# Patient Record
Sex: MALE | Race: WHITE | Employment: UNEMPLOYED | URBAN - NONMETROPOLITAN AREA
[De-identification: names, ages, dates, MRNs, and addresses within clinical notes are randomized per-mention and may not be internally consistent; named-entity substitution may affect disease eponyms.]

---

## 2017-05-03 VITALS
HEIGHT: 69 IN | WEIGHT: 265 LBS | HEART RATE: 64 BPM | DIASTOLIC BLOOD PRESSURE: 60 MMHG | BODY MASS INDEX: 39.25 KG/M2 | SYSTOLIC BLOOD PRESSURE: 114 MMHG

## 2017-05-03 RX ORDER — DIAZEPAM 2 MG/1
2 TABLET ORAL EVERY 6 HOURS PRN
COMMUNITY
End: 2019-04-18 | Stop reason: ALTCHOICE

## 2017-05-03 RX ORDER — GABAPENTIN 300 MG/1
300 CAPSULE ORAL 3 TIMES DAILY
COMMUNITY
End: 2017-06-29

## 2017-05-03 RX ORDER — OXYCODONE HYDROCHLORIDE AND ACETAMINOPHEN 5; 325 MG/1; MG/1
1 TABLET ORAL EVERY 4 HOURS PRN
Status: ON HOLD | COMMUNITY
End: 2017-07-19 | Stop reason: HOSPADM

## 2017-05-05 ENCOUNTER — INITIAL CONSULT (OUTPATIENT)
Dept: PULMONOLOGY | Age: 32
End: 2017-05-05

## 2017-05-05 VITALS
OXYGEN SATURATION: 97 % | HEART RATE: 80 BPM | BODY MASS INDEX: 41.02 KG/M2 | HEIGHT: 71 IN | SYSTOLIC BLOOD PRESSURE: 138 MMHG | WEIGHT: 293 LBS | DIASTOLIC BLOOD PRESSURE: 86 MMHG

## 2017-05-05 DIAGNOSIS — G47.33 OSA (OBSTRUCTIVE SLEEP APNEA): Primary | ICD-10-CM

## 2017-05-05 DIAGNOSIS — I10 ESSENTIAL HYPERTENSION: ICD-10-CM

## 2017-05-05 DIAGNOSIS — G47.10 HYPERSOMNIA: ICD-10-CM

## 2017-05-05 PROCEDURE — 99203 OFFICE O/P NEW LOW 30 MIN: CPT | Performed by: INTERNAL MEDICINE

## 2017-05-05 RX ORDER — FLUTICASONE PROPIONATE 50 MCG
2 SPRAY, SUSPENSION (ML) NASAL DAILY
Qty: 1 BOTTLE | Refills: 6 | Status: ON HOLD | OUTPATIENT
Start: 2017-05-05 | End: 2017-07-19 | Stop reason: HOSPADM

## 2017-05-05 RX ORDER — CALCIUM CARBONATE 500(1250)
5000 TABLET ORAL DAILY
COMMUNITY
End: 2017-08-29 | Stop reason: ALTCHOICE

## 2017-06-29 ENCOUNTER — OFFICE VISIT (OUTPATIENT)
Dept: OTOLARYNGOLOGY | Age: 32
End: 2017-06-29

## 2017-06-29 VITALS
HEART RATE: 80 BPM | HEIGHT: 70 IN | DIASTOLIC BLOOD PRESSURE: 64 MMHG | TEMPERATURE: 98.1 F | WEIGHT: 303.5 LBS | SYSTOLIC BLOOD PRESSURE: 124 MMHG | BODY MASS INDEX: 43.45 KG/M2 | RESPIRATION RATE: 20 BRPM

## 2017-06-29 DIAGNOSIS — Z99.89 OSA ON CPAP: ICD-10-CM

## 2017-06-29 DIAGNOSIS — K13.79 HYPERTROPHY OF UVULA: ICD-10-CM

## 2017-06-29 DIAGNOSIS — K13.79 HYPERTROPHIC SOFT PALATE: ICD-10-CM

## 2017-06-29 DIAGNOSIS — J34.2 NASAL SEPTAL DEVIATION: Primary | ICD-10-CM

## 2017-06-29 DIAGNOSIS — Z78.9 INTOLERANCE OF CONTINUOUS POSITIVE AIRWAY PRESSURE (CPAP) VENTILATION: ICD-10-CM

## 2017-06-29 DIAGNOSIS — G47.33 OSA ON CPAP: ICD-10-CM

## 2017-06-29 DIAGNOSIS — J34.3 HYPERTROPHY OF INFERIOR NASAL TURBINATE: ICD-10-CM

## 2017-06-29 PROCEDURE — 99243 OFF/OP CNSLTJ NEW/EST LOW 30: CPT | Performed by: OTOLARYNGOLOGY

## 2017-06-29 ASSESSMENT — ENCOUNTER SYMPTOMS
SHORTNESS OF BREATH: 0
FACIAL SWELLING: 0
VOICE CHANGE: 0
CHEST TIGHTNESS: 0
STRIDOR: 0
TROUBLE SWALLOWING: 0
VOMITING: 0
COUGH: 0
RHINORRHEA: 0
CHOKING: 0
SORE THROAT: 0
ABDOMINAL PAIN: 0
WHEEZING: 0
SINUS PRESSURE: 0
COLOR CHANGE: 0
DIARRHEA: 0
APNEA: 0
NAUSEA: 0

## 2017-07-18 ENCOUNTER — ANESTHESIA EVENT (OUTPATIENT)
Dept: OPERATING ROOM | Age: 32
End: 2017-07-18
Payer: MEDICAID

## 2017-07-18 PROBLEM — J34.2 NASAL SEPTAL DEVIATION: Status: ACTIVE | Noted: 2017-07-18

## 2017-07-18 PROBLEM — J34.3 HYPERTROPHY OF INFERIOR NASAL TURBINATE: Status: ACTIVE | Noted: 2017-07-18

## 2017-07-19 ENCOUNTER — HOSPITAL ENCOUNTER (OUTPATIENT)
Age: 32
Setting detail: OUTPATIENT SURGERY
Discharge: HOME | End: 2017-07-19
Attending: OTOLARYNGOLOGY | Admitting: OTOLARYNGOLOGY
Payer: MEDICAID

## 2017-07-19 ENCOUNTER — ANESTHESIA (OUTPATIENT)
Dept: OPERATING ROOM | Age: 32
End: 2017-07-19
Payer: MEDICAID

## 2017-07-19 VITALS
TEMPERATURE: 74.7 F | RESPIRATION RATE: 4 BRPM | DIASTOLIC BLOOD PRESSURE: 86 MMHG | SYSTOLIC BLOOD PRESSURE: 164 MMHG | OXYGEN SATURATION: 95 %

## 2017-07-19 VITALS
HEIGHT: 71 IN | HEART RATE: 84 BPM | TEMPERATURE: 97 F | RESPIRATION RATE: 16 BRPM | WEIGHT: 303.57 LBS | DIASTOLIC BLOOD PRESSURE: 81 MMHG | BODY MASS INDEX: 42.5 KG/M2 | SYSTOLIC BLOOD PRESSURE: 145 MMHG | OXYGEN SATURATION: 95 %

## 2017-07-19 PROCEDURE — 6360000002 HC RX W HCPCS: Performed by: OTOLARYNGOLOGY

## 2017-07-19 PROCEDURE — 6370000000 HC RX 637 (ALT 250 FOR IP)

## 2017-07-19 PROCEDURE — 7100000011 HC PHASE II RECOVERY - ADDTL 15 MIN: Performed by: OTOLARYNGOLOGY

## 2017-07-19 PROCEDURE — 7100000000 HC PACU RECOVERY - FIRST 15 MIN: Performed by: OTOLARYNGOLOGY

## 2017-07-19 PROCEDURE — 6370000000 HC RX 637 (ALT 250 FOR IP): Performed by: OTOLARYNGOLOGY

## 2017-07-19 PROCEDURE — 3700000001 HC ADD 15 MINUTES (ANESTHESIA): Performed by: OTOLARYNGOLOGY

## 2017-07-19 PROCEDURE — 7100000010 HC PHASE II RECOVERY - FIRST 15 MIN: Performed by: OTOLARYNGOLOGY

## 2017-07-19 PROCEDURE — 2500000003 HC RX 250 WO HCPCS: Performed by: OTOLARYNGOLOGY

## 2017-07-19 PROCEDURE — 2580000003 HC RX 258: Performed by: OTOLARYNGOLOGY

## 2017-07-19 PROCEDURE — 2500000003 HC RX 250 WO HCPCS: Performed by: REGISTERED NURSE

## 2017-07-19 PROCEDURE — 6370000000 HC RX 637 (ALT 250 FOR IP): Performed by: REGISTERED NURSE

## 2017-07-19 PROCEDURE — 3600000004 HC SURGERY LEVEL 4 BASE: Performed by: OTOLARYNGOLOGY

## 2017-07-19 PROCEDURE — 7100000001 HC PACU RECOVERY - ADDTL 15 MIN: Performed by: OTOLARYNGOLOGY

## 2017-07-19 PROCEDURE — 6360000002 HC RX W HCPCS: Performed by: REGISTERED NURSE

## 2017-07-19 PROCEDURE — 3700000000 HC ANESTHESIA ATTENDED CARE: Performed by: OTOLARYNGOLOGY

## 2017-07-19 PROCEDURE — 3600000014 HC SURGERY LEVEL 4 ADDTL 15MIN: Performed by: OTOLARYNGOLOGY

## 2017-07-19 RX ORDER — HYDROCODONE BITARTRATE AND ACETAMINOPHEN 7.5; 325 MG/1; MG/1
TABLET ORAL
Status: COMPLETED
Start: 2017-07-19 | End: 2017-07-19

## 2017-07-19 RX ORDER — PROPOFOL 10 MG/ML
INJECTION, EMULSION INTRAVENOUS PRN
Status: DISCONTINUED | OUTPATIENT
Start: 2017-07-19 | End: 2017-07-19 | Stop reason: SDUPTHER

## 2017-07-19 RX ORDER — GLYCOPYRROLATE 0.2 MG/ML
INJECTION INTRAMUSCULAR; INTRAVENOUS PRN
Status: DISCONTINUED | OUTPATIENT
Start: 2017-07-19 | End: 2017-07-19 | Stop reason: SDUPTHER

## 2017-07-19 RX ORDER — ROCURONIUM BROMIDE 10 MG/ML
INJECTION, SOLUTION INTRAVENOUS PRN
Status: DISCONTINUED | OUTPATIENT
Start: 2017-07-19 | End: 2017-07-19 | Stop reason: SDUPTHER

## 2017-07-19 RX ORDER — MIDAZOLAM HYDROCHLORIDE 1 MG/ML
INJECTION INTRAMUSCULAR; INTRAVENOUS PRN
Status: DISCONTINUED | OUTPATIENT
Start: 2017-07-19 | End: 2017-07-19 | Stop reason: SDUPTHER

## 2017-07-19 RX ORDER — ONDANSETRON 2 MG/ML
INJECTION INTRAMUSCULAR; INTRAVENOUS PRN
Status: DISCONTINUED | OUTPATIENT
Start: 2017-07-19 | End: 2017-07-19 | Stop reason: SDUPTHER

## 2017-07-19 RX ORDER — ROPIVACAINE HYDROCHLORIDE 5 MG/ML
INJECTION, SOLUTION EPIDURAL; INFILTRATION; PERINEURAL PRN
Status: DISCONTINUED | OUTPATIENT
Start: 2017-07-19 | End: 2017-07-19 | Stop reason: HOSPADM

## 2017-07-19 RX ORDER — LIDOCAINE HYDROCHLORIDE 40 MG/ML
SOLUTION TOPICAL PRN
Status: DISCONTINUED | OUTPATIENT
Start: 2017-07-19 | End: 2017-07-19 | Stop reason: SDUPTHER

## 2017-07-19 RX ORDER — HYDROMORPHONE HCL 110MG/55ML
0.25 PATIENT CONTROLLED ANALGESIA SYRINGE INTRAVENOUS EVERY 5 MIN PRN
Status: DISCONTINUED | OUTPATIENT
Start: 2017-07-19 | End: 2017-07-19 | Stop reason: HOSPADM

## 2017-07-19 RX ORDER — SUCCINYLCHOLINE CHLORIDE 20 MG/ML
INJECTION INTRAMUSCULAR; INTRAVENOUS PRN
Status: DISCONTINUED | OUTPATIENT
Start: 2017-07-19 | End: 2017-07-19 | Stop reason: SDUPTHER

## 2017-07-19 RX ORDER — LABETALOL HYDROCHLORIDE 5 MG/ML
5 INJECTION, SOLUTION INTRAVENOUS EVERY 10 MIN PRN
Status: DISCONTINUED | OUTPATIENT
Start: 2017-07-19 | End: 2017-07-19 | Stop reason: HOSPADM

## 2017-07-19 RX ORDER — FENTANYL CITRATE 50 UG/ML
INJECTION, SOLUTION INTRAMUSCULAR; INTRAVENOUS PRN
Status: DISCONTINUED | OUTPATIENT
Start: 2017-07-19 | End: 2017-07-19 | Stop reason: SDUPTHER

## 2017-07-19 RX ORDER — NEOSTIGMINE METHYLSULFATE 1 MG/ML
INJECTION, SOLUTION INTRAVENOUS PRN
Status: DISCONTINUED | OUTPATIENT
Start: 2017-07-19 | End: 2017-07-19 | Stop reason: SDUPTHER

## 2017-07-19 RX ORDER — CEPHALEXIN 500 MG/1
500 CAPSULE ORAL 4 TIMES DAILY
Qty: 20 CAPSULE | Refills: 0 | Status: SHIPPED | OUTPATIENT
Start: 2017-07-19 | End: 2017-07-24

## 2017-07-19 RX ORDER — SODIUM CHLORIDE 9 MG/ML
INJECTION, SOLUTION INTRAVENOUS CONTINUOUS
Status: DISCONTINUED | OUTPATIENT
Start: 2017-07-19 | End: 2017-07-19 | Stop reason: HOSPADM

## 2017-07-19 RX ORDER — DIPHENHYDRAMINE HYDROCHLORIDE 50 MG/ML
12.5 INJECTION INTRAMUSCULAR; INTRAVENOUS
Status: DISCONTINUED | OUTPATIENT
Start: 2017-07-19 | End: 2017-07-19 | Stop reason: HOSPADM

## 2017-07-19 RX ORDER — PREDNISONE 20 MG/1
20 TABLET ORAL DAILY
Qty: 4 TABLET | Refills: 0 | Status: SHIPPED | OUTPATIENT
Start: 2017-07-19 | End: 2017-07-22

## 2017-07-19 RX ORDER — DEXAMETHASONE SODIUM PHOSPHATE 4 MG/ML
12 INJECTION, SOLUTION INTRA-ARTICULAR; INTRALESIONAL; INTRAMUSCULAR; INTRAVENOUS; SOFT TISSUE
Status: DISCONTINUED | OUTPATIENT
Start: 2017-07-19 | End: 2017-07-19 | Stop reason: HOSPADM

## 2017-07-19 RX ORDER — FENTANYL CITRATE 50 UG/ML
25 INJECTION, SOLUTION INTRAMUSCULAR; INTRAVENOUS EVERY 5 MIN PRN
Status: DISCONTINUED | OUTPATIENT
Start: 2017-07-19 | End: 2017-07-19 | Stop reason: HOSPADM

## 2017-07-19 RX ORDER — HYDROMORPHONE HCL 110MG/55ML
0.5 PATIENT CONTROLLED ANALGESIA SYRINGE INTRAVENOUS EVERY 5 MIN PRN
Status: DISCONTINUED | OUTPATIENT
Start: 2017-07-19 | End: 2017-07-19 | Stop reason: HOSPADM

## 2017-07-19 RX ORDER — HYDROCODONE BITARTRATE AND ACETAMINOPHEN 7.5; 325 MG/1; MG/1
1 TABLET ORAL EVERY 4 HOURS PRN
Qty: 20 TABLET | Refills: 0 | Status: SHIPPED | OUTPATIENT
Start: 2017-07-19 | End: 2017-08-29 | Stop reason: ALTCHOICE

## 2017-07-19 RX ORDER — MINERAL OIL AND WHITE PETROLATUM 150; 830 MG/G; MG/G
OINTMENT OPHTHALMIC PRN
Status: DISCONTINUED | OUTPATIENT
Start: 2017-07-19 | End: 2017-07-19 | Stop reason: SDUPTHER

## 2017-07-19 RX ORDER — PSEUDOEPHEDRINE HYDROCHLORIDE 30 MG/1
30 TABLET ORAL EVERY 6 HOURS
Qty: 56 TABLET | Refills: 1 | Status: SHIPPED | OUTPATIENT
Start: 2017-07-19 | End: 2017-08-02

## 2017-07-19 RX ORDER — LIDOCAINE HYDROCHLORIDE AND EPINEPHRINE 10; 10 MG/ML; UG/ML
INJECTION, SOLUTION INFILTRATION; PERINEURAL PRN
Status: DISCONTINUED | OUTPATIENT
Start: 2017-07-19 | End: 2017-07-19 | Stop reason: HOSPADM

## 2017-07-19 RX ORDER — PROMETHAZINE HYDROCHLORIDE 25 MG/ML
12.5 INJECTION, SOLUTION INTRAMUSCULAR; INTRAVENOUS
Status: DISCONTINUED | OUTPATIENT
Start: 2017-07-19 | End: 2017-07-19 | Stop reason: HOSPADM

## 2017-07-19 RX ORDER — FENTANYL CITRATE 50 UG/ML
50 INJECTION, SOLUTION INTRAMUSCULAR; INTRAVENOUS EVERY 5 MIN PRN
Status: DISCONTINUED | OUTPATIENT
Start: 2017-07-19 | End: 2017-07-19 | Stop reason: HOSPADM

## 2017-07-19 RX ORDER — DEXAMETHASONE SODIUM PHOSPHATE 4 MG/ML
INJECTION, SOLUTION INTRA-ARTICULAR; INTRALESIONAL; INTRAMUSCULAR; INTRAVENOUS; SOFT TISSUE PRN
Status: DISCONTINUED | OUTPATIENT
Start: 2017-07-19 | End: 2017-07-19 | Stop reason: HOSPADM

## 2017-07-19 RX ORDER — MEPERIDINE HYDROCHLORIDE 25 MG/ML
25 INJECTION INTRAMUSCULAR; INTRAVENOUS; SUBCUTANEOUS
Status: DISCONTINUED | OUTPATIENT
Start: 2017-07-19 | End: 2017-07-19 | Stop reason: HOSPADM

## 2017-07-19 RX ORDER — OXYMETAZOLINE HYDROCHLORIDE 0.05 G/100ML
SPRAY NASAL PRN
Status: DISCONTINUED | OUTPATIENT
Start: 2017-07-19 | End: 2017-07-19 | Stop reason: HOSPADM

## 2017-07-19 RX ORDER — GINSENG 100 MG
CAPSULE ORAL PRN
Status: DISCONTINUED | OUTPATIENT
Start: 2017-07-19 | End: 2017-07-19 | Stop reason: HOSPADM

## 2017-07-19 RX ORDER — LIDOCAINE HYDROCHLORIDE 20 MG/ML
INJECTION, SOLUTION EPIDURAL; INFILTRATION; INTRACAUDAL; PERINEURAL PRN
Status: DISCONTINUED | OUTPATIENT
Start: 2017-07-19 | End: 2017-07-19 | Stop reason: SDUPTHER

## 2017-07-19 RX ADMIN — DEXAMETHASONE SODIUM PHOSPHATE 12 MG: 4 INJECTION, SOLUTION INTRAMUSCULAR; INTRAVENOUS at 07:18

## 2017-07-19 RX ADMIN — HYDROCODONE BITARTRATE AND ACETAMINOPHEN 1 TABLET: 7.5; 325 TABLET ORAL at 12:22

## 2017-07-19 RX ADMIN — NEOSTIGMINE METHYLSULFATE 5 MG: 1 INJECTION, SOLUTION INTRAVENOUS at 10:32

## 2017-07-19 RX ADMIN — FENTANYL CITRATE 50 MCG: 50 INJECTION, SOLUTION INTRAMUSCULAR; INTRAVENOUS at 10:22

## 2017-07-19 RX ADMIN — GLYCOPYRROLATE 0.2 MG: 0.2 INJECTION, SOLUTION INTRAMUSCULAR; INTRAVENOUS at 07:32

## 2017-07-19 RX ADMIN — SODIUM CHLORIDE: 9 INJECTION, SOLUTION INTRAVENOUS at 07:10

## 2017-07-19 RX ADMIN — FENTANYL CITRATE 50 MCG: 50 INJECTION, SOLUTION INTRAMUSCULAR; INTRAVENOUS at 10:31

## 2017-07-19 RX ADMIN — FENTANYL CITRATE 100 MCG: 50 INJECTION, SOLUTION INTRAMUSCULAR; INTRAVENOUS at 07:41

## 2017-07-19 RX ADMIN — GLYCOPYRROLATE 0.4 MG: 0.2 INJECTION, SOLUTION INTRAMUSCULAR; INTRAVENOUS at 10:32

## 2017-07-19 RX ADMIN — SUCCINYLCHOLINE CHLORIDE 200 MG: 20 INJECTION, SOLUTION INTRAMUSCULAR; INTRAVENOUS at 07:41

## 2017-07-19 RX ADMIN — MIDAZOLAM 2 MG: 1 INJECTION INTRAMUSCULAR; INTRAVENOUS at 07:32

## 2017-07-19 RX ADMIN — LIDOCAINE HYDROCHLORIDE 4 ML: 40 SOLUTION TOPICAL at 10:19

## 2017-07-19 RX ADMIN — LIDOCAINE HYDROCHLORIDE 100 MG: 20 INJECTION, SOLUTION EPIDURAL; INFILTRATION; INTRACAUDAL; PERINEURAL at 07:41

## 2017-07-19 RX ADMIN — ONDANSETRON 4 MG: 2 INJECTION INTRAMUSCULAR; INTRAVENOUS at 10:19

## 2017-07-19 RX ADMIN — Medication 50 MG: at 07:57

## 2017-07-19 RX ADMIN — PROPOFOL 200 MG: 10 INJECTION, EMULSION INTRAVENOUS at 07:41

## 2017-07-19 RX ADMIN — FENTANYL CITRATE 150 MCG: 50 INJECTION, SOLUTION INTRAMUSCULAR; INTRAVENOUS at 08:00

## 2017-07-19 RX ADMIN — MINERAL OIL AND WHITE PETROLATUM 2 APPLICATOR: 150; 830 OINTMENT OPHTHALMIC at 07:43

## 2017-07-19 RX ADMIN — SODIUM CHLORIDE: 9 INJECTION, SOLUTION INTRAVENOUS at 08:15

## 2017-07-19 ASSESSMENT — PULMONARY FUNCTION TESTS
PIF_VALUE: 27
PIF_VALUE: 24
PIF_VALUE: 30
PIF_VALUE: 30
PIF_VALUE: 26
PIF_VALUE: 31
PIF_VALUE: 27
PIF_VALUE: 4
PIF_VALUE: 27
PIF_VALUE: 30
PIF_VALUE: 27
PIF_VALUE: 26
PIF_VALUE: 27
PIF_VALUE: 28
PIF_VALUE: 27
PIF_VALUE: 1
PIF_VALUE: 29
PIF_VALUE: 31
PIF_VALUE: 23
PIF_VALUE: 31
PIF_VALUE: 27
PIF_VALUE: 28
PIF_VALUE: 2
PIF_VALUE: 26
PIF_VALUE: 27
PIF_VALUE: 27
PIF_VALUE: 4
PIF_VALUE: 31
PIF_VALUE: 29
PIF_VALUE: 27
PIF_VALUE: 25
PIF_VALUE: 23
PIF_VALUE: 27
PIF_VALUE: 27
PIF_VALUE: 2
PIF_VALUE: 27
PIF_VALUE: 19
PIF_VALUE: 31
PIF_VALUE: 29
PIF_VALUE: 32
PIF_VALUE: 27
PIF_VALUE: 27
PIF_VALUE: 26
PIF_VALUE: 27
PIF_VALUE: 27
PIF_VALUE: 29
PIF_VALUE: 26
PIF_VALUE: 27
PIF_VALUE: 31
PIF_VALUE: 27
PIF_VALUE: 31
PIF_VALUE: 31
PIF_VALUE: 27
PIF_VALUE: 16
PIF_VALUE: 27
PIF_VALUE: 29
PIF_VALUE: 27
PIF_VALUE: 27
PIF_VALUE: 29
PIF_VALUE: 27
PIF_VALUE: 31
PIF_VALUE: 27
PIF_VALUE: 27
PIF_VALUE: 26
PIF_VALUE: 29
PIF_VALUE: 27
PIF_VALUE: 29
PIF_VALUE: 29
PIF_VALUE: 27
PIF_VALUE: 27
PIF_VALUE: 30
PIF_VALUE: 20
PIF_VALUE: 27
PIF_VALUE: 27
PIF_VALUE: 31
PIF_VALUE: 31
PIF_VALUE: 27
PIF_VALUE: 31
PIF_VALUE: 27
PIF_VALUE: 31
PIF_VALUE: 31
PIF_VALUE: 20
PIF_VALUE: 25
PIF_VALUE: 32
PIF_VALUE: 21
PIF_VALUE: 27
PIF_VALUE: 30
PIF_VALUE: 3
PIF_VALUE: 0
PIF_VALUE: 27
PIF_VALUE: 27
PIF_VALUE: 2
PIF_VALUE: 27
PIF_VALUE: 27
PIF_VALUE: 29
PIF_VALUE: 27
PIF_VALUE: 27
PIF_VALUE: 26
PIF_VALUE: 27
PIF_VALUE: 27
PIF_VALUE: 24
PIF_VALUE: 29
PIF_VALUE: 27
PIF_VALUE: 4
PIF_VALUE: 27
PIF_VALUE: 33
PIF_VALUE: 27
PIF_VALUE: 24
PIF_VALUE: 0
PIF_VALUE: 27
PIF_VALUE: 31
PIF_VALUE: 31
PIF_VALUE: 26
PIF_VALUE: 27
PIF_VALUE: 27
PIF_VALUE: 0
PIF_VALUE: 6
PIF_VALUE: 27
PIF_VALUE: 23
PIF_VALUE: 27
PIF_VALUE: 27
PIF_VALUE: 29
PIF_VALUE: 27
PIF_VALUE: 29
PIF_VALUE: 31
PIF_VALUE: 27
PIF_VALUE: 4
PIF_VALUE: 27
PIF_VALUE: 0
PIF_VALUE: 31
PIF_VALUE: 27
PIF_VALUE: 30
PIF_VALUE: 27
PIF_VALUE: 31
PIF_VALUE: 27
PIF_VALUE: 29
PIF_VALUE: 27
PIF_VALUE: 24
PIF_VALUE: 32
PIF_VALUE: 29
PIF_VALUE: 27
PIF_VALUE: 29
PIF_VALUE: 27
PIF_VALUE: 31
PIF_VALUE: 9
PIF_VALUE: 0
PIF_VALUE: 27
PIF_VALUE: 4
PIF_VALUE: 22
PIF_VALUE: 31
PIF_VALUE: 31
PIF_VALUE: 27
PIF_VALUE: 27

## 2017-07-19 ASSESSMENT — PAIN SCALES - GENERAL
PAINLEVEL_OUTOF10: 6
PAINLEVEL_OUTOF10: 5
PAINLEVEL_OUTOF10: 9
PAINLEVEL_OUTOF10: 9
PAINLEVEL_OUTOF10: 7

## 2017-07-19 ASSESSMENT — PAIN - FUNCTIONAL ASSESSMENT: PAIN_FUNCTIONAL_ASSESSMENT: 0-10

## 2017-07-20 ENCOUNTER — OFFICE VISIT (OUTPATIENT)
Dept: ENT CLINIC | Age: 32
End: 2017-07-20

## 2017-07-20 VITALS
TEMPERATURE: 98.1 F | RESPIRATION RATE: 24 BRPM | SYSTOLIC BLOOD PRESSURE: 160 MMHG | DIASTOLIC BLOOD PRESSURE: 80 MMHG | HEIGHT: 71 IN | HEART RATE: 88 BPM | WEIGHT: 307.3 LBS | BODY MASS INDEX: 43.02 KG/M2

## 2017-07-20 DIAGNOSIS — Z99.89 OSA ON CPAP: ICD-10-CM

## 2017-07-20 DIAGNOSIS — Z78.9 INTOLERANCE OF CONTINUOUS POSITIVE AIRWAY PRESSURE (CPAP) VENTILATION: ICD-10-CM

## 2017-07-20 DIAGNOSIS — J34.2 NASAL SEPTAL DEVIATION: Primary | ICD-10-CM

## 2017-07-20 DIAGNOSIS — J34.3 HYPERTROPHY OF INFERIOR NASAL TURBINATE: ICD-10-CM

## 2017-07-20 DIAGNOSIS — G47.33 OSA ON CPAP: ICD-10-CM

## 2017-07-20 DIAGNOSIS — K13.79 HYPERTROPHY OF UVULA: ICD-10-CM

## 2017-07-20 DIAGNOSIS — Z98.890 STATUS POST NASAL SEPTOPLASTY: ICD-10-CM

## 2017-07-20 DIAGNOSIS — K13.79 HYPERTROPHIC SOFT PALATE: ICD-10-CM

## 2017-07-20 PROCEDURE — 99024 POSTOP FOLLOW-UP VISIT: CPT | Performed by: OTOLARYNGOLOGY

## 2017-07-20 ASSESSMENT — ENCOUNTER SYMPTOMS
COUGH: 0
RHINORRHEA: 0
NAUSEA: 0
COLOR CHANGE: 0
WHEEZING: 0
DIARRHEA: 0
VOMITING: 0
ABDOMINAL PAIN: 0
STRIDOR: 0
TROUBLE SWALLOWING: 0
CHEST TIGHTNESS: 0
SINUS PRESSURE: 0
APNEA: 0
FACIAL SWELLING: 0
SHORTNESS OF BREATH: 0
SORE THROAT: 0
VOICE CHANGE: 0
CHOKING: 0

## 2017-07-27 ENCOUNTER — OFFICE VISIT (OUTPATIENT)
Dept: ENT CLINIC | Age: 32
End: 2017-07-27

## 2017-07-27 VITALS
WEIGHT: 301.8 LBS | DIASTOLIC BLOOD PRESSURE: 88 MMHG | TEMPERATURE: 98.2 F | SYSTOLIC BLOOD PRESSURE: 126 MMHG | HEART RATE: 88 BPM | RESPIRATION RATE: 20 BRPM | BODY MASS INDEX: 42.09 KG/M2

## 2017-07-27 DIAGNOSIS — Z98.890 STATUS POST NASAL SEPTOPLASTY: ICD-10-CM

## 2017-07-27 DIAGNOSIS — Z99.89 OSA ON CPAP: ICD-10-CM

## 2017-07-27 DIAGNOSIS — K13.79 HYPERTROPHY OF UVULA: ICD-10-CM

## 2017-07-27 DIAGNOSIS — G47.33 OSA ON CPAP: ICD-10-CM

## 2017-07-27 DIAGNOSIS — K13.79 HYPERTROPHIC SOFT PALATE: ICD-10-CM

## 2017-07-27 DIAGNOSIS — Z78.9 INTOLERANCE OF CONTINUOUS POSITIVE AIRWAY PRESSURE (CPAP) VENTILATION: ICD-10-CM

## 2017-07-27 DIAGNOSIS — J34.3 HYPERTROPHY OF INFERIOR NASAL TURBINATE: ICD-10-CM

## 2017-07-27 DIAGNOSIS — J34.2 NASAL SEPTAL DEVIATION: Primary | ICD-10-CM

## 2017-07-27 PROCEDURE — 99024 POSTOP FOLLOW-UP VISIT: CPT | Performed by: OTOLARYNGOLOGY

## 2017-07-27 RX ORDER — DOXYCYCLINE HYCLATE 100 MG/1
100 CAPSULE ORAL 4 TIMES DAILY
COMMUNITY
End: 2017-08-29 | Stop reason: ALTCHOICE

## 2017-07-27 RX ORDER — SULFAMETHOXAZOLE AND TRIMETHOPRIM 800; 160 MG/1; MG/1
1 TABLET ORAL 2 TIMES DAILY
Qty: 28 TABLET | Refills: 2 | Status: SHIPPED | OUTPATIENT
Start: 2017-07-27 | End: 2017-08-10

## 2017-07-27 ASSESSMENT — ENCOUNTER SYMPTOMS
VOICE CHANGE: 0
SINUS PRESSURE: 0
NAUSEA: 0
VOMITING: 0
DIARRHEA: 0
TROUBLE SWALLOWING: 0
RHINORRHEA: 0
STRIDOR: 0
SHORTNESS OF BREATH: 0
COLOR CHANGE: 0
COUGH: 0
CHEST TIGHTNESS: 0
WHEEZING: 0
SORE THROAT: 0
FACIAL SWELLING: 0
CHOKING: 0
APNEA: 0
ABDOMINAL PAIN: 0

## 2017-07-28 LAB
ORGANISM: ABNORMAL
THROAT/NOSE CULTURE: ABNORMAL

## 2017-08-07 ENCOUNTER — NURSE TRIAGE (OUTPATIENT)
Dept: ADMINISTRATIVE | Age: 32
End: 2017-08-07

## 2017-08-16 ENCOUNTER — TELEPHONE (OUTPATIENT)
Dept: ENT CLINIC | Age: 32
End: 2017-08-16

## 2017-08-29 ENCOUNTER — OFFICE VISIT (OUTPATIENT)
Dept: ENT CLINIC | Age: 32
End: 2017-08-29

## 2017-08-29 VITALS
HEART RATE: 88 BPM | SYSTOLIC BLOOD PRESSURE: 128 MMHG | TEMPERATURE: 98.4 F | HEIGHT: 71 IN | WEIGHT: 297.7 LBS | RESPIRATION RATE: 14 BRPM | DIASTOLIC BLOOD PRESSURE: 80 MMHG | BODY MASS INDEX: 41.68 KG/M2

## 2017-08-29 DIAGNOSIS — J34.3 HYPERTROPHY OF INFERIOR NASAL TURBINATE: ICD-10-CM

## 2017-08-29 DIAGNOSIS — Z98.890 STATUS POST NASAL SEPTOPLASTY: ICD-10-CM

## 2017-08-29 DIAGNOSIS — J34.2 NASAL SEPTAL DEVIATION: Primary | ICD-10-CM

## 2017-08-29 DIAGNOSIS — G47.33 OSA ON CPAP: ICD-10-CM

## 2017-08-29 DIAGNOSIS — Z99.89 OSA ON CPAP: ICD-10-CM

## 2017-08-29 DIAGNOSIS — K13.79 HYPERTROPHIC SOFT PALATE: ICD-10-CM

## 2017-08-29 DIAGNOSIS — Z78.9 INTOLERANCE OF CONTINUOUS POSITIVE AIRWAY PRESSURE (CPAP) VENTILATION: ICD-10-CM

## 2017-08-29 DIAGNOSIS — K13.79 HYPERTROPHY OF UVULA: ICD-10-CM

## 2017-08-29 PROCEDURE — 99024 POSTOP FOLLOW-UP VISIT: CPT | Performed by: OTOLARYNGOLOGY

## 2017-08-29 ASSESSMENT — ENCOUNTER SYMPTOMS
CHOKING: 0
SHORTNESS OF BREATH: 0
VOICE CHANGE: 0
STRIDOR: 0
SORE THROAT: 0
COLOR CHANGE: 0
FACIAL SWELLING: 0
VOMITING: 0
CHEST TIGHTNESS: 0
NAUSEA: 0
COUGH: 0
ABDOMINAL PAIN: 0
RHINORRHEA: 0
WHEEZING: 0
DIARRHEA: 0
TROUBLE SWALLOWING: 0
SINUS PRESSURE: 0
APNEA: 0

## 2019-04-18 ENCOUNTER — OFFICE VISIT (OUTPATIENT)
Dept: ENT CLINIC | Age: 34
End: 2019-04-18
Payer: MEDICAID

## 2019-04-18 VITALS
RESPIRATION RATE: 12 BRPM | SYSTOLIC BLOOD PRESSURE: 144 MMHG | WEIGHT: 315 LBS | BODY MASS INDEX: 42.66 KG/M2 | HEART RATE: 74 BPM | TEMPERATURE: 98.6 F | DIASTOLIC BLOOD PRESSURE: 100 MMHG | HEIGHT: 72 IN

## 2019-04-18 DIAGNOSIS — K13.79 HYPERTROPHY OF UVULA: ICD-10-CM

## 2019-04-18 DIAGNOSIS — E66.2 CLASS 3 OBESITY WITH ALVEOLAR HYPOVENTILATION, SERIOUS COMORBIDITY, AND BODY MASS INDEX (BMI) OF 40.0 TO 44.9 IN ADULT (HCC): ICD-10-CM

## 2019-04-18 DIAGNOSIS — J35.1 PALATINE TONSIL HYPERTROPHY: ICD-10-CM

## 2019-04-18 DIAGNOSIS — K13.79 HYPERTROPHIC SOFT PALATE: ICD-10-CM

## 2019-04-18 DIAGNOSIS — E55.9 VITAMIN D DEFICIENCY: ICD-10-CM

## 2019-04-18 DIAGNOSIS — J35.1 LINGUAL TONSIL HYPERTROPHY: ICD-10-CM

## 2019-04-18 DIAGNOSIS — K14.8 LARGE TONGUE: ICD-10-CM

## 2019-04-18 DIAGNOSIS — Z98.890 STATUS POST NASAL SEPTOPLASTY: ICD-10-CM

## 2019-04-18 DIAGNOSIS — Z78.9 INTOLERANCE OF CONTINUOUS POSITIVE AIRWAY PRESSURE (CPAP) VENTILATION: Primary | ICD-10-CM

## 2019-04-18 DIAGNOSIS — K21.9 GERD WITHOUT ESOPHAGITIS: ICD-10-CM

## 2019-04-18 PROBLEM — G47.33 OSA ON CPAP: Status: ACTIVE | Noted: 2019-04-18

## 2019-04-18 PROBLEM — Z99.89 OSA ON CPAP: Status: ACTIVE | Noted: 2019-04-18

## 2019-04-18 PROBLEM — E66.813 CLASS 3 OBESITY WITH ALVEOLAR HYPOVENTILATION, SERIOUS COMORBIDITY, AND BODY MASS INDEX (BMI) OF 40.0 TO 44.9 IN ADULT: Status: ACTIVE | Noted: 2019-04-18

## 2019-04-18 PROCEDURE — 31575 DIAGNOSTIC LARYNGOSCOPY: CPT | Performed by: OTOLARYNGOLOGY

## 2019-04-18 PROCEDURE — 99214 OFFICE O/P EST MOD 30 MIN: CPT | Performed by: OTOLARYNGOLOGY

## 2019-04-18 RX ORDER — CHOLECALCIFEROL (VITAMIN D3) 1250 MCG
1 CAPSULE ORAL WEEKLY
Qty: 10 CAPSULE | Refills: 5 | Status: SHIPPED | OUTPATIENT
Start: 2019-04-18 | End: 2021-02-09 | Stop reason: ALTCHOICE

## 2019-04-18 ASSESSMENT — ENCOUNTER SYMPTOMS
FACIAL SWELLING: 0
WHEEZING: 0
ABDOMINAL PAIN: 0
CHEST TIGHTNESS: 0
TROUBLE SWALLOWING: 0
COUGH: 0
NAUSEA: 0
VOICE CHANGE: 0
RHINORRHEA: 0
COLOR CHANGE: 0
VOMITING: 0
SHORTNESS OF BREATH: 0
DIARRHEA: 0
CHOKING: 0
STRIDOR: 0
APNEA: 0
SORE THROAT: 0
SINUS PRESSURE: 0

## 2019-04-18 NOTE — PATIENT INSTRUCTIONS
Do not fill up stomach for 3 hours before bedtime. Elevate the head of the bed, perhaps with a foam wedge. May take Prilosec 20 mg with supper.      See dentist regarding dental splint for GERHARD

## 2019-04-18 NOTE — LETTER
[address] and [address] [address] [place] [address] Alberto Hortal[address]  [address]  Phone: [phone]  Fax: [address], [place]        April 29, 2019    Jina Albarado, [address]    Patient: Ronnie Judge   MR Number: [id number]   YOB: 1985   Date of Visit: 4/18/2019     Dear Jina Albarado,    I recently saw your patient, Ronnie Judge, regarding proceeding with the UPPP and tonsillectomy. [facility] has  Below are the relevant portions of my assessment and plan of care. Assessment & Plan   Diagnoses and all orders for this visit:     Diagnosis Orders   1. Intolerance of continuous positive airway pressure (CPAP) ventilation  [place] REMOVAL OF TONSILS,12+ Y/O    UPPP    NM LARYNGOSCOPY FLEXIBLE DIAGNOSTIC   2. Hypertrophy of uvula  UPPP   3. Hypertrophic soft palate  UPPP   4. Large tongue  UPPP    NM LARYNGOSCOPY FLEXIBLE DIAGNOSTIC   5. Class 3 obesity with alveolar hypoventilation, serious comorbidity, and body mass index (BMI) of 40.0 to 44.9 in adult (HCC)  [place] REMOVAL OF TONSILS,12+ Y/O    UPPP   6. Status post nasal septoplasty  [place] REMOVAL OF TONSILS,12+ Y/O    UPPP   7. GERD without esophagitis  NM LARYNGOSCOPY FLEXIBLE DIAGNOSTIC   8. Lingual tonsil hypertrophy     9. [place] tonsil hypertrophy  NM REMOVAL OF TONSILS,12+ Y/O   10. Vitamin D deficiency  Vitamin D 25 Hydroxy       The findings were explained and his questions were answered. As noted previously has severe obstruction the level of the oropharynx with a long thick palate and a huge thick wide uvula. Uvulopalatoplasty and tonsillectomy was discussed previously as the next logical step. He also has a prominent base of tongue. The potential third phase would be partial posterior midline glossectomy to reduce the volume in that base of tongue, should he still not be effectively treated with CPAP.   Based on my extensive experience with virtually 40 years of treating sleep apnea, his oropharyngeal obstruction is more significant than the obstruction in the hypopharynx at the base of tongue level. After these procedures, if he still has difficulty, a dental device might pull his base of tongue forward enough that he might avoid CPAP. He is still far too obstructed at both levels for a dental device to work now. Maxillo- mandibular osteotomies have very high expense and morbidity, and as noted in the authoritative [place] experience, are the last resort. Furthermore there is no one in this area that does the surgery. Recommended procedures:  Uvulopalatopharyngoplasty  Tonsillectomy  Estimated surgical time, 2 hours. Informed consent. UPPP plus tonsillectomy  Discussed the risks including but not limited to bleeding intraop as well as postop with possible resultant airway obstruction, MI, stroke, and death. Also discussed the risks of velopharyngeal insufficiency, voice change, failure to improve or resolve the snoring and/or apnea, weight loss and dehydration. Rarely a revision might be necessary. The goal is to improve his airway when asleep by eliminating the redundant/obstructing tissues. No guarantees are made about outcomes or compllications. Pt understands and accepts these risks and wishes to proceed    He will probably need to be kept overnight for pain control and observation of airway. If you have questions, please do not hesitate to call me. I look forward to following Hermelindo David along with you.     Sincerely,          Zahra Gutierrez MD

## 2019-04-18 NOTE — PROGRESS NOTES
[address], NOSE AND THROAT  [address] [address]  Dept: [phone]  Dept Fax: [phone]  Loc: [phone]    David Cisneros is a 29 y.o. male who was referred byNo ref. provider found for:  Chief Complaint   Patient presents with    Follow-up     Pt. would like to discuss surgery   . HPI:     David Cisneros is a 29 y.o. male who presents today for discussion about proceeding with a next step in the plan to get his airway opened up sufficiently. His nose was almost completely obstructed and and his airway is better but he is still dramatically snoring. His daytme somnolence is still interfering with work and home life. Original AHI 73.8, With desaturation to 71%  TITRATED CPAP PRESSURE:  16  Patient cannot tolerate. Cannot keep the mask on. BMI 40.87 kg/m2. Mallampati Score: 4   Neck Circumference:20 Inches  [place] sleepiness score 5/5/17: 17    History:     No Known Allergies  Current Outpatient Medications   Medication Sig Dispense Refill    UNABLE TO FIND Take 50 mg by mouth daily Indications: Medical Canibus      Cholecalciferol (VITAMIN D3) 24870 units CAPS Take 1 capsule by mouth once a week 10 capsule 5     No current facility-administered medications for this visit.       Past Medical History:   Diagnosis Date    Chronic back pain     Diverticulitis     Sleep apnea       Past Surgical History:   Procedure Laterality Date    ANKLE SURGERY  2001    BACK SURGERY      CHOLECYSTECTOMY      COLON SURGERY      NOSE SURGERY  1993    Laser Surgery    RECTAL SURGERY      Ruptured Sigmoid Colon 2013    SEPTOPLASTY Left 7/19/2017    SEPTOPLASTY SUBMUCOUS RESECT TURBINATES, PARTIAL LEFT performed by Lui Briones MD at [address] [place] [address] History   Problem Relation Age of Onset    Cancer Mother     Diabetes Father     Heart Disease Father     Heart Disease Maternal Grandmother     Heart Disease Maternal Grandfather     Diabetes Paternal Grandmother     COPD Paternal Grandmother     Diabetes Paternal Grandfather     COPD Paternal Grandfather      Social History     Tobacco Use    Smoking status: Current Every Day Smoker     Packs/day: 0.50     Types: Cigarettes     Start date: 5/5/2007    Smokeless tobacco: Never Used    Tobacco comment: 0.5-1 ppd 5/5/17   Substance Use Topics    Alcohol use: Yes     Comment: rarely       Subjective:      Review of Systems   Constitutional: Negative for activity change, appetite change, chills, diaphoresis, fatigue, fever and unexpected weight change. HENT: Negative for congestion, dental problem, ear discharge, ear pain, facial swelling, hearing loss, mouth sores, nosebleeds, postnasal drip, rhinorrhea, sinus pressure, sneezing, sore throat, tinnitus, trouble swallowing and voice change. Eyes: Negative for visual disturbance. Respiratory: Negative for apnea, cough, choking, chest tightness, shortness of breath, wheezing and stridor. Cardiovascular: Negative for chest pain, palpitations and leg swelling. Gastrointestinal: Negative for abdominal pain, diarrhea, nausea and vomiting. Endocrine: Negative for cold intolerance, heat intolerance, polydipsia and polyuria. Genitourinary: Negative for dysuria, enuresis and hematuria. Musculoskeletal: Negative for arthralgias, gait problem, neck pain and neck stiffness. Skin: Negative for color change and rash. Allergic/Immunologic: Negative for environmental allergies, food allergies and immunocompromised state. Neurological: Negative for dizziness, syncope, facial asymmetry, speech difficulty, light-headedness and headaches. Hematological: Negative for adenopathy. Does not bruise/bleed easily. Psychiatric/Behavioral: Negative for confusion and sleep disturbance. The patient is not nervous/anxious.         Objective:   BP (!) 144/100 (Site: Right Upper Arm, Position: Sitting)   Pulse 74   Temp 98.6 °F (37 process tenderness present. No tracheal deviation present. No thyroid mass and no thyromegaly present. No adenopathy. Salivary glands not enlarged and normal to palpation     Cardiovascular: Normal rate and regular rhythm. No murmur heard. Pulmonary/Chest: Effort normal and breath sounds normal. No stridor. Chest wall is not dull to percussion. Neurological: He is alert and oriented to person, place, and time. No cranial nerve deficit (VIIth N function intact bilat). Psychiatric: He has a normal mood and affect. Nursing note and vitals reviewed. PROCEDURE: FIBEROPTIC LARYNGOSCOPY    A fiberoptic laryngoscopy was performed under topical anesthesia, after using Afrin and Lidocaine spray in the nasal fossa. The nasal fossa, nasopharynx, hypopharynx and larynx were carefully examined. Base of tongue was symmetrical and large. Epiglottis appeared normal and was retrodisplaced. True vocal cords had normal mobility. There was no erythema. No mucosal lesions or masses were noted. No pooling in the pyriform sinuses. Data:  All of the past medical history, past surgical history, family history,social history, allergies and current medications were reviewed with the patient. Assessment & Plan   Diagnoses and all orders for this visit:     Diagnosis Orders   1. Intolerance of continuous positive airway pressure (CPAP) ventilation  [place] REMOVAL OF TONSILS,12+ Y/O    UPPP    [place] LARYNGOSCOPY FLEXIBLE DIAGNOSTIC   2. Hypertrophy of uvula  UPPP   3. Hypertrophic soft palate  UPPP   4. Large tongue  UPPP    VA LARYNGOSCOPY FLEXIBLE DIAGNOSTIC   5. Class 3 obesity with alveolar hypoventilation, serious comorbidity, and body mass index (BMI) of 40.0 to 44.9 in adult (HCC)  [place] REMOVAL OF TONSILS,12+ Y/O    UPPP   6. Status post nasal septoplasty  [place] REMOVAL OF TONSILS,12+ Y/O    UPPP   7. GERD without esophagitis  [place] LARYNGOSCOPY FLEXIBLE DIAGNOSTIC   8. Lingual tonsil hypertrophy     9.  [place] tonsil hypertrophy PA REMOVAL OF TONSILS,12+ Y/O   10. Vitamin D deficiency  Vitamin D 25 Hydroxy       The findings were explained and his questions were answered. As noted previously has severe obstruction the level of the oropharynx with a long thick palate and a huge thick wide uvula. Uvulopalatoplasty and tonsillectomy was discussed previously as the next logical step. He also has a prominent base of tongue. The potential third phase would be partial posterior midline glossectomy to reduce the volume in that base of tongue, should he still not be effectively treated with CPAP. Based on my extensive experience with virtually 40 years of treating sleep apnea, his oropharyngeal obstruction is more significant than the obstruction in the hypopharynx at the base of tongue level. After these procedures, if he still has difficulty, a dental device might pull his base of tongue forward enough that he might avoid CPAP. He is still far too obstructed at both levels for a dental device to work now. Maxillo- mandibular osteotomies have very high expense and morbidity, and as noted in the authoritative [place] experience, are the last resort. Furthermore there is no one in this area that does the surgery. Recommended procedures:  Uvulopalatopharyngoplasty  Tonsillectomy  Estimated surgical time, 2 hours. Informed consent. UPPP plus tonsillectomy  Discussed the risks including but not limited to bleeding intraop as well as postop with possible resultant airway obstruction, MI, stroke, and death. Also discussed the risks of velopharyngeal insufficiency, voice change, failure to improve or resolve the snoring and/or apnea, weight loss and dehydration. Rarely a revision might be necessary. The goal is to improve his airway when asleep by eliminating the redundant/obstructing tissues. No guarantees are made about outcomes or compllications.  Pt understands and accepts these risks and wishes to proceed    He will probably need to be kept overnight for pain control and observation of airway. Jayy Carroll. Cindy Reynolds MD    **This report has been created using voice recognition software. It may contain minor errors which are inherent in voicerecognition technology. **

## 2019-04-24 ENCOUNTER — TELEPHONE (OUTPATIENT)
Dept: ENT CLINIC | Age: 34
End: 2019-04-24

## 2019-04-30 DIAGNOSIS — K13.79 HYPERTROPHY OF UVULA: ICD-10-CM

## 2019-04-30 DIAGNOSIS — Z01.818 PRE-OP TESTING: Primary | ICD-10-CM

## 2019-04-30 DIAGNOSIS — F17.200 SMOKER: ICD-10-CM

## 2019-05-03 ENCOUNTER — TELEPHONE (OUTPATIENT)
Dept: ENT CLINIC | Age: 34
End: 2019-05-03

## 2019-05-03 DIAGNOSIS — J34.2 NASAL SEPTAL DEVIATION: ICD-10-CM

## 2019-05-03 DIAGNOSIS — Z01.818 PRE-OP TESTING: Primary | ICD-10-CM

## 2019-05-08 ENCOUNTER — ANESTHESIA EVENT (OUTPATIENT)
Dept: OPERATING ROOM | Age: 34
End: 2019-05-08
Payer: MEDICAID

## 2019-05-08 ENCOUNTER — HOSPITAL ENCOUNTER (OUTPATIENT)
Age: 34
Setting detail: OUTPATIENT SURGERY
Discharge: HOME | End: 2019-05-08
Attending: OTOLARYNGOLOGY | Admitting: OTOLARYNGOLOGY
Payer: MEDICAID

## 2019-05-08 ENCOUNTER — ANESTHESIA (OUTPATIENT)
Dept: OPERATING ROOM | Age: 34
End: 2019-05-08
Payer: MEDICAID

## 2019-05-08 VITALS
DIASTOLIC BLOOD PRESSURE: 91 MMHG | TEMPERATURE: 98.9 F | RESPIRATION RATE: 16 BRPM | HEIGHT: 70 IN | BODY MASS INDEX: 44.15 KG/M2 | WEIGHT: 308.4 LBS | OXYGEN SATURATION: 94 % | SYSTOLIC BLOOD PRESSURE: 151 MMHG | HEART RATE: 88 BPM

## 2019-05-08 VITALS
OXYGEN SATURATION: 100 % | DIASTOLIC BLOOD PRESSURE: 59 MMHG | RESPIRATION RATE: 3 BRPM | TEMPERATURE: 98.6 F | SYSTOLIC BLOOD PRESSURE: 129 MMHG

## 2019-05-08 DIAGNOSIS — E66.2 CLASS 3 OBESITY WITH ALVEOLAR HYPOVENTILATION, SERIOUS COMORBIDITY, AND BODY MASS INDEX (BMI) OF 40.0 TO 44.9 IN ADULT (HCC): ICD-10-CM

## 2019-05-08 DIAGNOSIS — Z99.89 OSA ON CPAP: Primary | ICD-10-CM

## 2019-05-08 DIAGNOSIS — G47.33 OSA ON CPAP: Primary | ICD-10-CM

## 2019-05-08 DIAGNOSIS — K14.8 LARGE TONGUE: ICD-10-CM

## 2019-05-08 DIAGNOSIS — K13.79 HYPERTROPHY OF UVULA: ICD-10-CM

## 2019-05-08 DIAGNOSIS — Z78.9 INTOLERANCE OF CONTINUOUS POSITIVE AIRWAY PRESSURE (CPAP) VENTILATION: ICD-10-CM

## 2019-05-08 DIAGNOSIS — K13.79 HYPERTROPHIC SOFT PALATE: ICD-10-CM

## 2019-05-08 DIAGNOSIS — Z98.890 STATUS POST NASAL SEPTOPLASTY: ICD-10-CM

## 2019-05-08 LAB — PLATELET # BLD: 122 THOU/MM3 (ref 130–400)

## 2019-05-08 PROCEDURE — 85049 AUTOMATED PLATELET COUNT: CPT

## 2019-05-08 PROCEDURE — 2580000003 HC RX 258: Performed by: OTOLARYNGOLOGY

## 2019-05-08 PROCEDURE — 6360000002 HC RX W HCPCS: Performed by: ANESTHESIOLOGY

## 2019-05-08 PROCEDURE — 2500000003 HC RX 250 WO HCPCS: Performed by: NURSE ANESTHETIST, CERTIFIED REGISTERED

## 2019-05-08 PROCEDURE — 6360000002 HC RX W HCPCS: Performed by: NURSE ANESTHETIST, CERTIFIED REGISTERED

## 2019-05-08 PROCEDURE — 88304 TISSUE EXAM BY PATHOLOGIST: CPT

## 2019-05-08 PROCEDURE — 7100000011 HC PHASE II RECOVERY - ADDTL 15 MIN: Performed by: OTOLARYNGOLOGY

## 2019-05-08 PROCEDURE — 42145 REPAIR PALATE PHARYNX/UVULA: CPT | Performed by: OTOLARYNGOLOGY

## 2019-05-08 PROCEDURE — 3700000000 HC ANESTHESIA ATTENDED CARE: Performed by: OTOLARYNGOLOGY

## 2019-05-08 PROCEDURE — 88302 TISSUE EXAM BY PATHOLOGIST: CPT

## 2019-05-08 PROCEDURE — 6370000000 HC RX 637 (ALT 250 FOR IP)

## 2019-05-08 PROCEDURE — 6370000000 HC RX 637 (ALT 250 FOR IP): Performed by: OTOLARYNGOLOGY

## 2019-05-08 PROCEDURE — 3600000014 HC SURGERY LEVEL 4 ADDTL 15MIN: Performed by: OTOLARYNGOLOGY

## 2019-05-08 PROCEDURE — 6360000002 HC RX W HCPCS: Performed by: OTOLARYNGOLOGY

## 2019-05-08 PROCEDURE — 3700000001 HC ADD 15 MINUTES (ANESTHESIA): Performed by: OTOLARYNGOLOGY

## 2019-05-08 PROCEDURE — 2709999900 HC NON-CHARGEABLE SUPPLY: Performed by: OTOLARYNGOLOGY

## 2019-05-08 PROCEDURE — 2580000003 HC RX 258: Performed by: NURSE ANESTHETIST, CERTIFIED REGISTERED

## 2019-05-08 PROCEDURE — 7100000001 HC PACU RECOVERY - ADDTL 15 MIN: Performed by: OTOLARYNGOLOGY

## 2019-05-08 PROCEDURE — 36415 COLL VENOUS BLD VENIPUNCTURE: CPT

## 2019-05-08 PROCEDURE — 7100000010 HC PHASE II RECOVERY - FIRST 15 MIN: Performed by: OTOLARYNGOLOGY

## 2019-05-08 PROCEDURE — 3600000004 HC SURGERY LEVEL 4 BASE: Performed by: OTOLARYNGOLOGY

## 2019-05-08 PROCEDURE — 2720000010 HC SURG SUPPLY STERILE: Performed by: OTOLARYNGOLOGY

## 2019-05-08 PROCEDURE — 7100000000 HC PACU RECOVERY - FIRST 15 MIN: Performed by: OTOLARYNGOLOGY

## 2019-05-08 RX ORDER — LABETALOL HYDROCHLORIDE 5 MG/ML
5 INJECTION, SOLUTION INTRAVENOUS EVERY 10 MIN PRN
Status: DISCONTINUED | OUTPATIENT
Start: 2019-05-08 | End: 2019-05-08 | Stop reason: HOSPADM

## 2019-05-08 RX ORDER — ROCURONIUM BROMIDE 10 MG/ML
INJECTION, SOLUTION INTRAVENOUS PRN
Status: DISCONTINUED | OUTPATIENT
Start: 2019-05-08 | End: 2019-05-08 | Stop reason: SDUPTHER

## 2019-05-08 RX ORDER — FENTANYL CITRATE 50 UG/ML
50 INJECTION, SOLUTION INTRAMUSCULAR; INTRAVENOUS EVERY 5 MIN PRN
Status: DISCONTINUED | OUTPATIENT
Start: 2019-05-08 | End: 2019-05-08 | Stop reason: HOSPADM

## 2019-05-08 RX ORDER — HYDROXYZINE HCL 10 MG/5 ML
15 SOLUTION, ORAL ORAL EVERY 4 HOURS PRN
Qty: 240 ML | Refills: 1 | Status: SHIPPED | OUTPATIENT
Start: 2019-05-08 | End: 2019-05-15

## 2019-05-08 RX ORDER — FENTANYL CITRATE 50 UG/ML
INJECTION, SOLUTION INTRAMUSCULAR; INTRAVENOUS PRN
Status: DISCONTINUED | OUTPATIENT
Start: 2019-05-08 | End: 2019-05-08 | Stop reason: SDUPTHER

## 2019-05-08 RX ORDER — LIDOCAINE HYDROCHLORIDE 20 MG/ML
INJECTION, SOLUTION INTRAVENOUS PRN
Status: DISCONTINUED | OUTPATIENT
Start: 2019-05-08 | End: 2019-05-08 | Stop reason: SDUPTHER

## 2019-05-08 RX ORDER — DEXAMETHASONE SODIUM PHOSPHATE 4 MG/ML
INJECTION, SOLUTION INTRA-ARTICULAR; INTRALESIONAL; INTRAMUSCULAR; INTRAVENOUS; SOFT TISSUE PRN
Status: DISCONTINUED | OUTPATIENT
Start: 2019-05-08 | End: 2019-05-08 | Stop reason: SDUPTHER

## 2019-05-08 RX ORDER — MEPERIDINE HYDROCHLORIDE 25 MG/ML
12.5 INJECTION INTRAMUSCULAR; INTRAVENOUS; SUBCUTANEOUS EVERY 5 MIN PRN
Status: DISCONTINUED | OUTPATIENT
Start: 2019-05-08 | End: 2019-05-08 | Stop reason: HOSPADM

## 2019-05-08 RX ORDER — NEOSTIGMINE METHYLSULFATE 5 MG/5 ML
SYRINGE (ML) INTRAVENOUS PRN
Status: DISCONTINUED | OUTPATIENT
Start: 2019-05-08 | End: 2019-05-08 | Stop reason: SDUPTHER

## 2019-05-08 RX ORDER — SODIUM CHLORIDE 9 MG/ML
INJECTION, SOLUTION INTRAVENOUS CONTINUOUS PRN
Status: DISCONTINUED | OUTPATIENT
Start: 2019-05-08 | End: 2019-05-08 | Stop reason: SDUPTHER

## 2019-05-08 RX ORDER — SODIUM CHLORIDE FOR INHALATION 0.9 %
3 VIAL, NEBULIZER (ML) INHALATION EVERY 4 HOURS PRN
Status: DISCONTINUED | OUTPATIENT
Start: 2019-05-08 | End: 2019-05-08 | Stop reason: HOSPADM

## 2019-05-08 RX ORDER — OXYMETAZOLINE HYDROCHLORIDE 0.05 G/100ML
SPRAY NASAL PRN
Status: DISCONTINUED | OUTPATIENT
Start: 2019-05-08 | End: 2019-05-08 | Stop reason: ALTCHOICE

## 2019-05-08 RX ORDER — ONDANSETRON 2 MG/ML
INJECTION INTRAMUSCULAR; INTRAVENOUS PRN
Status: DISCONTINUED | OUTPATIENT
Start: 2019-05-08 | End: 2019-05-08 | Stop reason: SDUPTHER

## 2019-05-08 RX ORDER — SODIUM CHLORIDE 9 MG/ML
INJECTION, SOLUTION INTRAVENOUS ONCE
Status: COMPLETED | OUTPATIENT
Start: 2019-05-08 | End: 2019-05-08

## 2019-05-08 RX ORDER — SUCCINYLCHOLINE/SOD CL,ISO/PF 200MG/10ML
SYRINGE (ML) INTRAVENOUS PRN
Status: DISCONTINUED | OUTPATIENT
Start: 2019-05-08 | End: 2019-05-08 | Stop reason: SDUPTHER

## 2019-05-08 RX ORDER — PROPOFOL 10 MG/ML
INJECTION, EMULSION INTRAVENOUS PRN
Status: DISCONTINUED | OUTPATIENT
Start: 2019-05-08 | End: 2019-05-08 | Stop reason: SDUPTHER

## 2019-05-08 RX ORDER — ONDANSETRON 2 MG/ML
4 INJECTION INTRAMUSCULAR; INTRAVENOUS
Status: DISCONTINUED | OUTPATIENT
Start: 2019-05-08 | End: 2019-05-08 | Stop reason: HOSPADM

## 2019-05-08 RX ORDER — GLYCOPYRROLATE 1 MG/5 ML
SYRINGE (ML) INTRAVENOUS PRN
Status: DISCONTINUED | OUTPATIENT
Start: 2019-05-08 | End: 2019-05-08 | Stop reason: SDUPTHER

## 2019-05-08 RX ADMIN — FENTANYL CITRATE 50 MCG: 50 INJECTION, SOLUTION INTRAMUSCULAR; INTRAVENOUS at 16:24

## 2019-05-08 RX ADMIN — DEXAMETHASONE SODIUM PHOSPHATE 20 MG: 4 INJECTION, SOLUTION INTRAMUSCULAR; INTRAVENOUS at 14:40

## 2019-05-08 RX ADMIN — FENTANYL CITRATE 150 MCG: 50 INJECTION INTRAMUSCULAR; INTRAVENOUS at 12:02

## 2019-05-08 RX ADMIN — SODIUM CHLORIDE: 9 INJECTION, SOLUTION INTRAVENOUS at 12:53

## 2019-05-08 RX ADMIN — LIDOCAINE HYDROCHLORIDE 150 MG: 20 INJECTION, SOLUTION INTRAVENOUS at 12:02

## 2019-05-08 RX ADMIN — SODIUM CHLORIDE: 9 INJECTION, SOLUTION INTRAVENOUS at 10:47

## 2019-05-08 RX ADMIN — FENTANYL CITRATE 50 MCG: 50 INJECTION INTRAMUSCULAR; INTRAVENOUS at 13:48

## 2019-05-08 RX ADMIN — Medication 200 MG: at 12:02

## 2019-05-08 RX ADMIN — ROCURONIUM BROMIDE 10 MG: 10 INJECTION INTRAVENOUS at 12:02

## 2019-05-08 RX ADMIN — ROCURONIUM BROMIDE 30 MG: 10 INJECTION INTRAVENOUS at 12:24

## 2019-05-08 RX ADMIN — FENTANYL CITRATE 50 MCG: 50 INJECTION, SOLUTION INTRAMUSCULAR; INTRAVENOUS at 16:15

## 2019-05-08 RX ADMIN — RACEPINEPHRINE HYDROCHLORIDE 11.25 MG: 11.25 SOLUTION RESPIRATORY (INHALATION) at 15:20

## 2019-05-08 RX ADMIN — AMPICILLIN SODIUM AND SULBACTAM SODIUM 3 G: 2; 1 INJECTION, POWDER, FOR SOLUTION INTRAMUSCULAR; INTRAVENOUS at 12:04

## 2019-05-08 RX ADMIN — SODIUM CHLORIDE: 9 INJECTION, SOLUTION INTRAVENOUS at 12:00

## 2019-05-08 RX ADMIN — HYDROCODONE BITARTRATE AND ACETAMINOPHEN 15 ML: 5; 217 SOLUTION ORAL at 17:40

## 2019-05-08 RX ADMIN — Medication 0.8 MG: at 14:56

## 2019-05-08 RX ADMIN — FENTANYL CITRATE 50 MCG: 50 INJECTION INTRAMUSCULAR; INTRAVENOUS at 15:16

## 2019-05-08 RX ADMIN — PROPOFOL 200 MG: 10 INJECTION, EMULSION INTRAVENOUS at 12:02

## 2019-05-08 RX ADMIN — Medication 5 MG: at 14:56

## 2019-05-08 RX ADMIN — PROPOFOL 100 MG: 10 INJECTION, EMULSION INTRAVENOUS at 13:04

## 2019-05-08 RX ADMIN — ROCURONIUM BROMIDE 10 MG: 10 INJECTION INTRAVENOUS at 13:04

## 2019-05-08 RX ADMIN — ONDANSETRON HYDROCHLORIDE 4 MG: 4 INJECTION, SOLUTION INTRAMUSCULAR; INTRAVENOUS at 14:40

## 2019-05-08 RX ADMIN — AMPICILLIN SODIUM AND SULBACTAM SODIUM 3 G: 2; 1 INJECTION, POWDER, FOR SOLUTION INTRAMUSCULAR; INTRAVENOUS at 11:38

## 2019-05-08 ASSESSMENT — PULMONARY FUNCTION TESTS
PIF_VALUE: 33
PIF_VALUE: 30
PIF_VALUE: 30
PIF_VALUE: 18
PIF_VALUE: 30
PIF_VALUE: 21
PIF_VALUE: 28
PIF_VALUE: 29
PIF_VALUE: 30
PIF_VALUE: 26
PIF_VALUE: 31
PIF_VALUE: 28
PIF_VALUE: 31
PIF_VALUE: 28
PIF_VALUE: 24
PIF_VALUE: 2
PIF_VALUE: 29
PIF_VALUE: 28
PIF_VALUE: 29
PIF_VALUE: 29
PIF_VALUE: 18
PIF_VALUE: 30
PIF_VALUE: 26
PIF_VALUE: 30
PIF_VALUE: 29
PIF_VALUE: 27
PIF_VALUE: 29
PIF_VALUE: 19
PIF_VALUE: 29
PIF_VALUE: 28
PIF_VALUE: 30
PIF_VALUE: 17
PIF_VALUE: 26
PIF_VALUE: 32
PIF_VALUE: 30
PIF_VALUE: 17
PIF_VALUE: 29
PIF_VALUE: 29
PIF_VALUE: 31
PIF_VALUE: 28
PIF_VALUE: 28
PIF_VALUE: 29
PIF_VALUE: 28
PIF_VALUE: 30
PIF_VALUE: 28
PIF_VALUE: 30
PIF_VALUE: 30
PIF_VALUE: 1
PIF_VALUE: 31
PIF_VALUE: 26
PIF_VALUE: 27
PIF_VALUE: 30
PIF_VALUE: 29
PIF_VALUE: 29
PIF_VALUE: 27
PIF_VALUE: 30
PIF_VALUE: 27
PIF_VALUE: 29
PIF_VALUE: 29
PIF_VALUE: 32
PIF_VALUE: 27
PIF_VALUE: 29
PIF_VALUE: 30
PIF_VALUE: 30
PIF_VALUE: 28
PIF_VALUE: 29
PIF_VALUE: 30
PIF_VALUE: 28
PIF_VALUE: 29
PIF_VALUE: 26
PIF_VALUE: 28
PIF_VALUE: 19
PIF_VALUE: 30
PIF_VALUE: 29
PIF_VALUE: 30
PIF_VALUE: 30
PIF_VALUE: 29
PIF_VALUE: 30
PIF_VALUE: 17
PIF_VALUE: 31
PIF_VALUE: 30
PIF_VALUE: 30
PIF_VALUE: 35
PIF_VALUE: 28
PIF_VALUE: 17
PIF_VALUE: 25
PIF_VALUE: 30
PIF_VALUE: 27
PIF_VALUE: 1
PIF_VALUE: 29
PIF_VALUE: 29
PIF_VALUE: 30
PIF_VALUE: 19
PIF_VALUE: 28
PIF_VALUE: 28
PIF_VALUE: 30
PIF_VALUE: 29
PIF_VALUE: 28
PIF_VALUE: 19
PIF_VALUE: 30
PIF_VALUE: 29
PIF_VALUE: 31
PIF_VALUE: 23
PIF_VALUE: 30
PIF_VALUE: 27
PIF_VALUE: 30
PIF_VALUE: 28
PIF_VALUE: 29
PIF_VALUE: 28
PIF_VALUE: 29
PIF_VALUE: 28
PIF_VALUE: 26
PIF_VALUE: 25
PIF_VALUE: 28
PIF_VALUE: 28
PIF_VALUE: 30
PIF_VALUE: 27
PIF_VALUE: 30
PIF_VALUE: 29
PIF_VALUE: 30
PIF_VALUE: 26
PIF_VALUE: 27
PIF_VALUE: 34
PIF_VALUE: 29
PIF_VALUE: 30
PIF_VALUE: 28
PIF_VALUE: 26
PIF_VALUE: 28
PIF_VALUE: 32
PIF_VALUE: 29
PIF_VALUE: 29
PIF_VALUE: 27
PIF_VALUE: 26
PIF_VALUE: 18
PIF_VALUE: 28
PIF_VALUE: 26
PIF_VALUE: 19
PIF_VALUE: 29
PIF_VALUE: 38
PIF_VALUE: 26
PIF_VALUE: 30
PIF_VALUE: 27
PIF_VALUE: 29
PIF_VALUE: 26
PIF_VALUE: 23
PIF_VALUE: 28
PIF_VALUE: 2
PIF_VALUE: 30
PIF_VALUE: 30
PIF_VALUE: 28
PIF_VALUE: 27
PIF_VALUE: 30
PIF_VALUE: 1
PIF_VALUE: 30
PIF_VALUE: 28
PIF_VALUE: 29
PIF_VALUE: 17
PIF_VALUE: 33
PIF_VALUE: 28
PIF_VALUE: 17
PIF_VALUE: 30
PIF_VALUE: 29
PIF_VALUE: 16
PIF_VALUE: 27
PIF_VALUE: 26
PIF_VALUE: 30
PIF_VALUE: 2
PIF_VALUE: 26
PIF_VALUE: 28
PIF_VALUE: 29
PIF_VALUE: 29
PIF_VALUE: 33
PIF_VALUE: 29
PIF_VALUE: 28
PIF_VALUE: 15
PIF_VALUE: 28
PIF_VALUE: 30
PIF_VALUE: 18
PIF_VALUE: 28
PIF_VALUE: 30
PIF_VALUE: 13
PIF_VALUE: 28
PIF_VALUE: 26
PIF_VALUE: 19

## 2019-05-08 ASSESSMENT — PAIN DESCRIPTION - PAIN TYPE
TYPE: SURGICAL PAIN

## 2019-05-08 ASSESSMENT — PAIN DESCRIPTION - DESCRIPTORS
DESCRIPTORS: STABBING
DESCRIPTORS: SORE

## 2019-05-08 ASSESSMENT — PAIN SCALES - GENERAL
PAINLEVEL_OUTOF10: 10
PAINLEVEL_OUTOF10: 5
PAINLEVEL_OUTOF10: 10
PAINLEVEL_OUTOF10: 9

## 2019-05-08 ASSESSMENT — PAIN - FUNCTIONAL ASSESSMENT: PAIN_FUNCTIONAL_ASSESSMENT: 0-10

## 2019-05-08 ASSESSMENT — PAIN DESCRIPTION - PROGRESSION
CLINICAL_PROGRESSION: GRADUALLY IMPROVING
CLINICAL_PROGRESSION: GRADUALLY WORSENING

## 2019-05-08 ASSESSMENT — PAIN DESCRIPTION - FREQUENCY
FREQUENCY: INTERMITTENT
FREQUENCY: CONTINUOUS

## 2019-05-08 ASSESSMENT — PAIN DESCRIPTION - LOCATION
LOCATION: NOSE
LOCATION: THROAT
LOCATION: THROAT

## 2019-05-08 NOTE — PROGRESS NOTES
1510: Patient arrived to PACU. Report received from Claudia Dalton CRNA and circulating RN. Patient placed on cardiac monitor. To give pt racepinephrine breathing treatment per Dr. Lorri Valdez. 1520: breathing treatment started at this time. 1530: 50mcg fentanyl left from CRNA given to pt at this time. 1539: pt keeps stating that he feels like he cannot breath. Pt sleepy, and is showing signs of sleep apnea when asleep. Notified Dr. Caryn johns  [id number]: pt resting in cart with eyes closed. When awaken, states that he has pain, but then falls right back to sleep. 1615: notified Dr. Caryn Sigala that pt having pain 10/10, but when he falls asleep he obstructs at times due to sleep apnea. To work in the fentanyl slowly as ordered. Not to give any long acting medications at this time. 1623: pt states that pain medication did not help and it is still a 10/10. Given fentanyl at this time. 1631: pt's oxygen level down to 88%. Encouraged to take deep breaths. States pain is a 9/10, but he is resting with his eyes closed. 1638: pt resting in cart with eyes closed. Rates pain 8/10, and then falls right back to sleep. 1644: pt meets pacu discharge criteria. Pt transported to [facility] in stable condition on 8 liters face tent with 35% Fio2. Report given to Vanessa/ORALIA Espinosa's. Pt's family notified in waiting room.

## 2019-05-08 NOTE — H&P
[address], NOSE AND THROAT  [address]  [address] [address]  Dept: [phone]  Dept Fax: [phone]  Loc: [phone]     Lupe Hills is a 29 y.o. male who was referred byNo ref. provider found for:       Chief Complaint   Patient presents with    Follow-up       Pt. would like to discuss surgery   . HPI:      Lupe Hills is a 29 y.o. male who presents today for discussion about proceeding with a next step in the plan to get his airway opened up sufficiently. His nose was almost completely obstructed and and his airway is better but he is still dramatically snoring. His daytme somnolence is still interfering with work and home life.     Original AHI 73.8, With desaturation to 71%  TITRATED CPAP PRESSURE:  16  Patient cannot tolerate. Cannot keep the mask on.   BMI 40.87 kg/m2.   Mallampati Score: 4   Neck Circumference:20 Inches  [place] sleepiness score 5/5/17: 17     History:      No Known Allergies  Current Facility-Administered Medications          Current Outpatient Medications   Medication Sig Dispense Refill    UNABLE TO FIND Take 50 mg by mouth daily Indications: Medical Canibus        Cholecalciferol (VITAMIN D3) 84112 units CAPS Take 1 capsule by mouth once a week 10 capsule 5      No current facility-administered medications for this visit.          Past Medical History        Past Medical History:   Diagnosis Date    Chronic back pain      Diverticulitis      Sleep apnea           Past Surgical History   Past Surgical History:   Procedure Laterality Date    ANKLE SURGERY   2001    BACK SURGERY        CHOLECYSTECTOMY        COLON SURGERY        NOSE SURGERY   1993     Laser Surgery    RECTAL SURGERY         Ruptured Sigmoid Colon 2013    SEPTOPLASTY Left 7/19/2017     SEPTOPLASTY SUBMUCOUS RESECT TURBINATES, PARTIAL LEFT performed by Faye Bowers MD at [facility] [address] History   Problem Relation Age of Onset    Cancer Mother      Diabetes Father      Heart Disease Father      Heart Disease Maternal Grandmother      Heart Disease Maternal Grandfather      Diabetes Paternal Grandmother      COPD Paternal Grandmother      Diabetes Paternal Grandfather      COPD Paternal Grandfather           Social History            Tobacco Use    Smoking status: Current Every Day Smoker       Packs/day: 0.50       Types: Cigarettes       Start date: 5/5/2007    Smokeless tobacco: Never Used    Tobacco comment: 0.5-1 ppd 5/5/17   Substance Use Topics    Alcohol use: Yes       Comment: rarely         Subjective:      Review of Systems   Constitutional: Negative for activity change, appetite change, chills, diaphoresis, fatigue, fever and unexpected weight change. HENT: Negative for congestion, dental problem, ear discharge, ear pain, facial swelling, hearing loss, mouth sores, nosebleeds, postnasal drip, rhinorrhea, sinus pressure, sneezing, sore throat, tinnitus, trouble swallowing and voice change. Eyes: Negative for visual disturbance. Respiratory: Negative for apnea, cough, choking, chest tightness, shortness of breath, wheezing and stridor. Cardiovascular: Negative for chest pain, palpitations and leg swelling. Gastrointestinal: Negative for abdominal pain, diarrhea, nausea and vomiting. Endocrine: Negative for cold intolerance, heat intolerance, polydipsia and polyuria. Genitourinary: Negative for dysuria, enuresis and hematuria. Musculoskeletal: Negative for arthralgias, gait problem, neck pain and neck stiffness. Skin: Negative for color change and rash. Allergic/Immunologic: Negative for environmental allergies, food allergies and immunocompromised state. Neurological: Negative for dizziness, syncope, facial asymmetry, speech difficulty, light-headedness and headaches. Hematological: Negative for adenopathy. Does not bruise/bleed easily. Eyes: Right eye exhibits normal extraocular motion and no nystagmus. Left eye exhibits normal extraocular motion and no nystagmus. Conjugate gaze   Neck: Trachea normal and phonation normal. Neck supple. No spinous process tenderness present. No tracheal deviation present. No thyroid mass and no thyromegaly present. No adenopathy. Salivary glands not enlarged and normal to palpation     Cardiovascular: Normal rate and regular rhythm. No murmur heard. Pulmonary/Chest: Effort normal and breath sounds normal. No stridor. Chest wall is not dull to percussion. Neurological: He is alert and oriented to person, place, and time. No cranial nerve deficit (VIIth N function intact bilat). Psychiatric: He has a normal mood and affect. Nursing note and vitals reviewed.     PROCEDURE: FIBEROPTIC LARYNGOSCOPY     A fiberoptic laryngoscopy was performed under topical anesthesia, after using Afrin and Lidocaine spray in the nasal fossa. The nasal fossa, nasopharynx, hypopharynx and larynx were carefully examined. Base of tongue was symmetrical and large. Epiglottis appeared normal and was retrodisplaced. True vocal cords had normal mobility. There was no erythema. No mucosal lesions or masses were noted. No pooling in the pyriform sinuses.     Data:  All of the past medical history, past surgical history, family history,social history, allergies and current medications were reviewed with the patient. Assessment & Plan   Diagnoses and all orders for this visit:       Diagnosis Orders   1. Intolerance of continuous positive airway pressure (CPAP) ventilation  [place] REMOVAL OF TONSILS,12+ Y/O     UPPP     [place] LARYNGOSCOPY FLEXIBLE DIAGNOSTIC   2. Hypertrophy of uvula  UPPP   3. Hypertrophic soft palate  UPPP   4. Large tongue  UPPP     [place] LARYNGOSCOPY FLEXIBLE DIAGNOSTIC   5.  Class 3 obesity with alveolar hypoventilation, serious comorbidity, and body mass index (BMI) of 40.0 to 44.9 in adult (HCC)  [place] REMOVAL OF TONSILS,12+ Y/O     UPPP   6. Status post nasal septoplasty  [place] REMOVAL OF TONSILS,12+ Y/O     UPPP   7. GERD without esophagitis  VA LARYNGOSCOPY FLEXIBLE DIAGNOSTIC   8. Lingual tonsil hypertrophy      9. [place] tonsil hypertrophy  VA REMOVAL OF TONSILS,12+ Y/O   10. Vitamin D deficiency  Vitamin D 25 Hydroxy         The findings were explained and his questions were answered. As noted previously has severe obstruction the level of the oropharynx with a long thick palate and a huge thick wide uvula. Uvulopalatoplasty and tonsillectomy was discussed previously as the next logical step. He also has a prominent base of tongue. The potential third phase would be partial posterior midline glossectomy to reduce the volume in that base of tongue, should he still not be effectively treated with CPAP. Based on my extensive experience with virtually 40 years of treating sleep apnea, his oropharyngeal obstruction is more significant than the obstruction in the hypopharynx at the base of tongue level. After these procedures, if he still has difficulty, a dental device might pull his base of tongue forward enough that he might avoid CPAP. He is still far too obstructed at both levels for a dental device to work now. Maxillo- mandibular osteotomies have very high expense and morbidity, and as noted in the authoritative [place] experience, are the last resort. Furthermore there is no one in this area that does the surgery.      Recommended procedures:  Uvulopalatopharyngoplasty  Tonsillectomy  Estimated surgical time, 2 hours.      Informed consent. UPPP plus tonsillectomy  Discussed the risks including but not limited to bleeding intraop as well as postop with possible resultant airway obstruction, MI, stroke, and death. Also discussed the risks of velopharyngeal insufficiency, voice change, failure to improve or resolve the snoring and/or apnea, weight loss and dehydration. Rarely a revision might be necessary.  The goal is to improve his airway when asleep by eliminating the redundant/obstructing tissues. No guarantees are made about outcomes or compllications. Pt understands and accepts these risks and wishes to proceed     He will probably need to be kept overnight for pain control and observation of airway.       Osbaldo Presley MD     **This report has been created using voice recognition software. It may contain minor errors which are inherent in voicerecognition technology. **

## 2019-05-08 NOTE — ANESTHESIA POSTPROCEDURE EVALUATION
Department of Anesthesiology  Postprocedure Note    Patient: Dallas Ruiz  MRN: [id number]  YOB: 1985  Date of evaluation: 5/8/2019  Time:  3:59 PM     Procedure Summary     Date:  05/08/19 Room / Location:  [address] [place] [address] HENOK Wynn    Anesthesia Start:  1200 Anesthesia Stop:  8734    Procedure:  TONSILLECTOMY, UPPP (N/A Nose) Diagnosis:  (CPAP VENTILATION, POST SEPTOPLASTY, TONSIL HYPERTROPHY, HYPERTROPY OF UVULA, HYPERTROPHIC SOFT PALATE, LARGE TONGUE)    Surgeon:  Nba Chacon MD Responsible Provider:  Desire Acosta DO    Anesthesia Type:  general ASA Status:  3          Anesthesia Type: general    Daisy Phase I: Daisy Score: 10    Daisy Phase II:      Last vitals: Reviewed and per EMR flowsheets.        Anesthesia Post Evaluation    Patient location during evaluation: PACU  Patient participation: complete - patient participated  Level of consciousness: sleepy but conscious, responsive to verbal stimuli and responsive to light touch  Pain score: 4  Airway patency: patent  Nausea & Vomiting: no nausea and no vomiting  Complications: no  Cardiovascular status: hemodynamically stable and blood pressure returned to baseline  Respiratory status: spontaneous ventilation, nasal cannula and acceptable  Hydration status: stable

## 2019-05-08 NOTE — INTERVAL H&P NOTE
Pt Name: Behzad Coleman  MRN: [id number]  YOB: 1985  Date of evaluation: 5/8/2019    I have examined the patient and reviewed the H&P/Consult and there are no changes to the patient or plans.          Electronically signed by Angelika Hardy MD on 5/8/2019 at 11:43 AM

## 2019-05-08 NOTE — ANESTHESIA PRE PROCEDURE
TURBINATES, PARTIAL LEFT performed by Paco Coon MD at [address] History:    Social History     Tobacco Use    Smoking status: Current Every Day Smoker     Packs/day: 0.50     Types: Cigarettes     Start date: 5/5/2007    Smokeless tobacco: Never Used    Tobacco comment: 0.5-1 ppd 5/5/17   Substance Use Topics    Alcohol use: Yes     Comment: rarely                                Ready to quit: Not Answered  Counseling given: Not Answered  Comment: 0.5-1 ppd 5/5/17      Vital Signs (Current):   Vitals:    05/08/19 1028   BP: (!) 141/87   Pulse: 67   Resp: 16   Temp: 98.2 °F (36.8 °C)   TempSrc: Temporal   SpO2: 99%   Weight: (!) 308 lb 6.4 oz (139.9 kg)   Height: 5' 10\" (1.778 m)                                              BP Readings from Last 3 Encounters:   05/08/19 (!) 141/87   04/18/19 (!) 144/100   08/29/17 128/80       NPO Status: Time of last liquid consumption: 2330                        Time of last solid consumption: 2330                        Date of last liquid consumption: 05/07/19                        Date of last solid food consumption: 05/07/19    BMI:   Wt Readings from Last 3 Encounters:   05/08/19 (!) 308 lb 6.4 oz (139.9 kg)   05/02/19 300 lb (136.1 kg)   04/18/19 (!) 315 lb 14.4 oz (143.3 kg)     Body mass index is 44.25 kg/m². CBC:   Lab Results   Component Value Date     05/08/2019       CMP: No results found for: NA, K, CL, CO2, BUN, CREATININE, GFRAA, AGRATIO, LABGLOM, GLUCOSE, PROT, CALCIUM, BILITOT, ALKPHOS, AST, ALT    POC Tests: No results for input(s): POCGLU, POCNA, POCK, POCCL, POCBUN, POCHEMO, POCHCT in the last 72 hours.     Coags: No results found for: PROTIME, INR, APTT    HCG (If Applicable): No results found for: PREGTESTUR, PREGSERUM, HCG, HCGQUANT     ABGs: No results found for: PHART, PO2ART, ZKL6KGD, KMW0OQC, BEART, J1WXJEWS     Type & Screen (If Applicable):  No results found for: [facility]    Anesthesia Evaluation  Patient summary reviewed and Nursing notes reviewed   history of anesthetic complications: PONV. Airway: Mallampati: II  TM distance: >3 FB   Neck ROM: full  Mouth opening: > = 3 FB Dental:          Pulmonary:normal exam  breath sounds clear to auscultation  (+) sleep apnea: on CPAP,                             Cardiovascular:Negative CV ROS  Exercise tolerance: good (>4 METS),                     Neuro/Psych:   Negative Neuro/Psych ROS              GI/Hepatic/Renal:   (+) GERD:, morbid obesity          Endo/Other: Negative Endo/Other ROS             Pt had no PAT visit       Abdominal:           Vascular: negative vascular ROS. Anesthesia Plan      general     ASA 3       Induction: intravenous. MIPS: Postoperative opioids intended and Prophylactic antiemetics administered. Anesthetic plan and risks discussed with patient, father and mother. Plan discussed with CRNA.                   [address] Radha Rob, DO   5/8/2019

## 2019-05-08 NOTE — BRIEF OP NOTE
Brief Postoperative Note  ______________________________________________________________    Patient: Moriah Anderson  YOB: 1985  MRN: [id number]  Date of Procedure: 5/8/2019    Pre-Op Diagnosis: CPAP VENTILATION, POST SEPTOPLASTY, TONSIL HYPERTROPHY, HYPERTROPY OF UVULA, HYPERTROPHIC SOFT PALATE, LARGE TONGUE    Post-Op Diagnosis: Same       Procedure(s):  TONSILLECTOMY, UPPP    Anesthesia: General    Surgeon(s):  Paco Coon MD    Assistant:     Estimated Blood Loss (mL): less than 50     Complications: None    Specimens:   ID Type Source Tests Collected by Time Destination   A : left tonsil Tissue Tonsil SURGICAL PATHOLOGY Paco Coon MD 5/8/2019 1248    B : right tonsil Tissue Tonsil SURGICAL PATHOLOGY Paco Coon MD 5/8/2019 1249    C : uvula Tissue Uvula SURGICAL PATHOLOGY Paco Coon MD 5/8/2019 1257        Implants:  * No implants in log *      Drains: * No LDAs found *    Findings: oropharynx very compromised by huge uvula, thick palate, prominent fat pads, and large tongue. Mouth opening limited. Difficult access. Will observe in Same Day and he might need to be admitted. Opioid prescription exceeds day and/or maximum morphine equivalent daily dose (MEDD) limits for specific reason(s): condition routinely requires MEDD > 30 and non-opioid medication is not appropriate to treat the pain.  Reason: Patient wieghs over 300 lbs and has had an extremely paiful operation      Lora Garcia MD  Date: 5/8/2019  Time: 4:08 PM

## 2019-05-08 NOTE — PROGRESS NOTES
1037:  Patient admitted to [facility] room 7.  parents at bedside. Arm bands applied. Bilat. Socks, SCD's applied. Call light in reach. No drainage from cysts.

## 2019-05-08 NOTE — PROGRESS NOTES
Patient returned to [facility]. Report received from [facility] PACU. Family at bedside.

## 2019-05-08 NOTE — PROGRESS NOTES
Discharge criteria met. Discharge instructions given to the patient and his mom. Both verbalized understanding of the discharge instructions.

## 2019-05-09 ENCOUNTER — OFFICE VISIT (OUTPATIENT)
Dept: ENT CLINIC | Age: 34
End: 2019-05-09

## 2019-05-09 VITALS
TEMPERATURE: 97.6 F | HEART RATE: 72 BPM | WEIGHT: 306 LBS | HEIGHT: 70 IN | SYSTOLIC BLOOD PRESSURE: 130 MMHG | BODY MASS INDEX: 43.81 KG/M2 | DIASTOLIC BLOOD PRESSURE: 76 MMHG | RESPIRATION RATE: 14 BRPM

## 2019-05-09 DIAGNOSIS — J35.1 LINGUAL TONSIL HYPERTROPHY: ICD-10-CM

## 2019-05-09 DIAGNOSIS — G47.33 OSA ON CPAP: ICD-10-CM

## 2019-05-09 DIAGNOSIS — Z98.890 STATUS POST NASAL SEPTOPLASTY: ICD-10-CM

## 2019-05-09 DIAGNOSIS — K13.79 HYPERTROPHY OF UVULA: Primary | ICD-10-CM

## 2019-05-09 DIAGNOSIS — J34.3 HYPERTROPHY OF INFERIOR NASAL TURBINATE: ICD-10-CM

## 2019-05-09 DIAGNOSIS — Z99.89 OSA ON CPAP: ICD-10-CM

## 2019-05-09 DIAGNOSIS — K13.79 HYPERTROPHIC SOFT PALATE: ICD-10-CM

## 2019-05-09 DIAGNOSIS — J35.1 PALATINE TONSIL HYPERTROPHY: ICD-10-CM

## 2019-05-09 PROCEDURE — 99024 POSTOP FOLLOW-UP VISIT: CPT | Performed by: OTOLARYNGOLOGY

## 2019-05-09 ASSESSMENT — ENCOUNTER SYMPTOMS
CHOKING: 0
FACIAL SWELLING: 0
VOICE CHANGE: 0
WHEEZING: 0
APNEA: 0
STRIDOR: 0
DIARRHEA: 0
TROUBLE SWALLOWING: 0
COLOR CHANGE: 0
VOMITING: 0
SHORTNESS OF BREATH: 0
NAUSEA: 0
SORE THROAT: 1
RHINORRHEA: 0
ABDOMINAL PAIN: 0
SINUS PRESSURE: 0
CHEST TIGHTNESS: 0
COUGH: 0

## 2019-05-09 NOTE — PROGRESS NOTES
[address], NOSE AND THROAT  [facility] [address]  [address] [address]  Dept: [phone]  Dept Fax: [phone]  Loc: [phone]    Behzad Coleman is a 29 y.o. male who was referred byNo ref. provider found for:  Chief Complaint   Patient presents with    Post-Op Check     Patient is here post-op tonsillectomy/ UPPP 5/8/19   . HPI:     Behzad Coleman is a 29 y.o. male who presents today for follow-up of his surgery yesterday. He is very pleased because he is breathing so much better already. He had nasal surgery previously, and yesterday he had a tonsillectomy and a palatoplasty. .  This pain seems to be under good control. History:     No Known Allergies  Current Outpatient Medications   Medication Sig Dispense Refill    lidocaine viscous (XYLOCAINE) 2 % solution Take 15 mLs by mouth as needed for Irritation 100 mL 3    Cholecalciferol (VITAMIN D3) 92248 units CAPS Take 1 capsule by mouth once a week 10 capsule 5     No current facility-administered medications for this visit.       Past Medical History:   Diagnosis Date    Chronic back pain     Diverticulitis     PONV (postoperative nausea and vomiting)     Sleep apnea       Past Surgical History:   Procedure Laterality Date    ANKLE SURGERY  2001    BACK SURGERY      CHOLECYSTECTOMY      COLON SURGERY      NOSE SURGERY  1993    Laser Surgery    RECTAL SURGERY      Ruptured Sigmoid Colon 2013    SEPTOPLASTY Left 7/19/2017    SEPTOPLASTY SUBMUCOUS RESECT TURBINATES, PARTIAL LEFT performed by Ambika Ko MD at [facility] SEPTOPLASTY N/A 5/8/2019    TONSILLECTOMY, UPPP performed by Ambika Ko MD at [address] [place] [address] History   Problem Relation Age of Onset    Cancer Mother         Breast Cancer    Diabetes Father     Heart Disease Father     Heart Disease Maternal Grandmother     Heart Disease Maternal Grandfather     Diabetes Paternal Grandmother     COPD Paternal Grandmother     Diabetes Paternal Grandfather     COPD Paternal Grandfather      Social History     Tobacco Use    Smoking status: Current Every Day Smoker     Packs/day: 0.50     Types: Cigarettes     Start date: 5/5/2007    Smokeless tobacco: Never Used    Tobacco comment: 0.5-1 ppd 5/5/17 last cig 5/7/19   Substance Use Topics    Alcohol use: Yes     Comment: rarely       Subjective:      Review of Systems   Constitutional: Negative for activity change, appetite change, chills, diaphoresis, fatigue, fever and unexpected weight change. HENT: Positive for sore throat. Negative for congestion, dental problem, ear discharge, ear pain, facial swelling, hearing loss, mouth sores, nosebleeds, postnasal drip, rhinorrhea, sinus pressure, sneezing, tinnitus, trouble swallowing and voice change. Eyes: Negative for visual disturbance. Respiratory: Negative for apnea, cough, choking, chest tightness, shortness of breath, wheezing and stridor. Cardiovascular: Negative for chest pain, palpitations and leg swelling. Gastrointestinal: Negative for abdominal pain, diarrhea, nausea and vomiting. Endocrine: Negative for cold intolerance, heat intolerance, polydipsia and polyuria. Genitourinary: Negative for dysuria, enuresis and hematuria. Musculoskeletal: Negative for arthralgias, gait problem, neck pain and neck stiffness. Skin: Negative for color change and rash. Allergic/Immunologic: Negative for environmental allergies, food allergies and immunocompromised state. Neurological: Negative for dizziness, syncope, facial asymmetry, speech difficulty, light-headedness and headaches. Hematological: Negative for adenopathy. Does not bruise/bleed easily. Psychiatric/Behavioral: Negative for confusion and sleep disturbance. The patient is not nervous/anxious.         Objective:   /76 (Site: Left Upper Arm, Position: Sitting)   Pulse 72   Temp 97.6 °F (36.4 °C) (Oral)   Resp 14   Ht 5' 10\" (1.778 m)   Wt (!) 306 lb (138.8 kg)   BMI 43.91 kg/m²     Physical Exam   Oropharynx: Usual postop appearance. Suture line is intact. Data:  All of the past medical history, past surgical history, family history,social history, allergies and current medications were reviewed with the patient. Assessment & Plan   Diagnoses and all orders for this visit:     Diagnosis Orders   1. Hypertrophy of uvula     2. Hypertrophic soft palate     3. [place] tonsil hypertrophy     4. Status post nasal septoplasty     5. Hypertrophy of inferior nasal turbinate     6. Lingual tonsil hypertrophy     7. GERHARD on CPAP         The findings were explained and his questions were answered. So far the patient has an excellent result. He has adequate pain medication, I will see him in about 3 weeks       Zahra Hagen. Leonor Ho MD    **This report has been created using voice recognition software. It may contain minor errors which are inherent in voicerecognition technology. **

## 2019-05-09 NOTE — OP NOTE
[address] [phone]                                OPERATIVE REPORT    PATIENT NAME: Pratima Morgan                  :        1985  MED REC NO:   [id number]                           ROOM:  ACCOUNT NO:   [de-identified]                           ADMIT DATE: 2019  PROVIDER:     Nimesh Morrison. Orlando Rodgers M.D.    Fritz Vigil:  2019    OPERATION PERFORMED:  Uvulopalatopharyngoplasty, tonsillectomy. SURGEON:  Nimesh Morrison. Orlando Rodgers MD    ANESTHESIA:  General endotracheal.    PREOPERATIVE DIAGNOSES:  Tonsillar hypertrophy, hypertrophy of uvula,  hypertrophy of soft palate, large tongue, GERHARD with intolerance of CPAP. POSTOPERATIVE DIAGNOSES:  Tonsillar hypertrophy, hypertrophy of uvula,  hypertrophy of soft palate, large tongue, GERHARD with intolerance of CPAP. HISTORY AND OPERATIVE FINDINGS:  The patient previously had septoplasty  and turbinate surgery and he had his nose opened up. He still has  heroic snoring, frequent obstructions. I had proposed that we perform  tonsillectomy and palatoplasty for his obstructive oropharynx at the  time we fix his nose and he has recently presented to complete those  procedures. Findings at surgery revealed a huge uvula and palate lying  on the posterior pharyngeal wall. Tonsils were 2+. Palate was  extremely thickened and the oropharyngeal airway was narrowed from the  rather extreme obesity and subcutaneous fat pads. At the end of the procedure, I had gotten as much gain as possible in  terms of his airway. His palate was somewhat swollen. I put in a nasal  trumpet and trimmed it to the appropriate length along with an oral  airway, then he was awakened and extubated and taken to recovery. Shortly after emergence from anesthesia, he removed the oral airway and  the nasal trumpet himself.     The patient will be observed closely on same day surgery and unless  things improve dramatically, he will have to be admitted on monitoring  of his O2 sats and for pain control. OPERATIVE PROCEDURE:  After adequate level of general endotracheal  anesthesia had been obtained, the patient was draped in the usual  fashion for tonsillectomy. Mouth gag was placed. Tonsils removed with  dissection and low power electrocautery. Hemostasis was achieved with  packs, sutures, and suction cautery. The planned modification of the posterior pillar and closure of the  tonsil bed was carried out by having back cuts up along the side of the  uvula on each side of approximately 1.5 cm. Uvula was then trimmed  basically completely away except for having some preservation of the  posterior mucosa _____ sewn in place. There were no mucosal defects  there because of that. 3-0 Vicryl figure-of-eight sutures were used to close and approximate  the anterior and posterior pillars. There was much improved  velopharyngeal opening at the end of this procedure. Stomach was  suctioned. The nasal and oral airways were placed as mentioned. Bupivacaine 0.5% without epinephrine was injected into the tonsillar  fossa beds for postoperative pain relief. Then the patient was then  awakened, extubated, and taken to recovery room in satisfactory  condition. Estimated blood loss was less than 50 mL. There were no  complications. José Manuel Riojas M.D.    D: 05/08/2019 17:18:10       T: 05/08/2019 23:07:03     [email or web address]  Job#: [id number]     Doc#: [id number]    CC:  Cleveland Hamman. DEVYN Montana D.O.

## 2019-05-14 ENCOUNTER — TELEPHONE (OUTPATIENT)
Dept: ENT CLINIC | Age: 34
End: 2019-05-14

## 2019-05-14 DIAGNOSIS — K13.79 HYPERTROPHIC SOFT PALATE: ICD-10-CM

## 2019-05-14 DIAGNOSIS — K14.8 LARGE TONGUE: ICD-10-CM

## 2019-05-14 DIAGNOSIS — Z78.9 INTOLERANCE OF CONTINUOUS POSITIVE AIRWAY PRESSURE (CPAP) VENTILATION: ICD-10-CM

## 2019-05-14 DIAGNOSIS — Z98.890 STATUS POST NASAL SEPTOPLASTY: ICD-10-CM

## 2019-05-14 DIAGNOSIS — G47.33 OSA ON CPAP: ICD-10-CM

## 2019-05-14 DIAGNOSIS — Z99.89 OSA ON CPAP: ICD-10-CM

## 2019-05-14 DIAGNOSIS — K13.79 HYPERTROPHY OF UVULA: ICD-10-CM

## 2019-05-14 DIAGNOSIS — E66.2 CLASS 3 OBESITY WITH ALVEOLAR HYPOVENTILATION, SERIOUS COMORBIDITY, AND BODY MASS INDEX (BMI) OF 40.0 TO 44.9 IN ADULT (HCC): ICD-10-CM

## 2019-05-14 NOTE — TELEPHONE ENCOUNTER
Called patient, informed him prescription for 3 more days of Hycet was sent to his pharmacy. Told him he can also use Cepacol lozenges or viscous lidocaine. Patient verbalized understanding and thanked me.

## 2019-05-14 NOTE — TELEPHONE ENCOUNTER
Patient called in stating he accidentally spilled his Hycet over the weekend. He still has a little bit left but would like to know about getting a refill. Please advise. He also is complaining of a cough with a lot of Phlegm. He wants to know if this is normal. Please advise on this also.

## 2019-05-17 ENCOUNTER — NURSE TRIAGE (OUTPATIENT)
Dept: ADMINISTRATIVE | Age: 34
End: 2019-05-17

## 2019-05-30 ENCOUNTER — TELEPHONE (OUTPATIENT)
Dept: ENT CLINIC | Age: 34
End: 2019-05-30

## 2019-05-30 NOTE — TELEPHONE ENCOUNTER
Patient called in stating he is feeling stiches in the back of his throat. It is making him gag constantly. He is asking to move up his appointment, which is scheduled for 06/04/19 with Erasmo Reeder. He is post op UPPP and tonsillectomy on 05/08/19. Please advise.

## 2019-05-31 ENCOUNTER — OFFICE VISIT (OUTPATIENT)
Dept: ENT CLINIC | Age: 34
End: 2019-05-31

## 2019-05-31 VITALS
WEIGHT: 298.5 LBS | DIASTOLIC BLOOD PRESSURE: 88 MMHG | BODY MASS INDEX: 42.73 KG/M2 | HEIGHT: 70 IN | RESPIRATION RATE: 16 BRPM | TEMPERATURE: 98 F | HEART RATE: 80 BPM | SYSTOLIC BLOOD PRESSURE: 140 MMHG

## 2019-05-31 DIAGNOSIS — Z98.890 S/P UPPP (UVULOPALATOPHARYNGOPLASTY): Primary | ICD-10-CM

## 2019-05-31 DIAGNOSIS — G47.33 OSA ON CPAP: ICD-10-CM

## 2019-05-31 DIAGNOSIS — Z99.89 OSA ON CPAP: ICD-10-CM

## 2019-05-31 PROCEDURE — 99024 POSTOP FOLLOW-UP VISIT: CPT | Performed by: PHYSICIAN ASSISTANT

## 2019-05-31 ASSESSMENT — ENCOUNTER SYMPTOMS
STRIDOR: 0
VOMITING: 0
ABDOMINAL PAIN: 0
VOICE CHANGE: 0
COUGH: 1
CHEST TIGHTNESS: 0
NAUSEA: 0
RHINORRHEA: 0
SINUS PRESSURE: 0
SHORTNESS OF BREATH: 0
CHOKING: 0
FACIAL SWELLING: 0
DIARRHEA: 0
COLOR CHANGE: 0
TROUBLE SWALLOWING: 0
APNEA: 0
WHEEZING: 0
SORE THROAT: 1

## 2019-05-31 NOTE — PROGRESS NOTES
[facility], NOSE AND THROAT  [facility]  [address] [address]  Dept: [phone]  Dept Fax: [phone]  Loc: [phone]    Lupe Hills is a 29 y.o. male who was referred by No ref. provider found for:  Chief Complaint   Patient presents with    Follow Up After Procedure     Patient here to have sutures/cut at back of throat due to making him gag   . HPI:     Patient presents today for post-op follow up from a UPPP on 5/8/19 with Dr. Mae He. The patient states that he has been doing well since surgery. He has had a cough since surgery with occasional green sputum production. He was feeling some rattling in his chest last week as well. However the last few days he hasn't been coughing as much and he doesn't notice any rattling. He called the office yesterday because he was reportedly gagging on the sutures and wanted his appointment moved up. He states after calling to move the appointment he had a few hard coughs after gagging and he felt like he dislodged the biggest one. He did not gag after the incident. The patient states that he is doing so much better after the surgery, has nearly no pain, and he is not falling asleep while driving anymore. He also wakes up feeling refreshed and won't fall asleep right away if he sits down in a comfortable chair. The patient denies fevers, chills, headaches, nasal drainage, nausea, and vomiting. Subjective:        Review of Systems   Constitutional: Negative for activity change, appetite change, chills, diaphoresis, fatigue, fever and unexpected weight change. HENT: Positive for sore throat. Negative for congestion, dental problem, ear discharge, ear pain, facial swelling, hearing loss, mouth sores, nosebleeds, postnasal drip, rhinorrhea, sinus pressure, sneezing, tinnitus, trouble swallowing and voice change. Eyes: Negative for visual disturbance.    Respiratory: Positive for cough (occasionally productive). Negative for apnea, choking, chest tightness, shortness of breath, wheezing and stridor. Cardiovascular: Negative for chest pain, palpitations and leg swelling. Gastrointestinal: Negative for abdominal pain, diarrhea, nausea and vomiting. Endocrine: Negative for cold intolerance, heat intolerance, polydipsia and polyuria. Genitourinary: Negative for difficulty urinating, discharge, dysuria, enuresis, hematuria, penile pain, penile swelling, scrotal swelling, testicular pain and urgency. Musculoskeletal: Negative for arthralgias, gait problem, neck pain and neck stiffness. Skin: Negative for color change, rash and wound. Allergic/Immunologic: Negative for environmental allergies, food allergies and immunocompromised state. Neurological: Negative for dizziness, seizures, syncope, facial asymmetry, speech difficulty, light-headedness and headaches. Hematological: Negative for adenopathy. Does not bruise/bleed easily. Psychiatric/Behavioral: Negative for confusion, sleep disturbance and suicidal ideas. The patient is not nervous/anxious. Objective: This is a 29 y.o. male. Patient is alert and oriented to person, place and time. Mood is happy. BP (!) 140/88 (Site: Right Upper Arm, Position: Sitting, Cuff Size: Medium Adult)   Pulse 80   Temp 98 °F (36.7 °C) (Oral)   Resp 16   Ht 5' 10\" (1.778 m)   Wt 298 lb 8 oz (135.4 kg)   BMI 42.83 kg/m²       Lips, tongue and oral cavity show tongue is midline, mobile, no lesions. Dentition: good, no malocclusion  Oral mucosa: moist  Tonsils: No exudate or erythema   Oropharynx: wound site healing well without signs of infection. Some of wound eschar still in place and is partially hanging off of the palate. Hard and soft palates symmetrical and intact. Uvula midline. Gag reflex present    Lymphatic: No cervical lymphadenopathy noted. Lungs: Normal effort of breathing, not obviously distressed.  Lung sounds clear throughout. Assessment/Plan:     Diagnosis Orders   1. S/P UPPP (uvulopalatopharyngoplasty)       The patient is a 29year old male that presents for post-UPPP evaluation. The wound site appears to be healing well. I explained to the patient that I do not see any of the sutures, but they could be contained in the eschar. I told the patient I would not recommend trying to removed the eschar. He was agreeable, as he had not gagged since he reportedly dislodged a large portion yesterday. The patient had some rattling in his chest last week with a intermittently productive cough. However, he states that seems to have greatly improved over the last few days. The patient will call our office on Monday if he does not improve over the weekend. I also recommended he consider coming to the ED over the weekend if he worsens symptomatically or develops a fever. The patient expressed understanding and thanked me. He will follow up PRN.     Electronically signed by RAUL Mock on 5/31/2019 at 9:22 AM

## 2019-06-18 ENCOUNTER — OFFICE VISIT (OUTPATIENT)
Dept: ENT CLINIC | Age: 34
End: 2019-06-18

## 2019-06-18 VITALS
RESPIRATION RATE: 14 BRPM | DIASTOLIC BLOOD PRESSURE: 78 MMHG | WEIGHT: 294 LBS | SYSTOLIC BLOOD PRESSURE: 138 MMHG | TEMPERATURE: 98.9 F | BODY MASS INDEX: 42.09 KG/M2 | HEART RATE: 76 BPM | HEIGHT: 70 IN

## 2019-06-18 DIAGNOSIS — Z99.89 OSA ON CPAP: ICD-10-CM

## 2019-06-18 DIAGNOSIS — Z98.890 S/P UPPP (UVULOPALATOPHARYNGOPLASTY): Primary | ICD-10-CM

## 2019-06-18 DIAGNOSIS — G47.33 OSA ON CPAP: ICD-10-CM

## 2019-06-18 DIAGNOSIS — Z90.89 S/P TONSILLECTOMY: ICD-10-CM

## 2019-06-18 PROCEDURE — 99024 POSTOP FOLLOW-UP VISIT: CPT | Performed by: PHYSICIAN ASSISTANT

## 2019-06-18 ASSESSMENT — ENCOUNTER SYMPTOMS
FACIAL SWELLING: 0
DIARRHEA: 0
SINUS PRESSURE: 0
VOMITING: 0
COUGH: 0
CHEST TIGHTNESS: 0
WHEEZING: 0
APNEA: 0
COLOR CHANGE: 0
SHORTNESS OF BREATH: 0
NAUSEA: 0
ABDOMINAL PAIN: 0
SORE THROAT: 0
VOICE CHANGE: 0
CHOKING: 0
STRIDOR: 0
TROUBLE SWALLOWING: 1
RHINORRHEA: 0

## 2019-06-18 NOTE — PROGRESS NOTES
apnea, cough, choking, chest tightness, shortness of breath, wheezing and stridor. Cardiovascular: Negative for chest pain, palpitations and leg swelling. Gastrointestinal: Negative for abdominal pain, diarrhea, nausea and vomiting. Endocrine: Negative for cold intolerance, heat intolerance, polydipsia and polyuria. Genitourinary: Negative for difficulty urinating, discharge, dysuria, enuresis, hematuria, penile pain, penile swelling, scrotal swelling, testicular pain and urgency. Musculoskeletal: Negative for arthralgias, gait problem, neck pain and neck stiffness. Skin: Negative for color change, rash and wound. Allergic/Immunologic: Negative for environmental allergies, food allergies and immunocompromised state. Neurological: Negative for dizziness, seizures, syncope, facial asymmetry, speech difficulty, light-headedness and headaches. Hematological: Negative for adenopathy. Does not bruise/bleed easily. Psychiatric/Behavioral: Negative for confusion, sleep disturbance and suicidal ideas. The patient is not nervous/anxious. Social History:    TOBACCO:   reports that he has been smoking cigarettes. He started smoking about 12 years ago. He has been smoking about 0.50 packs per day. He has never used smokeless tobacco.  ETOH:   reports that he drinks alcohol. DRUGS:   reports that he does not use drugs. Family History:       Problem Relation Age of Onset    Cancer Mother         Breast Cancer    Diabetes Father     Heart Disease Father     Heart Disease Maternal Grandmother     Heart Disease Maternal Grandfather     Diabetes Paternal Grandmother     COPD Paternal Grandmother     Diabetes Paternal Grandfather     COPD Paternal Grandfather        Objective: This is a 29 y.o. male. Patient is alert and oriented to person, place and time. Patient appears well developed, well nourished. Mood is happy. Not obviously hearing impaired.     /78 (Site: Left Upper Arm, Position: Sitting)   Pulse 76   Temp 98.9 °F (37.2 °C) (Oral)   Resp 14   Ht 5' 10\" (1.778 m)   Wt 294 lb (133.4 kg)   BMI 42.18 kg/m²     Mouth/Throat:  Lips, tongue and oral cavity: Normal. No masses or lesions noted   Dentition: fair, no malocclusion  Oral mucosa: moist  Tonsils: surgically absent  Oropharynx: normal-appearing mucosa. Long eschar hanging in previous spot of uvula and a shorter one laterally to the left. The medial eschar was trimmed down to about half the size. Patient immediately reports improvement. Dr. Vick Skinner examined the patient as well, and he removed the sutures completely. Hard and soft palates: symmetrical and intact. Salivary glands: not enlarged and no tenderness to palpation. Uvula: midline, no obvious lesions    Neck: Trachea midline. Thyroid not enlarged, no palpable masses or tenderness  Lymphatic: No cervical lymphadenopathy noted. Eyes: FLAKITO, EOM intact. Conjunctiva moist without discharge. Lungs: Normal effort of breathing, not obviously distressed. Assessment/Plan:     Diagnosis Orders   1. S/P UPPP (uvulopalatopharyngoplasty)     2. GERHARD on CPAP  [address] [place] [address]   3. S/P tonsillectomy         The patient is a 29 y.o. male that presents for post-op evaluation. Patient has been doing well since surgery. He denies symptoms of infection. The only complaint is long eschar in the throat causing him to gag. The eschar was trimmed down without complication and patient reports immediate improvement. The sutures were then completely removed by Dr. Vick Skinner without complication. Since the patient is over 6 weeks post-op I recommended another sleep study to evaluate if the patient's CPAP pressure can be reduced. We also discussed getting a mouth splint from a dentist or sporting goods store to help prevent his tongue falling back in the oropharynx causing obstruction. Patient will return PRN. He will call with any concerns in the meantime. Electronically signed by RAUL White on 6/18/2019 at 11:19 AM

## 2020-01-02 ENCOUNTER — HOSPITAL ENCOUNTER (EMERGENCY)
Age: 35
Discharge: HOME | End: 2020-01-02
Payer: MEDICAID

## 2020-01-02 VITALS
TEMPERATURE: 97.8 F | HEIGHT: 72 IN | BODY MASS INDEX: 35.21 KG/M2 | OXYGEN SATURATION: 97 % | WEIGHT: 260 LBS | HEART RATE: 62 BPM | SYSTOLIC BLOOD PRESSURE: 120 MMHG | RESPIRATION RATE: 18 BRPM | DIASTOLIC BLOOD PRESSURE: 76 MMHG

## 2020-01-02 PROCEDURE — 99283 EMERGENCY DEPT VISIT LOW MDM: CPT

## 2020-01-02 PROCEDURE — 6370000000 HC RX 637 (ALT 250 FOR IP): Performed by: EMERGENCY MEDICINE

## 2020-01-02 RX ORDER — IBUPROFEN 800 MG/1
800 TABLET ORAL ONCE
Status: COMPLETED | OUTPATIENT
Start: 2020-01-02 | End: 2020-01-02

## 2020-01-02 RX ORDER — IBUPROFEN 600 MG/1
600 TABLET ORAL 3 TIMES DAILY PRN
Qty: 30 TABLET | Refills: 0 | Status: SHIPPED | OUTPATIENT
Start: 2020-01-02 | End: 2021-07-11

## 2020-01-02 RX ORDER — CEPHALEXIN 500 MG/1
500 CAPSULE ORAL 3 TIMES DAILY
Qty: 30 CAPSULE | Refills: 0 | Status: SHIPPED | OUTPATIENT
Start: 2020-01-02 | End: 2020-01-12

## 2020-01-02 RX ORDER — SULFAMETHOXAZOLE AND TRIMETHOPRIM 800; 160 MG/1; MG/1
1 TABLET ORAL 2 TIMES DAILY
Qty: 20 TABLET | Refills: 0 | Status: SHIPPED | OUTPATIENT
Start: 2020-01-02 | End: 2020-01-12

## 2020-01-02 RX ORDER — HYDROCODONE BITARTRATE AND ACETAMINOPHEN 5; 325 MG/1; MG/1
1 TABLET ORAL ONCE
Status: COMPLETED | OUTPATIENT
Start: 2020-01-02 | End: 2020-01-02

## 2020-01-02 RX ADMIN — HYDROCODONE BITARTRATE AND ACETAMINOPHEN 1 TABLET: 5; 325 TABLET ORAL at 09:20

## 2020-01-02 RX ADMIN — IBUPROFEN 800 MG: 800 TABLET, FILM COATED ORAL at 09:20

## 2020-01-02 ASSESSMENT — PAIN DESCRIPTION - LOCATION
LOCATION: HEAD;GENERALIZED
LOCATION: HEAD

## 2020-01-02 ASSESSMENT — PAIN SCALES - GENERAL
PAINLEVEL_OUTOF10: 9

## 2020-01-02 ASSESSMENT — PAIN DESCRIPTION - PAIN TYPE
TYPE: ACUTE PAIN
TYPE: ACUTE PAIN

## 2020-01-02 ASSESSMENT — ENCOUNTER SYMPTOMS
ABDOMINAL PAIN: 0
SHORTNESS OF BREATH: 0
COUGH: 0

## 2020-01-02 NOTE — ED PROVIDER NOTES
[facility]  eMERGENCY dEPARTMENT eNCOUnter          [facility]       Chief Complaint   Patient presents with    Cyst     under arms and right leg; History of HS    Headache       Nurses Notes reviewed and I agree except as noted in the HPI. HISTORY OF PRESENT ILLNESS    Nicola Boone is a 29 y.o. male who presents to the Emergency Department for the evaluation of what he calls a flareup of his hidradenitis. Patient states for the last several days he has had a flareup of multiple nodules to bilateral axilla, and in his groin. He states that there is no drainable abscesses in either armpit but he does have an area on his right inner thigh that has become red, swollen and very tender. Patient states that he gets flares of his hidradenitis frequently. He is typically placed on Bactrim that helps with his symptoms. The patient states that his family physician has moved and he has not seen him since changing locations. He needs to get back with his primary care provider and is here in the emergency department as he has yet to do that. Denies any fevers, body aches or chills. Patient does state he has a headache for 2 days. He thinks that headache is attributed to just feeling generalized uncomfortableness due to the multiple lesions. The HPI was provided by the patient. REVIEW OF SYSTEMS     Review of Systems   Constitutional: Positive for fatigue. Negative for fever. Respiratory: Negative for cough and shortness of breath. Cardiovascular: Negative for chest pain and leg swelling. Gastrointestinal: Negative for abdominal pain. Skin: Positive for rash and wound (abscess right inner thigh, multiple lesion to axillae). Neurological: Positive for headaches. Psychiatric/Behavioral: The patient is not nervous/anxious.         PAST MEDICAL HISTORY    has a past medical history of Chronic back pain, Diverticulitis, Hidradenitis suppurativa, PONV (postoperative Bill Jiménez, XOCHITL - CNP  01/02/20 1017

## 2020-01-02 NOTE — ED NOTES
Pt resting in bed with family at bedside. Resp easy and unlabored. VSS. Pt informed on updated plan of care. Lights remain dimmed for pt comfort. Call light in reach. Will continue to monitor.       Sia Peck RN  01/02/20 3424

## 2021-02-02 ENCOUNTER — TELEPHONE (OUTPATIENT)
Dept: ENT CLINIC | Age: 36
End: 2021-02-02

## 2021-02-02 DIAGNOSIS — Z78.9 INTOLERANCE OF CONTINUOUS POSITIVE AIRWAY PRESSURE (CPAP) VENTILATION: Primary | ICD-10-CM
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
## 2021-02-04 NOTE — TELEPHONE ENCOUNTER
Referral placed. Depending when his sleep medicine appointment and sleep study is scheduled, his appointment with Dr. Josefina Montoya may need to be pushed back. Hopefully they will just be able to see him before that appointment.

## 2021-02-05 NOTE — TELEPHONE ENCOUNTER
The office received urgent care notes and after Camille Khan reviewed them he stated that he can see patient at first available. Attempted to call patient. Got voicemail, left message to call the office.

## 2021-02-09 ENCOUNTER — OFFICE VISIT (OUTPATIENT)
Dept: ENT CLINIC | Age: 36
End: 2021-02-09
Payer: MEDICAID

## 2021-02-09 VITALS
HEART RATE: 126 BPM | DIASTOLIC BLOOD PRESSURE: 84 MMHG | HEIGHT: 72 IN | RESPIRATION RATE: 16 BRPM | TEMPERATURE: 98.9 F | SYSTOLIC BLOOD PRESSURE: 124 MMHG | BODY MASS INDEX: 42.66 KG/M2 | WEIGHT: 315 LBS

## 2021-02-09 DIAGNOSIS — K21.9 GERD WITHOUT ESOPHAGITIS: ICD-10-CM

## 2021-02-09 DIAGNOSIS — Z86.39 H/O VITAMIN D DEFICIENCY: ICD-10-CM

## 2021-02-09 DIAGNOSIS — Z57.0 OCCUPATIONAL EXPOSURE TO NOISE: ICD-10-CM

## 2021-02-09 DIAGNOSIS — H69.81 ETD (EUSTACHIAN TUBE DYSFUNCTION), RIGHT: ICD-10-CM

## 2021-02-09 DIAGNOSIS — R09.89 CHOKING EPISODE OCCURRING AT NIGHT: ICD-10-CM

## 2021-02-09 DIAGNOSIS — H65.92 LEFT SEROUS OTITIS MEDIA, UNSPECIFIED CHRONICITY: Primary | ICD-10-CM

## 2021-02-09 DIAGNOSIS — Z78.9 INTOLERANCE OF CONTINUOUS POSITIVE AIRWAY PRESSURE (CPAP) VENTILATION: ICD-10-CM

## 2021-02-09 DIAGNOSIS — G47.19 DAYTIME HYPERSOMNOLENCE: ICD-10-CM

## 2021-02-09 DIAGNOSIS — Z90.89 HISTORY OF TONSILLECTOMY: ICD-10-CM

## 2021-02-09 DIAGNOSIS — H91.93 DECREASED HEARING OF BOTH EARS: ICD-10-CM

## 2021-02-09 DIAGNOSIS — Z98.890 HISTORY OF UVULOPALATOPHARYNGOPLASTY: ICD-10-CM

## 2021-02-09 DIAGNOSIS — G47.33 OSA (OBSTRUCTIVE SLEEP APNEA): ICD-10-CM

## 2021-02-09 DIAGNOSIS — H93.13 BILATERAL TINNITUS: ICD-10-CM

## 2021-02-09 DIAGNOSIS — Z98.890 HISTORY OF NASAL SEPTOPLASTY: ICD-10-CM

## 2021-02-09 PROCEDURE — 4004F PT TOBACCO SCREEN RCVD TLK: CPT | Performed by: PHYSICIAN ASSISTANT

## 2021-02-09 PROCEDURE — G8417 CALC BMI ABV UP PARAM F/U: HCPCS | Performed by: PHYSICIAN ASSISTANT

## 2021-02-09 PROCEDURE — G8484 FLU IMMUNIZE NO ADMIN: HCPCS | Performed by: PHYSICIAN ASSISTANT

## 2021-02-09 PROCEDURE — G8427 DOCREV CUR MEDS BY ELIG CLIN: HCPCS | Performed by: PHYSICIAN ASSISTANT

## 2021-02-09 PROCEDURE — 99215 OFFICE O/P EST HI 40 MIN: CPT | Performed by: PHYSICIAN ASSISTANT

## 2021-02-09 RX ORDER — CEFDINIR 300 MG/1
300 CAPSULE ORAL 2 TIMES DAILY
Qty: 28 CAPSULE | Refills: 0 | Status: SHIPPED | OUTPATIENT
Start: 2021-02-09 | End: 2021-02-23

## 2021-02-09 RX ORDER — POLYMYXIN B SULFATE AND TRIMETHOPRIM 1; 10000 MG/ML; [USP'U]/ML
SOLUTION OPHTHALMIC
COMMUNITY
Start: 2021-02-02 | End: 2021-07-14 | Stop reason: ALTCHOICE

## 2021-02-09 RX ORDER — FLUTICASONE PROPIONATE 50 MCG
1 SPRAY, SUSPENSION (ML) NASAL DAILY
Qty: 1 BOTTLE | Refills: 2 | Status: SHIPPED
Start: 2021-02-09 | End: 2021-06-30 | Stop reason: SDUPTHER

## 2021-02-09 RX ORDER — OMEPRAZOLE 40 MG/1
40 CAPSULE, DELAYED RELEASE ORAL
Qty: 30 CAPSULE | Refills: 2 | Status: SHIPPED | OUTPATIENT
Start: 2021-02-09 | End: 2021-11-23

## 2021-02-09 SDOH — HEALTH STABILITY - PHYSICAL HEALTH: OCCUPATIONAL EXPOSURE TO NOISE: Z57.0

## 2021-02-09 ASSESSMENT — ENCOUNTER SYMPTOMS
SORE THROAT: 0
CHOKING: 0
RHINORRHEA: 0
ABDOMINAL PAIN: 0
TROUBLE SWALLOWING: 0
APNEA: 1
VOMITING: 0
COUGH: 0
COLOR CHANGE: 0
WHEEZING: 0
SINUS PRESSURE: 0
FACIAL SWELLING: 0
STRIDOR: 0
VOICE CHANGE: 0
SHORTNESS OF BREATH: 0
NAUSEA: 0
DIARRHEA: 0
CHEST TIGHTNESS: 0

## 2021-02-09 NOTE — PROGRESS NOTES
[facility] [place] [facility]  [facility] EAR, NOSE AND THROAT  [facility]  Dept: [phone]  Dept Fax: [phone]  Loc: [phone]    Luz Rosa is a 39 y.o. male who was referred by No ref. provider found for:  Chief Complaint   Patient presents with    Follow-up     Patient is here for ear check. Vira Libman HPI:     Patient presents for ear check and discuss sleeping issues. Patient reports that he went to the ER on 1/1/2021 after he fell. He is evaluated in the ER and found to have a torn rotator cuff. Also during the exam patient was noted to have \"an ear infection. \"  He denies any ear symptoms at the time including otalgia, and states he was otherwise feeling completely healthy at the time. Since then the patient reports that he has began to [facility] an ear infection in both ears. \"  He states that both ears feel as though they are full with muffled hearing, but states the left is worse than right. He states that the hearing was a gradual decrease in denies any acute/sudden changes. He also reports a new \"deep buzzing sound\" in each ear. He states that his ears are not necessarily painful at this time, but just feel plugged. He says occasionally he will have a few seconds of an earache which will spontaneously resolve. He feels as though he is underwater or on an airplane and his ears will not pop/crack. Patient has been treated with amoxicillin and Augmentin which has not helped his symptoms whatsoever. He denies any environmental allergies, rhinorrhea, congestion, postnasal drainage, sneezing. He has a denies a history of recurrent ear infections and states he has never had tubes. Patient does report a occupational exposure to loud noise as well as exposure to noise from firearms. Patient reports that he typically tries to wear hearing protection in loud situations.     The patient reports that he is also very concerned about his sleeping habits. He had called the office prior to the visit reporting waking up frequently throughout the night feeling as though he is choking. He states that he can wake up about 30 times in the night due to the sensation of choking reportedly. He states that he constantly has a headache and is always tired. He states that he feels as though he could fall asleep behind the wheel and is also been very \"hateful to his girlfriend. \"  The patient does have a history of obstructive sleep apnea and had CPAP in the past.  However, he did not tolerate the CPAP and got rid of the machine. Patient had a septoplasty and SMR of turbinates with Dr. Jamee Mclaughlin in July 2017. Patient did undergo a UPPP with Dr. Jamee Mclaughlin in May 2019 as well. During his follow-up visit a referral for sleep medicine was placed, but patient never followed up with them. He reports that his sleeping was better after surgery, but has seemed to worsen. Patient's girlfriend states that he wakes up too many times to count during the night. Patient reports that he is gained approximately 40 pounds since he was last seen. Patient is scheduled in a few weeks for evaluation and [address] [facility] Patient reports that during his last sleep study they stopped for about an hour due to his severe apneas. Patient also reports he has been experiencing increased amounts of acid reflux recently. Patient states that he has stopped drinking as much pop recently and that seemed to increase his reflux. He does report that he has been drinking more fruit juices as an alternative to pop during this time. He is not currently on any reflux medications. He does admit to snacking within 1 to 2 hours of going to bed fairly routinely as well. Patient also reports he was feeling well when he was on vitamin D and is interested in resuming medication.      Subjective:        Review of Systems   Constitutional: Negative for activity change, appetite change, chills, diaphoresis, fatigue, fever and unexpected weight change. HENT: Positive for ear pain (fullness), hearing loss (muffled) and tinnitus. Negative for congestion, dental problem, ear discharge, facial swelling, mouth sores, nosebleeds, postnasal drip, rhinorrhea, sinus pressure, sneezing, sore throat, trouble swallowing and voice change. Eyes: Negative for visual disturbance. Respiratory: Positive for apnea (wakes up choking many times during the night). Negative for cough, choking, chest tightness, shortness of breath, wheezing and stridor. Cardiovascular: Negative for chest pain, palpitations and leg swelling. Gastrointestinal: Negative for abdominal pain, diarrhea, nausea and vomiting. Endocrine: Negative for cold intolerance, heat intolerance, polydipsia and polyuria. Genitourinary: Negative for difficulty urinating, discharge, dysuria, enuresis, hematuria, penile pain, penile swelling, scrotal swelling, testicular pain and urgency. Musculoskeletal: Negative for arthralgias, gait problem, neck pain and neck stiffness. Skin: Negative for color change, rash and wound. Allergic/Immunologic: Negative for environmental allergies, food allergies and immunocompromised state. Neurological: Negative for dizziness, seizures, syncope, facial asymmetry, speech difficulty, light-headedness and headaches. Hematological: Negative for adenopathy. Does not bruise/bleed easily. Psychiatric/Behavioral: Negative for confusion, sleep disturbance and suicidal ideas. The patient is not nervous/anxious. Past Medical History:  Past Medical History:   Diagnosis Date    Chronic back pain     Diverticulitis     Hidradenitis suppurativa     PONV (postoperative nausea and vomiting)     Sleep apnea        Social History:    TOBACCO:   reports that he has been smoking cigarettes. He started smoking about 13 years ago. He has been smoking about 1.00 pack per day.  He has never used smokeless tobacco.  ETOH:   reports current alcohol use. DRUGS:   reports no history of drug use. Family History:       Problem Relation Age of Onset    Cancer Mother         Breast Cancer    Diabetes Father     Heart Disease Father     Heart Disease Maternal Grandmother     Heart Disease Maternal Grandfather     Diabetes Paternal Grandmother     COPD Paternal Grandmother     Diabetes Paternal Grandfather     COPD Paternal Grandfather        Surgical History:  Past Surgical History:   Procedure Laterality Date    ANKLE SURGERY  2001    BACK SURGERY      CHOLECYSTECTOMY      COLON SURGERY      NOSE SURGERY  1993    Laser Surgery    RECTAL SURGERY      Ruptured Sigmoid Colon 2013    SEPTOPLASTY Left 7/19/2017    SEPTOPLASTY SUBMUCOUS RESECT TURBINATES, PARTIAL LEFT performed by Davina Thomas MD at [address] [place] [address] SEPTOPLASTY N/A 5/8/2019    TONSILLECTOMY, UPPP performed by Davina Thomas MD at [place] [facility]        Objective: This is a 39 y.o. male. Patient is alert and oriented to person, place and time. Patient appears obese. Mood is happy with normal affect. Not obviously hearing impaired. No abnormality in speech noted. /84 (Site: Left Upper Arm, Position: Sitting)   Pulse 126   Temp 98.9 °F (37.2 °C) (Infrared)   Resp 16   Ht 6' (1.829 m)   Wt (!) 316 lb (143.3 kg)   BMI 42.86 kg/m²     Head:   Normocephalic, atraumatic. No obvious masses or lesions noted. Ears:  External ears: Normal: no scars, lesions or masses. Mastoid process: No erythema noted. No tenderness to palpation. R External auditory canal: clear and free of any pathology  L External auditory canal: clear and free of any pathology   Tympanic membranes:  R TM appears retracted. No evidence of infection                                                  L has a serous (yellow hue) effusion.   There is no purulence or significant erythema noted   [place]:   [place]:  Right Ear:  512 hz - Result positive (air conduction greater than bone conduction)               Left Ear:  512 hz - Result positive (air conduction greater than bone conduction)  [place]: 512 hz.  Result - lateralizes to the right  Nose:    External nose: Appears midline. No obvious deformity or masses. Septum:  normal. No septal hematoma. No perforation. Mucosa:  clear  Turbinates: normal and pink            Discharge:  none    Mouth/Throat:  Lips, tongue and oral cavity: Normal. No masses or lesions noted   Dentition: fair, no malocclusion  Oral mucosa: moist  Tonsils: absent  Oropharynx: normal-appearing mucosa  Hard and soft palates: Normal post-UPPP findings of soft palate without evidence of further hypertrophy. Otherwise symmetrical and intact. Salivary glands: not enlarged and no tenderness to palpation. Uvula: Absent    Neck: Trachea midline. Thyroid not enlarged, no palpable masses or tenderness. Lymphatic: No cervical lymphadenopathy noted. Eyes: FLAKITO, EOM intact. Conjunctiva moist without discharge. Lungs: Normal effort of breathing, not obviously distressed. Neuro: Cranial nerves II-XII grossly intact. Extremities: No clubbing, edema, or cyanosis noted. Assessment/Plan:     Diagnosis Orders   1. Left serous otitis media, unspecified chronicity  cefdinir (OMNICEF) 300 MG capsule    fluticasone (FLONASE) 50 MCG/ACT nasal spray    Audiometry with tympanometry   2. ETD (Eustachian tube dysfunction), right  fluticasone (FLONASE) 50 MCG/ACT nasal spray    Audiometry with tympanometry   3. Decreased hearing of both ears  Audiometry with tympanometry   4. Bilateral tinnitus  Audiometry with tympanometry   5. GERHARD (obstructive sleep apnea)     6. Intolerance of continuous positive airway pressure (CPAP) ventilation     7. Choking episode occurring at night     8. Daytime hypersomnolence     9. GERD without esophagitis  omeprazole (PRILOSEC) 40 MG delayed release capsule   10. Occupational exposure to noise     11.  H/O vitamin D deficiency  Vitamin D 25 Hydroxy   12. History of uvulopalatopharyngoplasty     13. History of tonsillectomy     14. History of nasal septoplasty         The patient is a 39 y.o. male that presents for evaluation of his ears and discuss other concerns. Patient has serous effusion on the left with likely eustachian tube dysfunction on the right. I recommended a course of Omnicef, daily Flonase and twice daily nasal saline irrigations. He will try this treatment for approximately a month and follow-up for an audiogram and same-day exam with Dr. Jerri Ren. Patient is currently scheduled for sleep evaluation in a few weeks, but patient is interested in moving this up. I informed him that he has been seen at [facility], and may be [facility] but have more availability. He states he will contact them to see if there is a sooner appointment. I recommended against driving if he is feeling as though he may fall asleep at the wheel. We also discussed the long-term health effects of untreated sleep apnea. Patient will be started on omeprazole for his GERD. We also discussed avoiding snacking within 3 hours of going to bed as this can increase the likelihood of reflux. Order for vitamin D also placed to see if patient needs/would benefit from supplementation. Patient expresses understanding of the plan and thanked me. He will contact the office sooner with new/worsening symptoms or other concerns.     Electronically signed by RAUL Cheung on 2/9/2021 at 4:50 PM

## 2021-02-19 ENCOUNTER — INITIAL CONSULT (OUTPATIENT)
Dept: PULMONOLOGY | Age: 36
End: 2021-02-19
Payer: MEDICAID

## 2021-02-19 VITALS
HEIGHT: 72 IN | BODY MASS INDEX: 42.66 KG/M2 | TEMPERATURE: 97.9 F | DIASTOLIC BLOOD PRESSURE: 86 MMHG | OXYGEN SATURATION: 98 % | SYSTOLIC BLOOD PRESSURE: 132 MMHG | HEART RATE: 84 BPM | WEIGHT: 315 LBS

## 2021-02-19 DIAGNOSIS — G47.10 HYPERSOMNIA: ICD-10-CM

## 2021-02-19 DIAGNOSIS — E66.01 MORBID OBESITY WITH BMI OF 40.0-44.9, ADULT (HCC): ICD-10-CM

## 2021-02-19 DIAGNOSIS — G47.33 OBSTRUCTIVE SLEEP APNEA: Primary | ICD-10-CM

## 2021-02-19 DIAGNOSIS — G47.8 NON-RESTORATIVE SLEEP: ICD-10-CM

## 2021-02-19 PROCEDURE — G8484 FLU IMMUNIZE NO ADMIN: HCPCS | Performed by: PHYSICIAN ASSISTANT

## 2021-02-19 PROCEDURE — 99204 OFFICE O/P NEW MOD 45 MIN: CPT | Performed by: PHYSICIAN ASSISTANT

## 2021-02-19 PROCEDURE — 4004F PT TOBACCO SCREEN RCVD TLK: CPT | Performed by: PHYSICIAN ASSISTANT

## 2021-02-19 PROCEDURE — G8417 CALC BMI ABV UP PARAM F/U: HCPCS | Performed by: PHYSICIAN ASSISTANT

## 2021-02-19 PROCEDURE — G8427 DOCREV CUR MEDS BY ELIG CLIN: HCPCS | Performed by: PHYSICIAN ASSISTANT

## 2021-02-19 ASSESSMENT — ENCOUNTER SYMPTOMS
CHEST TIGHTNESS: 0
SHORTNESS OF BREATH: 0
BACK PAIN: 1
NAUSEA: 0
EYES NEGATIVE: 1
DIARRHEA: 0
COUGH: 0
ALLERGIC/IMMUNOLOGIC NEGATIVE: 1
STRIDOR: 0
WHEEZING: 0
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
## 2021-02-19 NOTE — PATIENT INSTRUCTIONS
Patient Education        Learning About Benefits From Quitting Smoking  How does quitting smoking make you healthier? If you're thinking about quitting smoking, you may have a few reasons to be smoke-free. Your health may be one of them. · When you quit smoking, you lower your risks for cancer, lung disease, heart attack, stroke, blood vessel disease, and blindness from macular degeneration. · When you're smoke-free, you get sick less often, and you heal faster. You are less likely to get colds, flu, bronchitis, and pneumonia. · As a nonsmoker, you may find that your mood is better and you are less stressed. When and how will you feel healthier? Quitting has real health benefits that start from day 1 of being smoke-free. And the longer you stay smoke-free, the healthier you get and the better you feel. The first hours  · After just 20 minutes, your blood pressure and heart rate go down. That means there's less stress on your heart and blood vessels. · Within 12 hours, the level of carbon monoxide in your blood drops back to normal. That makes room for more oxygen. With more oxygen in your body, you may notice that you have more energy than when you smoked. After 2 weeks  · Your lungs start to work better. · Your risk of heart attack starts to drop. After 1 month  · When your lungs are clear, you cough less and breathe deeper, so it's easier to be active. · Your sense of taste and smell return. That means you can enjoy food more than you have since you started smoking. Over the years  · Over the years, your risks of heart disease, heart attack, and stroke are lower. · After 10 years, your risk of dying from lung cancer is cut by about half. And your risk for many other types of cancer is lower too. How would quitting help others in your life? When you quit smoking, you improve the health of everyone who now breathes in your smoke. · Their heart, lung, and cancer risks drop, much like yours.   · They are sick less. For babies and small children, living smoke-free means they're less likely to have ear infections, pneumonia, and bronchitis. · If you're a woman who is or will be pregnant someday, quitting smoking means a healthier . · Children who are close to you are less likely to become adult smokers. Where can you learn more? Go to https://Evodentalpeparasewliberty.GetGoing. org and sign in to your [employer]. account. Enter [phone] in the [facility] box to learn more about \"Learning About Benefits From Quitting Smoking. \"     If you do not have an account, please click on the \"Sign Up Now\" link. Current as of: 2020               Content Version: 12.6  © 9500-6324 [employer]. Care instructions adapted under license by [facility] ([facility] and the [facility]). If you have questions about a medical condition or this instruction, always ask your healthcare professional. [place] [address] any warranty or liability for your use of this information.

## 2021-03-11 ENCOUNTER — OFFICE VISIT (OUTPATIENT)
Dept: ENT CLINIC | Age: 36
End: 2021-03-11
Payer: MEDICAID

## 2021-03-11 ENCOUNTER — HOSPITAL ENCOUNTER (OUTPATIENT)
Dept: AUDIOLOGY | Age: 36
Discharge: HOME | End: 2021-03-11
Payer: MEDICAID

## 2021-03-11 VITALS
WEIGHT: 315 LBS | DIASTOLIC BLOOD PRESSURE: 82 MMHG | HEART RATE: 70 BPM | BODY MASS INDEX: 44.21 KG/M2 | TEMPERATURE: 97.6 F | SYSTOLIC BLOOD PRESSURE: 128 MMHG | RESPIRATION RATE: 16 BRPM

## 2021-03-11 DIAGNOSIS — J32.2 CHRONIC ETHMOIDAL SINUSITIS: Primary | ICD-10-CM

## 2021-03-11 DIAGNOSIS — R63.5 UNINTENDED WEIGHT GAIN: ICD-10-CM

## 2021-03-11 DIAGNOSIS — H65.23 BILATERAL CHRONIC SEROUS OTITIS MEDIA: ICD-10-CM

## 2021-03-11 DIAGNOSIS — J35.1 LINGUAL TONSIL HYPERTROPHY: ICD-10-CM

## 2021-03-11 DIAGNOSIS — G47.33 OBSTRUCTIVE SLEEP APNEA: ICD-10-CM

## 2021-03-11 DIAGNOSIS — H57.89 EYE REDNESS: ICD-10-CM

## 2021-03-11 DIAGNOSIS — J34.89 NASAL DRAINAGE: ICD-10-CM

## 2021-03-11 DIAGNOSIS — R44.8 FACIAL PRESSURE: ICD-10-CM

## 2021-03-11 PROCEDURE — 4004F PT TOBACCO SCREEN RCVD TLK: CPT | Performed by: OTOLARYNGOLOGY

## 2021-03-11 PROCEDURE — 92557 COMPREHENSIVE HEARING TEST: CPT | Performed by: AUDIOLOGIST

## 2021-03-11 PROCEDURE — 92567 TYMPANOMETRY: CPT | Performed by: AUDIOLOGIST

## 2021-03-11 PROCEDURE — G8427 DOCREV CUR MEDS BY ELIG CLIN: HCPCS | Performed by: OTOLARYNGOLOGY

## 2021-03-11 PROCEDURE — 31575 DIAGNOSTIC LARYNGOSCOPY: CPT | Performed by: OTOLARYNGOLOGY

## 2021-03-11 PROCEDURE — G8484 FLU IMMUNIZE NO ADMIN: HCPCS | Performed by: OTOLARYNGOLOGY

## 2021-03-11 PROCEDURE — G8417 CALC BMI ABV UP PARAM F/U: HCPCS | Performed by: OTOLARYNGOLOGY

## 2021-03-11 PROCEDURE — 99214 OFFICE O/P EST MOD 30 MIN: CPT | Performed by: OTOLARYNGOLOGY

## 2021-03-11 RX ORDER — SULFAMETHOXAZOLE AND TRIMETHOPRIM 800; 160 MG/1; MG/1
1 TABLET ORAL 2 TIMES DAILY
Qty: 56 TABLET | Refills: 2 | Status: SHIPPED | OUTPATIENT
Start: 2021-03-11 | End: 2021-04-08

## 2021-03-11 RX ORDER — FLUTICASONE PROPIONATE 50 MCG
1 SPRAY, SUSPENSION (ML) NASAL DAILY
Qty: 1 BOTTLE | Refills: 3 | Status: ON HOLD | OUTPATIENT
Start: 2021-03-11 | End: 2021-07-08 | Stop reason: HOSPADM

## 2021-03-11 ASSESSMENT — ENCOUNTER SYMPTOMS
SORE THROAT: 0
CHEST TIGHTNESS: 0
SINUS PRESSURE: 0
COLOR CHANGE: 0
WHEEZING: 0
FACIAL SWELLING: 0
ABDOMINAL PAIN: 0
RHINORRHEA: 0
TROUBLE SWALLOWING: 0
COUGH: 0
DIARRHEA: 0
STRIDOR: 0
NAUSEA: 0
SHORTNESS OF BREATH: 0
APNEA: 0
VOICE CHANGE: 0
CHOKING: 0
VOMITING: 0

## 2021-03-11 NOTE — PROGRESS NOTES
AUDIOLOGICAL EVALUATION      REASON FOR TESTING:  Audiometric evaluation per the request of Sameer CARTER, due to the diagnosis of decreased hearing, bilateral tinnitus and eustachian tube dysfunction in both ears. The patient has noticed hearing loss since early January that is progressively worsening. He reports hearing his own voice and breathing in his head. OTOSCOPY: Clear canal, bilaterally. AUDIOGRAM        Reliability: Good  Audiometer Used:  [id number]        COMMENTS: Moderate mixed hearing loss in the right ear and moderately severe to severe mixed hearing loss in the left ear. Good word recognition ability at 92% in both ears. Tympanometry revealed a flat tympanogram with normal middle ear volume in both ears. RECOMMENDATION(S): 1- Continue care with Dr. Jason Holland today, as scheduled. 2- Repeat audiogram and tympanogram following medical management.

## 2021-03-11 NOTE — PROGRESS NOTES
[facility] [place] [facility]  [facility] EAR, NOSE AND THROAT  [facility]  Dept: [phone]  Dept Fax: [phone]  Loc: [phone]    Troy Lang is a 39 y.o. male who was referred byNo ref. provider found for:  Chief Complaint   Patient presents with    Follow-up     Patient is here to review audio results. Brad Angry HPI:     Troy Lang is a 39 y.o. male who presents today for audiogram review results. Audiogram/Typanogram reviewed with patient. He will have his sleep study done on 4/7/21. Audiogram/Tympanogram:  COMMENTS: Moderate mixed hearing loss in the right ear and moderately severe to severe mixed hearing loss in the left ear. Good word recognition ability at 92% in both ears. Tympanometry revealed a flat tympanogram with normal middle ear volume in both ears. RECOMMENDATION(S): 1- Continue care with Dr. Claudeen Roan today, as scheduled. 2- Repeat audiogram and tympanogram following medical management. Amanuel Sanchez treated him for serous otitis media. His ears have been clogged since January. His head was not clogged. He fell off the porch (he slipped on ice) he remembers hitting the ground. He went to hospital to get checked out. His airway has not been good since January. He was to follow up and has not. Sleep apnea started acting up again in January. No pressure in head. He used to have CPAP. He got a divorce and he lost his CPAP. everything is feeling like before he did the surgery. He is blocking off when he is sleeping. He has been rinsing and nose. Trouble breathing through nose. Has a \"hands on face feeling pressure\". In 2019 he had UPPP with tonsillectomy. He did not follow up after first day post-op. 2017 he had nasal airway surgery with an excellent result and full compliance    Has heart burn. Not taking blood pressure medication. No popping in ears. Weight gain. History:     No Known Allergies  Current Outpatient Medications   Medication Sig Dispense Refill    sulfamethoxazole-trimethoprim (BACTRIM DS;SEPTRA DS) 800-160 MG per tablet Take 1 tablet by mouth 2 times daily for 28 days Stay very well-hydrated 56 tablet 2    fluticasone (FLONASE) 50 MCG/ACT nasal spray 1 spray by Nasal route daily 1 Bottle 3    trimethoprim-polymyxin b (POLYTRIM) 29168-9.1 UNIT/ML-% ophthalmic solution PLACE 2 DROPS INTO EACH AFFECTED EYE 3 TIMES ADAY FOR 7 DAYS      omeprazole (PRILOSEC) 40 MG delayed release capsule Take 1 capsule by mouth every morning (before breakfast) 30 capsule 2    fluticasone (FLONASE) 50 MCG/ACT nasal spray 1 spray by Each Nostril route daily 1 Bottle 2    ibuprofen (ADVIL;MOTRIN) 600 MG tablet Take 1 tablet by mouth 3 times daily as needed for Pain 30 tablet 0     No current facility-administered medications for this visit.       Past Medical History:   Diagnosis Date    Chronic back pain     Diverticulitis     Hidradenitis suppurativa     PONV (postoperative nausea and vomiting)     Sleep apnea       Past Surgical History:   Procedure Laterality Date    ANKLE SURGERY  2001    BACK SURGERY      CHOLECYSTECTOMY      COLON SURGERY      NOSE SURGERY  1993    Laser Surgery    RECTAL SURGERY      Ruptured Sigmoid Colon 2013    SEPTOPLASTY Left 7/19/2017    SEPTOPLASTY SUBMUCOUS RESECT TURBINATES, PARTIAL LEFT performed by Paulette Mercedes MD at [facility] SEPTOPLASTY N/A 5/8/2019    TONSILLECTOMY, UPPP performed by Paulette Mercedes MD at [address] [facility] History   Problem Relation Age of Onset    Cancer Mother         Breast Cancer    Diabetes Father     Heart Disease Father     Heart Disease Maternal Grandmother     Heart Disease Maternal Grandfather     Diabetes Paternal Grandmother     COPD Paternal Grandmother     Diabetes Paternal Grandfather     COPD Paternal Grandfather      Social History     Tobacco Use    Smoking status: Current Every Day Smoker     Packs/day: 1.00     Types: Cigarettes     Start date: 5/5/2007    Smokeless tobacco: Never Used    Tobacco comment: 0.5-1 ppd 5/5/17 last cig 5/7/19   Substance Use Topics    Alcohol use: Yes     Comment: rarely       Subjective:       Review of Systems   Constitutional: Negative for activity change, appetite change, chills, diaphoresis, fatigue, fever and unexpected weight change. HENT: Negative for congestion, dental problem, ear discharge, ear pain, facial swelling, hearing loss, mouth sores, nosebleeds, postnasal drip, rhinorrhea, sinus pressure, sneezing, sore throat, tinnitus, trouble swallowing and voice change. Eyes: Negative for visual disturbance. Respiratory: Negative for apnea, cough, choking, chest tightness, shortness of breath, wheezing and stridor. Cardiovascular: Negative for chest pain, palpitations and leg swelling. Gastrointestinal: Negative for abdominal pain, diarrhea, nausea and vomiting. Endocrine: Negative for cold intolerance, heat intolerance, polydipsia and polyuria. Genitourinary: Negative for dysuria, enuresis and hematuria. Musculoskeletal: Negative for arthralgias, gait problem, neck pain and neck stiffness. Skin: Negative for color change and rash. Allergic/Immunologic: Negative for environmental allergies, food allergies and immunocompromised state. Neurological: Negative for dizziness, syncope, facial asymmetry, speech difficulty, light-headedness and headaches. Hematological: Negative for adenopathy. Does not bruise/bleed easily. Psychiatric/Behavioral: Negative for confusion and sleep disturbance. The patient is not nervous/anxious. Objective:     /82 (Site: Right Upper Arm, Position: Sitting)   Pulse 70   Temp 97.6 °F (36.4 °C) (Infrared)   Resp 16   Wt (!) 326 lb (147.9 kg)   BMI 44.21 kg/m²     Physical Exam   Obesity  Nose: Nasal congestion and erythema. Some purulent drainage cultured.   Mirror Exam: Large

## 2021-03-13 LAB — AEROBIC CULTURE: NORMAL

## 2021-04-07 ENCOUNTER — HOSPITAL ENCOUNTER (OUTPATIENT)
Dept: SLEEP CENTER | Age: 36
Discharge: HOME | End: 2021-04-09
Payer: MEDICAID

## 2021-04-07 DIAGNOSIS — G47.33 OBSTRUCTIVE SLEEP APNEA: ICD-10-CM

## 2021-04-07 PROCEDURE — 95811 POLYSOM 6/>YRS CPAP 4/> PARM: CPT

## 2021-04-08 DIAGNOSIS — G47.33 OSA TREATED WITH BIPAP: Primary | ICD-10-CM

## 2021-04-08 LAB — STATUS: NORMAL

## 2021-04-08 NOTE — PROGRESS NOTES
appropriate PAP supplies  a. Tubing   b. Humidifier (filled with distilled water)  c. Masks   4. The technologist should assess and measure patient for most appropriate mask prior to start of study. 5. CPAP should be initiated at 5 cm H20.  a. More pressure at start of study may be necessary if patient is morbidly obese or unable to fall asleep on a pressure of 5 cm H20   6. If apneas or frequent hypopneas are present, pressure settings should be increased by 2 cm H20. If occasional hypopneas, snoring, or mask flow limitation are present, pressure settings should be increased by 1 cm H20 and maintained for at least fie minutes to determine if events improve or resolve. Pressure settings may need to be increased more quickly during REM sleep given the limited amount of REM during sleep and the need to treat events during this stage. 7. If a mask leak occurs, the tech should first fix the leakage before raising the pressure. Otherwise, the final pressure setting chosen for the patient may be too high. Once the mask leak has been fixed, decrease the pressure to the last setting where mouth breathing and/or mask leakage was not present, and then re-titrate as indicated. Make sure to document directly on the study the steps taken to resolve the leak and the type of masks used. Pressure setting usually do not need to be set as high with a nasal-mask than with a full-face mask. 8. The recording technologist should document directly on the study at least every 30 minutes. 9. If the patient takes a break from wearing the mask, do not decrease the CPAP pressure on attempted return to sleep unless the patient remains awake for 15 minutes or the patient specifically requests that the pressure be lowered. 10. Do not raise pressure for central apneas. If the patient develops central apneas, pressure setting may need to be lowered.    11. If the patient is unable to tolerate CPAP secondary due to:  a. Persistent mouth breathing despite use of a full-face mask/chin strap  b. Inability to exhale against higher expiratory pressures (typically beginning anywhere from 15 to 20 cm of H20.  c. Has frequent central apneas, the use of bilevel positive airway pressure may be indicated. Document directly on study why the patient is being switched from CPAP to bilevel. 12. Ensure that supine sleep has been seen on the chosen setting. Going above the chosen setting 1 or 2 cm H20 to show range may be helpful to ensure that the correct pressure has been established. BiLEVEL:    1. Technologist may change from CPAP to bilevel during a study if proven the patient is unable to tolerate CPAP. 2. Review the patients pertinent medical al history, previous sleep study or studies to ass the severity of sleep disordered breathing. Review of pertinent information will help to attain a better titration. 3. Applications of electrodes, montages, filters, sensitivities and scoring will be performed according to the current version of the AASM Scoring Manual.   4. Prior to initiating study collect all appropriate PAP supplies  a. Tubing   b. Humidifier (filled with distilled water)  c. Masks   5. The technologist should assess and measure patient for most appropriate mask prior to start of study. 6. If the patient has not previously been on CPAP, beginning pressures should be 8/4 cm H20 or higher if patient is morbidly obese or unable to fall asleep at lower pressures. If the patient has been previously successfully treated on CPAP start expiratory pressure at therapeutic setting and set inspiratory pressure 4 cm H20 higher. If advancing from CPAP to bilevel during a study expiratory pressure should be set at most successful CPAP setting and inspiratory pressure set 4 cm h20 higher. 7. The standard differential pressure utilized during bilevel titrations typically ranges from 3 to 5 cm H20, with 4 cm H20 being the most common.   Higher differential pressures may be needed in patients who are morbidly obese or who have neuromuscular diseases. 8. If apneas or frequent hypopneas are present, inspiratory and expiratory pressure settings should be increased by 2 cm H20. If occasional hypopneas, snoring, or mask flow limitation are present inspiratory and expiratory pressures settings should be increased by 1 cm h20 and maintained for at least 5 minutes to determine if events improve or resolve. Pressure settings may need to be increased more quickly during REM sleep given the limited amount of REM during sleep and the need to treat events during this stage. 9. If a mask leak occurs, the tech should first fix the leakage before raising the pressure. Otherwise, the final pressure setting chosen for the patient may be too high. Once the mask leak has been fixed, decrease the pressure to the last setting where mouth breathing and/or mask leakage was not present, and then re-titrate as indicated. Make sure to document directly on the study the steps taken to resolve the leak and the type of masks used. Pressure setting usually do not need to be set as high with a nasal-mask than with a full-face mask. 10. The recording technologist should document directly on the study at least every 30 minutes. 11. If the patient takes a break from wearing the mask, do not decrease the CPAP pressure on attempted return to sleep unless the patient remains awake for 15 minutes or the patient specifically requests that the pressure be lowered. 12. Do not raise pressure for central apneas. If the patient develops central apneas, pressure setting may need to be lowered. If the patient has central apneas on bilevel, the use of spontaneous (ST) mode may be indicated. a. ST mode may only be used in 2 cases  i. An order for ST mode with a primary diagnosis of central sleep apnea  ii.  During a titration if obstructive events are less than 5/ hour and centrals must be greater than 50% of total respiratory events. 13. Ensure that supine sleep has been seen on the chosen setting. Going above the chosen setting 1 or 2 cm H20 to show range may be helpful to ensure that the correct pressure has been established.

## 2021-04-09 NOTE — PROGRESS NOTES
LIMB MOVEMENT ANALYSIS:  Revealed the patient did not have any  periodic limb movements. The patient had a total of 7 spontaneous  arousals with a spontaneous arousal index of 3. OXYGEN SATURATION MONITORING:  Revealed the patient had a maximum oxygen  desaturation to 63% with a mean oxygen saturation of 84.8%. The patient  spent a total of 72.4 minutes below oxygen saturation less than 88%. EKG MONITORING:  Revealed normal sinus rhythm. PART B:  The treatment part of split-night sleep study was performed on  04/07/2021. The study was started at 11:51 p.m. and was terminated at  03:56 a.m. with a total recording time of 245 minutes, sleep period time  was 245 minutes, and total sleep time was 226.1 minutes. Overall sleep  efficiency was 92.3%. The sleep onset latency was immediate whereas  wake after sleep onset was 18.9 minutes, REM sleep latency was 104.1  minutes. SLEEP STAGING AND DISTRIBUTION SUMMARY:  Revealed the patient spent 12.1  minutes in stage I consisting of 5.3% of total sleep time, 128.5 minutes  in stage II consisting of 56.8%, 48.5 minutes in stage III consisting of  21.5%, and 37 minutes in REM sleep consisting of 16.4% of total sleep  time. CPAP/BiPAP TITRATION STUDY:  The CPAP/BiPAP titration study was started  with a CPAP pressure of 5 cm of water, and the CPAP pressure was  gradually increased to a CPAP pressure of 14 cm of water by titrating to  apneas and hypopneas. At a CPAP pressure of 14 cm of water due to  persistence of respiratory events and difficulty with exhalation and  failed CPAP therapy, the CPAP mode was changed to bilevel pressures with  starting BiPAP pressure of 18/14 cm of water. The BiPAP pressure was  further increased to a final BiPAP pressure of 19/15 cm of water. At a  BiPAP pressure of 19/15 cm of water, the patient spent 50.4 minutes in  bed. Out of 50.4 minutes, the patient slept for a period of 44.4  minutes in non-REM sleep.   At a BiPAP

## 2021-04-15 ENCOUNTER — TELEPHONE (OUTPATIENT)
Dept: SLEEP CENTER | Age: 36
End: 2021-04-15

## 2021-04-15 NOTE — TELEPHONE ENCOUNTER
Faxed BiPAP setup order to [address] [place] [address] per patient's request.      Thuy Lopez  4/15/2021

## 2021-05-04 ENCOUNTER — TELEPHONE (OUTPATIENT)
Dept: ENT CLINIC | Age: 36
End: 2021-05-04

## 2021-05-04 NOTE — TELEPHONE ENCOUNTER
Maylon Mohs no showed his appointment for the CT facial bones without contrast, 4/13/21. Maylon Mohs states that he has a follow up appointment with Dr. Paulette Boeck 5/6/21. Per Dr. Fiona Watt office notes, 3/11/21, the patient needs a one month follow up. Maylon Mohs is rescheduled for his ct scan 5/5/21, he verified the time and address of [facility].

## 2021-05-05 ENCOUNTER — HOSPITAL ENCOUNTER (OUTPATIENT)
Dept: CT IMAGING | Age: 36
Discharge: HOME | End: 2021-05-05
Payer: MEDICAID

## 2021-05-05 DIAGNOSIS — R44.8 FACIAL PRESSURE: ICD-10-CM

## 2021-05-05 DIAGNOSIS — J35.1 LINGUAL TONSIL HYPERTROPHY: ICD-10-CM

## 2021-05-05 DIAGNOSIS — J32.2 CHRONIC ETHMOIDAL SINUSITIS: ICD-10-CM

## 2021-05-05 DIAGNOSIS — H65.23 BILATERAL CHRONIC SEROUS OTITIS MEDIA: ICD-10-CM

## 2021-05-05 PROCEDURE — 70486 CT MAXILLOFACIAL W/O DYE: CPT

## 2021-05-06 ENCOUNTER — TELEPHONE (OUTPATIENT)
Dept: PULMONOLOGY | Age: 36
End: 2021-05-06

## 2021-05-06 ENCOUNTER — OFFICE VISIT (OUTPATIENT)
Dept: ENT CLINIC | Age: 36
End: 2021-05-06
Payer: MEDICAID

## 2021-05-06 VITALS
BODY MASS INDEX: 42.26 KG/M2 | HEART RATE: 80 BPM | WEIGHT: 312 LBS | DIASTOLIC BLOOD PRESSURE: 80 MMHG | HEIGHT: 72 IN | TEMPERATURE: 97.6 F | RESPIRATION RATE: 14 BRPM | SYSTOLIC BLOOD PRESSURE: 160 MMHG

## 2021-05-06 DIAGNOSIS — J34.89 REFRACTORY OBSTRUCTION OF NASAL AIRWAY: ICD-10-CM

## 2021-05-06 DIAGNOSIS — J34.89 OSTIOMEATAL COMPLEX OBSTRUCTION OF NASAL SINUS: ICD-10-CM

## 2021-05-06 DIAGNOSIS — H65.23 BILATERAL CHRONIC SEROUS OTITIS MEDIA: ICD-10-CM

## 2021-05-06 DIAGNOSIS — J32.0 CHRONIC MAXILLARY SINUSITIS: ICD-10-CM

## 2021-05-06 DIAGNOSIS — J34.3 HYPERTROPHY OF INFERIOR NASAL TURBINATE: ICD-10-CM

## 2021-05-06 DIAGNOSIS — J32.3 CHRONIC SPHENOIDAL SINUSITIS: ICD-10-CM

## 2021-05-06 DIAGNOSIS — F17.200 SMOKER: ICD-10-CM

## 2021-05-06 DIAGNOSIS — R51.9 RHINOGENIC HEADACHE: ICD-10-CM

## 2021-05-06 DIAGNOSIS — J34.2 DEVIATED NASAL SEPTUM: ICD-10-CM

## 2021-05-06 DIAGNOSIS — J32.2 CHRONIC ETHMOIDAL SINUSITIS: Primary | ICD-10-CM

## 2021-05-06 DIAGNOSIS — J32.1 CHRONIC FRONTAL SINUSITIS: ICD-10-CM

## 2021-05-06 DIAGNOSIS — J34.89 CONCHA BULLOSA: ICD-10-CM

## 2021-05-06 DIAGNOSIS — Z99.89 OSA ON CPAP: ICD-10-CM

## 2021-05-06 DIAGNOSIS — Z01.818 PRE-OP TESTING: Primary | ICD-10-CM

## 2021-05-06 DIAGNOSIS — Z78.9 DIFFICULTY WITH CPAP USE: ICD-10-CM

## 2021-05-06 DIAGNOSIS — J33.8 MAXILLARY SINUS POLYP: ICD-10-CM

## 2021-05-06 DIAGNOSIS — G47.33 OSA ON CPAP: ICD-10-CM

## 2021-05-06 PROCEDURE — 4004F PT TOBACCO SCREEN RCVD TLK: CPT | Performed by: OTOLARYNGOLOGY

## 2021-05-06 PROCEDURE — G8427 DOCREV CUR MEDS BY ELIG CLIN: HCPCS | Performed by: OTOLARYNGOLOGY

## 2021-05-06 PROCEDURE — G8417 CALC BMI ABV UP PARAM F/U: HCPCS | Performed by: OTOLARYNGOLOGY

## 2021-05-06 PROCEDURE — 99215 OFFICE O/P EST HI 40 MIN: CPT | Performed by: OTOLARYNGOLOGY

## 2021-05-06 PROCEDURE — 69433 CREATE EARDRUM OPENING: CPT | Performed by: OTOLARYNGOLOGY

## 2021-05-06 ASSESSMENT — ENCOUNTER SYMPTOMS
CHEST TIGHTNESS: 0
RHINORRHEA: 0
STRIDOR: 0
CHOKING: 0
FACIAL SWELLING: 0
COUGH: 0
WHEEZING: 0
COLOR CHANGE: 0
DIARRHEA: 0
ABDOMINAL PAIN: 0
SINUS PRESSURE: 0
APNEA: 0
VOMITING: 0
VOICE CHANGE: 0
SHORTNESS OF BREATH: 0
SORE THROAT: 0
TROUBLE SWALLOWING: 0
NAUSEA: 0

## 2021-05-06 NOTE — TELEPHONE ENCOUNTER
Patient is scheduled for surgery with Dr. Jeff Rios at [facility]  July 8. We are requesting pulmonary clearance for patient. Surgery: IGS nasal endoscopy with maxillary antrostomy, resection of scott bullosa, sphenoidotomy, total ethmoidectomy, frontal sinus exploration, removal of tissue from maxillary sinus, septo, and smr. Patient is scheduled to see Nayeli Canela tomorrow in [facility] 5/7. I will fax a pre op form for your convenience. Thank you!

## 2021-05-06 NOTE — PROGRESS NOTES
Grandmother     Heart Disease Maternal Grandfather     Diabetes Paternal Grandmother     COPD Paternal Grandmother     Diabetes Paternal Grandfather     COPD Paternal Grandfather      Social History     Tobacco Use    Smoking status: Current Every Day Smoker     Packs/day: 1.00     Types: Cigarettes     Start date: 5/5/2007    Smokeless tobacco: Never Used    Tobacco comment: 0.5-1 ppd 5/5/17 last cig 5/7/19   Substance Use Topics    Alcohol use: Yes     Comment: rarely       Subjective:       Review of Systems   Constitutional: Negative for activity change, appetite change, chills, diaphoresis, fatigue, fever and unexpected weight change. HENT: Negative for congestion, dental problem, ear discharge, ear pain, facial swelling, hearing loss, mouth sores, nosebleeds, postnasal drip, rhinorrhea, sinus pressure, sneezing, sore throat, tinnitus, trouble swallowing and voice change. Eyes: Negative for visual disturbance. Respiratory: Negative for apnea, cough, choking, chest tightness, shortness of breath, wheezing and stridor. Cardiovascular: Negative for chest pain, palpitations and leg swelling. Gastrointestinal: Negative for abdominal pain, diarrhea, nausea and vomiting. Endocrine: Negative for cold intolerance, heat intolerance, polydipsia and polyuria. Genitourinary: Negative for dysuria, enuresis and hematuria. Musculoskeletal: Negative for arthralgias, gait problem, neck pain and neck stiffness. Skin: Negative for color change and rash. Allergic/Immunologic: Negative for environmental allergies, food allergies and immunocompromised state. Neurological: Negative for dizziness, syncope, facial asymmetry, speech difficulty, light-headedness and headaches. Hematological: Negative for adenopathy. Does not bruise/bleed easily. Psychiatric/Behavioral: Negative for confusion and sleep disturbance. The patient is not nervous/anxious.         Objective:     BP (!) 160/80 (Site: Left Upper Arm, Position: Sitting)   Pulse 80   Temp 97.6 °F (36.4 °C) (Infrared)   Resp 14   Ht 6' (1.829 m)   Wt (!) 312 lb (141.5 kg)   BMI 42.31 kg/m²     Physical Exam  Vitals and nursing note reviewed. Constitutional:       Appearance: He is well-developed. HENT:      Head: Normocephalic and atraumatic. No laceration. Comments:        Right Ear: Ear canal and external ear normal. No drainage or swelling. A middle ear effusion is present. Tympanic membrane is not perforated or erythematous. Left Ear: Ear canal and external ear normal. No drainage or swelling. A middle ear effusion is present. Tympanic membrane is not perforated or erythematous. Nose: Septal deviation (Anterior, complex deviated nasal septum ) present. No mucosal edema or rhinorrhea. Comments: Wide Columella    Airflow improved dramatically with spreading the nostrils out. Mouth/Throat:      Mouth: Mucous membranes are not pale and not dry. No oral lesions. Pharynx: Oropharynx is clear. Uvula midline. No oropharyngeal exudate or posterior oropharyngeal erythema. Comments: LIps: lips normal     Mallampati 1  Base of tongue: symmetric,  Eyes:      Extraocular Movements:      Right eye: Normal extraocular motion and no nystagmus. Left eye: Normal extraocular motion and no nystagmus. Comments: Conjugate gaze   Neck:      Thyroid: No thyroid mass or thyromegaly. Trachea: Phonation normal. No tracheal deviation. Comments:   Salivary glands not enlarged and normal to palpation    Cardiovascular:      Rate and Rhythm: Normal rate and regular rhythm. Heart sounds: No murmur heard. Pulmonary:      Effort: Pulmonary effort is normal. No retractions. Breath sounds: Normal breath sounds. No stridor. Chest:      Chest wall: There is no dullness to percussion. Musculoskeletal:      Cervical back: Neck supple.    Neurological:      Mental Status: He is alert and oriented to person, place, and time. Cranial Nerves: No cranial nerve deficit (VIIth N function intact bilat). Psychiatric:         Mood and Affect: Mood and affect normal.         Behavior: Behavior is cooperative. Wide Columella. Has Low larynx    PROCEDURE: MYRINGOTOMY AND TYMPANOSTOMY TUBE PLACEMENT, BILATERAL    After appropriate informed consent was obtained, including benefits and risks, answering all of the patient's questions, and explaining other options and that there were no guarantees, the patient requested that we proceed. After cleaning the left ear canal, anesthesia of the posterior-inferior quadrant of the tympanic membrane was obtained with topical Phenol. After this had taken effect, a radial incision was created and the middle ear ear examined with the following findings serous effusion. An Berrios ventilation tube was placed without difficulty. A similar procedure was performed on the opposite ear with findings as noted: Serous effusion          Data:  All of the past medical history, past surgical history, family history,social history, allergies and current medications were reviewed with the patient. Assessment & Plan   Diagnoses and all orders for this visit:     Diagnosis Orders   1. Chronic ethmoidal sinusitis  VA NASAL/SINUS NDSC W/TOTAL ETHOIDECTOMY    IGS Endo Sinus Sx   2. Chronic frontal sinusitis  VA NASAL/SINUS NDSC W/RMVL TISS FROM FRONTAL SINUS    IGS Endo Sinus Sx   3. Chronic maxillary sinusitis  VA NASAL SCOPY,OPEN MAXILL SINUS    IGS Endo Sinus Sx    VA NASAL SCOPY,RMV TISS MAXILL SINUS   4. Chronic sphenoidal sinusitis  VA NASAL SCOPY,SPHENOIDOTOMY    IGS Endo Sinus Sx   5. Hypertrophy of inferior nasal turbinate  VA EXCISION TURBINATE,SUBMUCOUS   6. Betito bullosa  VA NASAL/SINUS SCOPY,RMV BETITO BULL   7. Deviated nasal septum, anterior  VA REPAIR OF NASAL SEPTUM   8. Bilateral chronic serous otitis media     9. Smoker     10.  Ostiomeatal complex obstruction of nasal sinus  VA NASAL/SINUS NDSC W/RMVL TISS FROM FRONTAL SINUS    RI NASAL SCOPY,OPEN MAXILL SINUS   11. GERHARD on CPAP     12. Difficulty with CPAP use     13. Rhinogenic headache  RI NASAL/SINUS SCOPY,RMV BETITO BULL   14. Maxillary sinus polyp  RI NASAL SCOPY,RMV TISS MAXILL SINUS   15. Refractory obstruction of nasal airway  RI EXCISION TURBINATE,SUBMUCOUS    RI REPAIR OF NASAL SEPTUM       The findings were explained and his questions were answered. Options were discussed including surgery to clear his airway and establish drainage pathways for his sinuses. He agreed. Recommended surgical procedures:  Septoplasty  Partial submucous resection both inferior turbinates  Partial resection betito bullosa both superior turbinates  Right intranasal frontal sinusotomy  Bilateral complete endoscopic ethmoidectomy  Left maxillary endoscopy and removal of tissue  Right maxillary antrostomy   Right sphenoidostomy  Stereotactic computerized image guidance for sinus surgery        INFORMED CONSENT:   Using the CT scan, the planned procedures were explained in detail. The risks and benefits of these sinus procedures, including but not limited to risk of anesthesia, bleeding, infection, vision loss and /or eye muscle damage, CSF leak, epiphora (eye watering), potential need for further surgery and possible persistent sinus infections were discussed with the patient. The risks and benefits of alternative procedures, as well as the possible consequences of not undergoing the procedures were discussed, if applicable. They read and kept the information sheet with postop instructions, which lists the more common and even some of the more unusual complications, and the sheet listing the types of medications to avoid that could interfere with clotting. All of their questions were answered and they understood no guarantees were made. The patient verbalized understanding and gave consent to proceed.  They also specifically consent to any additional related procedure, if the indications and need become evident during the surgery. Septoplasty complications that may occur were discussed including postoperative bleeding (usually easy to control), infection, nasal crusting, a hole in the septum (perforation), failure to completely improve breathing, persistent septal deflection resulting in the need for revision (uncommon), and very rarely, a change in appearance. They understand that no guarantees are made regarding either outcome or potential complications. All of their questions were answered. They requested we proceed. IAkbar CMA ([facility]), am scribing for, and in the presence of Dr. De Hooper. Electronically signed by Gemma Montes CMA ([facility]) on 5/6/21 at 10:59 AM EDT. (Please note that portions of this note were completed with a voice recognition program. Efforts were made to edit the dictations butoccasionally words are mis-transcribed.)    I agree to the above documentation placed by my scribe. I have personally evaluated this patient. Additional findings are as noted. I reviewed the scribe's note and agree with the documented findings and plan of care. Any areas of disagreement are corrected. I agree with the chief complaint, past medical history, past surgical history, allergies, medications, social and family history as documented unless otherwise noted below.      Electronically signed by Marsha Hastings MD on 5/26/2021 at 9:28 PM

## 2021-05-07 ENCOUNTER — OFFICE VISIT (OUTPATIENT)
Dept: PULMONOLOGY | Age: 36
End: 2021-05-07
Payer: MEDICAID

## 2021-05-07 VITALS
DIASTOLIC BLOOD PRESSURE: 74 MMHG | HEIGHT: 72 IN | SYSTOLIC BLOOD PRESSURE: 128 MMHG | HEART RATE: 88 BPM | BODY MASS INDEX: 42.66 KG/M2 | OXYGEN SATURATION: 98 % | TEMPERATURE: 98 F | WEIGHT: 315 LBS

## 2021-05-07 DIAGNOSIS — G47.33 OSA TREATED WITH BIPAP: Primary | ICD-10-CM

## 2021-05-07 DIAGNOSIS — G47.10 HYPERSOMNIA: ICD-10-CM

## 2021-05-07 DIAGNOSIS — E66.01 MORBID OBESITY WITH BMI OF 40.0-44.9, ADULT (HCC): ICD-10-CM

## 2021-05-07 PROCEDURE — 4004F PT TOBACCO SCREEN RCVD TLK: CPT | Performed by: PHYSICIAN ASSISTANT

## 2021-05-07 PROCEDURE — G8417 CALC BMI ABV UP PARAM F/U: HCPCS | Performed by: PHYSICIAN ASSISTANT

## 2021-05-07 PROCEDURE — G8427 DOCREV CUR MEDS BY ELIG CLIN: HCPCS | Performed by: PHYSICIAN ASSISTANT

## 2021-05-07 PROCEDURE — 99214 OFFICE O/P EST MOD 30 MIN: CPT | Performed by: PHYSICIAN ASSISTANT

## 2021-05-07 RX ORDER — NEBULIZER ACCESSORIES
EACH MISCELLANEOUS
Qty: 1 EACH | Refills: 0 | Status: SHIPPED | OUTPATIENT
Start: 2021-05-07 | End: 2021-06-30

## 2021-05-07 RX ORDER — QUETIAPINE FUMARATE 25 MG/1
25 TABLET, FILM COATED ORAL NIGHTLY
Qty: 30 TABLET | Refills: 1 | Status: SHIPPED
Start: 2021-05-07 | End: 2021-05-10 | Stop reason: SINTOL

## 2021-05-07 ASSESSMENT — ENCOUNTER SYMPTOMS
WHEEZING: 0
DIARRHEA: 0
BACK PAIN: 0
ALLERGIC/IMMUNOLOGIC NEGATIVE: 1
SHORTNESS OF BREATH: 0
STRIDOR: 0
CHEST TIGHTNESS: 0
NAUSEA: 0
COUGH: 0
EYES NEGATIVE: 1

## 2021-05-07 NOTE — PATIENT INSTRUCTIONS
Patient Education        Learning About Benefits From Quitting Smoking  How does quitting smoking make you healthier? If you're thinking about quitting smoking, you may have a few reasons to be smoke-free. Your health may be one of them. · When you quit smoking, you lower your risks for cancer, lung disease, heart attack, stroke, blood vessel disease, and blindness from macular degeneration. · When you're smoke-free, you get sick less often, and you heal faster. You are less likely to get colds, flu, bronchitis, and pneumonia. · As a nonsmoker, you may find that your mood is better and you are less stressed. When and how will you feel healthier? Quitting has real health benefits that start from day 1 of being smoke-free. And the longer you stay smoke-free, the healthier you get and the better you feel. The first hours  · After just 20 minutes, your blood pressure and heart rate go down. That means there's less stress on your heart and blood vessels. · Within 12 hours, the level of carbon monoxide in your blood drops back to normal. That makes room for more oxygen. With more oxygen in your body, you may notice that you have more energy than when you smoked. After 2 weeks  · Your lungs start to work better. · Your risk of heart attack starts to drop. After 1 month  · When your lungs are clear, you cough less and breathe deeper, so it's easier to be active. · Your sense of taste and smell return. That means you can enjoy food more than you have since you started smoking. Over the years  · Over the years, your risks of heart disease, heart attack, and stroke are lower. · After 10 years, your risk of dying from lung cancer is cut by about half. And your risk for many other types of cancer is lower too. How would quitting help others in your life? When you quit smoking, you improve the health of everyone who now breathes in your smoke. · Their heart, lung, and cancer risks drop, much like yours.   · They are sick less. For babies and small children, living smoke-free means they're less likely to have ear infections, pneumonia, and bronchitis. · If you're a woman who is or will be pregnant someday, quitting smoking means a healthier . · Children who are close to you are less likely to become adult smokers. Where can you learn more? Go to https://Mobile Adspeparasewliberty.GotoTel. org and sign in to your [employer] account. Enter [phone] in the [facility] box to learn more about \"Learning About Benefits From Quitting Smoking. \"     If you do not have an account, please click on the \"Sign Up Now\" link. Current as of: 2020               Content Version: 12.8  © 7282-8743 Healthwise, Incorporated. Care instructions adapted under license by [facility] ([facility]). If you have questions about a medical condition or this instruction, always ask your healthcare professional. [place] [address] any warranty or liability for your use of this information.

## 2021-05-07 NOTE — PROGRESS NOTES
[facility]      Luis Miguel Shaffer         [id number]  5/7/2021   Chief Complaint   Patient presents with    Follow-up     3mo f/u split night-4/7/21, [address] Pt of Dr. Brian Celeste     PAP Download:   Original or initial AHI: 73.8     Date of initial study: 7/24/13    Repeat PSG 4/7/21 .6   Compliant  0%     Noncompliant 20 %     PAP Type Spont Level  [id number]   Avg Hrs/Day 2hrs 0min   AHI: 13.9   Recorded compliance dates , 4/7/21-5/6/21   Machine/Mfg:   [x] ResMed    [] Respironics/Dreamstation   Interface:   [] Nasal    [] Nasal pillows   [] FFM      Provider:      [] [facility]     [][facility]     [] [employer]    [] Kenneth Rain    [] [facility]               [] [facility]      [] [employer]    [] [address]      [x] Other:MSC  Neck Size: 22.75  Mallampati Mallampati 4  ESS:  21  SAQLI: 55  Here is a scan of the most recent download:            Presentation:   Jayshree Fernandes presents for sleep medicine follow up for obstructive sleep apnea  Since the last visit, Jayshree Fernandes continues to struggle with PAP. He is following with ENT for possible septum surgery. He states the mask feels like it pinches off his nose. He had a split night study and shows severe GERHARD with . His AHI is still elevated despite bipap 19/15. He continues to feel tired all the time. Equipment issues: The pressure is  acceptable, the mask is unacceptable     Sleep issues:  Do you feel better? No  More rested? No   Better concentration? no    Progress History:   Since last visit any new medical issues? No  New ER or hospital visits? No  Any new or changes in medicines? No  Any new sleep medicines? No    Review of Systems -   Review of Systems   Constitutional: Negative for activity change, appetite change, chills and fever. HENT: Negative for congestion and postnasal drip. Eyes: Negative. Respiratory: Negative for cough, chest tightness, shortness of breath, wheezing and stridor. Cardiovascular: Negative for chest pain and leg swelling. Gastrointestinal: Negative for diarrhea and nausea. Endocrine: Negative. Genitourinary: Negative. Musculoskeletal: Negative. Negative for arthralgias and back pain. Skin: Negative. Allergic/Immunologic: Negative. Neurological: Negative. Negative for dizziness and light-headedness. Psychiatric/Behavioral: Negative. All other systems reviewed and are negative. Physical Exam:    BMI:  Body mass index is 42.72 kg/m². Wt Readings from Last 3 Encounters:   05/07/21 (!) 315 lb (142.9 kg)   05/06/21 (!) 312 lb (141.5 kg)   03/11/21 (!) 326 lb (147.9 kg)     Weight lost 11 lbs over 2 months  Vitals: /74 (Site: Right Upper Arm, Position: Sitting, Cuff Size: Large Adult)   Pulse 88   Temp 98 °F (36.7 °C) (Temporal)   Ht 6' (1.829 m)   Wt (!) 315 lb (142.9 kg)   SpO2 98% Comment: r/a  BMI 42.72 kg/m²       Physical Exam  Constitutional:       Appearance: He is well-developed. HENT:      Head: Normocephalic and atraumatic. Right Ear: External ear normal.      Left Ear: External ear normal.   Eyes:      Conjunctiva/sclera: Conjunctivae normal.      Pupils: Pupils are equal, round, and reactive to light. Neck:      Musculoskeletal: Normal range of motion and neck supple. Cardiovascular:      Rate and Rhythm: Normal rate and regular rhythm. Heart sounds: Normal heart sounds. Pulmonary:      Effort: Pulmonary effort is normal.      Breath sounds: Normal breath sounds. Musculoskeletal: Normal range of motion. Skin:     General: Skin is warm and dry. Neurological:      Mental Status: He is alert and oriented to person, place, and time. Psychiatric:         Behavior: Behavior normal.         Thought Content: Thought content normal.         Judgment: Judgment normal.         ASSESSMENT/DIAGNOSIS     Diagnosis Orders   1. GERHARD treated with BiPAP     2. Hypersomnia     3.  Morbid obesity with BMI of 40.0-44.9, adult [facility])            Plan   Do you need any equipment today? Yes needs new mask for comfort  - Suspect septum surgery may improve tolerance  - will try Seroquel for insomnia and anxiety to help with compliance   - Will decrease EPAP and increase IPAP for comfort  - difficult situation- he has severe GERHARD and struggling with PAP  - May need surgical intervention  - May need referral to [address] [place] [address] - ? [facility]- only an option if he looses weight and his GERHARD still might be too severe  - Download reviewed and discussed with patient  - He  was advised to continue current positive airway pressure therapy with above described pressure. - He  advised to keep good compliance with current recommended pressure to get optimal results and clinical improvement  - Recommend 7-9 hours of sleep with PAP  - He was advised to call [facility] [facility] company regarding supplies if needed.   -He call my office for earlier appointment if needed for worsening of sleep symptoms.   - He was instructed on weight loss  - Khalida was educated about my impression and plan. Patient verbalizesunderstanding.   We will see Ben Gibbs back in: 4 weeks with download    Information added by my medical assistant/LPN was reviewed today         Adi Cr PA-C, MPAS  5/7/2021

## 2021-06-04 ENCOUNTER — TELEPHONE (OUTPATIENT)
Dept: ENT CLINIC | Age: 36
End: 2021-06-04

## 2021-06-04 NOTE — TELEPHONE ENCOUNTER
Cecile from [facility] called stating they approved CPT codes: 31824, [id number], [id number], [id number], [id number], [id number]. However, they are requesting a Peer to Peer review for CPT codes: [phone], 21 , 66297.    94758  Submucous Resect Turbinates, Bilateral partial Inferior  62065  Nasal endoscopy w/partial resection Bilateral [facility] Turbinates  65704  Septoplasty    Pre op appointment with Alanna Erwin on 6/15/21. Surgery is scheduled with Dr Laya Chaudhary for 7/7/21. Called #[phone] to set up appointment for Peer to Peer. Scheduled for Wednesday 6/9/21 at 10:00 am with Dr TADEO [facility]. Please confirm if this date/time will work? Thank you!

## 2021-06-09 NOTE — TELEPHONE ENCOUNTER
Peer to peer completed for the following procedures. 36076  Submucous Resect Turbinates, Bilateral partial Inferior  66876  Nasal endoscopy w/partial resection Bilateral Isadora Bullosa Superior Turbinates  18478  Septoplasty    These three procedures have been approved.      Approval code: [id number]

## 2021-06-25 ENCOUNTER — OFFICE VISIT (OUTPATIENT)
Dept: ENT CLINIC | Age: 36
End: 2021-06-25

## 2021-06-25 VITALS
TEMPERATURE: 98 F | SYSTOLIC BLOOD PRESSURE: 138 MMHG | BODY MASS INDEX: 41.45 KG/M2 | RESPIRATION RATE: 18 BRPM | DIASTOLIC BLOOD PRESSURE: 88 MMHG | WEIGHT: 306 LBS | HEIGHT: 72 IN | HEART RATE: 84 BPM

## 2021-06-25 DIAGNOSIS — J32.2 CHRONIC ETHMOIDAL SINUSITIS: Primary | ICD-10-CM

## 2021-06-25 DIAGNOSIS — J33.8 MAXILLARY SINUS POLYP: ICD-10-CM

## 2021-06-25 DIAGNOSIS — J32.1 CHRONIC FRONTAL SINUSITIS: ICD-10-CM

## 2021-06-25 DIAGNOSIS — Z78.9 DIFFICULTY WITH CPAP USE: ICD-10-CM

## 2021-06-25 DIAGNOSIS — J32.0 CHRONIC MAXILLARY SINUSITIS: ICD-10-CM

## 2021-06-25 DIAGNOSIS — Z99.89 OSA ON CPAP: ICD-10-CM

## 2021-06-25 DIAGNOSIS — R44.8 FACIAL PRESSURE: ICD-10-CM

## 2021-06-25 DIAGNOSIS — J34.2 DEVIATED NASAL SEPTUM: ICD-10-CM

## 2021-06-25 DIAGNOSIS — J34.89 CONCHA BULLOSA: ICD-10-CM

## 2021-06-25 DIAGNOSIS — J34.89 OSTIOMEATAL COMPLEX OBSTRUCTION OF NASAL SINUS: ICD-10-CM

## 2021-06-25 DIAGNOSIS — G47.33 OSA ON CPAP: ICD-10-CM

## 2021-06-25 DIAGNOSIS — J34.3 HYPERTROPHY OF INFERIOR NASAL TURBINATE: ICD-10-CM

## 2021-06-25 DIAGNOSIS — J32.3 CHRONIC SPHENOIDAL SINUSITIS: ICD-10-CM

## 2021-06-25 PROCEDURE — 99999 PR OFFICE/OUTPT VISIT,PROCEDURE ONLY: CPT | Performed by: PHYSICIAN ASSISTANT

## 2021-06-25 NOTE — PROGRESS NOTES
[facility] [place] [facility]  [facility] EAR, NOSE AND THROAT  [facility]  Dept: [phone]  Dept Fax: [phone]  Loc: [phone]    Taya Segundo is a 39 y.o. male who was referred by No ref. provider found for:  Chief Complaint   Patient presents with    Pre-op Exam     Patient is here pre-op IGS sinus surgery with Dr. Bala Rangel 07/07/2021. Fan Pink HPI:     Patient presents for preop examination. Patient is scheduled for septoplasty, partial submucousal resection of bilateral inferior turbinates, partial resection scott bullosa bilateral superior turbinates, right intranasal frontal sinusotomy, bilateral complete endoscopic ethmoidectomy, left maxillary endoscopy and removal of tissue, right maxillary antrostomy, and right sphenoidostomy with Dr. Bala Rangel on 7/7/2021. Patient denies any changes to his medical history or medications list since he was last seen. Patient states he is ready for surgery to hopefully help his nasal breathing. He has not been wearing his BiPAP at night since he cannot tolerate the pressure. Patient reports that his ears have been doing much better since having tubes placed. He denies any other symptoms or concerns at this time. Subjective:      REVIEW OF SYSTEMS:    A complete multi-organ review of systems was performed using a new patient questionnaire, and reviewed by me.   ENT:  negative except as noted in HPI  CONSTITUTIONAL:  negative except as noted in HPI  EYES:  negative except as noted in HPI  RESPIRATORY:  negative except as noted in HPI  CARDIOVASCULAR:  negative except as noted in HPI  GASTROINTESTINAL:  negative except as noted in HPI  GENITOURINARY:  negative except as noted in HPI  MUSCULOSKELETAL:  negative except as noted in HPI  SKIN:  negative except as noted in HPI  ENDOCRINE/METABOLIC: negative except as noted in HPI  HEMATOLOGIC/LYMPHATIC:  negative except as noted in HPI  ALLERGY/IMMUN: negative except as noted in HPI  NEUROLOGICAL:  negative except as noted in HPI  BEHAVIOR/PSYCH:  negative except as noted in HPI    Past Medical History:  Past Medical History:   Diagnosis Date    Chronic back pain     Diverticulitis     Hidradenitis suppurativa     PONV (postoperative nausea and vomiting)     Sleep apnea        Social History:    TOBACCO:   reports that he has been smoking cigarettes. He started smoking about 14 years ago. He has been smoking about 1.00 pack per day. He has never used smokeless tobacco.  ETOH:   reports current alcohol use. DRUGS:   reports no history of drug use. Family History:       Problem Relation Age of Onset    Cancer Mother         Breast Cancer    Diabetes Father     Heart Disease Father     Heart Disease Maternal Grandmother     Heart Disease Maternal Grandfather     Diabetes Paternal Grandmother     COPD Paternal Grandmother     Diabetes Paternal Grandfather     COPD Paternal Grandfather        Surgical History:  Past Surgical History:   Procedure Laterality Date    ANKLE SURGERY  2001    BACK SURGERY      CHOLECYSTECTOMY      COLON SURGERY      NOSE SURGERY  1993    Laser Surgery    RECTAL SURGERY      Ruptured Sigmoid Colon 2013    SEPTOPLASTY Left 7/19/2017    SEPTOPLASTY SUBMUCOUS RESECT TURBINATES, PARTIAL LEFT performed by Gregg Estrella MD at [address] [place] [address] SEPTOPLASTY N/A 5/8/2019    TONSILLECTOMY, UPPP performed by Gregg Estrella MD at [place] [facility]        Objective: This is a 39 y.o. male. Patient is alert and oriented to person, place and time. Patient appears well developed, well nourished. Mood is happy with normal affect. Not obviously hearing impaired. No abnormality in speech noted. /88 (Site: Right Lower Arm, Position: Sitting)   Pulse 84   Temp 98 °F (36.7 °C) (Infrared)   Resp 18   Ht 6' (1.829 m)   Wt (!) 306 lb (138.8 kg)   BMI 41.50 kg/m²     Head:   Normocephalic, atraumatic.   No obvious masses or lesions noted.    Ears:  External ears: Normal: no scars, lesions or masses. Mastoid process: No erythema noted. No tenderness to palpation. R External auditory canal: clear and free of any pathology  L External auditory canal: clear and free of any pathology   Tympanic membranes:  R tube in place-dry, patent                                                  L tube in place-dry, patent  Nose:    External nose: Appears midline. No obvious deformity or masses. Septum:  mildly deviated. No septal hematoma. No perforation. Mucosa:  clear  Turbinates: hypertrophic and congested            Discharge:  Mild to moderate amount of brown, dry nasal crusting. More significant amount in the right nare as compared to left. Mouth/Throat:  Lips, tongue and oral cavity: Normal. No masses or lesions noted   Dentition: fair, no malocclusion  Oral mucosa: moist  Tonsils: absent  Oropharynx: normal-appearing mucosa  Hard and soft palates: symmetrical and intact. Salivary glands: not enlarged and no tenderness to palpation. Uvula: Surgically absent   Gag reflex is present. Neck: Trachea midline. Thyroid not enlarged, no palpable masses or tenderness. Lymphatic: No cervical lymphadenopathy noted. Eyes: FLAKITO, EOM intact. Conjunctiva moist without discharge. Lungs: Normal effort of breathing, not obviously distressed. Neuro: Cranial nerves II-XII grossly intact. Extremities: No clubbing, edema, or cyanosis noted. Assessment/Plan:     Diagnosis Orders   1. Chronic ethmoidal sinusitis     2. Chronic frontal sinusitis     3. Chronic maxillary sinusitis     4. Chronic sphenoidal sinusitis     5. Hypertrophy of inferior nasal turbinate     6. Deviated nasal septum, anterior     7. Ostiomeatal complex obstruction of nasal sinus     8. GERHARD on CPAP     9. Difficulty with CPAP use     10. Facial pressure     11. 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 male that presents for preop examination. Patient denies any changes in his medical history and medications since he was last seen. I discussed the typical day of surgery and postoperative course with the patient and his wife. I also discussed some of the risk/benefits of surgery. Some of the risks discussed include (but not limited to): Postop bleeding, postop infection, postop pain, persistent congestion, recurrent sinus infection, vision changes/loss. The patient expresses understanding and would like to proceed. We also discussed avoidance of NSAIDs and garlic 1 week preop and postop in hopes of limiting risk of postop bleeding. The patient is wife deny any other questions/concerns regarding surgery at this time. I requested the patient get his preop CBC completed in the next week or so. Patient will follow up for surgery, but will contact the office sooner with new/worsening symptoms or other concerns.     (Please note that portions of this note may have been completed with a voice recognition program.  Efforts were made to edit the dictation but occasionally words are mis-transcribed.)    Electronically signed by RAUL Raygoza on 6/25/2021 at 3:56 PM

## 2021-06-30 NOTE — PROGRESS NOTES
Covid screening questionnaire complete and negative for symptoms or exposure see chart for documentation.   Please notify your doctor if you develop any signs of illness  Please wear a mask when you come to the hospital    Following instructions given to patient, who states understanding:    NPO after midnight  Bring insurance info and 's license  Wear comfortable clean clothing  Do not bring jewelry   Shower night before and morning of surgery with a liquid antibacterial soap  Bring medications in original bottles  Follow all instructions given by your physician   needed at discharge  Call [phone] for any questions  Report to SDS on 2nd floor  If you would become ill prior to surgery, please call the surgeon  May have a visitor with you, we request that you limit to 2 visitors in pre-op area  Please bring and wear mask  Bring CPAP

## 2021-07-01 ENCOUNTER — TELEPHONE (OUTPATIENT)
Dept: ENT CLINIC | Age: 36
End: 2021-07-01

## 2021-07-01 DIAGNOSIS — Z01.818 PRE-OP TESTING: Primary | ICD-10-CM

## 2021-07-01 NOTE — TELEPHONE ENCOUNTER
Patients labs were noted to have low platelets at 879. Showed Dr. Huong Villalba. He wanted to know if patient had seen hematology previously because he did have a hx of this. Spoke with patient and he advised this was normal for him. He had seen a hematologist at [facility] for this. According to pe from 5/2019 this office it was noted he had seen Dr. Noemi Dinh and no treatment was advised. Patient stated his lowest level was 25. Advised Dr. Huong Villalba and he is ok with proceeding. Advised patient.

## 2021-07-06 RX ORDER — PREDNISONE 20 MG/1
20 TABLET ORAL DAILY
Qty: 4 TABLET | Refills: 0 | OUTPATIENT
Start: 2021-07-06 | End: 2021-07-09

## 2021-07-06 RX ORDER — PSEUDOEPHEDRINE HYDROCHLORIDE 30 MG/1
30 TABLET ORAL EVERY 6 HOURS
Qty: 56 TABLET | Refills: 1 | OUTPATIENT
Start: 2021-07-06 | End: 2021-07-20

## 2021-07-06 RX ORDER — CLINDAMYCIN HYDROCHLORIDE 300 MG/1
300 CAPSULE ORAL 3 TIMES DAILY
Qty: 42 CAPSULE | Refills: 0 | OUTPATIENT
Start: 2021-07-06 | End: 2021-07-20

## 2021-07-06 RX ORDER — HYDROCODONE BITARTRATE AND ACETAMINOPHEN 7.5; 325 MG/1; MG/1
1 TABLET ORAL EVERY 6 HOURS PRN
Qty: 20 TABLET | Refills: 0 | OUTPATIENT
Start: 2021-07-06 | End: 2021-07-11

## 2021-07-07 ENCOUNTER — ANESTHESIA EVENT (OUTPATIENT)
Dept: OPERATING ROOM | Age: 36
End: 2021-07-07
Payer: MEDICAID

## 2021-07-07 ENCOUNTER — HOSPITAL ENCOUNTER (OUTPATIENT)
Age: 36
Discharge: HOME | End: 2021-07-08
Attending: OTOLARYNGOLOGY | Admitting: OTOLARYNGOLOGY
Payer: MEDICAID

## 2021-07-07 ENCOUNTER — ANESTHESIA (OUTPATIENT)
Dept: OPERATING ROOM | Age: 36
End: 2021-07-07
Payer: MEDICAID

## 2021-07-07 VITALS — SYSTOLIC BLOOD PRESSURE: 171 MMHG | TEMPERATURE: 99.9 F | OXYGEN SATURATION: 96 % | DIASTOLIC BLOOD PRESSURE: 89 MMHG

## 2021-07-07 DIAGNOSIS — J34.2 NASAL SEPTAL DEVIATION: Primary | ICD-10-CM

## 2021-07-07 DIAGNOSIS — J32.0 CHRONIC MAXILLARY SINUSITIS: ICD-10-CM

## 2021-07-07 DIAGNOSIS — J32.3 CHRONIC SPHENOIDAL SINUSITIS: ICD-10-CM

## 2021-07-07 DIAGNOSIS — J34.3 HYPERTROPHY OF INFERIOR NASAL TURBINATE: ICD-10-CM

## 2021-07-07 DIAGNOSIS — K13.79 HYPERTROPHY OF UVULA: ICD-10-CM

## 2021-07-07 DIAGNOSIS — G89.18 POST-OPERATIVE PAIN: ICD-10-CM

## 2021-07-07 DIAGNOSIS — J33.8 MAXILLARY SINUS POLYP: ICD-10-CM

## 2021-07-07 DIAGNOSIS — J32.1 CHRONIC FRONTAL SINUSITIS: ICD-10-CM

## 2021-07-07 DIAGNOSIS — J32.2 CHRONIC ETHMOIDAL SINUSITIS: ICD-10-CM

## 2021-07-07 PROBLEM — Z98.890 POSTOPERATIVE STATE: Status: ACTIVE | Noted: 2021-07-07

## 2021-07-07 LAB
PLATELET FUNCTION INTERPRETATION: NORMAL SECONDS
PLT FUNCTION, EPI: 124 SECONDS (ref 68–175)

## 2021-07-07 PROCEDURE — 7100000001 HC PACU RECOVERY - ADDTL 15 MIN: Performed by: OTOLARYNGOLOGY

## 2021-07-07 PROCEDURE — 3600000014 HC SURGERY LEVEL 4 ADDTL 15MIN: Performed by: OTOLARYNGOLOGY

## 2021-07-07 PROCEDURE — 3700000001 HC ADD 15 MINUTES (ANESTHESIA): Performed by: OTOLARYNGOLOGY

## 2021-07-07 PROCEDURE — 87070 CULTURE OTHR SPECIMN AEROBIC: CPT

## 2021-07-07 PROCEDURE — 6360000002 HC RX W HCPCS: Performed by: NURSE ANESTHETIST, CERTIFIED REGISTERED

## 2021-07-07 PROCEDURE — 6370000000 HC RX 637 (ALT 250 FOR IP): Performed by: OTOLARYNGOLOGY

## 2021-07-07 PROCEDURE — 3700000000 HC ANESTHESIA ATTENDED CARE: Performed by: OTOLARYNGOLOGY

## 2021-07-07 PROCEDURE — 6360000002 HC RX W HCPCS: Performed by: OTOLARYNGOLOGY

## 2021-07-07 PROCEDURE — 2500000003 HC RX 250 WO HCPCS: Performed by: REGISTERED NURSE

## 2021-07-07 PROCEDURE — 31253 NSL/SINS NDSC TOTAL: CPT | Performed by: OTOLARYNGOLOGY

## 2021-07-07 PROCEDURE — 6360000002 HC RX W HCPCS

## 2021-07-07 PROCEDURE — 2500000003 HC RX 250 WO HCPCS: Performed by: NURSE ANESTHETIST, CERTIFIED REGISTERED

## 2021-07-07 PROCEDURE — 30140 RESECT INFERIOR TURBINATE: CPT | Performed by: OTOLARYNGOLOGY

## 2021-07-07 PROCEDURE — 31287 NASAL/SINUS ENDOSCOPY SURG: CPT | Performed by: OTOLARYNGOLOGY

## 2021-07-07 PROCEDURE — 6360000002 HC RX W HCPCS: Performed by: ANESTHESIOLOGY

## 2021-07-07 PROCEDURE — 30520 REPAIR OF NASAL SEPTUM: CPT | Performed by: OTOLARYNGOLOGY

## 2021-07-07 PROCEDURE — 31255 NSL/SINS NDSC W/TOT ETHMDCT: CPT | Performed by: OTOLARYNGOLOGY

## 2021-07-07 PROCEDURE — 2720000010 HC SURG SUPPLY STERILE: Performed by: OTOLARYNGOLOGY

## 2021-07-07 PROCEDURE — 7100000000 HC PACU RECOVERY - FIRST 15 MIN: Performed by: OTOLARYNGOLOGY

## 2021-07-07 PROCEDURE — 31267 ENDOSCOPY MAXILLARY SINUS: CPT | Performed by: OTOLARYNGOLOGY

## 2021-07-07 PROCEDURE — 6360000002 HC RX W HCPCS: Performed by: NURSE PRACTITIONER

## 2021-07-07 PROCEDURE — 2580000003 HC RX 258: Performed by: NURSE ANESTHETIST, CERTIFIED REGISTERED

## 2021-07-07 PROCEDURE — 2709999900 HC NON-CHARGEABLE SUPPLY: Performed by: OTOLARYNGOLOGY

## 2021-07-07 PROCEDURE — 87186 SC STD MICRODIL/AGAR DIL: CPT

## 2021-07-07 PROCEDURE — 6360000002 HC RX W HCPCS: Performed by: REGISTERED NURSE

## 2021-07-07 PROCEDURE — 2500000003 HC RX 250 WO HCPCS: Performed by: OTOLARYNGOLOGY

## 2021-07-07 PROCEDURE — 88305 TISSUE EXAM BY PATHOLOGIST: CPT

## 2021-07-07 PROCEDURE — 2580000003 HC RX 258: Performed by: OTOLARYNGOLOGY

## 2021-07-07 PROCEDURE — C1713 ANCHOR/SCREW BN/BN,TIS/BN: HCPCS | Performed by: OTOLARYNGOLOGY

## 2021-07-07 PROCEDURE — 3600000004 HC SURGERY LEVEL 4 BASE: Performed by: OTOLARYNGOLOGY

## 2021-07-07 PROCEDURE — 87077 CULTURE AEROBIC IDENTIFY: CPT

## 2021-07-07 PROCEDURE — 61782 SCAN PROC CRANIAL EXTRA: CPT | Performed by: OTOLARYNGOLOGY

## 2021-07-07 PROCEDURE — 85576 BLOOD PLATELET AGGREGATION: CPT

## 2021-07-07 RX ORDER — CLINDAMYCIN HYDROCHLORIDE 150 MG/1
300 CAPSULE ORAL EVERY 6 HOURS SCHEDULED
Status: DISCONTINUED | OUTPATIENT
Start: 2021-07-07 | End: 2021-07-08 | Stop reason: HOSPADM

## 2021-07-07 RX ORDER — MEPERIDINE HYDROCHLORIDE 25 MG/ML
12.5 INJECTION INTRAMUSCULAR; INTRAVENOUS; SUBCUTANEOUS EVERY 5 MIN PRN
Status: DISCONTINUED | OUTPATIENT
Start: 2021-07-07 | End: 2021-07-07 | Stop reason: HOSPADM

## 2021-07-07 RX ORDER — SODIUM CHLORIDE 0.9 % (FLUSH) 0.9 %
10 SYRINGE (ML) INJECTION PRN
Status: DISCONTINUED | OUTPATIENT
Start: 2021-07-07 | End: 2021-07-08 | Stop reason: HOSPADM

## 2021-07-07 RX ORDER — GINSENG 100 MG
CAPSULE ORAL PRN
Status: DISCONTINUED | OUTPATIENT
Start: 2021-07-07 | End: 2021-07-07 | Stop reason: ALTCHOICE

## 2021-07-07 RX ORDER — SODIUM CHLORIDE 0.9 % (FLUSH) 0.9 %
10 SYRINGE (ML) INJECTION EVERY 12 HOURS SCHEDULED
Status: DISCONTINUED | OUTPATIENT
Start: 2021-07-07 | End: 2021-07-08 | Stop reason: HOSPADM

## 2021-07-07 RX ORDER — OXYMETAZOLINE HYDROCHLORIDE 0.05 G/100ML
2 SPRAY NASAL EVERY 6 HOURS SCHEDULED
Status: DISCONTINUED | OUTPATIENT
Start: 2021-07-07 | End: 2021-07-08 | Stop reason: HOSPADM

## 2021-07-07 RX ORDER — OXYMETAZOLINE HYDROCHLORIDE 0.05 G/100ML
SPRAY NASAL PRN
Status: DISCONTINUED | OUTPATIENT
Start: 2021-07-07 | End: 2021-07-07 | Stop reason: ALTCHOICE

## 2021-07-07 RX ORDER — FENTANYL CITRATE 50 UG/ML
50 INJECTION, SOLUTION INTRAMUSCULAR; INTRAVENOUS EVERY 5 MIN PRN
Status: DISCONTINUED | OUTPATIENT
Start: 2021-07-07 | End: 2021-07-07 | Stop reason: HOSPADM

## 2021-07-07 RX ORDER — DEXAMETHASONE SODIUM PHOSPHATE 10 MG/ML
INJECTION, EMULSION INTRAMUSCULAR; INTRAVENOUS
Status: COMPLETED
Start: 2021-07-07 | End: 2021-07-07

## 2021-07-07 RX ORDER — DEXAMETHASONE SODIUM PHOSPHATE 4 MG/ML
10 INJECTION, SOLUTION INTRA-ARTICULAR; INTRALESIONAL; INTRAMUSCULAR; INTRAVENOUS; SOFT TISSUE
Status: DISCONTINUED | OUTPATIENT
Start: 2021-07-07 | End: 2021-07-07 | Stop reason: HOSPADM

## 2021-07-07 RX ORDER — HYDROCODONE BITARTRATE AND ACETAMINOPHEN 7.5; 325 MG/1; MG/1
1 TABLET ORAL EVERY 4 HOURS PRN
Status: DISCONTINUED | OUTPATIENT
Start: 2021-07-07 | End: 2021-07-08 | Stop reason: HOSPADM

## 2021-07-07 RX ORDER — MORPHINE SULFATE 4 MG/ML
4 INJECTION, SOLUTION INTRAMUSCULAR; INTRAVENOUS
Status: DISCONTINUED | OUTPATIENT
Start: 2021-07-07 | End: 2021-07-08 | Stop reason: HOSPADM

## 2021-07-07 RX ORDER — ACETAMINOPHEN 500 MG
1000 TABLET ORAL EVERY 6 HOURS PRN
COMMUNITY

## 2021-07-07 RX ORDER — ONDANSETRON 4 MG/1
4 TABLET, ORALLY DISINTEGRATING ORAL EVERY 8 HOURS PRN
Status: DISCONTINUED | OUTPATIENT
Start: 2021-07-07 | End: 2021-07-08 | Stop reason: HOSPADM

## 2021-07-07 RX ORDER — ROPIVACAINE HYDROCHLORIDE 5 MG/ML
INJECTION, SOLUTION EPIDURAL; INFILTRATION; PERINEURAL PRN
Status: DISCONTINUED | OUTPATIENT
Start: 2021-07-07 | End: 2021-07-07 | Stop reason: ALTCHOICE

## 2021-07-07 RX ORDER — MORPHINE SULFATE 2 MG/ML
2 INJECTION, SOLUTION INTRAMUSCULAR; INTRAVENOUS
Status: DISCONTINUED | OUTPATIENT
Start: 2021-07-07 | End: 2021-07-08 | Stop reason: HOSPADM

## 2021-07-07 RX ORDER — SODIUM CHLORIDE 9 MG/ML
INJECTION, SOLUTION INTRAVENOUS CONTINUOUS PRN
Status: DISCONTINUED | OUTPATIENT
Start: 2021-07-07 | End: 2021-07-08 | Stop reason: SDUPTHER

## 2021-07-07 RX ORDER — ONDANSETRON 2 MG/ML
INJECTION INTRAMUSCULAR; INTRAVENOUS PRN
Status: DISCONTINUED | OUTPATIENT
Start: 2021-07-07 | End: 2021-07-08 | Stop reason: SDUPTHER

## 2021-07-07 RX ORDER — LIDOCAINE HYDROCHLORIDE AND EPINEPHRINE 10; 10 MG/ML; UG/ML
INJECTION, SOLUTION INFILTRATION; PERINEURAL PRN
Status: DISCONTINUED | OUTPATIENT
Start: 2021-07-07 | End: 2021-07-07 | Stop reason: ALTCHOICE

## 2021-07-07 RX ORDER — HYDRALAZINE HYDROCHLORIDE 20 MG/ML
5 INJECTION INTRAMUSCULAR; INTRAVENOUS EVERY 10 MIN PRN
Status: DISCONTINUED | OUTPATIENT
Start: 2021-07-07 | End: 2021-07-07 | Stop reason: HOSPADM

## 2021-07-07 RX ORDER — LIDOCAINE HCL/PF 100 MG/5ML
SYRINGE (ML) INJECTION PRN
Status: DISCONTINUED | OUTPATIENT
Start: 2021-07-07 | End: 2021-07-08 | Stop reason: SDUPTHER

## 2021-07-07 RX ORDER — SUCCINYLCHOLINE/SOD CL,ISO/PF 200MG/10ML
SYRINGE (ML) INTRAVENOUS PRN
Status: DISCONTINUED | OUTPATIENT
Start: 2021-07-07 | End: 2021-07-08 | Stop reason: SDUPTHER

## 2021-07-07 RX ORDER — ONDANSETRON 2 MG/ML
4 INJECTION INTRAMUSCULAR; INTRAVENOUS
Status: COMPLETED | OUTPATIENT
Start: 2021-07-07 | End: 2021-07-07

## 2021-07-07 RX ORDER — HYDROMORPHONE HCL 110MG/55ML
PATIENT CONTROLLED ANALGESIA SYRINGE INTRAVENOUS PRN
Status: DISCONTINUED | OUTPATIENT
Start: 2021-07-07 | End: 2021-07-08 | Stop reason: SDUPTHER

## 2021-07-07 RX ORDER — MINERAL OIL AND WHITE PETROLATUM 150; 830 MG/G; MG/G
OINTMENT OPHTHALMIC PRN
Status: DISCONTINUED | OUTPATIENT
Start: 2021-07-07 | End: 2021-07-07 | Stop reason: ALTCHOICE

## 2021-07-07 RX ORDER — PANTOPRAZOLE SODIUM 40 MG/1
40 TABLET, DELAYED RELEASE ORAL
Status: DISCONTINUED | OUTPATIENT
Start: 2021-07-08 | End: 2021-07-08 | Stop reason: HOSPADM

## 2021-07-07 RX ORDER — DIPHENHYDRAMINE HYDROCHLORIDE 50 MG/ML
12.5 INJECTION INTRAMUSCULAR; INTRAVENOUS
Status: DISCONTINUED | OUTPATIENT
Start: 2021-07-07 | End: 2021-07-07 | Stop reason: HOSPADM

## 2021-07-07 RX ORDER — LABETALOL 20 MG/4 ML (5 MG/ML) INTRAVENOUS SYRINGE
5 4 TIMES DAILY PRN
Status: DISCONTINUED | OUTPATIENT
Start: 2021-07-07 | End: 2021-07-07 | Stop reason: HOSPADM

## 2021-07-07 RX ORDER — ROCURONIUM BROMIDE 10 MG/ML
INJECTION, SOLUTION INTRAVENOUS PRN
Status: DISCONTINUED | OUTPATIENT
Start: 2021-07-07 | End: 2021-07-08 | Stop reason: SDUPTHER

## 2021-07-07 RX ORDER — PROPOFOL 10 MG/ML
INJECTION, EMULSION INTRAVENOUS PRN
Status: DISCONTINUED | OUTPATIENT
Start: 2021-07-07 | End: 2021-07-08 | Stop reason: SDUPTHER

## 2021-07-07 RX ORDER — ONDANSETRON 2 MG/ML
4 INJECTION INTRAMUSCULAR; INTRAVENOUS EVERY 8 HOURS PRN
Status: DISCONTINUED | OUTPATIENT
Start: 2021-07-07 | End: 2021-07-08 | Stop reason: HOSPADM

## 2021-07-07 RX ORDER — MORPHINE SULFATE 2 MG/ML
2 INJECTION, SOLUTION INTRAMUSCULAR; INTRAVENOUS EVERY 5 MIN PRN
Status: DISCONTINUED | OUTPATIENT
Start: 2021-07-07 | End: 2021-07-07 | Stop reason: HOSPADM

## 2021-07-07 RX ORDER — CLINDAMYCIN PHOSPHATE 900 MG/50ML
900 INJECTION INTRAVENOUS ONCE
Status: COMPLETED | OUTPATIENT
Start: 2021-07-07 | End: 2021-07-07

## 2021-07-07 RX ORDER — FENTANYL CITRATE 50 UG/ML
INJECTION, SOLUTION INTRAMUSCULAR; INTRAVENOUS PRN
Status: DISCONTINUED | OUTPATIENT
Start: 2021-07-07 | End: 2021-07-08 | Stop reason: SDUPTHER

## 2021-07-07 RX ORDER — MIDAZOLAM HYDROCHLORIDE 1 MG/ML
INJECTION INTRAMUSCULAR; INTRAVENOUS PRN
Status: DISCONTINUED | OUTPATIENT
Start: 2021-07-07 | End: 2021-07-08 | Stop reason: SDUPTHER

## 2021-07-07 RX ORDER — SODIUM CHLORIDE 9 MG/ML
INJECTION, SOLUTION INTRAVENOUS CONTINUOUS
Status: DISCONTINUED | OUTPATIENT
Start: 2021-07-07 | End: 2021-07-08 | Stop reason: HOSPADM

## 2021-07-07 RX ADMIN — ONDANSETRON 4 MG: 2 INJECTION INTRAMUSCULAR; INTRAVENOUS at 21:35

## 2021-07-07 RX ADMIN — ROCURONIUM BROMIDE 10 MG: 10 INJECTION INTRAVENOUS at 16:09

## 2021-07-07 RX ADMIN — HYDROMORPHONE HYDROCHLORIDE 0.5 MG: 2 INJECTION INTRAMUSCULAR; INTRAVENOUS; SUBCUTANEOUS at 15:51

## 2021-07-07 RX ADMIN — MIDAZOLAM 2 MG: 1 INJECTION INTRAMUSCULAR; INTRAVENOUS at 13:14

## 2021-07-07 RX ADMIN — PHENYLEPHRINE HYDROCHLORIDE 100 MCG: 10 INJECTION INTRAVENOUS at 14:33

## 2021-07-07 RX ADMIN — SUGAMMADEX 200 MG: 100 INJECTION, SOLUTION INTRAVENOUS at 18:15

## 2021-07-07 RX ADMIN — SODIUM CHLORIDE: 9 INJECTION, SOLUTION INTRAVENOUS at 12:45

## 2021-07-07 RX ADMIN — ONDANSETRON HYDROCHLORIDE 4 MG: 2 SOLUTION INTRAMUSCULAR; INTRAVENOUS at 19:32

## 2021-07-07 RX ADMIN — MORPHINE SULFATE 2 MG: 2 INJECTION, SOLUTION INTRAMUSCULAR; INTRAVENOUS at 21:30

## 2021-07-07 RX ADMIN — FENTANYL CITRATE 50 MCG: 50 INJECTION, SOLUTION INTRAMUSCULAR; INTRAVENOUS at 18:47

## 2021-07-07 RX ADMIN — SODIUM CHLORIDE: 9 INJECTION, SOLUTION INTRAVENOUS at 16:09

## 2021-07-07 RX ADMIN — PHENYLEPHRINE HYDROCHLORIDE 100 MCG: 10 INJECTION INTRAVENOUS at 13:45

## 2021-07-07 RX ADMIN — MORPHINE SULFATE 2 MG: 2 INJECTION, SOLUTION INTRAMUSCULAR; INTRAVENOUS at 19:25

## 2021-07-07 RX ADMIN — MORPHINE SULFATE 4 MG: 4 INJECTION, SOLUTION INTRAMUSCULAR; INTRAVENOUS at 23:46

## 2021-07-07 RX ADMIN — ROCURONIUM BROMIDE 20 MG: 10 INJECTION INTRAVENOUS at 15:07

## 2021-07-07 RX ADMIN — ROCURONIUM BROMIDE 50 MG: 10 INJECTION INTRAVENOUS at 13:20

## 2021-07-07 RX ADMIN — Medication 100 MG: at 13:14

## 2021-07-07 RX ADMIN — ROCURONIUM BROMIDE 10 MG: 10 INJECTION INTRAVENOUS at 15:49

## 2021-07-07 RX ADMIN — CLINDAMYCIN PHOSPHATE 900 MG: 900 INJECTION, SOLUTION INTRAVENOUS at 13:01

## 2021-07-07 RX ADMIN — Medication 160 MG: at 13:15

## 2021-07-07 RX ADMIN — PROPOFOL 200 MG: 10 INJECTION, EMULSION INTRAVENOUS at 13:14

## 2021-07-07 RX ADMIN — SODIUM CHLORIDE: 9 INJECTION, SOLUTION INTRAVENOUS at 17:02

## 2021-07-07 RX ADMIN — HYDROMORPHONE HYDROCHLORIDE 0.5 MG: 2 INJECTION INTRAMUSCULAR; INTRAVENOUS; SUBCUTANEOUS at 16:17

## 2021-07-07 RX ADMIN — PHENYLEPHRINE HYDROCHLORIDE 200 MCG: 10 INJECTION INTRAVENOUS at 13:52

## 2021-07-07 RX ADMIN — ROCURONIUM BROMIDE 10 MG: 10 INJECTION INTRAVENOUS at 17:33

## 2021-07-07 RX ADMIN — DEXAMETHASONE SODIUM PHOSPHATE 10 MG: 4 INJECTION, SOLUTION INTRAMUSCULAR; INTRAVENOUS at 13:43

## 2021-07-07 RX ADMIN — SODIUM CHLORIDE: 9 INJECTION, SOLUTION INTRAVENOUS at 14:00

## 2021-07-07 RX ADMIN — PHENYLEPHRINE HYDROCHLORIDE 100 MCG: 10 INJECTION INTRAVENOUS at 13:47

## 2021-07-07 RX ADMIN — FENTANYL CITRATE 100 MCG: 50 INJECTION, SOLUTION INTRAMUSCULAR; INTRAVENOUS at 13:14

## 2021-07-07 RX ADMIN — SODIUM CHLORIDE, PRESERVATIVE FREE 10 ML: 5 INJECTION INTRAVENOUS at 21:35

## 2021-07-07 RX ADMIN — FENTANYL CITRATE 25 MCG: 50 INJECTION, SOLUTION INTRAMUSCULAR; INTRAVENOUS at 15:14

## 2021-07-07 RX ADMIN — ONDANSETRON HYDROCHLORIDE 4 MG: 4 INJECTION, SOLUTION INTRAMUSCULAR; INTRAVENOUS at 13:30

## 2021-07-07 RX ADMIN — PHENYLEPHRINE HYDROCHLORIDE 100 MCG: 10 INJECTION INTRAVENOUS at 14:03

## 2021-07-07 RX ADMIN — CLINDAMYCIN HYDROCHLORIDE 300 MG: 150 CAPSULE ORAL at 22:19

## 2021-07-07 RX ADMIN — DEXAMETHASONE SODIUM PHOSPHATE 10 MG: 10 INJECTION, EMULSION INTRAMUSCULAR; INTRAVENOUS at 13:00

## 2021-07-07 RX ADMIN — FENTANYL CITRATE 50 MCG: 50 INJECTION, SOLUTION INTRAMUSCULAR; INTRAVENOUS at 15:49

## 2021-07-07 RX ADMIN — FENTANYL CITRATE 25 MCG: 50 INJECTION, SOLUTION INTRAMUSCULAR; INTRAVENOUS at 15:10

## 2021-07-07 RX ADMIN — OXYMETAZOLINE HYDROCHLORIDE 2 SPRAY: 0.05 SPRAY NASAL at 22:19

## 2021-07-07 RX ADMIN — PHENYLEPHRINE HYDROCHLORIDE 200 MCG: 10 INJECTION INTRAVENOUS at 13:40

## 2021-07-07 RX ADMIN — ROCURONIUM BROMIDE 20 MG: 10 INJECTION INTRAVENOUS at 14:21

## 2021-07-07 RX ADMIN — SODIUM CHLORIDE: 9 INJECTION, SOLUTION INTRAVENOUS at 21:36

## 2021-07-07 ASSESSMENT — PULMONARY FUNCTION TESTS
PIF_VALUE: 23
PIF_VALUE: 5
PIF_VALUE: 27
PIF_VALUE: 27
PIF_VALUE: 28
PIF_VALUE: 26
PIF_VALUE: 24
PIF_VALUE: 28
PIF_VALUE: 25
PIF_VALUE: 25
PIF_VALUE: 28
PIF_VALUE: 1
PIF_VALUE: 28
PIF_VALUE: 27
PIF_VALUE: 25
PIF_VALUE: 28
PIF_VALUE: 26
PIF_VALUE: 28
PIF_VALUE: 28
PIF_VALUE: 26
PIF_VALUE: 28
PIF_VALUE: 26
PIF_VALUE: 28
PIF_VALUE: 28
PIF_VALUE: 27
PIF_VALUE: 28
PIF_VALUE: 26
PIF_VALUE: 26
PIF_VALUE: 0
PIF_VALUE: 28
PIF_VALUE: 27
PIF_VALUE: 28
PIF_VALUE: 28
PIF_VALUE: 26
PIF_VALUE: 28
PIF_VALUE: 27
PIF_VALUE: 28
PIF_VALUE: 28
PIF_VALUE: 26
PIF_VALUE: 27
PIF_VALUE: 26
PIF_VALUE: 24
PIF_VALUE: 28
PIF_VALUE: 23
PIF_VALUE: 5
PIF_VALUE: 27
PIF_VALUE: 26
PIF_VALUE: 0
PIF_VALUE: 28
PIF_VALUE: 25
PIF_VALUE: 28
PIF_VALUE: 8
PIF_VALUE: 25
PIF_VALUE: 27
PIF_VALUE: 28
PIF_VALUE: 28
PIF_VALUE: 27
PIF_VALUE: 28
PIF_VALUE: 0
PIF_VALUE: 28
PIF_VALUE: 26
PIF_VALUE: 28
PIF_VALUE: 28
PIF_VALUE: 27
PIF_VALUE: 27
PIF_VALUE: 28
PIF_VALUE: 5
PIF_VALUE: 28
PIF_VALUE: 27
PIF_VALUE: 28
PIF_VALUE: 26
PIF_VALUE: 25
PIF_VALUE: 28
PIF_VALUE: 27
PIF_VALUE: 28
PIF_VALUE: 26
PIF_VALUE: 27
PIF_VALUE: 27
PIF_VALUE: 28
PIF_VALUE: 27
PIF_VALUE: 29
PIF_VALUE: 28
PIF_VALUE: 0
PIF_VALUE: 28
PIF_VALUE: 25
PIF_VALUE: 26
PIF_VALUE: 28
PIF_VALUE: 26
PIF_VALUE: 28
PIF_VALUE: 24
PIF_VALUE: 28
PIF_VALUE: 27
PIF_VALUE: 22
PIF_VALUE: 28
PIF_VALUE: 25
PIF_VALUE: 24
PIF_VALUE: 28
PIF_VALUE: 25
PIF_VALUE: 25
PIF_VALUE: 28
PIF_VALUE: 27
PIF_VALUE: 28
PIF_VALUE: 25
PIF_VALUE: 25
PIF_VALUE: 28
PIF_VALUE: 1
PIF_VALUE: 28
PIF_VALUE: 28
PIF_VALUE: 1
PIF_VALUE: 28
PIF_VALUE: 25
PIF_VALUE: 28
PIF_VALUE: 29
PIF_VALUE: 28
PIF_VALUE: 28
PIF_VALUE: 26
PIF_VALUE: 28
PIF_VALUE: 26
PIF_VALUE: 28
PIF_VALUE: 26
PIF_VALUE: 28
PIF_VALUE: 26
PIF_VALUE: 28
PIF_VALUE: 28
PIF_VALUE: 27
PIF_VALUE: 28
PIF_VALUE: 28
PIF_VALUE: 27
PIF_VALUE: 1
PIF_VALUE: 28
PIF_VALUE: 3
PIF_VALUE: 28
PIF_VALUE: 26
PIF_VALUE: 26
PIF_VALUE: 28
PIF_VALUE: 27
PIF_VALUE: 26
PIF_VALUE: 26
PIF_VALUE: 28
PIF_VALUE: 27
PIF_VALUE: 26
PIF_VALUE: 28
PIF_VALUE: 28
PIF_VALUE: 23
PIF_VALUE: 26
PIF_VALUE: 26
PIF_VALUE: 28
PIF_VALUE: 26
PIF_VALUE: 28
PIF_VALUE: 26
PIF_VALUE: 27
PIF_VALUE: 26
PIF_VALUE: 28
PIF_VALUE: 28
PIF_VALUE: 25
PIF_VALUE: 28
PIF_VALUE: 28
PIF_VALUE: 22
PIF_VALUE: 27
PIF_VALUE: 28
PIF_VALUE: 25
PIF_VALUE: 27
PIF_VALUE: 6
PIF_VALUE: 23
PIF_VALUE: 28
PIF_VALUE: 28
PIF_VALUE: 27
PIF_VALUE: 28
PIF_VALUE: 28
PIF_VALUE: 0
PIF_VALUE: 28
PIF_VALUE: 27
PIF_VALUE: 0
PIF_VALUE: 26
PIF_VALUE: 28
PIF_VALUE: 26
PIF_VALUE: 28
PIF_VALUE: 27
PIF_VALUE: 25
PIF_VALUE: 28
PIF_VALUE: 27
PIF_VALUE: 23
PIF_VALUE: 28
PIF_VALUE: 28
PIF_VALUE: 25
PIF_VALUE: 0
PIF_VALUE: 21
PIF_VALUE: 28
PIF_VALUE: 10
PIF_VALUE: 28
PIF_VALUE: 25
PIF_VALUE: 28
PIF_VALUE: 26
PIF_VALUE: 29
PIF_VALUE: 28
PIF_VALUE: 26
PIF_VALUE: 25
PIF_VALUE: 28
PIF_VALUE: 1
PIF_VALUE: 10
PIF_VALUE: 25
PIF_VALUE: 28
PIF_VALUE: 0
PIF_VALUE: 0
PIF_VALUE: 28
PIF_VALUE: 28
PIF_VALUE: 25
PIF_VALUE: 28
PIF_VALUE: 28
PIF_VALUE: 25
PIF_VALUE: 1
PIF_VALUE: 25
PIF_VALUE: 26
PIF_VALUE: 26
PIF_VALUE: 28
PIF_VALUE: 26
PIF_VALUE: 28
PIF_VALUE: 25
PIF_VALUE: 27
PIF_VALUE: 28
PIF_VALUE: 26
PIF_VALUE: 25
PIF_VALUE: 26
PIF_VALUE: 27
PIF_VALUE: 28
PIF_VALUE: 26
PIF_VALUE: 28
PIF_VALUE: 26
PIF_VALUE: 28
PIF_VALUE: 26
PIF_VALUE: 25
PIF_VALUE: 28
PIF_VALUE: 28
PIF_VALUE: 27
PIF_VALUE: 27
PIF_VALUE: 26
PIF_VALUE: 28
PIF_VALUE: 27
PIF_VALUE: 25
PIF_VALUE: 28
PIF_VALUE: 28
PIF_VALUE: 27
PIF_VALUE: 28
PIF_VALUE: 28
PIF_VALUE: 27
PIF_VALUE: 28
PIF_VALUE: 28
PIF_VALUE: 26
PIF_VALUE: 28
PIF_VALUE: 25
PIF_VALUE: 28
PIF_VALUE: 26
PIF_VALUE: 25
PIF_VALUE: 27
PIF_VALUE: 28
PIF_VALUE: 25
PIF_VALUE: 29
PIF_VALUE: 27
PIF_VALUE: 28
PIF_VALUE: 26
PIF_VALUE: 27
PIF_VALUE: 27
PIF_VALUE: 28
PIF_VALUE: 28
PIF_VALUE: 22
PIF_VALUE: 28
PIF_VALUE: 26
PIF_VALUE: 0
PIF_VALUE: 27
PIF_VALUE: 27
PIF_VALUE: 28
PIF_VALUE: 26
PIF_VALUE: 26
PIF_VALUE: 27
PIF_VALUE: 28
PIF_VALUE: 28
PIF_VALUE: 26
PIF_VALUE: 28
PIF_VALUE: 27
PIF_VALUE: 28
PIF_VALUE: 27
PIF_VALUE: 26
PIF_VALUE: 25
PIF_VALUE: 1
PIF_VALUE: 28
PIF_VALUE: 1

## 2021-07-07 ASSESSMENT — PAIN DESCRIPTION - LOCATION
LOCATION: ARM;FACE
LOCATION: ARM;NOSE
LOCATION: ARM;NOSE

## 2021-07-07 ASSESSMENT — PAIN SCALES - GENERAL
PAINLEVEL_OUTOF10: 9
PAINLEVEL_OUTOF10: 9
PAINLEVEL_OUTOF10: 5
PAINLEVEL_OUTOF10: 9
PAINLEVEL_OUTOF10: 10
PAINLEVEL_OUTOF10: 7
PAINLEVEL_OUTOF10: 10
PAINLEVEL_OUTOF10: 7

## 2021-07-07 ASSESSMENT — PAIN DESCRIPTION - PAIN TYPE
TYPE: ACUTE PAIN

## 2021-07-07 NOTE — BRIEF OP NOTE
Brief Postoperative Note      Patient: Maryanne Anguiano  YOB: 1985  MRN: [id number]    Date of Procedure: 7/7/2021    Pre-Op Diagnosis: CHRONIC ETHMOIDAL SINUSITIS, CHRONIC FRONTAL SINUSITIS, CHRONIC MAXILLARY SINUSITIS, CHRONIC SPHENOIDAL SINUSITIS, OSTEOMEATAL COMPLEX OBSTRUCTION OF NASAL SINUS, BETITO BULLOSA, RHINOGENIC HEADACHE, HYPERTROPHY OF INFERIOR NASAL TURBINATE, MAXILLARY  SINUS POLYP, DEVIATED NASAL SEPTUM, ANTERIOR, STATUS POST SEPTOPLASTY    Post-Op Diagnosis: Same and COAGULOPATHY       Procedure(s):  IMAGE GUIDED NASAL SINUS ENDOSCOPY WITH BILATERAL MAXILLARY ANTROSTOMY, PARTIAL RESECTION BETITO BULLOSA BILATERAL SUPERIOR TURBINATES, SPHENOIDOTOMY, RIGHT, TOTAL ETHMOIDECTOMY ANTERIOR AND POSTERIOR, FONTAL SINUS EXPLORATION RIGHT WITHOUT REMOVAL OF TISSUE, MAXILLARY ANTROSTOMY WITH REMOVAL OF TISSUE FROM RIGHT MAXILLARY SINUS, SEPTOPLASTY, SUBMUCOUS RESECTION TURBINATES, BILATERAL PARTIAL INFERIOR TURBINATE    Surgeon(s):  Halford Closs, MD    Assistant:  * No surgical staff found *    Anesthesia: General    Estimated Blood Loss (mL): 823    Complications: Bleeding    Specimens:   ID Type Source Tests Collected by Time Destination   1 : nasal fossa Swab Nose CULTURE, AEROBIC Halford Closs, MD 7/7/2021 1404        Implants:  * No implants in log *      Drains:   Urethral Catheter 16 fr (Active)       Findings: Despite clearance from hematologist and the patient's assurances that he does not have problems and his platelet count has been low for a long time, the patient did not clot well at all. It took an extra couple of hours to control the bleeding from the nasal sinus surgery. Family afterwards said that he had been having problems  for some 15 years. Patient's nostrils were virtually completely obstructed by wide columella related to excessive curvature of the lower ends of the medial crura encroaching on the nostril along with thickness of the skin and soft tissue.   He had had a prior septoplasty. Inferior turbinate SMR would likely help. CT shows a rather narrow maxilla. His sinuses were extremely inflamed and vascular. There was no val pus but just extensive inflammation. Culture was taken from the nasal fossa where there was some thick drainage noted at the beginning of the procedure. Because of his poor airway in the oropharynx hypopharynx areas and sleep apnea, and his tendency for bleeding, I will keep him in bed overnight on observation.     Electronically signed by Ro Llamas MD on 7/7/2021 at 6:38 PM

## 2021-07-07 NOTE — ANESTHESIA PRE PROCEDURE
[place] Bedoya MD 50 mL/hr at 07/07/21 1301 900 mg at 07/07/21 1301     Facility-Administered Medications Ordered in Other Encounters   Medication Dose Route Frequency Provider Last Rate Last Admin    0.9 % sodium chloride infusion   Intravenous Continuous PRN RosendoXOCHITL Ferrara - CRNA   New Bag at 07/07/21 1245       Allergies:  No Known Allergies    Problem List:    Patient Active Problem List   Diagnosis Code    Nasal septal deviation J34.2    Hypertrophy of inferior nasal turbinate, left J34.3    GERHARD on CPAP G47.33, Z99.89    Intolerance of continuous positive airway pressure (CPAP) ventilation Z78.9    Hypertrophy of uvula K13.79    Hypertrophic soft palate K13.79    Large tongue K14.8    Class 3 obesity with alveolar hypoventilation, serious comorbidity, and body mass index (BMI) of 40.0 to 44.9 in adult ([facility] [address] E66.2, Z68.41    Status post nasal septoplasty Z98.890    GERD without esophagitis K21.9    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    Maxillary sinus polyp J33.8    Chronic sphenoidal sinusitis J32.3    Chronic maxillary sinusitis J32.0    Chronic frontal sinusitis J32.1    Chronic ethmoidal sinusitis J32.2       Past Medical History:        Diagnosis Date    Chronic back pain     Diverticulitis     Hidradenitis suppurativa     Sleep apnea     has CPAP       Past Surgical History:        Procedure Laterality Date    ANKLE SURGERY  2001    BACK SURGERY      x3    CHOLECYSTECTOMY      COLON SURGERY      NOSE SURGERY  1993    Laser Surgery    RECTAL SURGERY      Ruptured Sigmoid Colon 2013    SEPTOPLASTY Left 7/19/2017    SEPTOPLASTY SUBMUCOUS RESECT TURBINATES, PARTIAL LEFT performed by Jaye Brayn MD at [address] [place] [address] SEPTOPLASTY N/A 5/8/2019    TONSILLECTOMY, UPPP performed by Jaye Bryan MD at [facility] History:    Social History     Tobacco Use    Smoking status: Current Every Day Smoker     Packs/day: 1.00     Types: Cigarettes     Start date: 5/5/2007    Smokeless tobacco: Never Used    Tobacco comment: 0.5-1 ppd 5/5/17 last cig 5/7/19   Substance Use Topics    Alcohol use: Yes     Comment: rarely                                Ready to quit: Not Answered  Counseling given: Not Answered  Comment: 0.5-1 ppd 5/5/17 last cig 5/7/19      Vital Signs (Current):   Vitals:    06/30/21 1016 07/07/21 1054 07/07/21 1104   BP:  136/86    Pulse:  80    Resp:  18    Temp:  97.8 °F (36.6 °C)    SpO2:  97%    Weight: (!) 302 lb (137 kg)  (!) 317 lb (143.8 kg)   Height: 6' (1.829 m)  6' (1.829 m)                                              BP Readings from Last 3 Encounters:   07/07/21 136/86   06/25/21 138/88   05/07/21 128/74       NPO Status: Time of last liquid consumption: 2345                        Time of last solid consumption: 2345                        Date of last liquid consumption: 07/06/21                        Date of last solid food consumption: 07/06/21    BMI:   Wt Readings from Last 3 Encounters:   07/07/21 (!) 317 lb (143.8 kg)   06/25/21 (!) 306 lb (138.8 kg)   05/07/21 (!) 315 lb (142.9 kg)     Body mass index is 42.99 kg/m². CBC:   Lab Results   Component Value Date     05/08/2019       CMP: No results found for: NA, K, CL, CO2, BUN, CREATININE, GFRAA, AGRATIO, LABGLOM, GLUCOSE, PROT, CALCIUM, BILITOT, ALKPHOS, AST, ALT    POC Tests: No results for input(s): POCGLU, POCNA, POCK, POCCL, POCBUN, POCHEMO, POCHCT in the last 72 hours.     Coags: No results found for: PROTIME, INR, APTT    HCG (If Applicable): No results found for: PREGTESTUR, PREGSERUM, HCG, HCGQUANT     ABGs: No results found for: PHART, PO2ART, KTP3ANX, YLC3DAR, BEART, J3ZEORCE     Type & Screen (If Applicable):  No results found for: LABABO, LABRH    Drug/Infectious Status (If Applicable):  No results found for: HIV, HEPCAB    COVID-19 Screening (If Applicable): No results found for: COVID19        Anesthesia Evaluation    Airway: Mallampati: III        Dental:          Pulmonary:   (+) sleep

## 2021-07-07 NOTE — H&P
[facility] [place] [facility]  [facility] EAR, NOSE AND THROAT  [facility]  Dept: [phone]  Dept Fax: [phone]  Loc: [phone]    Harshad Jarvis is a 39 y.o. male who was referred by No ref. provider found for:  No chief complaint on file. Clayton Theodore HPI:     Patient presents for preop examination. Patient is scheduled for septoplasty, partial submucousal resection of bilateral inferior turbinates, partial resection scott bullosa bilateral superior turbinates, right intranasal frontal sinusotomy, bilateral complete endoscopic ethmoidectomy, left maxillary endoscopy and removal of tissue, right maxillary antrostomy, and right sphenoidostomy with Dr. Coleman Johnston on 7/7/2021. Patient denies any changes to his medical history or medications list since he was last seen. Patient states he is ready for surgery to hopefully help his nasal breathing. He has not been wearing his BiPAP at night since he cannot tolerate the pressure. Patient reports that his ears have been doing much better since having tubes placed. He denies any other symptoms or concerns at this time. Subjective:      REVIEW OF SYSTEMS:    A complete multi-organ review of systems was performed using a new patient questionnaire, and reviewed by me.   ENT:  negative except as noted in HPI  CONSTITUTIONAL:  negative except as noted in HPI  EYES:  negative except as noted in HPI  RESPIRATORY:  negative except as noted in HPI  CARDIOVASCULAR:  negative except as noted in HPI  GASTROINTESTINAL:  negative except as noted in HPI  GENITOURINARY:  negative except as noted in HPI  MUSCULOSKELETAL:  negative except as noted in HPI  SKIN:  negative except as noted in HPI  ENDOCRINE/METABOLIC: negative except as noted in HPI  HEMATOLOGIC/LYMPHATIC:  negative except as noted in HPI  ALLERGY/IMMUN: negative except as noted in HPI  NEUROLOGICAL:  negative except as noted in HPI  BEHAVIOR/PSYCH:  negative except as noted in HPI    Past Medical History:  Past Medical History:   Diagnosis Date    Chronic back pain     Diverticulitis     Hidradenitis suppurativa     PONV (postoperative nausea and vomiting)     Sleep apnea     has CPAP       Social History:    TOBACCO:   reports that he has been smoking cigarettes. He started smoking about 14 years ago. He has been smoking about 1.00 pack per day. He has never used smokeless tobacco.  ETOH:   reports current alcohol use. DRUGS:   reports no history of drug use. Family History:       Problem Relation Age of Onset    Cancer Mother         Breast Cancer    Diabetes Father     Heart Disease Father     Heart Disease Maternal Grandmother     Heart Disease Maternal Grandfather     Diabetes Paternal Grandmother     COPD Paternal Grandmother     Diabetes Paternal Grandfather     COPD Paternal Grandfather        Surgical History:  Past Surgical History:   Procedure Laterality Date    ANKLE SURGERY  2001    BACK SURGERY      x3    CHOLECYSTECTOMY      COLON SURGERY      NOSE SURGERY  1993    Laser Surgery    RECTAL SURGERY      Ruptured Sigmoid Colon 2013    SEPTOPLASTY Left 7/19/2017    SEPTOPLASTY SUBMUCOUS RESECT TURBINATES, PARTIAL LEFT performed by Doug Hodge MD at [address] [place] [address] SEPTOPLASTY N/A 5/8/2019    TONSILLECTOMY, UPPP performed by Doug Hodge MD at [facility]        Objective: This is a 39 y.o. male. Patient is alert and oriented to person, place and time. Patient appears well developed, well nourished. Mood is happy with normal affect. Not obviously hearing impaired. No abnormality in speech noted. Ht 6' (1.829 m)   Wt (!) 302 lb (137 kg)   BMI 40.96 kg/m²     Head:   Normocephalic, atraumatic. No obvious masses or lesions noted. Ears:  External ears: Normal: no scars, lesions or masses. Mastoid process: No erythema noted. No tenderness to palpation.   R External auditory canal: clear and free of any pathology  L External auditory canal: clear and free of any pathology   Tympanic membranes:  R tube in place-dry, patent                                                  L tube in place-dry, patent  Nose:    External nose: Appears midline. No obvious deformity or masses. Septum:  Anterior septum has complex deviation, mid and posterior septum straight. No septal hematoma. No perforation. Mucosa:  clear  Turbinates: hypertrophic and congested            Discharge:  Mild to moderate amount of brown, dry nasal crusting. More significant amount in the right nare as compared to left. Mouth/Throat:  Lips, tongue and oral cavity: Normal. No masses or lesions noted   Dentition: fair, no malocclusion  Oral mucosa: moist  Tonsils: absent  Oropharynx: normal-appearing mucosa  Hard and soft palates: symmetrical and intact. Salivary glands: not enlarged and no tenderness to palpation. Uvula: Surgically absent   Gag reflex is present. Neck: Trachea midline. Thyroid not enlarged, no palpable masses or tenderness. Lymphatic: No cervical lymphadenopathy noted. Eyes: FLAKITO, EOM intact. Conjunctiva moist without discharge. Lungs: Normal effort of breathing, not obviously distressed. Neuro: Cranial nerves II-XII grossly intact. Extremities: No clubbing, edema, or cyanosis noted. Assessment/Plan:     Diagnosis Orders   1. Chronic ethmoidal sinusitis     2. Chronic frontal sinusitis     3. Chronic maxillary sinusitis     4. Chronic sphenoidal sinusitis     5. Hypertrophy of inferior nasal turbinate     6. Deviated nasal septum, anterior     7. Ostiomeatal complex obstruction of nasal sinus     8. GERHARD on CPAP     9. Difficulty with CPAP use     10. Facial pressure     11. 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 male that presents for preop examination. Patient denies any changes in his medical history and medications since he was last seen.   I discussed the typical day of surgery and postoperative course with the patient and his wife. I also discussed some of the risk/benefits of surgery. Some of the risks discussed include (but not limited to): Postop bleeding, postop infection, postop pain, persistent congestion, recurrent sinus infection, vision changes/loss. The patient expresses understanding and would like to proceed. We also discussed avoidance of NSAIDs and garlic 1 week preop and postop in hopes of limiting risk of postop bleeding. The patient is wife deny any other questions/concerns regarding surgery at this time. I requested the patient get his preop CBC completed in the next week or so. Patient will follow up for surgery, but will contact the office sooner with new/worsening symptoms or other concerns. (Please note that portions of this note may have been completed with a voice recognition program.  Efforts were made to edit the dictation but occasionally words are mis-transcribed.)    Recommended surgical procedures:  Septoplasty  Partial submucous resection both inferior turbinates  Partial resection scott bullosa both superior turbinates  Right intranasal frontal sinusotomy  Bilateral complete endoscopic ethmoidectomy  Left maxillary endoscopy and removal of tissue  Right maxillary antrostomy   Right sphenoidostomy  Stereotactic computerized image guidance for sinus surgery           INFORMED CONSENT:   Using the CT scan, the planned procedures were explained in detail. The risks and benefits of these sinus procedures, including but not limited to risk of anesthesia, bleeding, infection, vision loss and /or eye muscle damage, CSF leak, epiphora (eye watering), potential need for further surgery and possible persistent sinus infections were discussed with the patient. The risks and benefits of alternative procedures, as well as the possible consequences of not undergoing the procedures were discussed, if applicable.  They read and kept the information sheet with postop instructions, which lists the more common and even some of the more unusual complications, and the sheet listing the types of medications to avoid that could interfere with clotting. All of their questions were answered and they understood no guarantees were made. The patient verbalized understanding and gave consent to proceed. They also specifically consent to any additional related procedure, if the indications and need become evident during the surgery. Septoplasty complications that may occur were discussed including postoperative bleeding (usually easy to control), infection, nasal crusting, a hole in the septum (perforation), failure to completely improve breathing, persistent septal deflection resulting in the need for revision (uncommon), and very rarely, a change in appearance. They understand that no guarantees are made regarding either outcome or potential complications. All of their questions were answered. They requested we proceed.     Electronically signed by Cecelia Hunter MD on 7/6/2021 at 11:12 PM

## 2021-07-07 NOTE — INTERVAL H&P NOTE
Pt Name: Vesna Gibson  MRN: [id number]  YOB: 1985  Date of evaluation: 7/7/2021    I have examined the patient and reviewed the H&P/Consult and there are no changes to the patient or plans, except that he answered yesterday when asked when he last took Motrin. After that he says it was tylenol, and his spouse said they do not even have ibuprofen in the house. Given that Motrin was prescribed for him in the past, to be careful,  we will get a stat platelet function test before proceeding. His platelet count is low to begin with. .           Electronically signed by Ro Llamas MD on 7/7/2021 at 11:43 AM

## 2021-07-08 ENCOUNTER — OFFICE VISIT (OUTPATIENT)
Dept: ENT CLINIC | Age: 36
End: 2021-07-08

## 2021-07-08 ENCOUNTER — TELEPHONE (OUTPATIENT)
Dept: ENT CLINIC | Age: 36
End: 2021-07-08

## 2021-07-08 VITALS
HEIGHT: 72 IN | DIASTOLIC BLOOD PRESSURE: 87 MMHG | TEMPERATURE: 96.2 F | WEIGHT: 315 LBS | SYSTOLIC BLOOD PRESSURE: 141 MMHG | HEART RATE: 78 BPM | RESPIRATION RATE: 18 BRPM | BODY MASS INDEX: 42.66 KG/M2 | OXYGEN SATURATION: 97 %

## 2021-07-08 VITALS
HEIGHT: 72 IN | HEART RATE: 92 BPM | TEMPERATURE: 97.1 F | BODY MASS INDEX: 42.66 KG/M2 | RESPIRATION RATE: 14 BRPM | WEIGHT: 315 LBS | DIASTOLIC BLOOD PRESSURE: 70 MMHG | SYSTOLIC BLOOD PRESSURE: 144 MMHG

## 2021-07-08 DIAGNOSIS — Z99.89 OSA ON CPAP: ICD-10-CM

## 2021-07-08 DIAGNOSIS — J34.89 OSTIOMEATAL COMPLEX OBSTRUCTION OF NASAL SINUS: ICD-10-CM

## 2021-07-08 DIAGNOSIS — Z98.890 STATUS POST NASAL SEPTOPLASTY: ICD-10-CM

## 2021-07-08 DIAGNOSIS — G47.33 OSA ON CPAP: ICD-10-CM

## 2021-07-08 DIAGNOSIS — Z98.890 STATUS POST FUNCTIONAL ENDOSCOPIC SINUS SURGERY: ICD-10-CM

## 2021-07-08 DIAGNOSIS — J32.3 CHRONIC SPHENOIDAL SINUSITIS: ICD-10-CM

## 2021-07-08 DIAGNOSIS — J34.3 HYPERTROPHY OF INFERIOR NASAL TURBINATE: ICD-10-CM

## 2021-07-08 DIAGNOSIS — J34.2 DEVIATED NASAL SEPTUM: Primary | ICD-10-CM

## 2021-07-08 DIAGNOSIS — J32.1 CHRONIC FRONTAL SINUSITIS: ICD-10-CM

## 2021-07-08 DIAGNOSIS — Z78.9 DIFFICULTY WITH CPAP USE: ICD-10-CM

## 2021-07-08 DIAGNOSIS — J32.0 CHRONIC MAXILLARY SINUSITIS: ICD-10-CM

## 2021-07-08 PROCEDURE — 99024 POSTOP FOLLOW-UP VISIT: CPT | Performed by: OTOLARYNGOLOGY

## 2021-07-08 PROCEDURE — 6370000000 HC RX 637 (ALT 250 FOR IP): Performed by: OTOLARYNGOLOGY

## 2021-07-08 PROCEDURE — 6360000002 HC RX W HCPCS: Performed by: OTOLARYNGOLOGY

## 2021-07-08 PROCEDURE — APPNB45 APP NON BILLABLE 31-45 MINUTES: Performed by: NURSE PRACTITIONER

## 2021-07-08 RX ORDER — PREDNISONE 20 MG/1
20 TABLET ORAL DAILY
Qty: 4 TABLET | Refills: 0 | Status: SHIPPED | OUTPATIENT
Start: 2021-07-08 | End: 2021-07-11

## 2021-07-08 RX ORDER — OXYMETAZOLINE HYDROCHLORIDE 0.05 G/100ML
SPRAY NASAL
Refills: 3 | COMMUNITY
Start: 2021-07-08 | End: 2021-07-22 | Stop reason: ALTCHOICE

## 2021-07-08 RX ORDER — HYDROCODONE BITARTRATE AND ACETAMINOPHEN 7.5; 325 MG/1; MG/1
1 TABLET ORAL EVERY 6 HOURS PRN
Qty: 28 TABLET | Refills: 0 | Status: SHIPPED | OUTPATIENT
Start: 2021-07-08 | End: 2021-07-11 | Stop reason: ALTCHOICE

## 2021-07-08 RX ORDER — CLINDAMYCIN HYDROCHLORIDE 300 MG/1
300 CAPSULE ORAL 3 TIMES DAILY
Qty: 30 CAPSULE | Refills: 0 | Status: SHIPPED | OUTPATIENT
Start: 2021-07-08 | End: 2021-07-18

## 2021-07-08 RX ORDER — HYDROCODONE BITARTRATE AND ACETAMINOPHEN 7.5; 325 MG/1; MG/1
1 TABLET ORAL EVERY 4 HOURS PRN
Qty: 42 TABLET | Refills: 0 | Status: SHIPPED | OUTPATIENT
Start: 2021-07-08 | End: 2021-07-08 | Stop reason: CLARIF

## 2021-07-08 RX ORDER — PSEUDOEPHEDRINE HYDROCHLORIDE 30 MG/1
30 TABLET ORAL EVERY 6 HOURS PRN
Qty: 56 TABLET | Refills: 1 | Status: SHIPPED | OUTPATIENT
Start: 2021-07-08 | End: 2021-07-22 | Stop reason: ALTCHOICE

## 2021-07-08 RX ADMIN — HYDROCODONE BITARTRATE AND ACETAMINOPHEN 1 TABLET: 7.5; 325 TABLET ORAL at 06:54

## 2021-07-08 RX ADMIN — OXYMETAZOLINE HYDROCHLORIDE 2 SPRAY: 0.05 SPRAY NASAL at 02:06

## 2021-07-08 RX ADMIN — PANTOPRAZOLE SODIUM 40 MG: 40 TABLET, DELAYED RELEASE ORAL at 06:54

## 2021-07-08 RX ADMIN — HYDROCODONE BITARTRATE AND ACETAMINOPHEN 1 TABLET: 7.5; 325 TABLET ORAL at 10:11

## 2021-07-08 RX ADMIN — OXYMETAZOLINE HYDROCHLORIDE 2 SPRAY: 0.05 SPRAY NASAL at 10:07

## 2021-07-08 RX ADMIN — MORPHINE SULFATE 2 MG: 2 INJECTION, SOLUTION INTRAMUSCULAR; INTRAVENOUS at 04:12

## 2021-07-08 RX ADMIN — MORPHINE SULFATE 4 MG: 4 INJECTION, SOLUTION INTRAMUSCULAR; INTRAVENOUS at 02:06

## 2021-07-08 RX ADMIN — CLINDAMYCIN HYDROCHLORIDE 300 MG: 150 CAPSULE ORAL at 10:08

## 2021-07-08 RX ADMIN — CLINDAMYCIN HYDROCHLORIDE 300 MG: 150 CAPSULE ORAL at 02:07

## 2021-07-08 ASSESSMENT — PAIN SCALES - GENERAL
PAINLEVEL_OUTOF10: 0
PAINLEVEL_OUTOF10: 7
PAINLEVEL_OUTOF10: 7
PAINLEVEL_OUTOF10: 0
PAINLEVEL_OUTOF10: 4
PAINLEVEL_OUTOF10: 4
PAINLEVEL_OUTOF10: 5
PAINLEVEL_OUTOF10: 6

## 2021-07-08 ASSESSMENT — ENCOUNTER SYMPTOMS
RHINORRHEA: 0
CHOKING: 0
VOMITING: 0
SORE THROAT: 0
TROUBLE SWALLOWING: 0
SHORTNESS OF BREATH: 0
APNEA: 0
DIARRHEA: 0
STRIDOR: 0
ABDOMINAL PAIN: 0
NAUSEA: 0
COLOR CHANGE: 0
VOICE CHANGE: 0
FACIAL SWELLING: 0
CHEST TIGHTNESS: 0
WHEEZING: 0
COUGH: 0
SINUS PRESSURE: 0

## 2021-07-08 ASSESSMENT — PAIN DESCRIPTION - ORIENTATION: ORIENTATION: LEFT;RIGHT

## 2021-07-08 ASSESSMENT — PAIN DESCRIPTION - ONSET: ONSET: ON-GOING

## 2021-07-08 ASSESSMENT — PAIN DESCRIPTION - FREQUENCY: FREQUENCY: INTERMITTENT

## 2021-07-08 ASSESSMENT — PAIN - FUNCTIONAL ASSESSMENT: PAIN_FUNCTIONAL_ASSESSMENT: PREVENTS OR INTERFERES SOME ACTIVE ACTIVITIES AND ADLS

## 2021-07-08 ASSESSMENT — PAIN DESCRIPTION - PAIN TYPE: TYPE: ACUTE PAIN

## 2021-07-08 ASSESSMENT — PAIN DESCRIPTION - DESCRIPTORS: DESCRIPTORS: DISCOMFORT

## 2021-07-08 ASSESSMENT — PAIN DESCRIPTION - LOCATION: LOCATION: NOSE

## 2021-07-08 NOTE — PROGRESS NOTES
[facility] [place] [facility]  [facility] EAR, NOSE AND THROAT  [facility]  Dept: [phone]  Dept Fax: [phone]  Loc: [phone]    Vesna Gibson is a 39 y.o. male who was referred byNo ref. provider found for:  Chief Complaint   Patient presents with   [facility] Post-Op Check     Patient is here post-op IGS sinus endo w maxillary antrostomy 7/7/21   . HPI:     Vesna Gibson is a 39 y.o. male who presents today for 1 day postop evaluation. He was kept overnight. He is doing better today. He has some nasal congestion. Drainage has mostly stopped. History:     No Known Allergies  Current Outpatient Medications   Medication Sig Dispense Refill    oxymetazoline (AFRIN) 0.05 % nasal spray Use Afrin nasal spray, 8 drops each nostril on back with head hanging off edge of bed, stay 5 minutes. Repeat every 6 hours. IF IT JUST RUNS DOWN THE THROAT, YOU ARE NOT BACK FAR ENOUGH. REPOSITION AND DO IT AGAIN. Use for 5 days. 3    pseudoephedrine (DECONGESTANT) 30 MG tablet Take 1 tablet by mouth every 6 hours as needed for Congestion 56 tablet 1    acetaminophen (TYLENOL) 500 MG tablet Take 1,000 mg by mouth every 6 hours as needed for Pain      omeprazole (PRILOSEC) 40 MG delayed release capsule Take 1 capsule by mouth every morning (before breakfast) 30 capsule 2    allopurinol (ZYLOPRIM) 100 MG tablet Take 1 tablet by mouth daily 90 tablet 3    sucralfate (CARAFATE) 1 GM tablet Take 1 tablet by mouth 4 times daily 40 tablet 0     No current facility-administered medications for this visit.      Past Medical History:   Diagnosis Date    Chronic back pain     Diverticulitis     Hidradenitis suppurativa     Pancytopenia ([address] 07/2021    Sinusitis     Sleep apnea     has CPAP      Past Surgical History:   Procedure Laterality Date    ANKLE SURGERY  2001    BACK SURGERY      x3    CHOLECYSTECTOMY      COLON SURGERY      NOSE SURGERY  1993 Laser Surgery    RECTAL SURGERY      Ruptured Sigmoid Colon 2013    SEPTOPLASTY Left 7/19/2017    SEPTOPLASTY SUBMUCOUS RESECT TURBINATES, PARTIAL LEFT performed by Norm Heaton MD at [address] [facility] SEPTOPLASTY N/A 5/8/2019    TONSILLECTOMY, UPPP performed by Norm Heaton MD at [address] Bilateral 7/7/2021    IMAGE GUIDED NASAL SINUS ENDOSCOPY WITH BILATERAL MAXILLARY ANTROSTOMY, PARTIAL RESECTION BETITO BULLOSA BILATERAL SUPERIOR TURBINATES, SPHENOIDOTOMY, RIGHT, TOTAL ETHMOIDECTOMY ANTERIOR AND POSTERIOR, FONTAL SINUS EXPLORATION RIGHT WITHOUT REMOVAL OF TISSUE, MAXILLARY ANTROSTOMY WITH REMOVAL OF TISSUE FROM RIGHT MAXILLARY SINUS, SEPTOPLASTY, SUBMUCOUS RESECTION TURBINATES, BILATERAL PARTIAL INFERI     Family History   Problem Relation Age of Onset    Cancer Mother         Breast Cancer    Diabetes Father     Heart Disease Father     Other Father     Heart Disease Maternal Grandmother     Heart Disease Maternal Grandfather     Diabetes Paternal Grandmother     COPD Paternal Grandmother     Diabetes Paternal Grandfather     COPD Paternal Grandfather      Social History     Tobacco Use    Smoking status: Current Every Day Smoker     Packs/day: 1.00     Types: Cigarettes     Start date: 5/5/2007    Smokeless tobacco: Never Used    Tobacco comment: 0.5-1 ppd 5/5/17 last cig 5/7/19   Substance Use Topics    Alcohol use: Yes     Comment: rarely       Subjective:      Review of Systems   Constitutional: Negative for activity change, appetite change, chills, diaphoresis, fatigue, fever and unexpected weight change. HENT: Negative for congestion, dental problem, ear discharge, ear pain, facial swelling, hearing loss, mouth sores, nosebleeds, postnasal drip, rhinorrhea, sinus pressure, sneezing, sore throat, tinnitus, trouble swallowing and voice change. Eyes: Negative for visual disturbance.    Respiratory: Negative for apnea, cough, choking, chest tightness, shortness of breath, wheezing and stridor. Cardiovascular: Negative for chest pain, palpitations and leg swelling. Gastrointestinal: Negative for abdominal pain, diarrhea, nausea and vomiting. Endocrine: Negative for cold intolerance, heat intolerance, polydipsia and polyuria. Genitourinary: Negative for dysuria, enuresis and hematuria. Musculoskeletal: Negative for arthralgias, gait problem, neck pain and neck stiffness. Skin: Negative for color change and rash. Allergic/Immunologic: Negative for environmental allergies, food allergies and immunocompromised state. Neurological: Negative for dizziness, syncope, facial asymmetry, speech difficulty, light-headedness and headaches. Hematological: Negative for adenopathy. Does not bruise/bleed easily. Psychiatric/Behavioral: Negative for confusion and sleep disturbance. The patient is not nervous/anxious. Objective:   BP (!) 144/70 (Site: Right Lower Arm, Position: Sitting)   Pulse 92   Temp 97.1 °F (36.2 °C) (Infrared)   Resp 14   Ht 6' (1.829 m)   Wt (!) 317 lb (143.8 kg)   BMI 42.99 kg/m²     Physical Exam   Nose: Nose is decongested and anesthetized with topical sprays. Nasal fossa is suctioned bilaterally. Clots are removed. Normal postoperative appearance. Data:  All of the past medical history, past surgical history, family history,social history, allergies and current medications were reviewed with the patient. Assessment & Plan   Diagnoses and all orders for this visit:     Diagnosis Orders   1. Deviated nasal septum, anterior     2. Chronic frontal sinusitis     3. Chronic maxillary sinusitis     4. Chronic sphenoidal sinusitis     5. Hypertrophy of inferior nasal turbinate     6. Ostiomeatal complex obstruction of nasal sinus     7. GERHARD on CPAP     8. Difficulty with CPAP use     9. Status post nasal septoplasty     10.  Status post functional endoscopic sinus surgery         The findings were explained and his questions were answered. Follow the instructions from yesterday's AVS form regarding continuing the Afrin, and starting the saline irrigations in 3 days. Use NeilMed bottle, their packets, and only distilled water at least twice a day. Flush up one nostril and out the other, leaning forward over sink. Stop the Afrin after 4 more days. Karma Amaya. Alanis Kamara MD    **This report has been created using voice recognition software. It may contain minor errors which are inherent in voicerecognition technology. **

## 2021-07-08 NOTE — OP NOTE
[address] [place] [address]                                OPERATIVE REPORT    PATIENT NAME: Christine Perez                  :        1985  MED REC NO:   [id number]                           ROOM:       0003  ACCOUNT NO:   [de-identified]                           ADMIT DATE: 2021  PROVIDER:     Tony Olivares. Gely Martinez M.D.    Alexia Fragamers:  2021    OPERATIONS:  Septoplasty; bilateral complete endoscopic ethmoidectomies;  right maxillary endoscopy and removal of tissue; left maxillary  antrostomy; right endoscopic sphenoidostomy; right frontal sinusotomy;  partial submucous resection, both inferior turbinates; partial  resection, scott bullosa of both superior turbinates; stereotactic  computerized image guidance for sinus surgery. SURGEON:  Tony Olivares. Gely Martinez MD    ANESTHESIA:  General endotracheal.    PREOPERATIVE DIAGNOSES:  Severe nasal obstruction with nasal septal  deviation; chronic frontal, ethmoid, and maxillary sinusitis with polyp,  right maxillary sinus. Scott bullosa of both superior turbinates,  hypertrophy of bilateral inferior turbinates. POSTOPERATIVE DIAGNOSES:  Severe nasal obstruction with nasal septal  deviation; chronic frontal, ethmoid, and maxillary sinusitis with polyp,  right maxillary sinus. Scott bullosa of both superior turbinates,  hypertrophy of bilateral inferior turbinates plus coagulopathy. HISTORY AND OPERATIVE FINDINGS:  This 29-year-old male was sent to us  regarding correcting his nasal airway, so he could more effectively use  his CPAP. He had multiple levels of obstruction and wanted to start  with the nasal surgery. He has a history of low platelet counts running  from 100,000 to 125,000. He reported no problems with bleeding and he  was cleared by his hematologist to proceed with the surgery.   Family,  however, indicated he has been having problems for about 15 years and he  did not form good clots at all during the procedure. He had blood loss  around 400 mL despite vigorous attempts at vasoconstriction, packing,  Surgiflo, bipolar cautery, and local ice pressure. Because of the above  and the prolonged length of his procedure since I spent the last couple  hours stopping the bleeding, the patient will be put in a bed overnight  on observation with oxygen monitoring and on alarm at 85%. Other than the coagulopathy, there were no complications. The biggest obstruction was his nasal septum was extremely wide  columella due partly to some skin of the upper lip extending up into it  and very wide curvature of the lower portions of the medial crura of the  lower lateral cartilages necessary to enter the columella and remove  some of the tissue between the medial crura and actually resect the  horizontal portion of the crura with tenting the skin outward. Photographic documentation of this was quite striking. OPERATIVE PROCEDURE:  After adequate level of general endotracheal  anesthesia had been obtained, the patient's face was prepped and draped  in an aseptic fashion. The nasal fossa was decongested,  vasoconstricted, and anesthetized in the usual manner for nasal surgery. Inferior turbinate SMRs were performed first.  On the right side, the  turbinator was used to an anterior entry point to treat the medial  surface of the right inferior turbinate. Packing was placed against  that. On the left side, similar procedure was performed through an anterior  entry point treating the medial surface submucosally. This did produce  significant improvement in the airway. Two cottonoids impregnated with  Afrin were placed against that. On the right side, the uncinate process was incised and removed. The  natural ostium was identified.   A 120-degree Jamcloudstronic maxillary sinus  blade was used on the microdebrider through the natural ostium to remove  most of the soft tissue density on CT scan. Right complete endoscopic ethmoidectomy was then performed. This was  where we began to see extreme vascularity that this kept going despite  vasoconstriction both from injection and topically with Afrin and  cottonoids. Internal walls were taken down. Peripheral mucosal  preservation was excellent. Superior meatus checked for patency and the  medial aspect of the scott bullosa on the right superior turbinate was  resected. Also because of the right sphenoidotomy, lower third of the  right superior turbinate was resected and the edge cauterized with  bipolar. The right sphenoid ostium was identified. This was enlarged in a  superolateral direction with microdebrider. This caused a rather  pronounced bleeding that was very difficult to control despite packing,  Surgiflo, and bipolar cautery. This was eventually controlled with the  Surgiflo and packing following some bipolar cautery and the injection  into the pterygopalatine fossa. Cottonoids impregnated with Afrin were  placed in the right ethmoid and attention was turned to the left side. Left uncinate process was incised and removed. Natural ostium was  decompressed. This was not connected to what appeared to be a  previously placed nasoantral window. The intervening tissue was taken  down to prevent reentry. Left complete endoscopic ethmoidectomy was then performed using  microdebrider and cutting forceps as needed. Stereotactic imaging was  used to great advantage to perform a more complete procedure more  safely. This side did not have quite as much oozing and bleeding as the  right side. Superior meatus was checked for patency. This was opened up with  microdebrider and the lateral aspect of scott bullosa of left superior  turbinate was resected. Cottonoids impregnated with Afrin placed in  that space. On the right side, the frontal recess was dissected.   I was able to pass  a probe directly up into the frontal sinus. Given the bleeding, I did  not pursue that much further. With Surgiflo and cottonoids in the right posterior sinus surgery sites,  I began working on the septum. Right hemitransfixion incision was  created. Caudal margin was dissected out. Combination of blunt and  sharp dissection was used to get between the medial crura. Soft tissue  was grasped and removed using Brown-Adson forceps and Iris scissors. Scalpel was used to transect the lower portion of the rachel and then,  that horizontal portion was removed. This did not have any impact on  the tip support. A smaller incision on the left was performed and then, a similar  procedure including the horizontal portion of the medial rachel resected  along with soft tissue. I was able to approximate the tissues with  3-0 chromic horizontal mattress sutures, narrowing the columella into a  much more normal shape. The suture was also used to close the  hemitransfixion incisions. The closure of the incisions and the compression of the columella to  normal size was a combined suturing technique. Attention was returned to the nasal fossa. Attempts were made to  control the bleeding including Surgiflo and placing cottonoids. I did  this twice. Attention was turned to the oropharynx where 2 mL of 1% lidocaine with  epinephrine was injected into the pterygopalatine fossa on each side for  postoperative pain relief and vasoconstriction. I was hoping that would  help slow the bleeding. Stomach was attempted to be suctioned. I got  to 45 cm and met some resistance and did not force it. There was no  blood on the catheter. I was not able to empty stomach for him. Attention was returned to the nasal fossa. Cottonoids were removed. Suctioning was performed. The area of the sphenoidotomy was under  control, but there was still bleeding from the right ethmoid.   The sinus  on the right was filled with Surgiflo and with some Sinu-Foam injected  through the middle to help push it to the edges. This was allowed to  take effect. There seemed to be no bleeding and nose was cleansed. Bacitracin ointment was applied to both sides of the nasal fossa. Throat was checked for bleeding. The patient was then awakened,  extubated, and taken to Recovery. Estimated blood loss approximately 400 mL. The only complication was  his coagulopathy. The patient will be kept on an outpatient bed status.         Dorie Nichols M.D.    D: 07/07/2021 19:30:50       T: 07/07/2021 19:39:58     [email or web address]  Job#: [id number]     Doc#: [id number]    CC:  Damir Jordan P.A.-C.

## 2021-07-08 NOTE — TELEPHONE ENCOUNTER
Patient stated that insurance is not covering the hydrocodone-acetaminophen. Called pharmacy and pharmacist stated that insurance only will cover the medication of every 6 hours dispense 28 tablets instead for every 4 hours. We will need to send a new proscription with 28 tablets taking every 6 hours.

## 2021-07-08 NOTE — CARE COORDINATION
DISCHARGE PLANNING EVALUATION: OP/OBSERVATION        7/8/21, 7:44 AM EDT    Vesna Gibson       Admitted from: PACU 7/7/2021/ 1031   Location: [address] [place], [place] [address] Reason for admit: Postoperative state [Z98.890]   Admit order signed?: n/a    Procedure: 7/07/2021 IMAGE GUIDED NASAL SINUS ENDOSCOPY WITH BILATERAL MAXILLARY ANTROSTOMY, PARTIAL RESECTION BETITO BULLOSA BILATERAL SUPERIOR TURBINATES, SPHENOIDOTOMY, RIGHT, TOTAL ETHMOIDECTOMY ANTERIOR AND POSTERIOR, FONTAL SINUS EXPLORATION RIGHT WITHOUT REMOVAL OF TISSUE, MAXILLARY ANTROSTOMY WITH REMOVAL OF TISSUE FROM RIGHT MAXILLARY SINUS, SEPTOPLASTY, SUBMUCOUS RESECTION TURBINATES, BILATERAL PARTIAL INFERIOR TURBINATE    Pertinent Info/Orders/Treatment Plan:  IV fluids, Afrin, Cleocin q 6 hr., prn pain medications and Zofran, regular diet, SCD's, ambulate, continuous pulse oximetry, up with assistance. PCP: No primary care provider on file. Patient Goals/Plan/Treatment Preferences: Met with Zachary Su. He is from home with his S/O. He verifies his insurance. He does not have a PCP and agrees to follow-up with a [facility]) physician. He is scheduled to see Dr. Quintin Gibson on 7/14/2021 @ 9:15 a.m. Kalliyoseph Su will have transportation to home at discharge. He is independent managing his health care needs. He declines HH and does not need any DME. He is ready for discharge. Transportation/Food Security/Housekeeping Addressed:  No issues identified. 7/8/21, 9:55 AM EDT    Patient goals/plan/ treatment preferences discussed by  and . Patient goals/plan/ treatment preferences reviewed with patient/ family. Patient/ family verbalize understanding of discharge plan and are in agreement with goal/plan/treatment preferences. Understanding was demonstrated using the teach back method.   AVS provided by RN at time of discharge, which includes all necessary medical information pertaining to the patients current course of illness, treatment, post-discharge goals of care, and treatment preferences.

## 2021-07-08 NOTE — ANESTHESIA POSTPROCEDURE EVALUATION
Department of Anesthesiology  Postprocedure Note    Patient: Claudia Willis  MRN: [id number]  YOB: 1985  Date of evaluation: 7/8/2021  Time:  6:41 AM     Procedure Summary     Date: 07/07/21 Room / Location: [address] [place] [address] HENOK Wynn    Anesthesia Start: 5036 Anesthesia Stop: 5655    Procedure: IMAGE GUIDED NASAL SINUS ENDOSCOPY WITH BILATERAL MAXILLARY ANTROSTOMY, PARTIAL RESECTION BETITO BULLOSA BILATERAL SUPERIOR TURBINATES, SPHENOIDOTOMY, RIGHT, TOTAL ETHMOIDECTOMY ANTERIOR AND POSTERIOR, FONTAL SINUS EXPLORATION RIGHT WITHOUT REMOVAL OF TISSUE, MAXILLARY ANTROSTOMY WITH REMOVAL OF TISSUE FROM RIGHT MAXILLARY SINUS, SEPTOPLASTY, SUBMUCOUS RESECTION TURBINATES, BILATERAL PARTIAL INFERIOR TURBINATE (Bilateral Nose) Diagnosis:       (CHRONIC ETHMOIDAL SINUSITIS, CHRONIC FRONTAL SINUSITIS, CHRONIC MAXILLARY SINUSITIS, CHRONIC SPHENOIDAL SINUSITIS, OSTEOMEATAL COMPLEX OBSTRUCTION OF NASAL SINUS, BETITO BULLOSA, RHINOGENIC HEADACHE, HYPERTROPHY OF INFERIOR NASAL TURBINATE, MAXILLARY)      (SINUS CPOLYP, DEVIATED NASAL EPTUM, ANTERIOR)    Surgeons: Osiris Chapman MD Responsible Provider: Joe Benavidez DO    Anesthesia Type: general ASA Status: 2          Anesthesia Type: general    Daisy Phase I: Daisy Score: 8    Daisy Phase II:      Last vitals: Reviewed and per EMR flowsheets.        Anesthesia Post Evaluation    Patient location during evaluation: PACU  Patient participation: complete - patient participated  Level of consciousness: awake  Airway patency: patent  Nausea & Vomiting: no nausea and no vomiting  Complications: no  Cardiovascular status: hemodynamically stable  Respiratory status: acceptable  Hydration status: stable

## 2021-07-08 NOTE — TELEPHONE ENCOUNTER
Spoke with Dr. Manuel Keller and prescription was sent to the pharmacy. Old prescription was cancelled.

## 2021-07-08 NOTE — DISCHARGE SUMMARY
DISCHARGE SUMMARY    Pt Name: Murray Hatchet  MRN: [id number]  YOB: 1985  Primary Care Physician: No primary care provider on file. Admit date:  7/7/2021 10:31 AM  Discharge date:  7/8/2021  Disposition: Home  Admitting Diagnosis:   1. Nasal septal deviation    2. Hypertrophy of inferior nasal turbinate, left    3. Hypertrophy of uvula    4. Maxillary sinus polyp    5. Chronic sphenoidal sinusitis    6. Chronic maxillary sinusitis    7. Chronic frontal sinusitis    8. Chronic ethmoidal sinusitis    9. Post-operative pain      Discharge Diagnosis:   Patient Active Problem List   Diagnosis Code    Nasal septal deviation J34.2    Hypertrophy of inferior nasal turbinate, left J34.3    GERHARD on CPAP G47.33, Z99.89    Intolerance of continuous positive airway pressure (CPAP) ventilation Z78.9    Hypertrophy of uvula K13.79    Hypertrophic soft palate K13.79    Large tongue K14.8    Class 3 obesity with alveolar hypoventilation, serious comorbidity, and body mass index (BMI) of 40.0 to 44.9 in adult ([facility] E66.2, Z68.41    Status post nasal septoplasty Z98.890    GERD without esophagitis K21.9    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    Maxillary sinus polyp J33.8    Chronic sphenoidal sinusitis J32.3    Chronic maxillary sinusitis J32.0    Chronic frontal sinusitis J32.1    Chronic ethmoidal sinusitis J32.2    Postoperative state Z98.890     Consultants:  none  Procedures/Diagnostic Test:  S/p Septoplasty; bilateral complete endoscopic ethmoidectomies;  right maxillary endoscopy and removal of tissue; left maxillary  antrostomy; right endoscopic sphenoidostomy; right frontal sinusotomy;  partial submucous resection, both inferior turbinates; partial  resection, scott bullosa of both superior turbinates; stereotactic  computerized image guidance for sinus surgery 7/7/2021 with Dr Eden Lugo. Hospital Course: Horacio Finn originally presented to the hospital on 7/7/2021 10:31 AM for above described surgery.  He was admitted overnight for pain management. At time of discharge, Manny Sharma was tolerating a regular diet, having bowel movements, ambulating on his own accord and had adequate analgesia on oral pain medications. Patient had no signs or symptoms of complications. He was discharged home in stable condition. PHYSICAL EXAMINATION     Discharge Vitals:  height is 6' (1.829 m) and weight is 317 lb (143.8 kg) (abnormal). His axillary temperature is 96.2 °F (35.7 °C). His blood pressure is 141/87 (abnormal) and his pulse is 78. His respiration is 18 and oxygen saturation is 97%. Deferred at this time. Patient discharged to ENT clinic for post-op debridement per Dr Fabiana Monson. LABS     No results for input(s): WBC, HGB, HCT, PLT, NA, K, CL, CO2, BUN, CREATININE in the last 72 hours. DISCHARGE INSTRUCTIONS     Discharge Medications:      Medication List      START taking these medications    clindamycin 300 MG capsule  Commonly known as: CLEOCIN  Take 1 capsule by mouth 3 times daily for 10 days     HYDROcodone-acetaminophen 7.5-325 MG per tablet  Commonly known as: NORCO  Take 1 tablet by mouth every 4 hours as needed for Pain for up to 7 days. oxymetazoline 0.05 % nasal spray  Commonly known as: AFRIN  Use Afrin nasal spray, 8 drops each nostril on back with head hanging off edge of bed, stay 5 minutes. Repeat every 6 hours. IF IT JUST RUNS DOWN THE THROAT, YOU ARE NOT BACK FAR ENOUGH. REPOSITION AND DO IT AGAIN. Use for 5 days. predniSONE 20 MG tablet  Commonly known as: DELTASONE  Take 1 tablet by mouth daily for 3 days Then one-half tablet for two days.  Start the day AFTER surgery     pseudoephedrine 30 MG tablet  Commonly known as: Decongestant  Take 1 tablet by mouth every 6 hours as needed for Congestion        CONTINUE taking these medications    acetaminophen 500 MG tablet  Commonly known as: TYLENOL     doxepin 10 MG capsule  Commonly known as: SINEQUAN  Take 1 capsule by mouth nightly     ibuprofen 600 MG tablet  Commonly known as: ADVIL;MOTRIN  Take 1 tablet by mouth 3 times daily as needed for Pain     omeprazole 40 MG delayed release capsule  Commonly known as: PRILOSEC  Take 1 capsule by mouth every morning (before breakfast)     trimethoprim-polymyxin b 90579-6.1 UNIT/ML-% ophthalmic solution  Commonly known as: POLYTRIM        STOP taking these medications    fluticasone 50 MCG/ACT nasal spray  Commonly known as: Flonase           Where to Get Your Medications      These medications were sent to [facility], [address] [place], [place] [address] [facility]    Phone: [phone]   · clindamycin 300 MG capsule  · HYDROcodone-acetaminophen 7.5-325 MG per tablet  · predniSONE 20 MG tablet  · pseudoephedrine 30 MG tablet     You can get these medications from any pharmacy    You don't need a prescription for these medications  · oxymetazoline 0.05 % nasal spray         Electronically signed by XOCHITL Mendez CNP on 7/8/2021 at 11:22 AM

## 2021-07-08 NOTE — PLAN OF CARE
Problem: Pain:  Goal: Pain level will decrease  Description: Pain level will decrease  7/8/2021 1124 by Isidro Curtis RN  Outcome: Ongoing  Note: Pain is controled with prescribed pain med. 7/8/2021 0057 by Priyanka Barnard RN  Outcome: Ongoing  Goal: Control of acute pain  Description: Control of acute pain  7/8/2021 1124 by Isidro Curtis RN  Outcome: Ongoing  7/8/2021 0057 by Priyanka Barnard RN  Outcome: Ongoing  Goal: Control of chronic pain  Description: Control of chronic pain  7/8/2021 1124 by Isidro Curtis RN  Outcome: Ongoing  7/8/2021 0057 by Priyanka Barnard RN  Outcome: Ongoing     Problem: Falls - Risk of:  Goal: Will remain free from falls  Description: Will remain free from falls  7/8/2021 1124 by Isidro Curtis RN  Outcome: Ongoing  Note: Call light is within reach.   7/8/2021 0057 by Priyanka Barnard RN  Outcome: Ongoing  Goal: Absence of physical injury  Description: Absence of physical injury  7/8/2021 1124 by Isidro Curtis RN  Outcome: Ongoing  7/8/2021 0057 by Priyanka Barnard RN  Outcome: Ongoing

## 2021-07-11 ENCOUNTER — HOSPITAL ENCOUNTER (EMERGENCY)
Age: 36
Discharge: HOME | End: 2021-07-11
Attending: EMERGENCY MEDICINE
Payer: MEDICAID

## 2021-07-11 ENCOUNTER — APPOINTMENT (OUTPATIENT)
Dept: CT IMAGING | Age: 36
End: 2021-07-11
Payer: MEDICAID

## 2021-07-11 VITALS
BODY MASS INDEX: 42.66 KG/M2 | HEIGHT: 72 IN | RESPIRATION RATE: 15 BRPM | TEMPERATURE: 98.9 F | DIASTOLIC BLOOD PRESSURE: 90 MMHG | HEART RATE: 103 BPM | SYSTOLIC BLOOD PRESSURE: 145 MMHG | WEIGHT: 315 LBS | OXYGEN SATURATION: 100 %

## 2021-07-11 DIAGNOSIS — R10.13 EPIGASTRIC PAIN: Primary | ICD-10-CM

## 2021-07-11 LAB
AEROBIC CULTURE: ABNORMAL
ALBUMIN SERPL-MCNC: 3.6 G/DL (ref 3.5–5.1)
ALP BLD-CCNC: 90 U/L (ref 38–126)
ALT SERPL-CCNC: 88 U/L (ref 11–66)
ANION GAP SERPL CALCULATED.3IONS-SCNC: 11 MEQ/L (ref 8–16)
AST SERPL-CCNC: 53 U/L (ref 5–40)
BASOPHILS # BLD: 0.4 %
BASOPHILS ABSOLUTE: 0 THOU/MM3 (ref 0–0.1)
BILIRUB SERPL-MCNC: 0.5 MG/DL (ref 0.3–1.2)
BILIRUBIN DIRECT: < 0.2 MG/DL (ref 0–0.3)
BUN BLDV-MCNC: 8 MG/DL (ref 7–22)
CALCIUM SERPL-MCNC: 8.7 MG/DL (ref 8.5–10.5)
CHLORIDE BLD-SCNC: 101 MEQ/L (ref 98–111)
CO2: 26 MEQ/L (ref 23–33)
CREAT SERPL-MCNC: 0.8 MG/DL (ref 0.4–1.2)
EOSINOPHIL # BLD: 1.1 %
EOSINOPHILS ABSOLUTE: 0.1 THOU/MM3 (ref 0–0.4)
ERYTHROCYTE [DISTWIDTH] IN BLOOD BY AUTOMATED COUNT: 14.1 % (ref 11.5–14.5)
ERYTHROCYTE [DISTWIDTH] IN BLOOD BY AUTOMATED COUNT: 44.4 FL (ref 35–45)
GFR SERPL CREATININE-BSD FRML MDRD: > 90 ML/MIN/1.73M2
GLUCOSE BLD-MCNC: 121 MG/DL (ref 70–108)
HCT VFR BLD CALC: 40.2 % (ref 42–52)
HEMOGLOBIN: 13.2 GM/DL (ref 14–18)
IMMATURE GRANS (ABS): 0.08 THOU/MM3 (ref 0–0.07)
IMMATURE GRANULOCYTES: 1.5 %
LIPASE: 38.1 U/L (ref 5.6–51.3)
LYMPHOCYTES # BLD: 13.7 %
LYMPHOCYTES ABSOLUTE: 0.7 THOU/MM3 (ref 1–4.8)
MAGNESIUM: 1.5 MG/DL (ref 1.6–2.4)
MCH RBC QN AUTO: 28.7 PG (ref 26–33)
MCHC RBC AUTO-ENTMCNC: 32.8 GM/DL (ref 32.2–35.5)
MCV RBC AUTO: 87.4 FL (ref 80–94)
MONOCYTES # BLD: 5.7 %
MONOCYTES ABSOLUTE: 0.3 THOU/MM3 (ref 0.4–1.3)
NUCLEATED RED BLOOD CELLS: 0 /100 WBC
ORGANISM: ABNORMAL
ORGANISM: ABNORMAL
OSMOLALITY CALCULATION: 275.3 MOSMOL/KG (ref 275–300)
PLATELET # BLD: 128 THOU/MM3 (ref 130–400)
PMV BLD AUTO: 10.8 FL (ref 9.4–12.4)
POTASSIUM SERPL-SCNC: 3.8 MEQ/L (ref 3.5–5.2)
RBC # BLD: 4.6 MILL/MM3 (ref 4.7–6.1)
SEG NEUTROPHILS: 77.6 %
SEGMENTED NEUTROPHILS ABSOLUTE COUNT: 4.1 THOU/MM3 (ref 1.8–7.7)
SODIUM BLD-SCNC: 138 MEQ/L (ref 135–145)
TOTAL PROTEIN: 6.9 G/DL (ref 6.1–8)
WBC # BLD: 5.3 THOU/MM3 (ref 4.8–10.8)

## 2021-07-11 PROCEDURE — 85025 COMPLETE CBC W/AUTO DIFF WBC: CPT

## 2021-07-11 PROCEDURE — 83735 ASSAY OF MAGNESIUM: CPT

## 2021-07-11 PROCEDURE — 99283 EMERGENCY DEPT VISIT LOW MDM: CPT

## 2021-07-11 PROCEDURE — 6360000004 HC RX CONTRAST MEDICATION: Performed by: EMERGENCY MEDICINE

## 2021-07-11 PROCEDURE — 96374 THER/PROPH/DIAG INJ IV PUSH: CPT

## 2021-07-11 PROCEDURE — 83690 ASSAY OF LIPASE: CPT

## 2021-07-11 PROCEDURE — 6360000002 HC RX W HCPCS: Performed by: EMERGENCY MEDICINE

## 2021-07-11 PROCEDURE — 2580000003 HC RX 258: Performed by: EMERGENCY MEDICINE

## 2021-07-11 PROCEDURE — 74177 CT ABD & PELVIS W/CONTRAST: CPT

## 2021-07-11 PROCEDURE — 36415 COLL VENOUS BLD VENIPUNCTURE: CPT

## 2021-07-11 PROCEDURE — 80053 COMPREHEN METABOLIC PANEL: CPT

## 2021-07-11 PROCEDURE — 82248 BILIRUBIN DIRECT: CPT

## 2021-07-11 RX ORDER — SUCRALFATE 1 G/1
1 TABLET ORAL ONCE
Status: DISCONTINUED | OUTPATIENT
Start: 2021-07-11 | End: 2021-07-11 | Stop reason: HOSPADM

## 2021-07-11 RX ORDER — 0.9 % SODIUM CHLORIDE 0.9 %
1000 INTRAVENOUS SOLUTION INTRAVENOUS ONCE
Status: COMPLETED | OUTPATIENT
Start: 2021-07-11 | End: 2021-07-11

## 2021-07-11 RX ORDER — HYDROCODONE BITARTRATE AND ACETAMINOPHEN 5; 325 MG/1; MG/1
1 TABLET ORAL EVERY 6 HOURS PRN
Qty: 12 TABLET | Refills: 0 | Status: SHIPPED | OUTPATIENT
Start: 2021-07-11 | End: 2021-07-14

## 2021-07-11 RX ORDER — HYDROCODONE BITARTRATE AND ACETAMINOPHEN 5; 325 MG/1; MG/1
1 TABLET ORAL ONCE
Status: DISCONTINUED | OUTPATIENT
Start: 2021-07-11 | End: 2021-07-11 | Stop reason: HOSPADM

## 2021-07-11 RX ORDER — HYDROCODONE BITARTRATE AND ACETAMINOPHEN 5; 325 MG/1; MG/1
2 TABLET ORAL ONCE
Status: DISCONTINUED | OUTPATIENT
Start: 2021-07-11 | End: 2021-07-11

## 2021-07-11 RX ORDER — MORPHINE SULFATE 10 MG/ML
6 INJECTION, SOLUTION INTRAMUSCULAR; INTRAVENOUS ONCE
Status: COMPLETED | OUTPATIENT
Start: 2021-07-11 | End: 2021-07-11

## 2021-07-11 RX ORDER — SUCRALFATE 1 G/1
1 TABLET ORAL 4 TIMES DAILY
Qty: 40 TABLET | Refills: 0 | Status: SHIPPED | OUTPATIENT
Start: 2021-07-11 | End: 2021-07-22 | Stop reason: ALTCHOICE

## 2021-07-11 RX ADMIN — SODIUM CHLORIDE 1000 ML: 9 INJECTION, SOLUTION INTRAVENOUS at 19:49

## 2021-07-11 RX ADMIN — IOPAMIDOL 80 ML: 755 INJECTION, SOLUTION INTRAVENOUS at 20:25

## 2021-07-11 RX ADMIN — MORPHINE SULFATE 6 MG: 10 INJECTION, SOLUTION INTRAMUSCULAR; INTRAVENOUS at 20:03

## 2021-07-11 ASSESSMENT — PAIN DESCRIPTION - FREQUENCY: FREQUENCY: CONTINUOUS

## 2021-07-11 ASSESSMENT — PAIN DESCRIPTION - DESCRIPTORS: DESCRIPTORS: SHARP

## 2021-07-11 ASSESSMENT — PAIN DESCRIPTION - LOCATION: LOCATION: ABDOMEN;GENERALIZED

## 2021-07-11 ASSESSMENT — PAIN SCALES - GENERAL
PAINLEVEL_OUTOF10: 7
PAINLEVEL_OUTOF10: 10

## 2021-07-11 ASSESSMENT — PAIN DESCRIPTION - PAIN TYPE: TYPE: ACUTE PAIN

## 2021-07-11 NOTE — ED TRIAGE NOTES
Patient presents to ER with complaints of generalized abdominal pain, fever, and headache that started last night. Patient reports history of diverticulitis and recent nasal surgery.

## 2021-07-11 NOTE — ED PROVIDER NOTES
[facility] ENCOUNTER          Pt Name: Dae Weston  MRN: [id number]  [place] 1985  Date of evaluation: 7/11/2021  Emergency Physician: Alvaro Busby, [facility]       Chief Complaint   Patient presents with    Abdominal Pain     generalized    Fever     History obtained from the patient. HISTORY OF PRESENT ILLNESS    HPI  Dae Weston is a 39 y.o. male who presents to the emergency department for evaluation of fever, and abdominal pain. Patient is 7 days post septoplasty per Dr. Olivia Bautista. He is taking clindamycin and Norco post operatively. He states he's had previous GI issues when taking clindamycin. He has currently been taking it since Wednesday. No Diarrhea. No Blood in stool. Abdominal pain is crampy. periubicla rated 8/10. Reports subjective fever. No chills. No vomiting. No chest pain. No Cough. The patient has no other acute complaints at this time. REVIEW OF SYSTEMS   Review of Systems   Constitutional: Negative for activity change, chills and fever. HENT: Positive for sinus pain. Negative for congestion and sore throat. Eyes: Negative for photophobia, redness and visual disturbance. Respiratory: Negative for cough and chest tightness. Cardiovascular: Negative for chest pain, palpitations and leg swelling. Gastrointestinal: Positive for abdominal pain and nausea. Negative for vomiting. Endocrine: Negative for polydipsia and polyuria. Genitourinary: Negative for decreased urine volume, difficulty urinating, dysuria, flank pain, frequency and urgency. Musculoskeletal: Negative for back pain, myalgias and neck pain. Skin: Negative for color change and rash. Neurological: Negative for weakness and headaches. Hematological: Negative for adenopathy. Does not bruise/bleed easily. Psychiatric/Behavioral: Negative for confusion and self-injury.          PAST MEDICAL AND SURGICAL HISTORY Past Medical History:   Diagnosis Date    Chronic back pain     Diverticulitis     Hidradenitis suppurativa     Sleep apnea     has CPAP     Past Surgical History:   Procedure Laterality Date    ANKLE SURGERY  2001    BACK SURGERY      x3    CHOLECYSTECTOMY      COLON SURGERY      NOSE SURGERY  1993    Laser Surgery    RECTAL SURGERY      Ruptured Sigmoid Colon 2013    SEPTOPLASTY Left 7/19/2017    SEPTOPLASTY SUBMUCOUS RESECT TURBINATES, PARTIAL LEFT performed by Colletta Gable, MD at [address] [place] [address] SEPTOPLASTY N/A 5/8/2019    TONSILLECTOMY, UPPP performed by Colletta Gable, MD at [address] [place] [address] Bilateral 7/7/2021    IMAGE GUIDED NASAL SINUS ENDOSCOPY WITH BILATERAL MAXILLARY ANTROSTOMY, PARTIAL RESECTION BETITO BULLOSA BILATERAL SUPERIOR TURBINATES, SPHENOIDOTOMY, RIGHT, TOTAL ETHMOIDECTOMY ANTERIOR AND POSTERIOR, FONTAL SINUS EXPLORATION RIGHT WITHOUT REMOVAL OF TISSUE, MAXILLARY ANTROSTOMY WITH REMOVAL OF TISSUE FROM RIGHT MAXILLARY SINUS, SEPTOPLASTY, SUBMUCOUS RESECTION TURBINATES, BILATERAL PARTIAL INFERI         MEDICATIONS   No current facility-administered medications for this encounter. Current Outpatient Medications:     oxymetazoline (AFRIN) 0.05 % nasal spray, Use Afrin nasal spray, 8 drops each nostril on back with head hanging off edge of bed, stay 5 minutes. Repeat every 6 hours. IF IT JUST RUNS DOWN THE THROAT, YOU ARE NOT BACK FAR ENOUGH. REPOSITION AND DO IT AGAIN. Use for 5 days. , Disp: , Rfl: 3    clindamycin (CLEOCIN) 300 MG capsule, Take 1 capsule by mouth 3 times daily for 10 days, Disp: 30 capsule, Rfl: 0    predniSONE (DELTASONE) 20 MG tablet, Take 1 tablet by mouth daily for 3 days Then one-half tablet for two days.  Start the day AFTER surgery, Disp: 4 tablet, Rfl: 0    pseudoephedrine (DECONGESTANT) 30 MG tablet, Take 1 tablet by mouth every 6 hours as needed for Congestion, Disp: 56 tablet, Rfl: 1    HYDROcodone-acetaminophen (NORCO) 7.5-325 MG per tablet, Take 1 tablet by mouth every 6 hours as needed for Pain for up to 7 days. , Disp: 28 tablet, Rfl: 0    acetaminophen (TYLENOL) 500 MG tablet, Take 1,000 mg by mouth every 6 hours as needed for Pain, Disp: , Rfl:     doxepin (SINEQUAN) 10 MG capsule, Take 1 capsule by mouth nightly, Disp: 30 capsule, Rfl: 3    trimethoprim-polymyxin b (POLYTRIM) 66252-9.1 UNIT/ML-% ophthalmic solution, PLACE 2 DROPS INTO EACH AFFECTED EYE 3 TIMES ADAY FOR 7 DAYS, Disp: , Rfl:     omeprazole (PRILOSEC) 40 MG delayed release capsule, Take 1 capsule by mouth every morning (before breakfast), Disp: 30 capsule, Rfl: 2    ibuprofen (ADVIL;MOTRIN) 600 MG tablet, Take 1 tablet by mouth 3 times daily as needed for Pain, Disp: 30 tablet, Rfl: 0      SOCIAL HISTORY     Social History     Social History Narrative    Not on file     Social History     Tobacco Use    Smoking status: Current Every Day Smoker     Packs/day: 1.00     Types: Cigarettes     Start date: 5/5/2007    Smokeless tobacco: Never Used    Tobacco comment: 0.5-1 ppd 5/5/17 last cig 5/7/19   Vaping Use    Vaping Use: Former   Substance Use Topics    Alcohol use: Yes     Comment: rarely    Drug use: No         ALLERGIES   No Known Allergies      FAMILY HISTORY     Family History   Problem Relation Age of Onset    Cancer Mother         Breast Cancer    Diabetes Father     Heart Disease Father     Heart Disease Maternal Grandmother     Heart Disease Maternal Grandfather     Diabetes Paternal Grandmother     COPD Paternal Grandmother     Diabetes Paternal Grandfather     COPD Paternal Grandfather          PHYSICAL EXAM     ED Triage Vitals [07/11/21 1820]   BP Temp Temp Source Pulse Resp SpO2 Height Weight   (!) 181/114 98.9 °F (37.2 °C) Oral 121 24 99 % 6' (1.829 m) (!) 315 lb (142.9 kg)         Additional Vital Signs:  Vitals:    07/11/21 1924   BP: (!) 162/94   Pulse: 104   Resp: 17   Temp:    SpO2: 99%       Physical Exam  Vitals and nursing note reviewed. Constitutional:       General: He is not in acute distress. Appearance: He is well-developed. He is not diaphoretic. HENT:      Head: Normocephalic. Comments: Dried blood bilateral Nares. No Facial redness. No Sinus TTP. No periorbital edea or redness. Eyes:      Pupils: Pupils are equal, round, and reactive to light. Neck:      Vascular: No JVD. Cardiovascular:      Rate and Rhythm: Normal rate and regular rhythm. Heart sounds: Normal heart sounds. Pulmonary:      Effort: Pulmonary effort is normal.      Breath sounds: Normal breath sounds. Abdominal:      General: Bowel sounds are normal. There is no distension. Palpations: Abdomen is soft. Tenderness: There is abdominal tenderness in the epigastric area and periumbilical area. There is no guarding or rebound. Musculoskeletal:         General: No tenderness or deformity. Normal range of motion. Cervical back: Normal range of motion and neck supple. Skin:     General: Skin is warm and dry. Capillary Refill: Capillary refill takes less than 2 seconds. Neurological:      Mental Status: He is alert and oriented to person, place, and time. Comments: Non-focal. Moves all extremities. Initial vital signs and nursing assessment reviewed and abnormal from Tachycardia for pain. .   Pulsoximetry is normal per my interpretation. MEDICAL DECISION MAKING   Initial Assessment: Given the patient's above chief complaint and findings on history and physical examination, I thought it was appropriate to consider the following emergency medical conditions:Medication reaction, Gastritis, Ulcer, Diverticulitis, Post operative pain, C. Diff. GIB. Hepatitis, Pancreatitis, and others. Although some of these diagnoses are unlikely they were considered in my medical decision making.     Plan: CBC, BMP, LFT, Lipase Symptomatic treatment with Pain control, hydration and reassess         ED RESULTS   Laboratory results:  Labs Reviewed   CBC WITH AUTO DIFFERENTIAL - Abnormal; Notable for the following components:       Result Value    RBC 4.60 (*)     Hemoglobin 13.2 (*)     Hematocrit 40.2 (*)     Platelets 165 (*)     Lymphocytes Absolute 0.7 (*)     Monocytes Absolute 0.3 (*)     Immature Grans (Abs) 0.08 (*)     All other components within normal limits   BASIC METABOLIC PANEL - Abnormal; Notable for the following components:    Glucose 121 (*)     All other components within normal limits   MAGNESIUM - Abnormal; Notable for the following components:    Magnesium 1.5 (*)     All other components within normal limits   HEPATIC FUNCTION PANEL - Abnormal; Notable for the following components:    AST 53 (*)     ALT 88 (*)     All other components within normal limits   LIPASE   ANION GAP   GLOMERULAR FILTRATION RATE, ESTIMATED   OSMOLALITY       Radiologic studies results:  CT ABDOMEN PELVIS W IV CONTRAST Additional Contrast? None   Final Result   Impression:      No acute processes. This document has been electronically signed by: Andrea Ricks MD on    07/11/2021 09:12 PM      All CTs at this facility use dose modulation techniques and iterative    reconstructions, and/or weight-based dosing   when appropriate to reduce radiation to a low as reasonably achievable. ED Medications administered this visit:   Medications   0.9 % sodium chloride bolus (0 mLs Intravenous Stopped 7/11/21 2137)   morphine (PF) injection 6 mg (6 mg Intravenous Given 7/11/21 2003)   iopamidol (ISOVUE-370) 76 % injection 80 mL (80 mLs Intravenous Given 7/11/21 2025)         ED COURSE     ED Course as of Jul 12 0127   Mon Jul 12, 2021   0126 Mag tab given.     Magnesium(!): 1.5 [DD]   0126 Temp: 98.9 °F (37.2 °C) [DD]   0126 Lipase: 38.1 [DD]   0126 WBC: 5.3 [DD]   0127 Negative CT a/p   CT ABDOMEN PELVIS W IV CONTRAST Additional Contrast? None [DD]      ED Course User Index  [DD] Juliann Hayward, DO         The diagnosis, extensive differential diagnosis, laboratory and imaging findings were discussed at the bedside. The patient was an active participant in their care. They are agreeable to the plan of care. All questions and concerns were addressed at the time of the encounter. MEDICATION CHANGES     DISCHARGE MEDICATIONS:  New Prescriptions    No medications on file            FINAL DISPOSITION     Final diagnoses:   Epigastric pain     Condition: condition: good  Dispo: Discharge to home    PATIENT REFERRED TO:  Ramy Fernandez MD  [address] [place] [address] [phone]    Call in 1 day      Syl Lombardi MD  [address] [place] [address] [phone]    Schedule an appointment as soon as possible for a visit in 3 days        Critical Care Time   None      This transcription was electronically signed. Parts of this transcriptions may have been dictated by use of voice recognition software and electronically transcribed, and parts may have been transcribed with the assistance of an ED scribe. The transcription may contain errors not detected in proofreading.     Electronically Signed: Juliann Hayward DO, 07/11/21, 7:32 PM      Juliann Hayward DO  07/12/21 0127

## 2021-07-12 ASSESSMENT — ENCOUNTER SYMPTOMS
PHOTOPHOBIA: 0
COUGH: 0
SINUS PAIN: 1
VOMITING: 0
BACK PAIN: 0
ABDOMINAL PAIN: 1
CHEST TIGHTNESS: 0
NAUSEA: 1
EYE REDNESS: 0
SORE THROAT: 0
COLOR CHANGE: 0

## 2021-07-14 ENCOUNTER — OFFICE VISIT (OUTPATIENT)
Dept: FAMILY MEDICINE CLINIC | Age: 36
End: 2021-07-14
Payer: MEDICAID

## 2021-07-14 VITALS
RESPIRATION RATE: 24 BRPM | BODY MASS INDEX: 44.1 KG/M2 | HEART RATE: 88 BPM | WEIGHT: 315 LBS | DIASTOLIC BLOOD PRESSURE: 74 MMHG | SYSTOLIC BLOOD PRESSURE: 128 MMHG | HEIGHT: 71 IN | OXYGEN SATURATION: 98 % | TEMPERATURE: 97.8 F

## 2021-07-14 DIAGNOSIS — R10.84 GENERALIZED ABDOMINAL PAIN: Primary | ICD-10-CM

## 2021-07-14 DIAGNOSIS — M25.50 ARTHRALGIA, UNSPECIFIED JOINT: ICD-10-CM

## 2021-07-14 DIAGNOSIS — M62.838 MUSCLE SPASM: ICD-10-CM

## 2021-07-14 DIAGNOSIS — L73.2 HIDRADENITIS SUPPURATIVA: ICD-10-CM

## 2021-07-14 LAB
ALBUMIN SERPL-MCNC: 3.7 G/DL (ref 3.5–5.1)
ALP BLD-CCNC: 84 U/L (ref 38–126)
ALT SERPL-CCNC: 68 U/L (ref 11–66)
ANION GAP SERPL CALCULATED.3IONS-SCNC: 9 MEQ/L (ref 8–16)
AST SERPL-CCNC: 46 U/L (ref 5–40)
BASOPHILS # BLD: 0.6 %
BASOPHILS ABSOLUTE: 0 THOU/MM3 (ref 0–0.1)
BILIRUB SERPL-MCNC: 0.6 MG/DL (ref 0.3–1.2)
BUN BLDV-MCNC: 8 MG/DL (ref 7–22)
C-REACTIVE PROTEIN: 2.42 MG/DL (ref 0–1)
CALCIUM SERPL-MCNC: 8.7 MG/DL (ref 8.5–10.5)
CHLORIDE BLD-SCNC: 103 MEQ/L (ref 98–111)
CO2: 25 MEQ/L (ref 23–33)
CREAT SERPL-MCNC: 0.7 MG/DL (ref 0.4–1.2)
EOSINOPHIL # BLD: 2.1 %
EOSINOPHILS ABSOLUTE: 0.1 THOU/MM3 (ref 0–0.4)
ERYTHROCYTE [DISTWIDTH] IN BLOOD BY AUTOMATED COUNT: 14.5 % (ref 11.5–14.5)
ERYTHROCYTE [DISTWIDTH] IN BLOOD BY AUTOMATED COUNT: 45.8 FL (ref 35–45)
GFR SERPL CREATININE-BSD FRML MDRD: > 90 ML/MIN/1.73M2
GLUCOSE BLD-MCNC: 151 MG/DL (ref 70–108)
HCT VFR BLD CALC: 39.2 % (ref 42–52)
HEMOGLOBIN: 12.5 GM/DL (ref 14–18)
IMMATURE GRANS (ABS): 0.02 THOU/MM3 (ref 0–0.07)
IMMATURE GRANULOCYTES: 0.6 %
LYMPHOCYTES # BLD: 25 %
LYMPHOCYTES ABSOLUTE: 0.8 THOU/MM3 (ref 1–4.8)
MAGNESIUM: 1.9 MG/DL (ref 1.6–2.4)
MCH RBC QN AUTO: 28.4 PG (ref 26–33)
MCHC RBC AUTO-ENTMCNC: 31.9 GM/DL (ref 32.2–35.5)
MCV RBC AUTO: 89.1 FL (ref 80–94)
MONOCYTES # BLD: 7.2 %
MONOCYTES ABSOLUTE: 0.2 THOU/MM3 (ref 0.4–1.3)
NUCLEATED RED BLOOD CELLS: 0 /100 WBC
PLATELET # BLD: 107 THOU/MM3 (ref 130–400)
PMV BLD AUTO: 11.8 FL (ref 9.4–12.4)
POTASSIUM SERPL-SCNC: 4 MEQ/L (ref 3.5–5.2)
RBC # BLD: 4.4 MILL/MM3 (ref 4.7–6.1)
RHEUMATOID FACTOR: < 10 IU/ML (ref 0–13)
SEDIMENTATION RATE, ERYTHROCYTE: 23 MM/HR (ref 0–10)
SEG NEUTROPHILS: 64.5 %
SEGMENTED NEUTROPHILS ABSOLUTE COUNT: 2.1 THOU/MM3 (ref 1.8–7.7)
SODIUM BLD-SCNC: 137 MEQ/L (ref 135–145)
TOTAL PROTEIN: 6.5 G/DL (ref 6.1–8)
URIC ACID: 8.2 MG/DL (ref 3.7–7)
WBC # BLD: 3.3 THOU/MM3 (ref 4.8–10.8)

## 2021-07-14 PROCEDURE — 99204 OFFICE O/P NEW MOD 45 MIN: CPT | Performed by: FAMILY MEDICINE

## 2021-07-14 PROCEDURE — G8417 CALC BMI ABV UP PARAM F/U: HCPCS | Performed by: FAMILY MEDICINE

## 2021-07-14 PROCEDURE — 4004F PT TOBACCO SCREEN RCVD TLK: CPT | Performed by: FAMILY MEDICINE

## 2021-07-14 PROCEDURE — G8427 DOCREV CUR MEDS BY ELIG CLIN: HCPCS | Performed by: FAMILY MEDICINE

## 2021-07-14 SDOH — ECONOMIC STABILITY: FOOD INSECURITY: WITHIN THE PAST 12 MONTHS, YOU WORRIED THAT YOUR FOOD WOULD RUN OUT BEFORE YOU GOT MONEY TO BUY MORE.: NEVER TRUE

## 2021-07-14 SDOH — ECONOMIC STABILITY: FOOD INSECURITY: WITHIN THE PAST 12 MONTHS, THE FOOD YOU BOUGHT JUST DIDN'T LAST AND YOU DIDN'T HAVE MONEY TO GET MORE.: NEVER TRUE

## 2021-07-14 ASSESSMENT — ENCOUNTER SYMPTOMS
CONSTIPATION: 0
EYE PAIN: 0
SHORTNESS OF BREATH: 0
NAUSEA: 0
ABDOMINAL PAIN: 1
WHEEZING: 0
VOMITING: 0
DIARRHEA: 0
RHINORRHEA: 0
CHEST TIGHTNESS: 0
BLOOD IN STOOL: 0
SORE THROAT: 0
COUGH: 0
BACK PAIN: 0

## 2021-07-14 ASSESSMENT — PATIENT HEALTH QUESTIONNAIRE - PHQ9
SUM OF ALL RESPONSES TO PHQ QUESTIONS 1-9: 0
SUM OF ALL RESPONSES TO PHQ QUESTIONS 1-9: 0
1. LITTLE INTEREST OR PLEASURE IN DOING THINGS: 0
SUM OF ALL RESPONSES TO PHQ9 QUESTIONS 1 & 2: 0
2. FEELING DOWN, DEPRESSED OR HOPELESS: 0
SUM OF ALL RESPONSES TO PHQ QUESTIONS 1-9: 0

## 2021-07-14 ASSESSMENT — SOCIAL DETERMINANTS OF HEALTH (SDOH): HOW HARD IS IT FOR YOU TO PAY FOR THE VERY BASICS LIKE FOOD, HOUSING, MEDICAL CARE, AND HEATING?: SOMEWHAT HARD

## 2021-07-14 NOTE — PROGRESS NOTES
Mati Arias (:  1985) is a 39 y.o. male,New patient, here for evaluation of the following chief complaint(s):  Established New Doctor         ASSESSMENT/PLAN:  1. Generalized abdominal pain  -     CBC Auto Differential; Future  -     Comprehensive Metabolic Panel; Future  -     Uric Acid; Future  -     Sedimentation Rate; Future  -     Rheumatoid Factor; Future  -     Magnesium; Future  -     C-Reactive Protein; Future  -     JOE Screen with Reflex; Future  -unknown diagnosis/prognosis, blood work up, treat accordingly, monitor sxs  2. Arthralgia, unspecified joint  -     CBC Auto Differential; Future  -     Comprehensive Metabolic Panel; Future  -     Uric Acid; Future  -     Sedimentation Rate; Future  -     Rheumatoid Factor; Future  -     Magnesium; Future  -     C-Reactive Protein; Future  -     JOE Screen with Reflex; Future  -unknown diagnosis/prognosis, blood work up, treat accordingly, monitor sxs  3. Muscle spasm  -     CBC Auto Differential; Future  -     Comprehensive Metabolic Panel; Future  -     Uric Acid; Future  -     Sedimentation Rate; Future  -     Rheumatoid Factor; Future  -     Magnesium; Future  -     C-Reactive Protein; Future  -     JOE Screen with Reflex; Future  -unknown diagnosis/prognosis, blood work up, treat accordingly, monitor sxs    4. Hidradenitis suppurativa  -     Rob Ramos MD, Dermatology, [facility]  -chronic problem, exacerbated, refer to derm for further evaluation and treatment    No follow-ups on file. Subjective   SUBJECTIVE/OBJECTIVE:  HPI  Pt here for a new patient visit. Reviewed BMi of 44. Encouraged diet, exercise and weight loss. Single, current smoker, pmh reviewed. Recent sinus surgery with Dr. Gely Martinez. Having increased abdominal pain. Went to ED. History of diverticulitis and colostomy. The sinus surgery has been good. Family history of SLE. Also has HS. A lot of muscle spasms.       Review of Systems   Constitutional: Negative for chills, fatigue, fever and unexpected weight change. HENT: Negative for congestion, ear pain, rhinorrhea and sore throat. Eyes: Negative for pain and visual disturbance. Respiratory: Negative for cough, chest tightness, shortness of breath and wheezing. Cardiovascular: Negative for chest pain and palpitations. Gastrointestinal: Positive for abdominal pain. Negative for blood in stool, constipation, diarrhea, nausea and vomiting. Genitourinary: Negative for difficulty urinating, frequency, hematuria and urgency. Musculoskeletal: Negative for back pain, joint swelling, myalgias and neck pain. Spasms   Skin: Negative for rash. Neurological: Negative for dizziness and headaches. Hematological: Negative for adenopathy. Does not bruise/bleed easily. Psychiatric/Behavioral: Negative for behavioral problems and sleep disturbance. The patient is not nervous/anxious.           Past Medical History:   Diagnosis Date    Chronic back pain     Diverticulitis     Hidradenitis suppurativa     Sinusitis     Sleep apnea     has CPAP      Past Surgical History:   Procedure Laterality Date    ANKLE SURGERY  2001    BACK SURGERY      x3    CHOLECYSTECTOMY      COLON SURGERY      NOSE SURGERY  1993    Laser Surgery    RECTAL SURGERY      Ruptured Sigmoid Colon 2013    SEPTOPLASTY Left 7/19/2017    SEPTOPLASTY SUBMUCOUS RESECT TURBINATES, PARTIAL LEFT performed by Ricardo Pearce MD at [facility] SEPTOPLASTY N/A 5/8/2019    TONSILLECTOMY, UPPP performed by Ricardo Pearce MD at [facility] Bilateral 7/7/2021    IMAGE GUIDED NASAL SINUS ENDOSCOPY WITH BILATERAL MAXILLARY ANTROSTOMY, PARTIAL RESECTION BETITO BULLOSA BILATERAL SUPERIOR TURBINATES, SPHENOIDOTOMY, RIGHT, TOTAL ETHMOIDECTOMY ANTERIOR AND POSTERIOR, FONTAL SINUS EXPLORATION RIGHT WITHOUT REMOVAL OF TISSUE, MAXILLARY ANTROSTOMY WITH REMOVAL OF TISSUE FROM RIGHT MAXILLARY SINUS, SEPTOPLASTY, SUBMUCOUS RESECTION TURBINATES, BILATERAL PARTIAL INFERI     No Known Allergies    Current Outpatient Medications:     sucralfate (CARAFATE) 1 GM tablet, Take 1 tablet by mouth 4 times daily, Disp: 40 tablet, Rfl: 0    HYDROcodone-acetaminophen (NORCO) 5-325 MG per tablet, Take 1 tablet by mouth every 6 hours as needed for Pain for up to 3 days. Intended supply: 3 days. Take lowest dose possible to manage pain, Disp: 12 tablet, Rfl: 0    oxymetazoline (AFRIN) 0.05 % nasal spray, Use Afrin nasal spray, 8 drops each nostril on back with head hanging off edge of bed, stay 5 minutes. Repeat every 6 hours. IF IT JUST RUNS DOWN THE THROAT, YOU ARE NOT BACK FAR ENOUGH. REPOSITION AND DO IT AGAIN. Use for 5 days. , Disp: , Rfl: 3    clindamycin (CLEOCIN) 300 MG capsule, Take 1 capsule by mouth 3 times daily for 10 days, Disp: 30 capsule, Rfl: 0    pseudoephedrine (DECONGESTANT) 30 MG tablet, Take 1 tablet by mouth every 6 hours as needed for Congestion, Disp: 56 tablet, Rfl: 1    acetaminophen (TYLENOL) 500 MG tablet, Take 1,000 mg by mouth every 6 hours as needed for Pain, Disp: , Rfl:     omeprazole (PRILOSEC) 40 MG delayed release capsule, Take 1 capsule by mouth every morning (before breakfast), Disp: 30 capsule, Rfl: 2  Social History     Socioeconomic History    Marital status: Single     Spouse name: Not on file    Number of children: Not on file    Years of education: Not on file    Highest education level: Not on file   Occupational History    Not on file   Tobacco Use    Smoking status: Current Every Day Smoker     Packs/day: 1.00     Types: Cigarettes     Start date: 5/5/2007    Smokeless tobacco: Never Used    Tobacco comment: 0.5-1 ppd 5/5/17 last cig 5/7/19   Vaping Use    Vaping Use: Former   Substance and Sexual Activity    Alcohol use: Yes     Comment: rarely    Drug use: No    Sexual activity: Not on file   Other Topics Concern    Not on file   Social History Narrative    Not on file     Social Determinants of Health     Financial Resource Strain: Medium Risk    Difficulty of Paying Living Expenses: Somewhat hard   Food Insecurity: No Food Insecurity    Worried About Running Out of Food in the Last Year: Never true    Ran Out of Food in the Last Year: Never true   Transportation Needs:     Lack of Transportation (Medical):  Lack of Transportation (Non-Medical):    Physical Activity:     Days of Exercise per Week:     Minutes of Exercise per Session:    Stress:     Feeling of Stress :    Social Connections:     Frequency of Communication with Friends and Family:     Frequency of Social Gatherings with Friends and Family:     Attends Worship Services:     Active Member of Clubs or Organizations:     Attends Club or Organization Meetings:     Marital Status:    Intimate Partner Violence:     Fear of Current or Ex-Partner:     Emotionally Abused:     Physically Abused:     Sexually Abused:      Family History   Problem Relation Age of Onset    Cancer Mother         Breast Cancer    Diabetes Father     Heart Disease Father     Other Father     Heart Disease Maternal Grandmother     Heart Disease Maternal Grandfather     Diabetes Paternal Grandmother     COPD Paternal Grandmother     Diabetes Paternal Grandfather     COPD Paternal Grandfather          Objective   Physical Exam  Vitals and nursing note reviewed. Constitutional:       Appearance: He is well-developed. HENT:      Head: Normocephalic and atraumatic. Right Ear: External ear normal.      Left Ear: External ear normal.      Nose: Nose normal.      Mouth/Throat:      Mouth: Mucous membranes are moist.   Eyes:      Pupils: Pupils are equal, round, and reactive to light. Neck:      Thyroid: No thyromegaly. Cardiovascular:      Rate and Rhythm: Normal rate and regular rhythm. Heart sounds: Normal heart sounds. Pulmonary:      Breath sounds: Normal breath sounds. No wheezing or rales.    Abdominal: General: Bowel sounds are normal. There is distension. Palpations: Abdomen is soft. Tenderness: There is generalized abdominal tenderness. There is no guarding or rebound. Musculoskeletal:         General: Normal range of motion. Cervical back: Neck supple. Lymphadenopathy:      Cervical: No cervical adenopathy. Skin:     General: Skin is warm and dry. Findings: No rash. Neurological:      Mental Status: He is alert and oriented to person, place, and time. Cranial Nerves: No cranial nerve deficit. Deep Tendon Reflexes: Reflexes are normal and symmetric. Vitals:    07/14/21 0913   BP: 128/74   Site: Left Upper Arm   Position: Sitting   Cuff Size: Large Adult   Pulse: 88   Resp: 24   Temp: 97.8 °F (36.6 °C)   TempSrc: Infrared   SpO2: 98%   Weight: (!) 315 lb 6.4 oz (143.1 kg)   Height: 5' 10.7\" (1.796 m)                An electronic signature was used to authenticate this note.     --Josh Cornelius MD

## 2021-07-14 NOTE — PATIENT INSTRUCTIONS
Patient Education        Abdominal Pain: Care Instructions  Your Care Instructions     Abdominal pain has many possible causes. Some aren't serious and get better on their own in a few days. Others need more testing and treatment. If your pain continues or gets worse, you need to be rechecked and may need more tests to find out what is wrong. You may need surgery to correct the problem. Don't ignore new symptoms, such as fever, nausea and vomiting, urination problems, pain that gets worse, and dizziness. These may be signs of a more serious problem. Your doctor may have recommended a follow-up visit in the next 8 to 12 hours. If you are not getting better, you may need more tests or treatment. The doctor has checked you carefully, but problems can develop later. If you notice any problems or new symptoms, get medical treatment right away. Follow-up care is a key part of your treatment and safety. Be sure to make and go to all appointments, and call your doctor if you are having problems. It's also a good idea to know your test results and keep a list of the medicines you take. How can you care for yourself at home? · Rest until you feel better. · To prevent dehydration, drink plenty of fluids. Choose water and other caffeine-free clear liquids until you feel better. If you have kidney, heart, or liver disease and have to limit fluids, talk with your doctor before you increase the amount of fluids you drink. · If your stomach is upset, eat mild foods, such as rice, dry toast or crackers, bananas, and applesauce. Try eating several small meals instead of two or three large ones. · Wait until 48 hours after all symptoms have gone away before you have spicy foods, alcohol, and drinks that contain caffeine. · Do not eat foods that are high in fat. · Avoid anti-inflammatory medicines such as aspirin, ibuprofen (Advil, Motrin), and naproxen (Aleve). These can cause stomach upset.  Talk to your doctor if you take daily aspirin for another health problem. When should you call for help? Call 911 anytime you think you may need emergency care. For example, call if:    · You passed out (lost consciousness).     · You pass maroon or very bloody stools.     · You vomit blood or what looks like coffee grounds.     · You have new, severe belly pain. Call your doctor now or seek immediate medical care if:    · Your pain gets worse, especially if it becomes focused in one area of your belly.     · You have a new or higher fever.     · Your stools are black and look like tar, or they have streaks of blood.     · You have unexpected vaginal bleeding.     · You have symptoms of a urinary tract infection. These may include:  ? Pain when you urinate. ? Urinating more often than usual.  ? Blood in your urine.     · You are dizzy or lightheaded, or you feel like you may faint. Watch closely for changes in your health, and be sure to contact your doctor if:    · You are not getting better after 1 day (24 hours). Where can you learn more? Go to https://Rukuku.[employer]. org and sign in to your [employer] account. Enter M538 in the Alleantia box to learn more about \"Abdominal Pain: Care Instructions. \"     If you do not have an account, please click on the \"Sign Up Now\" link. Current as of: October 19, 2020               Content Version: 12.9  © 2006-2021 [employer]box music. Care instructions adapted under license by [facility] ([facility]). If you have questions about a medical condition or this instruction, always ask your healthcare professional. [place] [address] any warranty or liability for your use of this information. Patient Education        Hidradenitis Suppurativa: Care Instructions  Your Care Instructions     Hidradenitis suppurativa (say \"opc-qsls-al-NY-tus sup-yur-uh-TY-vuh\") is a skin condition that causes lumps on the skin that look like pimples or boils.  The lumps are usually painful and can break open and drain blood and bad-smelling pus. The condition can come and go for many years. Treatment for this condition may include antibiotics and other medicines. You may need surgery to remove the lumps. Home care includes wearing loose-fitting clothes and washing the area gently. You can help prevent lumps from coming back by staying at a healthy weight and not smoking. Doctors don't know exactly how this condition starts. But they do know that something irritates and inflames the hair follicles, causing them to swell and form lumps. This skin condition can't be spread from person to person (isn't contagious). Follow-up care is a key part of your treatment and safety. Be sure to make and go to all appointments, and call your doctor if you are having problems. It's also a good idea to know your test results and keep a list of the medicines you take. How can you care for yourself at home? Skin care    · Wash the area every day with mild soap. Use your hands rather than a washcloth or sponge when you wash that part of your body.     · Leave the affected areas uncovered when you can. If you have lumps that are draining, you can cover them with a bandage or other dressing. Put petroleum jelly (such as Vaseline) on the dressing to help keep it from sticking.     · Wear-loose fitting clothes that don't rub against the area. Avoid activities that cause skin to rub together.     · If you have pain, try a warm compress. Soak a towel or washcloth in warm water, wring it out, and place it on the affected skin for about 10 minutes. Medicines    · Be safe with medicines. Take your medicines exactly as prescribed. Call your doctor if you think you are having a problem with your medicine. You will get more details on the specific medicines your doctor prescribes.     · If your doctor prescribed antibiotics, take them as directed. Do not stop taking them just because you feel better.  You need to take the full course of antibiotics. Lifestyle choices    · If you smoke, think about quitting. Smoking can make the condition worse. If you need help quitting, talk to your doctor about stop-smoking programs and medicines. These can increase your chances of quitting for good.     · Stay at a healthy weight, or lose weight, by eating healthy foods and being physically active. Being overweight could make this condition worse. When should you call for help? Call your doctor now or seek immediate medical care if:    · You have symptoms of infection, such as:  ? Increased pain, swelling, warmth, or redness. ? Red streaks leading from the area. ? Pus draining from the area. ? A fever. Watch closely for changes in your health, and be sure to contact your doctor if:    · You do not get better as expected. Where can you learn more? Go to https://esolidarpepiceweb.[email or web address] [email or web address] org and sign in to your [employer] account. Enter E464 in the [employer] box to learn more about \"Hidradenitis Suppurativa: Care Instructions. \"     If you do not have an account, please click on the \"Sign Up Now\" link. Current as of: March 3, 2021               Content Version: 12.9  © 2006-2021 Openet. Care instructions adapted under license by [facility] ([facility]). If you have questions about a medical condition or this instruction, always ask your healthcare professional. [place] [address] any warranty or liability for your use of this information. Patient Education        Joint Pain: Care Instructions  Your Care Instructions     Many people have small aches and pains from overuse or injury to muscles and joints. Joint injuries often happen during sports or recreation, work tasks, or projects around the home.  An overuse injury can happen when you put too much stress on a joint or when you do an activity that stresses the joint over and over, such as using the computer or rowing a boat.  You can take action at home to help your muscles and joints get better. You should feel better in 1 to 2 weeks, but it can take 3 months or more to heal completely. Follow-up care is a key part of your treatment and safety. Be sure to make and go to all appointments, and call your doctor if you are having problems. It's also a good idea to know your test results and keep a list of the medicines you take. How can you care for yourself at home? · Do not put weight on the injured joint for at least a day or two. · For the first day or two after an injury, do not take hot showers or baths, and do not use hot packs. The heat could make swelling worse. · Put ice or a cold pack on the sore joint for 10 to 20 minutes at a time. Try to do this every 1 to 2 hours for the next 3 days (when you are awake) or until the swelling goes down. Put a thin cloth between the ice and your skin. · Wrap the injury in an elastic bandage. Do not wrap it too tightly because this can cause more swelling. · Prop up the sore joint on a pillow when you ice it or anytime you sit or lie down during the next 3 days. Try to keep it above the level of your heart. This will help reduce swelling. · Take an over-the-counter pain medicine, such as acetaminophen (Tylenol), ibuprofen (Advil, Motrin), or naproxen (Aleve). Read and follow all instructions on the label. · After 1 or 2 days of rest, begin moving the joint gently. While the joint is still healing, you can begin to exercise using activities that do not strain or hurt the painful joint. When should you call for help? Call your doctor now or seek immediate medical care if:    · You have signs of infection, such as:  ? Increased pain, swelling, warmth, and redness. ? Red streaks leading from the joint. ? A fever.    Watch closely for changes in your health, and be sure to contact your doctor if:    · Your movement or symptoms are not getting better after 1 to 2 weeks of home treatment. Where can you learn more? Go to https://chpepiceweb.healthFifth Generation Technologies [employer] Private. org and sign in to your [employer] account. Enter P205 in the [employer] box to learn more about \"Joint Pain: Care Instructions. \"     If you do not have an account, please click on the \"Sign Up Now\" link. Current as of: November 16, 2020               Content Version: 12.9  © 2006-2021 Healthwise, [employer]zer. Care instructions adapted under license by [facility] ([facility]). If you have questions about a medical condition or this instruction, always ask your healthcare professional. [address] any warranty or liability for your use of this information.

## 2021-07-15 ENCOUNTER — TELEPHONE (OUTPATIENT)
Dept: FAMILY MEDICINE CLINIC | Age: 36
End: 2021-07-15

## 2021-07-15 ENCOUNTER — OFFICE VISIT (OUTPATIENT)
Dept: ENT CLINIC | Age: 36
End: 2021-07-15
Payer: MEDICAID

## 2021-07-15 VITALS
WEIGHT: 315 LBS | OXYGEN SATURATION: 20 % | DIASTOLIC BLOOD PRESSURE: 88 MMHG | HEART RATE: 84 BPM | TEMPERATURE: 97.1 F | BODY MASS INDEX: 44.66 KG/M2 | SYSTOLIC BLOOD PRESSURE: 144 MMHG

## 2021-07-15 DIAGNOSIS — D61.818 PANCYTOPENIA (HCC): ICD-10-CM

## 2021-07-15 DIAGNOSIS — Z98.890 STATUS POST FUNCTIONAL ENDOSCOPIC SINUS SURGERY (FESS): ICD-10-CM

## 2021-07-15 DIAGNOSIS — J32.0 CHRONIC MAXILLARY SINUSITIS: ICD-10-CM

## 2021-07-15 DIAGNOSIS — J32.1 CHRONIC FRONTAL SINUSITIS: ICD-10-CM

## 2021-07-15 DIAGNOSIS — E79.0 ELEVATED URIC ACID IN BLOOD: Primary | ICD-10-CM

## 2021-07-15 DIAGNOSIS — J34.2 DEVIATED NASAL SEPTUM: ICD-10-CM

## 2021-07-15 DIAGNOSIS — J32.3 CHRONIC SPHENOIDAL SINUSITIS: ICD-10-CM

## 2021-07-15 DIAGNOSIS — J34.3 HYPERTROPHY OF INFERIOR NASAL TURBINATE: ICD-10-CM

## 2021-07-15 DIAGNOSIS — Z98.890 STATUS POST NASAL SEPTOPLASTY: ICD-10-CM

## 2021-07-15 DIAGNOSIS — J32.2 CHRONIC ETHMOIDAL SINUSITIS: Primary | ICD-10-CM

## 2021-07-15 PROCEDURE — 99213 OFFICE O/P EST LOW 20 MIN: CPT | Performed by: OTOLARYNGOLOGY

## 2021-07-15 PROCEDURE — 4004F PT TOBACCO SCREEN RCVD TLK: CPT | Performed by: OTOLARYNGOLOGY

## 2021-07-15 PROCEDURE — G8417 CALC BMI ABV UP PARAM F/U: HCPCS | Performed by: OTOLARYNGOLOGY

## 2021-07-15 PROCEDURE — G8427 DOCREV CUR MEDS BY ELIG CLIN: HCPCS | Performed by: OTOLARYNGOLOGY

## 2021-07-15 RX ORDER — ALLOPURINOL 100 MG/1
100 TABLET ORAL DAILY
Qty: 90 TABLET | Refills: 3 | Status: SHIPPED | OUTPATIENT
Start: 2021-07-15 | End: 2021-08-31 | Stop reason: ALTCHOICE

## 2021-07-15 ASSESSMENT — ENCOUNTER SYMPTOMS
TROUBLE SWALLOWING: 0
RHINORRHEA: 0
VOICE CHANGE: 0
APNEA: 0
CHOKING: 0
COLOR CHANGE: 0
SORE THROAT: 0
VOMITING: 0
WHEEZING: 0
NAUSEA: 0
CHEST TIGHTNESS: 0
COUGH: 0
FACIAL SWELLING: 0
STRIDOR: 0
ABDOMINAL PAIN: 0
SINUS PRESSURE: 0
SHORTNESS OF BREATH: 0
DIARRHEA: 0

## 2021-07-15 NOTE — TELEPHONE ENCOUNTER
appt scheduled with Dr Saeed Waggoner on 7/21/21 at 230 pm, office notes and labs faxed to the office  Lab order to recheck uric acid mailed to pt  Pt notified of appt date and time

## 2021-07-15 NOTE — PROGRESS NOTES
Verbal order from Dr. Syl Gibson to anesthetize the patient's nasal cavity. After checking the patient's allergy list to confirm no listed medication allergy and verbally asking the patient, the patient's nasal cavity was anesthetized with topical 4% Xylocaine 4 sprays each nostril and Epifanio-Synephrine 4 sprays each nostril .

## 2021-07-15 NOTE — TELEPHONE ENCOUNTER
ep'ed allopurinol to pharm requested    Recheck uric acid level in 1 month. [id number] Lissa Wynn for Trip referral for pancytopenia.

## 2021-07-15 NOTE — TELEPHONE ENCOUNTER
Pt notified and is agreeable to start allopurinol   Requests rx to [facility] he saw Dr Nathan Posey in the past, but has been awhile since he last saw him, is agreeable to go back to him-prefers him as he is now in [place] and it is closer for him

## 2021-07-15 NOTE — TELEPHONE ENCOUNTER
Patient notified of results. States he was not fasting. May have had gout attack 1 or 2 time in the past but nothing recent or frequent. States it does run in the family, his dad and brother have.

## 2021-07-15 NOTE — PROGRESS NOTES
Kate Mcclain Patient Age: 33 year old  MESSAGE:   Patient is calling to ask if its okay for her to take Tylenol Sinus and Headache pills whilst pregnant. Connecting to [facility] clinical pool for advice. Message confirmed with caller.     Next and Last Visit with Provider and Department  Last visit with this provider: 1/9/2019  Last visit with this department: 1/9/2019    Next visit with this provider: Visit date not found  Next visit with this department: 2/6/2019    WEIGHT AND HEIGHT:   Wt Readings from Last 1 Encounters:   01/09/19 52.6 kg (116 lb)     Ht Readings from Last 1 Encounters:   12/05/16 5' 4\" (1.626 m)     BMI Readings from Last 1 Encounters:   01/09/19 19.91 kg/m²       ALLERGIES: no known allergies.  Current Outpatient Medications   Medication   • Prenatal Vit-Fe Fumarate-FA (PNV PRENATAL PLUS MULTIVITAMIN) 27-1 MG Tab     No current facility-administered medications for this visit.      PHARMACY to use:           Pharmacy preference(s) on file: No Pharmacies Listed    CALL BACK INFO: Ok to leave response (including medical information) on answering machine  ROUTING: Patient's physician/staff        PCP: No Pcp         INS: Payor: BYRON/YOLY / Plan: [id number] / Product Type: POS MISC   PATIENT ADDRESS:  [id number] [place] [address]     [facility] [place] [facility]  [facility] EAR, NOSE AND THROAT  [facility]  Dept: [phone]  Dept Fax: [phone]  Loc: [phone]    Roland Elizabeth is a 39 y.o. male who was referred byNo ref. provider found for:  Chief Complaint   Patient presents with    Post-Op Check     Patient is here for post op 7/7   . HPI:     Roland Elizabeth is a 39 y.o. male who presents today for post op Septoplasty; bilateral complete endoscopic ethmoidectomies; right maxillary endoscopy and removal of tissue; left maxillary antrostomy; right endoscopic sphenoidostomy; right frontal sinusotomy;  partial submucous resection, both inferior turbinates; partial resection, scott bullosa of both superior turbinates on 7/7/21. He is doing well. Blew a stitch out when he was rinsing. He is swallowing ok. Snoring is not as loud. He was on a CPAP machine, hasn't been using it because he wasn't sure if he should be using it. The pressure is off his face. He has right ear pressure, hearing is the same on both sides. He was in the ER this past Sunday running a fever, feeling bad and stomach area hurt. Thought he was having a diverticulitis episode. Was sent back home with pain medication. He has another appointment next week. Dr. Kranthi Wilder ordered a lot of blood work on him. History:     No Known Allergies  Current Outpatient Medications   Medication Sig Dispense Refill    sucralfate (CARAFATE) 1 GM tablet Take 1 tablet by mouth 4 times daily 40 tablet 0    oxymetazoline (AFRIN) 0.05 % nasal spray Use Afrin nasal spray, 8 drops each nostril on back with head hanging off edge of bed, stay 5 minutes. Repeat every 6 hours. IF IT JUST RUNS DOWN THE THROAT, YOU ARE NOT BACK FAR ENOUGH. REPOSITION AND DO IT AGAIN. Use for 5 days.   3    clindamycin (CLEOCIN) 300 MG capsule Take 1 capsule by mouth 3 times daily for 10 days 30 capsule 0    pseudoephedrine (DECONGESTANT) 30 MG tablet Take 1 tablet by mouth every 6 hours as needed for Congestion 56 tablet 1    acetaminophen (TYLENOL) 500 MG tablet Take 1,000 mg by mouth every 6 hours as needed for Pain      omeprazole (PRILOSEC) 40 MG delayed release capsule Take 1 capsule by mouth every morning (before breakfast) 30 capsule 2    allopurinol (ZYLOPRIM) 100 MG tablet Take 1 tablet by mouth daily 90 tablet 3     No current facility-administered medications for this visit.      Past Medical History:   Diagnosis Date    Chronic back pain     Diverticulitis     Hidradenitis suppurativa     Pancytopenia ([address] 07/2021    Sinusitis     Sleep apnea     has CPAP      Past Surgical History:   Procedure Laterality Date    ANKLE SURGERY  2001    BACK SURGERY      x3    CHOLECYSTECTOMY      COLON SURGERY      NOSE SURGERY  1993    Laser Surgery    RECTAL SURGERY      Ruptured Sigmoid Colon 2013    SEPTOPLASTY Left 7/19/2017    SEPTOPLASTY SUBMUCOUS RESECT TURBINATES, PARTIAL LEFT performed by Ramy Fernandez MD at [address] [place] [address] SEPTOPLASTY N/A 5/8/2019    TONSILLECTOMY, UPPP performed by Ramy Fernandez MD at [address] [place] [address] 7/7/2021    IMAGE GUIDED NASAL SINUS ENDOSCOPY WITH BILATERAL MAXILLARY ANTROSTOMY, PARTIAL RESECTION BETITO BULLOSA BILATERAL SUPERIOR TURBINATES, SPHENOIDOTOMY, RIGHT, TOTAL ETHMOIDECTOMY ANTERIOR AND POSTERIOR, FONTAL SINUS EXPLORATION RIGHT WITHOUT REMOVAL OF TISSUE, MAXILLARY ANTROSTOMY WITH REMOVAL OF TISSUE FROM RIGHT MAXILLARY SINUS, SEPTOPLASTY, SUBMUCOUS RESECTION TURBINATES, BILATERAL PARTIAL INFERI     Family History   Problem Relation Age of Onset    Cancer Mother         Breast Cancer    Diabetes Father     Heart Disease Father     Other Father     Heart Disease Maternal Grandmother     Heart Disease Maternal Grandfather     Diabetes Paternal Grandmother     COPD Paternal Grandmother     Diabetes Paternal Grandfather     COPD Paternal Grandfather Social History     Tobacco Use    Smoking status: Current Every Day Smoker     Packs/day: 1.00     Types: Cigarettes     Start date: 5/5/2007    Smokeless tobacco: Never Used    Tobacco comment: 0.5-1 ppd 5/5/17 last cig 5/7/19   Substance Use Topics    Alcohol use: Yes     Comment: rarely       Subjective:       Review of Systems   Constitutional: Negative for activity change, appetite change, chills, diaphoresis, fatigue, fever and unexpected weight change. HENT: Negative for congestion, dental problem, ear discharge, ear pain, facial swelling, hearing loss, mouth sores, nosebleeds, postnasal drip, rhinorrhea, sinus pressure, sneezing, sore throat, tinnitus, trouble swallowing and voice change. Eyes: Negative for visual disturbance. Respiratory: Negative for apnea, cough, choking, chest tightness, shortness of breath, wheezing and stridor. Cardiovascular: Negative for chest pain, palpitations and leg swelling. Gastrointestinal: Negative for abdominal pain, diarrhea, nausea and vomiting. Endocrine: Negative for cold intolerance, heat intolerance, polydipsia and polyuria. Genitourinary: Negative for dysuria, enuresis and hematuria. Musculoskeletal: Negative for arthralgias, gait problem, neck pain and neck stiffness. Skin: Negative for color change and rash. Allergic/Immunologic: Negative for environmental allergies, food allergies and immunocompromised state. Neurological: Negative for dizziness, syncope, facial asymmetry, speech difficulty, light-headedness and headaches. Hematological: Negative for adenopathy. Does not bruise/bleed easily. Psychiatric/Behavioral: Negative for confusion and sleep disturbance. The patient is not nervous/anxious.         Objective:     BP (!) 144/88 (Site: Right Upper Arm, Position: Sitting)   Pulse 84   Temp 97.1 °F (36.2 °C) (Infrared)   Wt (!) 317 lb 8 oz (144 kg)   SpO2 (!) 20%   BMI 44.66 kg/m²     Physical Exam    No suctioning needed today.  He has been doing an excellent job with his buffered saline nasal irrigations. Data:  All of the past medical history, past surgical history, family history,social history, allergies and current medications were reviewed with the patient. Assessment & Plan   Diagnoses and all orders for this visit:     Diagnosis Orders   1. Chronic ethmoidal sinusitis     2. Chronic maxillary sinusitis     3. Chronic sphenoidal sinusitis     4. Chronic frontal sinusitis     5. Hypertrophy of inferior nasal turbinate     6. Deviated nasal septum, anterior     7. Status post nasal septoplasty     8. Status post functional endoscopic sinus surgery (FESS)         The findings were explained and his questions were answered. I will see him next week for a debridement, I will not have to suction today. He agreed. Follow up next week       I, Michael Carrero CMA ([facility]), am scribing for, and in the presence of Dr. Mae Lino. Electronically signed by Charbel Lomax CMA (MICHELLEMA) on 7/15/21 at 10:19 AM EDT. (Please note that portions of this note were completed with a voice recognition program. Efforts were made to edit the dictations butoccasionally words are mis-transcribed.)    I agree to the above documentation placed by my scribe. I have personally evaluated this patient. Additional findings are as noted. I reviewed the scribe's note and agree with the documented findings and plan of care. Any areas of disagreement are corrected. I agree with the chief complaint, past medical history, past surgical history, allergies, medications, social and family history as documented unless otherwise noted below.      Electronically signed by Sarai Caban MD on 8/7/2021 at 10:08 PM

## 2021-07-15 NOTE — TELEPHONE ENCOUNTER
----- Message from Sammie Smith MD sent at 7/15/2021  7:01 AM EDT -----  Inflammation markers are increased. Magnesium is back to normal.  Uric acid level is high. Any previous history of gout attacks? ?  CMP shows increased glucose at 151. Was he fasting??  If so, add a1c. Mild liver enzyme elevations but improved from 4 days prior. CBC shows pancytopenia ( low wbs, rbc and platelets). Anemic at 12.5 ( 14-18). Rheumatoid is negative. JOE ( lupus) is pending.

## 2021-07-15 NOTE — TELEPHONE ENCOUNTER
Recommend start allopurinol to reduce uric acid level, otherwise further gout attacks are inevitable, if agreeable. He had mentioned that he knew about the low platelets, does he see a hematologist?? Concerning that all 3 cell lines are low, not just the platelets. Await JOE level.

## 2021-07-17 LAB — ANA SCREEN: NORMAL

## 2021-07-19 ENCOUNTER — TELEPHONE (OUTPATIENT)
Dept: FAMILY MEDICINE CLINIC | Age: 36
End: 2021-07-19

## 2021-07-19 NOTE — TELEPHONE ENCOUNTER
[facility] referred patient to Dr Cassius Contreras, office faxed back info stating they do not accept patients insurance.  Please have patient contact his insurance to see who is in network and let us know for a new referral.

## 2021-07-19 NOTE — TELEPHONE ENCOUNTER
----- Message from Reyes Quiver, MD sent at 7/17/2021  9:38 AM EDT -----  JOE is normal.  Consider a course of prednisone for the increased inflammation markers but that would alternate his cbc further, since Trip appointment is soon, will hold off for now. Recheck esr and crp with the uric acid recheck.

## 2021-07-22 ENCOUNTER — TELEPHONE (OUTPATIENT)
Dept: ENT CLINIC | Age: 36
End: 2021-07-22

## 2021-07-22 ENCOUNTER — OFFICE VISIT (OUTPATIENT)
Dept: ENT CLINIC | Age: 36
End: 2021-07-22
Payer: MEDICAID

## 2021-07-22 VITALS
DIASTOLIC BLOOD PRESSURE: 80 MMHG | HEIGHT: 70 IN | SYSTOLIC BLOOD PRESSURE: 130 MMHG | WEIGHT: 304 LBS | HEART RATE: 80 BPM | TEMPERATURE: 97.6 F | BODY MASS INDEX: 43.52 KG/M2 | RESPIRATION RATE: 14 BRPM

## 2021-07-22 DIAGNOSIS — Z98.890 STATUS POST NASAL SEPTOPLASTY: ICD-10-CM

## 2021-07-22 DIAGNOSIS — J32.2 CHRONIC ETHMOIDAL SINUSITIS: ICD-10-CM

## 2021-07-22 DIAGNOSIS — J34.89 OSTIOMEATAL COMPLEX OBSTRUCTION OF NASAL SINUS: ICD-10-CM

## 2021-07-22 DIAGNOSIS — J34.2 DEVIATED NASAL SEPTUM: Primary | ICD-10-CM

## 2021-07-22 DIAGNOSIS — R25.2 LEG CRAMPS: ICD-10-CM

## 2021-07-22 DIAGNOSIS — J32.3 CHRONIC SPHENOIDAL SINUSITIS: ICD-10-CM

## 2021-07-22 DIAGNOSIS — J32.0 CHRONIC MAXILLARY SINUSITIS: ICD-10-CM

## 2021-07-22 DIAGNOSIS — J34.89 CONCHA BULLOSA: ICD-10-CM

## 2021-07-22 DIAGNOSIS — J32.1 CHRONIC FRONTAL SINUSITIS: ICD-10-CM

## 2021-07-22 DIAGNOSIS — J34.3 HYPERTROPHY OF INFERIOR NASAL TURBINATE: ICD-10-CM

## 2021-07-22 DIAGNOSIS — Z98.890 STATUS POST FUNCTIONAL ENDOSCOPIC SINUS SURGERY (FESS): ICD-10-CM

## 2021-07-22 PROCEDURE — 31237 NSL/SINS NDSC SURG BX POLYPC: CPT | Performed by: OTOLARYNGOLOGY

## 2021-07-22 PROCEDURE — 99024 POSTOP FOLLOW-UP VISIT: CPT | Performed by: OTOLARYNGOLOGY

## 2021-07-22 ASSESSMENT — ENCOUNTER SYMPTOMS
NAUSEA: 0
CHOKING: 0
VOICE CHANGE: 0
SHORTNESS OF BREATH: 0
COLOR CHANGE: 0
ABDOMINAL PAIN: 0
APNEA: 0
CHEST TIGHTNESS: 0
SINUS PRESSURE: 0
DIARRHEA: 0
WHEEZING: 0
COUGH: 0
TROUBLE SWALLOWING: 0
SORE THROAT: 0
STRIDOR: 0
VOMITING: 0
FACIAL SWELLING: 0
RHINORRHEA: 0

## 2021-07-22 NOTE — TELEPHONE ENCOUNTER
When patient checked out after visit today he said he was having a lot of pain. He stated that Dr. Venita Bailey did a debridement today and he had to do much more than usual.  He asked for medication. Dr. Venita Bailey was in the room with a patient. Bay Escamilla advised me to tell patient to take tylenol and check with provider. I informed patient to take tylenol and that I would check with Rain Armstrong. [facility]

## 2021-07-22 NOTE — TELEPHONE ENCOUNTER
Josie Meza called to verify patient instructions from Dr. Ester Rivero on 7/22/21. Josie Meza was informed that Dr. Ester Rivero is ok with patient proceeding with his bone marrow biopsy and wanted to notify the patient/office that Kumar Nayak had surgery 2 weeks ago and loss 2-3 units of blood.   Josie Meza verbalized understanding

## 2021-07-22 NOTE — PROGRESS NOTES
SURGERY      Ruptured Sigmoid Colon 2013    SEPTOPLASTY Left 7/19/2017    SEPTOPLASTY SUBMUCOUS RESECT TURBINATES, PARTIAL LEFT performed by Mae Lino MD at [address] SEPTOPLASTY N/A 5/8/2019    TONSILLECTOMY, UPPP performed by Mae Lino MD at [address] Bilateral 7/7/2021    IMAGE GUIDED NASAL SINUS ENDOSCOPY WITH BILATERAL MAXILLARY ANTROSTOMY, PARTIAL RESECTION BETITO BULLOSA BILATERAL SUPERIOR TURBINATES, SPHENOIDOTOMY, RIGHT, TOTAL ETHMOIDECTOMY ANTERIOR AND POSTERIOR, FONTAL SINUS EXPLORATION RIGHT WITHOUT REMOVAL OF TISSUE, MAXILLARY ANTROSTOMY WITH REMOVAL OF TISSUE FROM RIGHT MAXILLARY SINUS, SEPTOPLASTY, SUBMUCOUS RESECTION TURBINATES, BILATERAL PARTIAL INFERI     Family History   Problem Relation Age of Onset    Cancer Mother         Breast Cancer    Diabetes Father     Heart Disease Father     Other Father     Heart Disease Maternal Grandmother     Heart Disease Maternal Grandfather     Diabetes Paternal Grandmother     COPD Paternal Grandmother     Diabetes Paternal Grandfather     COPD Paternal Grandfather      Social History     Tobacco Use    Smoking status: Current Every Day Smoker     Packs/day: 1.00     Types: Cigarettes     Start date: 5/5/2007    Smokeless tobacco: Never Used    Tobacco comment: 0.5-1 ppd 5/5/17 last cig 5/7/19   Substance Use Topics    Alcohol use: Yes     Comment: rarely       Subjective:       Review of Systems   Constitutional: Negative for activity change, appetite change, chills, diaphoresis, fatigue, fever and unexpected weight change. HENT: Negative for congestion, dental problem, ear discharge, ear pain, facial swelling, hearing loss, mouth sores, nosebleeds, postnasal drip, rhinorrhea, sinus pressure, sneezing, sore throat, tinnitus, trouble swallowing and voice change. Eyes: Negative for visual disturbance. Respiratory: Negative for apnea, cough, choking, chest tightness, shortness of breath, wheezing and stridor. Cardiovascular: Negative for chest pain, palpitations and leg swelling. Gastrointestinal: Negative for abdominal pain, diarrhea, nausea and vomiting. Endocrine: Negative for cold intolerance, heat intolerance, polydipsia and polyuria. Genitourinary: Negative for dysuria, enuresis and hematuria. Musculoskeletal: Negative for arthralgias, gait problem, neck pain and neck stiffness. Skin: Negative for color change and rash. Allergic/Immunologic: Negative for environmental allergies, food allergies and immunocompromised state. Neurological: Negative for dizziness, syncope, facial asymmetry, speech difficulty, light-headedness and headaches. Hematological: Negative for adenopathy. Does not bruise/bleed easily. Psychiatric/Behavioral: Negative for confusion and sleep disturbance. The patient is not nervous/anxious. Objective:     /80 (Site: Left Upper Arm, Position: Sitting)   Pulse 80   Temp 97.6 °F (36.4 °C) (Infrared)   Resp 14   Ht 5' 10\" (1.778 m)   Wt (!) 304 lb (137.9 kg)   BMI 43.62 kg/m²     Physical Exam    Data:  All of the past medical history, past surgical history, family history,social history, allergies and current medications were reviewed with the patient. Assessment & Plan   Diagnoses and all orders for this visit:    {No diagnosis found. (Refresh or delete this SmartLink)}    The findings were explained and his questions were answered. No follow-ups on file. Homer EVERETT CMA ([facility]), am scribing for, and in the presence of Dr. Griselda Plaza. Electronically signed by Damon Lorenzo CMA ([facility]) on 7/22/21 at 11:05 AM EDT.      (Please note that portions of this note were completed with a voice recognition program. Efforts were made to edit the dictations butoccasionally words are mis-transcribed.)

## 2021-07-22 NOTE — PROGRESS NOTES
[facility] [place] [facility]  [facility] EAR, NOSE AND THROAT  [facility]  Dept: [phone]  Dept Fax: [phone]  Loc: [phone]    Debora Becker is a 39 y.o. male who was referred byNo ref. provider found for:  Chief Complaint   Patient presents with    Post-Op Check     Patient is her post-op igs sinus endo w maxillary antrostomy, r/s scott, sphenoidotomy, ethmoidectomy, frontal, septo, smr 7/7/21 c/o achey facial pain    . HPI:     Debora Becker is a 39 y.o. male who presents today for post op Septoplasty; bilateral complete endoscopic ethmoidectomies; right maxillary endoscopy and removal of tissue; left maxillary  antrostomy; right endoscopic sphenoidostomy; right frontal sinusotomy;  partial submucous resection, both inferior turbinates; partial  resection, scott bullosa of both superior turbinates on 7/7/21. Face is hurting like an ache. Feels like someone punched him. Has had massive leg cramps at night, does not take vitamin D. Hasn't used his CPAP machine since there is so much pressure and he has to keep his mask tight due to his beard. His pressure is 20. History:     No Known Allergies  Current Outpatient Medications   Medication Sig Dispense Refill    allopurinol (ZYLOPRIM) 100 MG tablet Take 1 tablet by mouth daily 90 tablet 3    acetaminophen (TYLENOL) 500 MG tablet Take 1,000 mg by mouth every 6 hours as needed for Pain      omeprazole (PRILOSEC) 40 MG delayed release capsule Take 1 capsule by mouth every morning (before breakfast) 30 capsule 2    cyclobenzaprine (FLEXERIL) 10 MG tablet Take 1 tablet by mouth 3 times daily as needed for Muscle spasms (Patient not taking: Reported on 7/22/2021) 30 tablet 0     No current facility-administered medications for this visit.      Past Medical History:   Diagnosis Date    Chronic back pain     Diverticulitis     Hidradenitis suppurativa     [facility] 07/[facility]21    Sinusitis     Sleep apnea     has CPAP      Past Surgical History:   Procedure Laterality Date    ANKLE SURGERY  2001    BACK SURGERY      x3    CHOLECYSTECTOMY      COLON SURGERY      NOSE SURGERY  1993    Laser Surgery    RECTAL SURGERY      Ruptured Sigmoid Colon 2013    SEPTOPLASTY Left 7/19/2017    SEPTOPLASTY SUBMUCOUS RESECT TURBINATES, PARTIAL LEFT performed by Mckenzie Estrada MD at [address] SEPTOPLASTY N/A 5/8/2019    TONSILLECTOMY, UPPP performed by Mckenzie Estrada MD at [address] Bilateral 7/7/2021    IMAGE GUIDED NASAL SINUS ENDOSCOPY WITH BILATERAL MAXILLARY ANTROSTOMY, PARTIAL RESECTION BETITO BULLOSA BILATERAL SUPERIOR TURBINATES, SPHENOIDOTOMY, RIGHT, TOTAL ETHMOIDECTOMY ANTERIOR AND POSTERIOR, FONTAL SINUS EXPLORATION RIGHT WITHOUT REMOVAL OF TISSUE, MAXILLARY ANTROSTOMY WITH REMOVAL OF TISSUE FROM RIGHT MAXILLARY SINUS, SEPTOPLASTY, SUBMUCOUS RESECTION TURBINATES, BILATERAL PARTIAL INFERI     Family History   Problem Relation Age of Onset    Cancer Mother         Breast Cancer    Diabetes Father     Heart Disease Father     Other Father     Heart Disease Maternal Grandmother     Heart Disease Maternal Grandfather     Diabetes Paternal Grandmother     COPD Paternal Grandmother     Diabetes Paternal Grandfather     COPD Paternal Grandfather      Social History     Tobacco Use    Smoking status: Current Every Day Smoker     Packs/day: 1.00     Types: Cigarettes     Start date: 5/5/2007    Smokeless tobacco: Never Used    Tobacco comment: 0.5-1 ppd 5/5/17 last cig 5/7/19   Substance Use Topics    Alcohol use: Yes     Comment: rarely       Subjective:       Review of Systems   Constitutional: Negative for activity change, appetite change, chills, diaphoresis, fatigue, fever and unexpected weight change.    HENT: Negative for congestion, dental problem, ear discharge, ear pain, facial swelling, hearing loss, mouth sores, nosebleeds, postnasal drip, rhinorrhea, sinus pressure, sneezing, sore throat, tinnitus, trouble swallowing and voice change. Eyes: Negative for visual disturbance. Respiratory: Negative for apnea, cough, choking, chest tightness, shortness of breath, wheezing and stridor. Cardiovascular: Negative for chest pain, palpitations and leg swelling. Gastrointestinal: Negative for abdominal pain, diarrhea, nausea and vomiting. Endocrine: Negative for cold intolerance, heat intolerance, polydipsia and polyuria. Genitourinary: Negative for dysuria, enuresis and hematuria. Musculoskeletal: Negative for arthralgias, gait problem, neck pain and neck stiffness. Skin: Negative for color change and rash. Allergic/Immunologic: Negative for environmental allergies, food allergies and immunocompromised state. Neurological: Negative for dizziness, syncope, facial asymmetry, speech difficulty, light-headedness and headaches. Hematological: Negative for adenopathy. Does not bruise/bleed easily. Psychiatric/Behavioral: Negative for confusion and sleep disturbance. The patient is not nervous/anxious. Objective:     /80 (Site: Left Upper Arm, Position: Sitting)   Pulse 80   Temp 97.6 °F (36.4 °C) (Infrared)   Resp 14   Ht 5' 10\" (1.778 m)   Wt (!) 304 lb (137.9 kg)   BMI 43.62 kg/m²     Physical Exam   Nose: Good airway. Some crusting present      PROCEDURE NOTE: SINUS DEBRIDEMENT     Nasal cavity was topically anesthetized and decongested. Rigid nasal endoscopy was performed and debris was removed from the middle meatus on on the Bilateral side using suction and/or instrumentation. Blood clots and adhesions were addressed. Mucosa appears to be healing well. Clean these crusts and secretions significantly improved the pressure feeling on his face. The patient tolerated the procedure well.     Data:  All of the past medical history, past surgical history, family history,social history, allergies and current medications were reviewed with the patient. Assessment & Plan   Diagnoses and all orders for this visit:     Diagnosis Orders   1. Deviated nasal septum, anterior     2. Hypertrophy of inferior nasal turbinate     3. Isadora bullosa     4. Chronic ethmoidal sinusitis     5. Chronic maxillary sinusitis     6. Chronic sphenoidal sinusitis     7. Chronic frontal sinusitis     8. Ostiomeatal complex obstruction of nasal sinus     9. Status post nasal septoplasty     10. Status post functional endoscopic sinus surgery (FESS)         The findings were explained and his questions were answered. Options were discussed including ordering vitamin D labs. I will see him in 4 weeks. Call if having any issues. I suggested he discuss with sleep medicine the CPAP pressure. Could probably be lowered. He agreed. 4 week follow up       I, Jose Aguirre CMA ([facility]), am scribing for, and in the presence of Dr. Osiris Chapman. Electronically signed by Olga Rinaldi CMA ([facility]) on 7/22/21 at 11:32 AM EDT. (Please note that portions of this note were completed with a voice recognition program. Efforts were made to edit the dictations butoccasionally words are mis-transcribed.)    I agree to the above documentation placed by my scribe. I have personally evaluated this patient. Additional findings are as noted. I reviewed the scribe's note and agree with the documented findings and plan of care. Any areas of disagreement are corrected. I agree with the chief complaint, past medical history, past surgical history, allergies, medications, social and family history as documented unless otherwise noted below. Electronically signed by Elia Bruno MD on 8/21/2021 at 6:03 PM    Verbal order from Dr. Talha Bagley to anesthetize the patient's nasal cavity.   After checking the patient's allergy list to confirm no listed medication allergy and verbally asking the patient, the patient's nasal cavity was anesthetized with topical 4% Xylocaine 4 sprays each nostril and Epifanio-Synephrine 4 sprays each nostril .

## 2021-08-23 ENCOUNTER — TELEPHONE (OUTPATIENT)
Dept: ENT CLINIC | Age: 36
End: 2021-08-23

## 2021-08-23 NOTE — TELEPHONE ENCOUNTER
----- Message from Timi Velez MD sent at 8/21/2021  6:09 PM EDT -----  Regarding: Not seeing the vitamin D level ordered 7/22  Please check to see if he had this drawn somewhere else and if not, encourage him to get that completed. Also encourage keeping his return visit.   Thank you

## 2021-08-25 NOTE — TELEPHONE ENCOUNTER
Called Pt. And let him know he needed his Vitamin D levels drawn. Pt. Asked to be sent the order so he could get them done before next appointment. Pt. Voiced understanding. Mailing lab.

## 2021-08-31 ENCOUNTER — HOSPITAL ENCOUNTER (OUTPATIENT)
Dept: INFUSION THERAPY | Age: 36
Discharge: HOME | End: 2021-08-31
Payer: MEDICAID

## 2021-08-31 ENCOUNTER — OFFICE VISIT (OUTPATIENT)
Dept: ONCOLOGY | Age: 36
End: 2021-08-31
Payer: MEDICAID

## 2021-08-31 VITALS
WEIGHT: 306.2 LBS | HEART RATE: 96 BPM | OXYGEN SATURATION: 98 % | TEMPERATURE: 98.6 F | BODY MASS INDEX: 42.87 KG/M2 | HEIGHT: 71 IN | RESPIRATION RATE: 20 BRPM | DIASTOLIC BLOOD PRESSURE: 94 MMHG | SYSTOLIC BLOOD PRESSURE: 158 MMHG

## 2021-08-31 DIAGNOSIS — R25.2 LEG CRAMPS: ICD-10-CM

## 2021-08-31 DIAGNOSIS — D69.6 THROMBOCYTOPENIA (HCC): Primary | ICD-10-CM

## 2021-08-31 DIAGNOSIS — E88.09 PLASMA CELL DYSCRASIA: ICD-10-CM

## 2021-08-31 DIAGNOSIS — Z98.890 STATUS POST FUNCTIONAL ENDOSCOPIC SINUS SURGERY (FESS): ICD-10-CM

## 2021-08-31 LAB — VITAMIN D 25-HYDROXY: 24 NG/ML (ref 30–100)

## 2021-08-31 PROCEDURE — 99211 OFF/OP EST MAY X REQ PHY/QHP: CPT

## 2021-08-31 PROCEDURE — 4004F PT TOBACCO SCREEN RCVD TLK: CPT | Performed by: INTERNAL MEDICINE

## 2021-08-31 PROCEDURE — 99204 OFFICE O/P NEW MOD 45 MIN: CPT | Performed by: INTERNAL MEDICINE

## 2021-08-31 PROCEDURE — 82306 VITAMIN D 25 HYDROXY: CPT

## 2021-08-31 PROCEDURE — G8417 CALC BMI ABV UP PARAM F/U: HCPCS | Performed by: INTERNAL MEDICINE

## 2021-08-31 PROCEDURE — G8427 DOCREV CUR MEDS BY ELIG CLIN: HCPCS | Performed by: INTERNAL MEDICINE

## 2021-08-31 PROCEDURE — 36415 COLL VENOUS BLD VENIPUNCTURE: CPT

## 2021-09-09 ENCOUNTER — TELEPHONE (OUTPATIENT)
Dept: FAMILY MEDICINE CLINIC | Age: 36
End: 2021-09-09

## 2021-09-09 ENCOUNTER — OFFICE VISIT (OUTPATIENT)
Dept: ENT CLINIC | Age: 36
End: 2021-09-09
Payer: MEDICAID

## 2021-09-09 VITALS
BODY MASS INDEX: 41.14 KG/M2 | HEART RATE: 80 BPM | RESPIRATION RATE: 14 BRPM | SYSTOLIC BLOOD PRESSURE: 134 MMHG | TEMPERATURE: 97.2 F | HEIGHT: 72 IN | WEIGHT: 303.7 LBS | DIASTOLIC BLOOD PRESSURE: 70 MMHG

## 2021-09-09 DIAGNOSIS — J34.3 HYPERTROPHY OF INFERIOR NASAL TURBINATE: ICD-10-CM

## 2021-09-09 DIAGNOSIS — J32.0 CHRONIC MAXILLARY SINUSITIS: ICD-10-CM

## 2021-09-09 DIAGNOSIS — J32.3 CHRONIC SPHENOIDAL SINUSITIS: ICD-10-CM

## 2021-09-09 DIAGNOSIS — D69.6 THROMBOCYTOPENIA (HCC): ICD-10-CM

## 2021-09-09 DIAGNOSIS — E55.9 VITAMIN D DEFICIENCY: ICD-10-CM

## 2021-09-09 DIAGNOSIS — Z99.89 OSA ON CPAP: ICD-10-CM

## 2021-09-09 DIAGNOSIS — J34.89 OSTIOMEATAL COMPLEX OBSTRUCTION OF NASAL SINUS: ICD-10-CM

## 2021-09-09 DIAGNOSIS — J34.2 DEVIATED NASAL SEPTUM: ICD-10-CM

## 2021-09-09 DIAGNOSIS — E88.09 PLASMA CELL DYSCRASIA: Primary | ICD-10-CM

## 2021-09-09 DIAGNOSIS — Z98.890 STATUS POST FUNCTIONAL ENDOSCOPIC SINUS SURGERY (FESS): ICD-10-CM

## 2021-09-09 DIAGNOSIS — J32.1 CHRONIC FRONTAL SINUSITIS: ICD-10-CM

## 2021-09-09 DIAGNOSIS — J34.89 CONCHA BULLOSA: ICD-10-CM

## 2021-09-09 DIAGNOSIS — J32.2 CHRONIC ETHMOIDAL SINUSITIS: ICD-10-CM

## 2021-09-09 DIAGNOSIS — G47.33 OSA ON CPAP: ICD-10-CM

## 2021-09-09 DIAGNOSIS — Z98.890 STATUS POST NASAL SEPTOPLASTY: ICD-10-CM

## 2021-09-09 PROCEDURE — 4004F PT TOBACCO SCREEN RCVD TLK: CPT | Performed by: OTOLARYNGOLOGY

## 2021-09-09 PROCEDURE — G8427 DOCREV CUR MEDS BY ELIG CLIN: HCPCS | Performed by: OTOLARYNGOLOGY

## 2021-09-09 PROCEDURE — 99212 OFFICE O/P EST SF 10 MIN: CPT | Performed by: OTOLARYNGOLOGY

## 2021-09-09 PROCEDURE — G8417 CALC BMI ABV UP PARAM F/U: HCPCS | Performed by: OTOLARYNGOLOGY

## 2021-09-09 RX ORDER — CHOLECALCIFEROL (VITAMIN D3) 1250 MCG
1 CAPSULE ORAL WEEKLY
Qty: 8 CAPSULE | Refills: 3 | Status: SHIPPED | OUTPATIENT
Start: 2021-09-09 | End: 2022-08-29 | Stop reason: SDUPTHER

## 2021-09-09 ASSESSMENT — ENCOUNTER SYMPTOMS
STRIDOR: 0
SHORTNESS OF BREATH: 0
CHOKING: 0
FACIAL SWELLING: 0
NAUSEA: 0
VOMITING: 0
RHINORRHEA: 0
COUGH: 0
COLOR CHANGE: 0
DIARRHEA: 0
APNEA: 0
TROUBLE SWALLOWING: 0
SINUS PRESSURE: 0
CHEST TIGHTNESS: 0
SORE THROAT: 0
ABDOMINAL PAIN: 0
VOICE CHANGE: 0
WHEEZING: 0

## 2021-09-09 NOTE — PROGRESS NOTES
[facility] [place] [facility]  [facility]AR, NOSE AND THRO[facility]  [facility] [place]  Dept: [phone]  Dept Fax: [phone]  Loc: [phone]    Daryle Fern is a 39 y.o. male who was referred byNo ref. provider found for:  Chief Complaint   Patient presents with   Ramin Smith Post-Op Check     Patient is here post-op post-op igs sinus endo w maxillary antrostomy, r/s scott, sphenoidotomy, ethmoidectomy, frontal, septo, smr 7/7/21   . HPI:     Daryle Fern is a 39 y.o. male who presents today for post op Septoplasty; bilateral complete endoscopic ethmoidectomies; right maxillary endoscopy and removal of tissue; left maxillary  antrostomy; right endoscopic sphenoidostomy; right frontal sinusotomy; partial submucous resection, both inferior turbinates; partial resection, scott bullosa of both superior turbinates on 7/7/21. His nose is better. Still rinsing but not as much as he should be doing. Ears are hurting. Having ear aches, today his ears are good. Pressure at times. No drainage. Had a bone marrow biopsy done with Dr. Amy Patterson. He was told he had Leukemia. Patient wanted second opinion and saw Dr. Adalgisa Estes. 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 Monoclonal antibody was found. Apparently so far, this only involves the bone marrow. He will continue to do blood work every month to keep an eye on it. He has an appointment on March 1st with Dr. Adalgisa Estes.       History:     No Known Allergies  Current Outpatient Medications   Medication Sig Dispense Refill    Cholecalciferol (VITAMIN D3) 1.25 MG (19498 UT) CAPS Take 1 capsule by mouth once a week 8 capsule 3    tiZANidine (ZANAFLEX) 4 MG tablet Take 1 tablet by mouth every 8 hours as needed (spasms) 30 tablet 0    acetaminophen (TYLENOL) 500 MG tablet Take 1,000 mg by mouth every 6 hours as needed for Pain      omeprazole (PRILOSEC) 40 MG delayed release capsule Take 1 capsule by mouth every morning (before breakfast) 30 capsule 2    ibuprofen (ADVIL;MOTRIN) 600 MG tablet Take 1 tablet by mouth 3 times daily (with meals) for 7 days 21 tablet 0    predniSONE (DELTASONE) 20 MG tablet Take 1 tablet twice daily for five days, then take 1 tablet once a day for 5 days. 15 tablet 0    metaxalone (SKELAXIN) 400 MG tablet Take 2 tablets by mouth 3 times daily 30 tablet 0     No current facility-administered medications for this visit.      Past Medical History:   Diagnosis Date    Chronic back pain     Diverticulitis     Hidradenitis suppurativa     Pancytopenia ([address] 07/2021    Sinusitis     Sleep apnea     has CPAP      Past Surgical History:   Procedure Laterality Date    ANKLE SURGERY  2001    BACK SURGERY      x3    BONE MARROW BIOPSY  08/2021    CHOLECYSTECTOMY      COLON SURGERY      NOSE SURGERY  1993    Laser Surgery    RECTAL SURGERY      Ruptured Sigmoid Colon 2013    SEPTOPLASTY Left 7/19/2017    SEPTOPLASTY SUBMUCOUS RESECT TURBINATES, PARTIAL LEFT performed by Yo Ruby MD at [facility] SEPTOPLASTY N/A 5/8/2019    TONSILLECTOMY, UPPP performed by Yo Ruby MD at [address] [place] [address] Bilateral 7/7/2021    IMAGE GUIDED NASAL SINUS ENDOSCOPY WITH BILATERAL MAXILLARY ANTROSTOMY, PARTIAL RESECTION BETITO BULLOSA BILATERAL SUPERIOR TURBINATES, SPHENOIDOTOMY, RIGHT, TOTAL ETHMOIDECTOMY ANTERIOR AND POSTERIOR, FONTAL SINUS EXPLORATION RIGHT WITHOUT REMOVAL OF TISSUE, MAXILLARY ANTROSTOMY WITH REMOVAL OF TISSUE FROM RIGHT MAXILLARY SINUS, SEPTOPLASTY, SUBMUCOUS RESECTION TURBINATES, BILATERAL PARTIAL INFERI     Family History   Problem Relation Age of Onset    Cancer Mother         Breast Cancer    Diabetes Father     Heart Disease Father     Other Father     Heart Disease Maternal Grandmother     Heart Disease Maternal Grandfather     Diabetes Paternal Grandmother     COPD Paternal Grandmother     Diabetes Paternal Ronlade Pia COPD Paternal Grandfather      Social History     Tobacco Use    Smoking status: Current Every Day Smoker     Packs/day: 1.00     Types: Cigarettes     Start date: 5/5/2007    Smokeless tobacco: Never Used    Tobacco comment: 0.5-1 ppd 5/5/17 last cig 5/7/19   Substance Use Topics    Alcohol use: Yes     Comment: rarely       Subjective:       Review of Systems   Constitutional: Negative for activity change, appetite change, chills, diaphoresis, fatigue, fever and unexpected weight change. HENT: Negative for congestion, dental problem, ear discharge, ear pain, facial swelling, hearing loss, mouth sores, nosebleeds, postnasal drip, rhinorrhea, sinus pressure, sneezing, sore throat, tinnitus, trouble swallowing and voice change. Eyes: Negative for visual disturbance. Respiratory: Negative for apnea, cough, choking, chest tightness, shortness of breath, wheezing and stridor. Cardiovascular: Negative for chest pain, palpitations and leg swelling. Gastrointestinal: Negative for abdominal pain, diarrhea, nausea and vomiting. Endocrine: Negative for cold intolerance, heat intolerance, polydipsia and polyuria. Genitourinary: Negative for dysuria, enuresis and hematuria. Musculoskeletal: Negative for arthralgias, gait problem, neck pain and neck stiffness. Skin: Negative for color change and rash. Allergic/Immunologic: Negative for environmental allergies, food allergies and immunocompromised state. Neurological: Negative for dizziness, syncope, facial asymmetry, speech difficulty, light-headedness and headaches. Hematological: Negative for adenopathy. Does not bruise/bleed easily. Psychiatric/Behavioral: Negative for confusion and sleep disturbance. The patient is not nervous/anxious.         Objective:     /70 (Site: Left Upper Arm, Position: Sitting)   Pulse 80   Temp 97.2 °F (36.2 °C) (Infrared)   Resp 14   Ht 6' (1.829 m)   Wt (!) 303 lb 11.2 oz (137.8 kg)   BMI 41.19 kg/m²     Physical Exam butoccasionally words are mis-transcribed.)    I agree to the above documentation placed by my scribe. I have personally evaluated this patient. Additional findings are as noted. I reviewed the scribe's note and agree with the documented findings and plan of care. Any areas of disagreement are corrected. I agree with the chief complaint, past medical history, past surgical history, allergies, medications, social and family history as documented unless otherwise noted below.      Electronically signed by Yaya Burk MD on 9/19/2021 at 8:14 PM

## 2021-09-10 ENCOUNTER — OFFICE VISIT (OUTPATIENT)
Dept: FAMILY MEDICINE CLINIC | Age: 36
End: 2021-09-10
Payer: MEDICAID

## 2021-09-10 VITALS
OXYGEN SATURATION: 91 % | RESPIRATION RATE: 20 BRPM | WEIGHT: 302.2 LBS | HEART RATE: 80 BPM | SYSTOLIC BLOOD PRESSURE: 136 MMHG | BODY MASS INDEX: 40.99 KG/M2 | TEMPERATURE: 98 F | DIASTOLIC BLOOD PRESSURE: 86 MMHG

## 2021-09-10 DIAGNOSIS — M25.511 CHRONIC RIGHT SHOULDER PAIN: Primary | ICD-10-CM

## 2021-09-10 DIAGNOSIS — G89.29 CHRONIC RIGHT SHOULDER PAIN: Primary | ICD-10-CM

## 2021-09-10 PROCEDURE — 4004F PT TOBACCO SCREEN RCVD TLK: CPT | Performed by: NURSE PRACTITIONER

## 2021-09-10 PROCEDURE — G8417 CALC BMI ABV UP PARAM F/U: HCPCS | Performed by: NURSE PRACTITIONER

## 2021-09-10 PROCEDURE — 99213 OFFICE O/P EST LOW 20 MIN: CPT | Performed by: NURSE PRACTITIONER

## 2021-09-10 PROCEDURE — G8427 DOCREV CUR MEDS BY ELIG CLIN: HCPCS | Performed by: NURSE PRACTITIONER

## 2021-09-10 RX ORDER — IBUPROFEN 600 MG/1
600 TABLET ORAL
Qty: 21 TABLET | Refills: 0 | Status: SHIPPED | OUTPATIENT
Start: 2021-09-10 | End: 2021-09-29 | Stop reason: ALTCHOICE

## 2021-09-10 RX ORDER — METAXALONE 400 MG/1
800 TABLET ORAL 3 TIMES DAILY
Qty: 30 TABLET | Refills: 0 | Status: SHIPPED | OUTPATIENT
Start: 2021-09-10 | End: 2021-09-29 | Stop reason: ALTCHOICE

## 2021-09-10 RX ORDER — PREDNISONE 20 MG/1
TABLET ORAL
Qty: 15 TABLET | Refills: 0 | Status: SHIPPED | OUTPATIENT
Start: 2021-09-10 | End: 2021-09-29 | Stop reason: ALTCHOICE

## 2021-09-10 NOTE — PROGRESS NOTES
Madi Medina (:  1985) is a 39 y.o. male,Established patient, here for evaluation of the following chief complaint(s):  Shoulder Pain (right shoulder pain, tried chiropracter x1-2 months)         ASSESSMENT/PLAN:  1. Chronic right shoulder pain  - Acute on chronic  - Loss of ROM is concerning  - Start nsaid, prednisone, and muscle relaxant  - Xray for further evaluation  - Consider orthopedic referral if no resolution in symptoms  -     XR SHOULDER RIGHT (MIN 2 VIEWS); Future  -     ibuprofen (ADVIL;MOTRIN) 600 MG tablet; Take 1 tablet by mouth 3 times daily (with meals) for 7 days, Disp-21 tablet, R-0Normal  -     predniSONE (DELTASONE) 20 MG tablet; Take 1 tablet twice daily for five days, then take 1 tablet once a day for 5 days. , Disp-15 tablet, R-0Normal  -     metaxalone (SKELAXIN) 400 MG tablet; Take 2 tablets by mouth 3 times daily, Disp-30 tablet, R-0Normal      Return if symptoms worsen or fail to improve. Subjective   SUBJECTIVE/OBJECTIVE:  Shoulder Pain   The pain is present in the right shoulder and neck. This is a chronic problem. The current episode started more than 1 month ago. There has been a history of trauma. The problem occurs constantly. The problem has been gradually worsening. The quality of the pain is described as sharp and aching. The pain is at a severity of 9/10. Associated symptoms include a limited range of motion. Pertinent negatives include no stiffness. He has tried acetaminophen, cold and heat (1000 mg tid acetiminophen) for the symptoms. The treatment provided no relief. Review of Systems   Musculoskeletal: Positive for arthralgias, gait problem and myalgias. Negative for stiffness. Objective   Physical Exam  Constitutional:       General: He is not in acute distress. Appearance: Normal appearance. HENT:      Head: Normocephalic and atraumatic.       Right Ear: External ear normal.      Left Ear: External ear normal.      Nose: No nasal deformity or rhinorrhea. Mouth/Throat:      Lips: Pink. No lesions. Eyes:      General: Lids are normal.         Right eye: No discharge. Left eye: No discharge. Conjunctiva/sclera: Conjunctivae normal.   Pulmonary:      Effort: No accessory muscle usage or respiratory distress. Breath sounds: No stridor. Musculoskeletal:      Cervical back: Normal range of motion. Skin:     Coloration: Skin is not pale. Findings: No rash. Neurological:      General: No focal deficit present. Mental Status: He is alert. Mental status is at baseline. Psychiatric:         Mood and Affect: Mood normal.         Behavior: Behavior is cooperative. An electronic signature was used to authenticate this note.     --Gustavo Lea, XOCHITL - CNP

## 2021-09-10 NOTE — PATIENT INSTRUCTIONS
Patient Education        Shoulder Stretches: Exercises  Introduction  Here are some examples of exercises for you to try. The exercises may be suggested for a condition or for rehabilitation. Start each exercise slowly. Ease off the exercises if you start to have pain. You will be told when to start these exercises and which ones will work best for you. How to do the exercises  Shoulder stretch   1.  a doorway and place one arm against the door frame. Your elbow should be a little higher than your shoulder. 2. Relax your shoulders as you lean forward, allowing your chest and shoulder muscles to stretch. You can also turn your body slightly away from your arm to stretch the muscles even more. 3. Hold for 15 to 30 seconds. 4. Repeat 2 to 4 times with each arm. Shoulder and chest stretch   1. Shoulder and chest stretch  2. While sitting, relax your upper body so you slump slightly in your chair. 3. As you breathe in, straighten your back and open your arms out to the sides. 4. Gently pull your shoulder blades back and downward. 5. Hold for 15 to 30 seconds as your breathe normally. 6. Repeat 2 to 4 times. Overhead stretch   1. Reach up over your head with both arms. 2. Hold for 15 to 30 seconds. 3. Repeat 2 to 4 times. Follow-up care is a key part of your treatment and safety. Be sure to make and go to all appointments, and call your doctor if you are having problems. It's also a good idea to know your test results and keep a list of the medicines you take. Where can you learn more? Go to https://eyal.[employer] Karaoke. org and sign in to your [employer] account. Enter S254 in the C2FO box to learn more about \"Shoulder Stretches: Exercises. \"     If you do not have an account, please click on the \"Sign Up Now\" link. Current as of: November 16, 2020               Content Version: 12.9  © 9899-5232 Healthwise, Incorporated.    Care instructions adapted under license by [facility] ([facility]). If you have questions about a medical condition or this instruction, always ask your healthcare professional. [place] [address] any warranty or liability for your use of this information. Patient Education        Shoulder Sprain: Care Instructions  Your Care Instructions     A shoulder sprain occurs when you stretch or tear a ligament in your shoulder. Ligaments are tough tissues that connect one bone to another. A sprain can happen during sports, a fall, or projects around the house. Shoulder sprains usually get better with treatment at home. Follow-up care is a key part of your treatment and safety. Be sure to make and go to all appointments, and call your doctor if you are having problems. It's also a good idea to know your test results and keep a list of the medicines you take. How can you care for yourself at home? · Rest and protect your shoulder. Try to stop or reduce any action that causes pain. · If your doctor gave you a sling or immobilizer, wear it as directed. A sling or immobilizer supports your shoulder and may make you more comfortable. · Put ice or a cold pack on your shoulder for 10 to 20 minutes at a time. Try to do this every 1 to 2 hours for the next 3 days (when you are awake) or until the swelling goes down. Put a thin cloth between the ice and your skin. Some doctors suggest alternating between hot and cold. · Be safe with medicines. Read and follow all instructions on the label. ? If the doctor gave you a prescription medicine for pain, take it as prescribed. ? If you are not taking a prescription pain medicine, ask your doctor if you can take an over-the-counter medicine. · For the first day or two after an injury, avoid things that might increase swelling, such as hot showers, hot tubs, or hot packs. · After 2 or 3 days, if your swelling is gone, apply a heating pad set on low or a warm cloth to your shoulder.  This helps keep your shoulder flexible. Some doctors suggest that you go back and forth between hot and cold. Put a thin cloth between the heating pad and your skin. · Follow your doctor's or physical therapist's directions for exercises. · Return to your usual level of activity slowly. When should you call for help? Call your doctor now or seek immediate medical care if:    · Your pain is worse.     · You cannot move your shoulder.     · Your arm is cool or pale or changes color below the shoulder.     · You have tingling, weakness, or numbness in your arm. Watch closely for changes in your health, and be sure to contact your doctor if:    · You do not get better as expected. Where can you learn more? Go to https://SocialGuide.[employer]. org and sign in to your [employer] account. Enter G186 in the Dubizzle box to learn more about \"Shoulder Sprain: Care Instructions. \"     If you do not have an account, please click on the \"Sign Up Now\" link. Current as of: November 16, 2020               Content Version: 12.9  © 2006-2021 Healthwise, Incorporated. Care instructions adapted under license by [facility] ([facility]). If you have questions about a medical condition or this instruction, always ask your healthcare professional. [place] [address] any warranty or liability for your use of this information.

## 2021-09-19 NOTE — PROGRESS NOTES
[facility]  [facility]  [facility] [address] [facility] [address]  [address]  [address]  Dept: [phone]  Dept Fax: [phone]: [phone]     Encounter Date:  08/31/2021    Referring Physician:  Mary Keane MD     Primary Provider: Javier Barrera MD     Subjective:      Chief Complaint:  Ivana Corey is a 39 y.o. with thrombocytopenia. HPI:  This is the first visit to the [facility] for this patient who was referred by Mary Keane MD for evaluation of thrombocytopenia. The patient has a thrombocytopenia dating back at least 5 years. His thrombocytopenia is been mild in nature without significant complications related to his low platelet count. He did have a bone marrow biopsy completed that found a slight elevation of his plasma cells suggestive of a plasma cell dyscrasia. Cytogenetics from the bone marrow specimen were noted to be normal.  The patient would like to change his hematology evaluation to our office here in [facility] as Dr. Lukas Gould has moved his office to [facility]. Today's evaluation his general sense of wellbeing is good. His most recent platelet count was 742,236. The patient's total white blood cell count, hemoglobin, and hematocrit were all normal. He has not had fever, cough, shortness of breath or other signs of infection. The patient's bowel and bladder habits have been normal.  He has not seen blood in his stool or urine. The patient remains active with an ECOG performance status of level 0. Past Medical History  He  has a past medical history of Chronic back pain, Diverticulitis, Hidradenitis suppurativa, Pancytopenia ([address] Sinusitis, and Sleep apnea. Surgical History  He  has a past surgical history that includes Rectal surgery; Ankle surgery (2001); Nose surgery (1993);  Cholecystectomy; Colon surgery; Septoplasty (Left, 7/19/2017); Septoplasty (N/A, 5/8/2019); back surgery; Sinus endoscopy (Bilateral, 7/7/2021); and bone marrow biopsy (08/2021). Home Medications  He has a current medication list which includes the following prescription(s): tizanidine, acetaminophen, omeprazole, ibuprofen, prednisone, metaxalone, and vitamin d3. Allergies  No Known Allergies    Social History  He  reports that he has been smoking cigarettes. He started smoking about 14 years ago. He has been smoking about 1.00 pack per day. He has never used smokeless tobacco. He reports current alcohol use. He reports that he does not use drugs. Family History  His family history includes COPD in his paternal grandfather and paternal grandmother; Cancer in his mother; Diabetes in his father, paternal grandfather, and paternal grandmother; Heart Disease in his father, maternal grandfather, and maternal grandmother; Other in his father. Review of Systems  Constitutional: Weight loss, fatigue. HENT: Hearing changes. Eyes: Negative. Respiratory: Dyspnea. Cardiovascular: Negative. Gastrointestinal: Constipation. Genitourinary: Negative. Musculoskeletal: Joint pain, muscle weakness. Skin: Negative. Neurological: Headaches, dizziness, numbness, confusion. Hematological: Negative. Psychiatric/Behavioral: Negative. Objective:   Physical Exam  Vitals:    08/31/21 1423   BP: (!) 158/94   Pulse: 96   Resp: 20   Temp: 98.6 °F (37 °C)   SpO2: 98%   Vitals reviewed and are stable. Constitutional: Well-developed. No acute distress. HENT: Normocephalic and atraumatic. Eyes: PERRL. No scleral icterus. Neck: Overall appearance is symmetrical. No identifiable mass. Chest: Inspection and palpation of chest is normal.  Pulmonary: Effort normal. No respiratory distress. Cardiovascular: RRR. No edema in any of the four extremities. Abdominal: No hepatomegaly or splenomegaly.    Musculoskeletal: Gait is normal. Muscle strength and tone appear normal.  Neurological: Alert and oriented to person, place, and time. Judgment and thought content normal.  Skin: Skin appears warm and dry. Psychiatric: Mood and affect appropriate for the clinical situation. Assessment:   1. Thrombocytopenia. 2.  Plasma cell dyscrasia. Plan:   1. Monitor platelet count and for any signs of abnormal bleeding/bruising. 2.  Monitor plasma cell dyscrasia. Multiple questions were answered throughout the course the consultation. By the end of the consultation, all the patient's questions had been answered. The patient was asked to call if there were any questions or concerns prior to the next scheduled clinic visit. Abhijit Hauser M.D. Medical Director: [facility]  Cancer Network [address] [facility], [address] [place] [address], [address] [place] [address], [address] [place] [address] of the [facility] at [facility]      **This report has been created using voice recognition software. It may contain minor errors which are inherent in voice recognition technology. **

## 2021-09-20 ENCOUNTER — TELEPHONE (OUTPATIENT)
Dept: FAMILY MEDICINE CLINIC | Age: 36
End: 2021-09-20

## 2021-09-20 NOTE — TELEPHONE ENCOUNTER
Request Reference Number: [id number]. METAXALONE TAB 400MG is approved through 09/20/2022. For further questions, call [employer] at [phone]. Pharmacy notified.

## 2021-11-23 ENCOUNTER — OFFICE VISIT (OUTPATIENT)
Dept: FAMILY MEDICINE CLINIC | Age: 36
End: 2021-11-23
Payer: MEDICAID

## 2021-11-23 VITALS
SYSTOLIC BLOOD PRESSURE: 138 MMHG | WEIGHT: 311.6 LBS | DIASTOLIC BLOOD PRESSURE: 88 MMHG | TEMPERATURE: 97.5 F | BODY MASS INDEX: 42.2 KG/M2 | HEIGHT: 72 IN | HEART RATE: 114 BPM | RESPIRATION RATE: 20 BRPM | OXYGEN SATURATION: 97 %

## 2021-11-23 DIAGNOSIS — L40.9 PSORIASIS: ICD-10-CM

## 2021-11-23 DIAGNOSIS — R07.9 CHEST PAIN, UNSPECIFIED TYPE: Primary | ICD-10-CM

## 2021-11-23 DIAGNOSIS — K21.9 GASTROESOPHAGEAL REFLUX DISEASE, UNSPECIFIED WHETHER ESOPHAGITIS PRESENT: ICD-10-CM

## 2021-11-23 DIAGNOSIS — M62.838 MUSCLE SPASM: ICD-10-CM

## 2021-11-23 PROCEDURE — 99214 OFFICE O/P EST MOD 30 MIN: CPT | Performed by: FAMILY MEDICINE

## 2021-11-23 PROCEDURE — G8427 DOCREV CUR MEDS BY ELIG CLIN: HCPCS | Performed by: FAMILY MEDICINE

## 2021-11-23 PROCEDURE — G8484 FLU IMMUNIZE NO ADMIN: HCPCS | Performed by: FAMILY MEDICINE

## 2021-11-23 PROCEDURE — G8417 CALC BMI ABV UP PARAM F/U: HCPCS | Performed by: FAMILY MEDICINE

## 2021-11-23 PROCEDURE — 4004F PT TOBACCO SCREEN RCVD TLK: CPT | Performed by: FAMILY MEDICINE

## 2021-11-23 RX ORDER — PANTOPRAZOLE SODIUM 40 MG/1
40 TABLET, DELAYED RELEASE ORAL
Qty: 60 TABLET | Refills: 5 | Status: SHIPPED | OUTPATIENT
Start: 2021-11-23 | End: 2022-08-30 | Stop reason: SDUPTHER

## 2021-11-23 RX ORDER — FLUOCINONIDE TOPICAL SOLUTION USP, 0.05% 0.5 MG/ML
SOLUTION TOPICAL
Qty: 60 ML | Refills: 2 | Status: SHIPPED | OUTPATIENT
Start: 2021-11-23 | End: 2022-04-06 | Stop reason: ALTCHOICE

## 2021-11-23 RX ORDER — TIZANIDINE 4 MG/1
8 TABLET ORAL EVERY 8 HOURS PRN
Qty: 60 TABLET | Refills: 0 | Status: SHIPPED | OUTPATIENT
Start: 2021-11-23 | End: 2022-03-21 | Stop reason: ALTCHOICE

## 2021-11-23 ASSESSMENT — ENCOUNTER SYMPTOMS
CHEST TIGHTNESS: 0
ABDOMINAL PAIN: 0
RHINORRHEA: 0
BACK PAIN: 0
DIARRHEA: 0
SORE THROAT: 0
WHEEZING: 0
CONSTIPATION: 0
EYE PAIN: 0
NAUSEA: 0
VOMITING: 0
BLOOD IN STOOL: 0
SHORTNESS OF BREATH: 0
COUGH: 0

## 2021-11-23 ASSESSMENT — PATIENT HEALTH QUESTIONNAIRE - PHQ9
SUM OF ALL RESPONSES TO PHQ QUESTIONS 1-9: 0
SUM OF ALL RESPONSES TO PHQ9 QUESTIONS 1 & 2: 0
2. FEELING DOWN, DEPRESSED OR HOPELESS: 0
SUM OF ALL RESPONSES TO PHQ QUESTIONS 1-9: 0
1. LITTLE INTEREST OR PLEASURE IN DOING THINGS: 0
SUM OF ALL RESPONSES TO PHQ QUESTIONS 1-9: 0

## 2021-11-23 NOTE — PROGRESS NOTES
De Day (:  1985) is a 39 y.o. male,Established patient, here for evaluation of the following chief complaint(s):  Hair/Scalp Problem (chest pain comes and goes, heartburn)         ASSESSMENT/PLAN:  1. Chest pain, unspecified type  -normal ekg in 2021--likely GI related. 2. Gastroesophageal reflux disease, unspecified whether esophagitis present  -     pantoprazole (PROTONIX) 40 MG tablet; Take 1 tablet by mouth 2 times daily (before meals), Disp-60 tablet, R-5Normal  -chronic condition, exacerbated, stop the omeprazole, trial of protonix 40 mg BID, -monitor sxs, call if not improving    3. Psoriasis  -     fluocinonide (LIDEX) 0.05 % external solution; Apply topically qHS. , Disp-60 mL, R-2, Normal  -new problem, trial of lidex solution, -monitor sxs, call if not improving, consider derm consult. 4. Muscle spasm  -     tiZANidine (ZANAFLEX) 4 MG tablet; Take 2 tablets by mouth every 8 hours as needed (spasms), Disp-60 tablet, R-0Normal  --chronic condition, exacerbated, increase zanaflex to 8 mg tid prn, -monitor sxs, call if not improving      No follow-ups on file. Subjective   SUBJECTIVE/OBJECTIVE:  HPI  Patient here today for a check up. Reviewed BMI of  42. Encouraged diet, exercise and weight loss. Hair and scalp, rash, itching, flaking. Tried multiple OTC. Chest pains , middle of chest.  No radiation. Sometimes food associated. Stabbing sharp pains. Normal EKG in 2021. Single, current smoker, pmh reviewed. Review of Systems   Constitutional: Negative for chills, fatigue, fever and unexpected weight change. HENT: Negative for congestion, ear pain, rhinorrhea and sore throat. Eyes: Negative for pain and visual disturbance. Respiratory: Negative for cough, chest tightness, shortness of breath and wheezing. Cardiovascular: Positive for chest pain. Negative for palpitations.    Gastrointestinal: Negative for abdominal pain, blood in stool, constipation, diarrhea, nausea and vomiting. Gerd   Genitourinary: Negative for difficulty urinating, frequency, hematuria and urgency. Musculoskeletal: Negative for back pain, joint swelling, myalgias and neck pain. Skin: Positive for rash. Neurological: Negative for dizziness and headaches. Hematological: Negative for adenopathy. Does not bruise/bleed easily. Psychiatric/Behavioral: Negative for behavioral problems and sleep disturbance. The patient is not nervous/anxious. Objective   Physical Exam  Vitals and nursing note reviewed. Constitutional:       Appearance: He is well-developed. HENT:      Head: Normocephalic and atraumatic. Right Ear: External ear normal.      Left Ear: External ear normal.      Nose: Nose normal.      Mouth/Throat:      Mouth: Mucous membranes are moist.   Eyes:      Pupils: Pupils are equal, round, and reactive to light. Neck:      Thyroid: No thyromegaly. Cardiovascular:      Rate and Rhythm: Normal rate and regular rhythm. Heart sounds: Normal heart sounds. Pulmonary:      Breath sounds: Normal breath sounds. No wheezing or rales. Abdominal:      General: Bowel sounds are normal.      Palpations: Abdomen is soft. Tenderness: There is no abdominal tenderness. There is no guarding or rebound. Musculoskeletal:         General: Normal range of motion. Cervical back: Neck supple. Lymphadenopathy:      Cervical: No cervical adenopathy. Skin:     General: Skin is warm and dry. Findings: No rash. Neurological:      Mental Status: He is alert and oriented to person, place, and time. Cranial Nerves: No cranial nerve deficit. Deep Tendon Reflexes: Reflexes are normal and symmetric. An electronic signature was used to authenticate this note.     --Olamide Gonzalez MD

## 2021-11-23 NOTE — LETTER
[address] [address]  Phone: [phone]  Fax: [phone]    Justine Macias MD        November 23, 2021     Patient: Poornima Simmons   YOB: 1985   Date of Visit: 11/23/2021       To Whom It May Concern: It is my medical opinion that Rosie Delatorre continues to be unable to work due to ongoing lumbar DDD and hidradenitis supportiva. If you have any questions or concerns, please don't hesitate to call.     Sincerely,        Justine Macias MD

## 2021-11-23 NOTE — PATIENT INSTRUCTIONS
Patient Education        Gastroesophageal Reflux Disease (GERD): Care Instructions  Overview     Gastroesophageal reflux disease (GERD) is the backward flow of stomach acid into the esophagus. The esophagus is the tube that leads from your throat to your stomach. A one-way valve prevents the stomach acid from backing up into this tube. But when you have GERD, this valve does not close tightly enough. This can also cause pain and swelling in your esophagus. (This is called esophagitis.)  If you have mild GERD symptoms including heartburn, you may be able to control the problem with antacids or over-the-counter medicine. You can also make lifestyle changes to help reduce your symptoms. These include changing your diet and eating habits, such as not eating late at night and losing weight. Follow-up care is a key part of your treatment and safety. Be sure to make and go to all appointments, and call your doctor if you are having problems. It's also a good idea to know your test results and keep a list of the medicines you take. How can you care for yourself at home? · Take your medicines exactly as prescribed. Call your doctor if you think you are having a problem with your medicine. · Your doctor may recommend over-the-counter medicine. For mild or occasional indigestion, antacids, such as Tums, Gaviscon, Mylanta, or Maalox, may help. Your doctor also may recommend over-the-counter acid reducers, such as famotidine (Pepcid AC), cimetidine (Tagamet HB), or omeprazole (Prilosec). Read and follow all instructions on the label. If you use these medicines often, talk with your doctor. · Change your eating habits. ? It's best to eat several small meals instead of two or three large meals. ? After you eat, wait 2 to 3 hours before you lie down. ? Chocolate, mint, and alcohol can make GERD worse. ?  Spicy foods, foods that have a lot of acid (like tomatoes and oranges), and coffee can make GERD symptoms worse in some people. If your symptoms are worse after you eat a certain food, you may want to stop eating that food to see if your symptoms get better. · Do not smoke or chew tobacco. Smoking can make GERD worse. If you need help quitting, talk to your doctor about stop-smoking programs and medicines. These can increase your chances of quitting for good. · If you have GERD symptoms at night, raise the head of your bed 6 to 8 inches by putting the frame on blocks or placing a foam wedge under the head of your mattress. (Adding extra pillows does not work.)  · Do not wear tight clothing around your middle. · Lose weight if you need to. Losing just 5 to 10 pounds can help. When should you call for help? Call your doctor now or seek immediate medical care if:    · You have new or different belly pain.     · Your stools are black and tarlike or have streaks of blood. Watch closely for changes in your health, and be sure to contact your doctor if:    · Your symptoms have not improved after 2 days.     · Food seems to catch in your throat or chest.   Where can you learn more? Go to https://Cymax.[employer]. org and sign in to your [employer] account. Enter L435 in the [facility] box to learn more about \"Gastroesophageal Reflux Disease (GERD): Care Instructions. \"     If you do not have an account, please click on the \"Sign Up Now\" link. Current as of: February 10, 2021               Content Version: 13.0  © 2141-0965 [employer]. Care instructions adapted under license by [facility] ([facility]). If you have questions about a medical condition or this instruction, always ask your healthcare professional. [place] [address] any warranty or liability for your use of this information. Patient Education        Psoriasis: Care Instructions  Overview  Psoriasis (say \"gvs-QU-wc-salas\") is a long-term skin problem that causes thick, white, silvery, or red patches on the skin.  The patches may be small or large, and they occur most often on the knees, elbows, scalp, hands, feet, or lower back. The skin may be scaly. If the condition is severe, your skin can become itchy and tender. Psoriasis also can be embarrassing if the patches are on visible areas. You can treat psoriasis with good care at home and with medicine from your doctor. You may put medicine on your skin and take pills or have shots to stop the redness and swelling. Your doctor also may suggest ultraviolet light treatments. Follow-up care is a key part of your treatment and safety. Be sure to make and go to all appointments, and call your doctor if you are having problems. It's also a good idea to know your test results and keep a list of the medicines you take. How can you care for yourself at home? · If your doctor prescribes medicine, use it exactly as prescribed. Follow your doctor's advice for sunlight or ultraviolet light treatment. Call your doctor if you think you are having a problem with your medicine. · Protect your skin:  ? Keep your skin moist. After bathing, put an ointment, cream, or lotion on your skin while it is still damp. This seals in moisture. Use over-the-counter products that your doctor suggests. These may include Cetaphil, Lubriderm, or Eucerin. Petroleum jelly (such as Vaseline) and vegetable shortening (such as Crisco) also work. ? If you have psoriasis on your scalp, use a shampoo with salicylic acid, such as Neutrogena T/Sal.  ? Avoid harsh skin products, such as those that contain alcohol. ? Try to prevent sunburn. Although short periods of sun exposure reduce psoriasis in most people, too much sun can damage the skin and cause skin cancer. In addition, sunburns can trigger psoriasis. Use sunscreen on areas of your skin that do not have psoriasis. Make sure to use a broad-spectrum sunscreen that has a sun protection factor (SPF) of 30 or higher. Use it every day, even when it is cloudy.   ? Take care to avoid accidents such as cutting or scraping your skin. An injury to the skin can cause psoriasis patches to form anywhere on the body, including the area of the injury. · Try making one or more changes to your daily habits to help with managing your psoriasis. For example:  ? Try to control stress and anxiety. They may cause psoriasis to appear suddenly or can make symptoms worse. ? If you smoke, think about quitting. If you need help quitting, talk to your doctor about stop-smoking programs and medicines. ? If you drink, limit or reduce the amount of alcohol you drink. ? If you are overweight, see if you can lose some weight. · Seek support from family and friends. Talk to a counselor or other professional if you feel sad about your condition and need more help. When should you call for help? Call your doctor now or seek immediate medical care if:    · You have signs of infection, such as:  ? Increased pain, swelling, warmth, or redness. ? Red streaks leading from the area. ? Pus draining from the area. ? A fever. Watch closely for changes in your health, and be sure to contact your doctor if:    · You have swelling, stiffness, or pain in your joints.     · You do not get better as expected. Where can you learn more? Go to https://innocutis.[employer]. org and sign in to your [employer] account. Enter N050 in the [facility] box to learn more about \"Psoriasis: Care Instructions. \"     If you do not have an account, please click on the \"Sign Up Now\" link. Current as of: March 3, 2021               Content Version: 13.0  © 2006-2021 [employer]. Care instructions adapted under license by [facility] ([facility]). If you have questions about a medical condition or this instruction, always ask your healthcare professional. [place] [address] any warranty or liability for your use of this information.

## 2021-11-24 ENCOUNTER — TELEPHONE (OUTPATIENT)
Dept: FAMILY MEDICINE CLINIC | Age: 36
End: 2021-11-24

## 2021-11-24 NOTE — TELEPHONE ENCOUNTER
Prior authorization approved Case ID: [id number]      Payer:  [address]   Request Reference Number: [id number]. FLUOCINONIDE SOL 0.05% is approved through 11/24/2022. For further questions, call [employer] at [phone].

## 2022-03-07 ENCOUNTER — TELEPHONE (OUTPATIENT)
Dept: ONCOLOGY | Age: 37
End: 2022-03-07

## 2022-03-21 ENCOUNTER — APPOINTMENT (OUTPATIENT)
Dept: GENERAL RADIOLOGY | Age: 37
End: 2022-03-21
Payer: MEDICARE

## 2022-03-21 ENCOUNTER — HOSPITAL ENCOUNTER (EMERGENCY)
Age: 37
Discharge: HOME | End: 2022-03-21
Payer: MEDICARE

## 2022-03-21 VITALS
RESPIRATION RATE: 18 BRPM | BODY MASS INDEX: 37.1 KG/M2 | TEMPERATURE: 98.1 F | HEART RATE: 90 BPM | WEIGHT: 265 LBS | SYSTOLIC BLOOD PRESSURE: 134 MMHG | HEIGHT: 71 IN | OXYGEN SATURATION: 98 % | DIASTOLIC BLOOD PRESSURE: 91 MMHG

## 2022-03-21 DIAGNOSIS — G89.29 ACUTE EXACERBATION OF CHRONIC LOW BACK PAIN: Primary | ICD-10-CM

## 2022-03-21 DIAGNOSIS — M54.50 ACUTE EXACERBATION OF CHRONIC LOW BACK PAIN: Primary | ICD-10-CM

## 2022-03-21 PROCEDURE — 99283 EMERGENCY DEPT VISIT LOW MDM: CPT

## 2022-03-21 PROCEDURE — 96372 THER/PROPH/DIAG INJ SC/IM: CPT

## 2022-03-21 PROCEDURE — 6370000000 HC RX 637 (ALT 250 FOR IP): Performed by: PHYSICIAN ASSISTANT

## 2022-03-21 PROCEDURE — 72100 X-RAY EXAM L-S SPINE 2/3 VWS: CPT

## 2022-03-21 PROCEDURE — 6360000002 HC RX W HCPCS: Performed by: PHYSICIAN ASSISTANT

## 2022-03-21 RX ORDER — IBUPROFEN 800 MG/1
800 TABLET ORAL EVERY 8 HOURS PRN
Qty: 15 TABLET | Refills: 0 | Status: SHIPPED | OUTPATIENT
Start: 2022-03-21 | End: 2022-08-30 | Stop reason: SDUPTHER

## 2022-03-21 RX ORDER — DIAZEPAM 5 MG/1
5 TABLET ORAL ONCE
Status: COMPLETED | OUTPATIENT
Start: 2022-03-21 | End: 2022-03-21

## 2022-03-21 RX ORDER — ORPHENADRINE CITRATE 30 MG/ML
60 INJECTION INTRAMUSCULAR; INTRAVENOUS ONCE
Status: DISCONTINUED | OUTPATIENT
Start: 2022-03-21 | End: 2022-03-21

## 2022-03-21 RX ORDER — HYDROCODONE BITARTRATE AND ACETAMINOPHEN 5; 325 MG/1; MG/1
1 TABLET ORAL EVERY 6 HOURS PRN
Qty: 8 TABLET | Refills: 0 | Status: SHIPPED | OUTPATIENT
Start: 2022-03-21 | End: 2022-03-24

## 2022-03-21 RX ORDER — DIAZEPAM 5 MG/ML
5 INJECTION, SOLUTION INTRAMUSCULAR; INTRAVENOUS ONCE
Status: DISCONTINUED | OUTPATIENT
Start: 2022-03-21 | End: 2022-03-21

## 2022-03-21 RX ORDER — KETOROLAC TROMETHAMINE 30 MG/ML
30 INJECTION, SOLUTION INTRAMUSCULAR; INTRAVENOUS ONCE
Status: COMPLETED | OUTPATIENT
Start: 2022-03-21 | End: 2022-03-21

## 2022-03-21 RX ORDER — CYCLOBENZAPRINE HCL 10 MG
10 TABLET ORAL 3 TIMES DAILY PRN
Qty: 12 TABLET | Refills: 0 | Status: SHIPPED | OUTPATIENT
Start: 2022-03-21 | End: 2022-04-06 | Stop reason: ALTCHOICE

## 2022-03-21 RX ADMIN — DIAZEPAM 5 MG: 5 TABLET ORAL at 11:54

## 2022-03-21 RX ADMIN — KETOROLAC TROMETHAMINE 30 MG: 30 INJECTION, SOLUTION INTRAMUSCULAR; INTRAVENOUS at 11:48

## 2022-03-21 ASSESSMENT — ENCOUNTER SYMPTOMS
SHORTNESS OF BREATH: 0
NAUSEA: 1
DIARRHEA: 0
ABDOMINAL PAIN: 0
RHINORRHEA: 0
BACK PAIN: 1
PHOTOPHOBIA: 0
VOMITING: 0
COUGH: 0

## 2022-03-21 ASSESSMENT — PAIN DESCRIPTION - PROGRESSION: CLINICAL_PROGRESSION: GRADUALLY WORSENING

## 2022-03-21 ASSESSMENT — PAIN - FUNCTIONAL ASSESSMENT: PAIN_FUNCTIONAL_ASSESSMENT: 0-10

## 2022-03-21 ASSESSMENT — PAIN DESCRIPTION - ONSET: ONSET: ON-GOING

## 2022-03-21 ASSESSMENT — PAIN DESCRIPTION - DESCRIPTORS: DESCRIPTORS: SHOOTING;STABBING

## 2022-03-21 ASSESSMENT — PAIN DESCRIPTION - ORIENTATION: ORIENTATION: LOWER

## 2022-03-21 ASSESSMENT — PAIN SCALES - GENERAL
PAINLEVEL_OUTOF10: 8
PAINLEVEL_OUTOF10: 8

## 2022-03-21 ASSESSMENT — PAIN DESCRIPTION - FREQUENCY: FREQUENCY: CONTINUOUS

## 2022-03-21 ASSESSMENT — PAIN DESCRIPTION - LOCATION: LOCATION: BACK

## 2022-03-21 NOTE — ED PROVIDER NOTES
[facility] [address] [address] EMERGENCY DEPT      CHIEF COMPLAINT       Chief Complaint   Patient presents with    Back Pain     Lower       Nurses Notes reviewed and I agree except as noted in the HPI. HISTORY OF PRESENT ILLNESS    Iris Greer is a 40 y.o. male who presents for low back pain. Last night patient was mopping his floor at home. He felt a \"pop\" in his low back. Patient went to bed and used a heating pad which seemed to help. However this morning he continues with constant, sharp, shooting, and stabbing low back pain worse on the left side. Patient has history of 3 prior back surgeries and states this pain is worse than before his surgery. Patient endorses mild nausea secondary to pain and feeling anxious. Patient denies incontinence of bowel or bladder, abdominal pain, fever, or other complaints. Patient takes medical marijuana for his chronic pain. Location/Symptom: Low back pain midline and left  Timing/Onset: Last night  Context/Setting: Patient felt a pop while mopping his floor; has history of 3 prior back surgery  Quality: Sharp, shooting, stabbing  Duration: Constant  Modifying Factors: Worsened with movement; improved with sitting still and using heating pad  Severity: Moderate    REVIEW OF SYSTEMS     Review of Systems   Constitutional: Positive for activity change. Negative for chills and fever. HENT: Negative for congestion, ear pain and rhinorrhea. Eyes: Negative for photophobia. Respiratory: Negative for cough and shortness of breath. Cardiovascular: Negative for chest pain. Gastrointestinal: Positive for nausea. Negative for abdominal pain, diarrhea and vomiting. No incontinence of bowel    Endocrine: Negative for polyuria. Genitourinary: Negative for difficulty urinating, dysuria and frequency. No incontinence of bladder   Musculoskeletal: Positive for back pain. Negative for gait problem and neck pain. Skin: Negative for rash.    Neurological: Negative for weakness and numbness. Psychiatric/Behavioral: Negative for confusion and sleep disturbance. The patient is nervous/anxious. PAST MEDICAL HISTORY    has a past medical history of Chronic back pain, Diverticulitis, Hidradenitis suppurativa, Pancytopenia ([address] Sinusitis, and Sleep apnea. SURGICAL HISTORY      has a past surgical history that includes Rectal surgery; Ankle surgery (2001); Nose surgery (1993); Cholecystectomy; Colon surgery; Septoplasty (Left, 7/19/2017); Septoplasty (N/A, 5/8/2019); back surgery; Sinus endoscopy (Bilateral, 7/7/2021); and bone marrow biopsy (08/2021). CURRENT MEDICATIONS       Previous Medications    ACETAMINOPHEN (TYLENOL) 500 MG TABLET    Take 1,000 mg by mouth every 6 hours as needed for Pain    CHOLECALCIFEROL (VITAMIN D3) 1.25 MG (75318 UT) CAPS    Take 1 capsule by mouth once a week    FLUOCINONIDE (LIDEX) 0.05 % EXTERNAL SOLUTION    Apply topically qHS. PANTOPRAZOLE (PROTONIX) 40 MG TABLET    Take 1 tablet by mouth 2 times daily (before meals)       ALLERGIES     has No Known Allergies. FAMILY HISTORY     He indicated that his mother is alive. He indicated that his father is alive. He indicated that his brother is alive. He indicated that the status of his maternal grandmother is unknown. He indicated that the status of his maternal grandfather is unknown. He indicated that the status of his paternal grandmother is unknown. He indicated that the status of his paternal grandfather is unknown.   family history includes COPD in his paternal grandfather and paternal grandmother; Cancer in his mother; Diabetes in his father, paternal grandfather, and paternal grandmother; Heart Disease in his father, maternal grandfather, and maternal grandmother; Other in his father. SOCIAL HISTORY    reports that he has been smoking cigarettes. He started smoking about 14 years ago. He has been smoking about 1.00 pack per day.  He has never used smokeless tobacco. He reports current alcohol use. He reports that he does not use drugs. PHYSICAL EXAM     INITIAL VITALS:  height is 5' 11\" (1.803 m) and weight is 265 lb (120.2 kg). His oral temperature is 98.1 °F (36.7 °C). His blood pressure is 134/91 (abnormal) and his pulse is 90. His respiration is 18 and oxygen saturation is 98%. Physical Exam  Vitals and nursing note reviewed. Constitutional:       General: He is not in acute distress. Appearance: Normal appearance. He is well-developed. He is not toxic-appearing or diaphoretic. HENT:      Head: Normocephalic and atraumatic. Right Ear: Hearing normal.      Left Ear: Hearing normal.      Nose: Nose normal. No rhinorrhea. Mouth/Throat:      Pharynx: Uvula midline. Eyes:      Conjunctiva/sclera: Conjunctivae normal.      Pupils: Pupils are equal, round, and reactive to light. Neck:      Vascular: No JVD. Trachea: No tracheal deviation. Cardiovascular:      Rate and Rhythm: Normal rate and regular rhythm. Pulses:           Dorsalis pedis pulses are 2+ on the right side and 2+ on the left side. Posterior tibial pulses are 2+ on the right side and 2+ on the left side. Heart sounds: Normal heart sounds. Pulmonary:      Effort: Pulmonary effort is normal. No respiratory distress. Breath sounds: Normal breath sounds. No decreased breath sounds or wheezing. Abdominal:      General: There is no distension. Palpations: Abdomen is not rigid. There is no pulsatile mass. Tenderness: There is no abdominal tenderness. There is no guarding or rebound. Hernia: No hernia is present. Musculoskeletal:      Cervical back: No rigidity. Lumbar back: Tenderness and bony tenderness present. Decreased range of motion. Back:       Comments: Well perfused; good strength appreciated in all muscle groups; there is good plantar and dorsiflexion of the feet and toes. SLR is negative in the modified position. Lymphadenopathy:      Cervical: No cervical adenopathy. Skin:     General: Skin is warm and dry. Coloration: Skin is not pale. Findings: No rash. Neurological:      Mental Status: He is alert. GCS: GCS eye subscore is 4. GCS verbal subscore is 5. GCS motor subscore is 6. Sensory: No sensory deficit. Coordination: Coordination normal.      Gait: Gait normal.      Comments: There is no saddle anesthesia. Psychiatric:         Speech: Speech normal.         Behavior: Behavior normal. Behavior is cooperative. Thought Content: Thought content normal.         DIFFERENTIAL DIAGNOSIS:   Including but not limited to: Acute on chronic low back pain, sprain, herniated disc, hardware malfunction    DIAGNOSTIC RESULTS     EKG: All [facility]'s [facility] who either signs or Co-signs this chart in the absence of a cardiologist.  None    RADIOLOGY: non-plain film images(s) such as CT,Ultrasound and MRI are read by the radiologist.  Plain radiographic images are visualized and preliminarily interpreted by the emergency physician unless otherwise stated below. XR LUMBAR SPINE (2-3 VIEWS)   Final Result   Postsurgical changes with no acute fracture. **This report has been created using voice recognition software. It may contain minor errors which are inherent in voice recognition technology. **      Final report electronically signed by Dr Anai Webber on 3/21/2022 11:30 AM          LABS:   Labs Reviewed - No data to display    EMERGENCY DEPARTMENT COURSE:   Vitals:    Vitals:    03/21/22 1027 03/21/22 1030   BP:  (!) 134/91   Pulse: 90    Resp: 18    Temp: 98.1 °F (36.7 °C)    TempSrc: Oral    SpO2: 98%    Weight: 265 lb (120.2 kg)    Height: 5' 11\" (1.803 m)        Patient was seen in the emergency department during the global pandemic, when there was surge capacity and regional healthcare crisis.     MDM:  The patient was seen and evaluated within the ED today for exacerbation of chronic low back pain after mopping a floor. On exam, I appreciated reproducible midline and left low back pain to palpation and with movement. The patient was neurovascularly intact. Old records were reviewed. Within the department, I observed the patient's vital signs to be within acceptable range. Radiological studies within the department revealed postsurgical changes in no acute osseous process. Within the department, the patient was treated with Toradol and Valium. I observed the patient's condition to modestly improve during the duration of their stay. Patient reported he did not have much improvement in his level of pain but declined any further medication. The patient remained stable in the ER with good vital signs, neurovascularly intact, and no distress evident. Patient demonstrated a steady, independent gait. I have considered cauda equina, epidural abscess and this patient's presentation is not consistent with such entities and therefore, no further testing was warranted. The patient was comfortable with the plan of discharge home and to follow up with Dr. Shelli Lomeli as he requested. Anticipatory guidance was given. The patient was instructed to return to the emergency department for any worsening of their symptoms. Patient was discharged from the emergency department in good condition with all questions answered. See disposition below. I have given the patient strict written and verbal instructions about care at home, follow-up, and signs and symptoms of worsening of condition and they did verbalize understanding. CRITICAL CARE:   None    CONSULTS:  None    PROCEDURES:  None    FINAL IMPRESSION      1. Acute exacerbation of chronic low back pain          DISPOSITION/PLAN     1.  Acute exacerbation of chronic low back pain        PATIENT REFERRED TO:  Ramiro Valadez MD  [address]  [phone]    Schedule an appointment as soon as possible for a visit       Park Merlin, MD  [address]  [phone]    Schedule an appointment as soon as possible for a visit         DISCHARGE MEDICATIONS:  New Prescriptions    CYCLOBENZAPRINE (FLEXERIL) 10 MG TABLET    Take 1 tablet by mouth 3 times daily as needed for Muscle spasms    HYDROCODONE-ACETAMINOPHEN (NORCO) 5-325 MG PER TABLET    Take 1 tablet by mouth every 6 hours as needed for Pain for up to 3 days. Intended supply: 3 days.  Take lowest dose possible to manage pain    IBUPROFEN (ADVIL;MOTRIN) 800 MG TABLET    Take 1 tablet by mouth every 8 hours as needed for Pain       (Please note that portions of this note were completed with a voice recognition program.  Efforts were made to edit the dictations but occasionally words are mis-transcribed.)    González Rankin PA-C 03/21/22 12:48 PM    DEAN Rizvi PA-C  03/21/22 1649

## 2022-03-21 NOTE — ED NOTES
Pt presents to the ER for lower back pain. Pt states he had a fusion of L3 to L5 and last night he was mopping and felt something pop. Pt states he is now in a lot of pain even when is sitting still.      Arin Villanueva  03/21/22 8432

## 2022-04-06 ENCOUNTER — HOSPITAL ENCOUNTER (OUTPATIENT)
Dept: INFUSION THERAPY | Age: 37
Discharge: HOME | End: 2022-04-06
Payer: MEDICARE

## 2022-04-06 ENCOUNTER — OFFICE VISIT (OUTPATIENT)
Dept: ONCOLOGY | Age: 37
End: 2022-04-06
Payer: MEDICARE

## 2022-04-06 VITALS
HEART RATE: 99 BPM | BODY MASS INDEX: 42.42 KG/M2 | DIASTOLIC BLOOD PRESSURE: 103 MMHG | WEIGHT: 303 LBS | OXYGEN SATURATION: 99 % | HEIGHT: 71 IN | SYSTOLIC BLOOD PRESSURE: 161 MMHG | TEMPERATURE: 98.2 F | RESPIRATION RATE: 16 BRPM

## 2022-04-06 VITALS
TEMPERATURE: 98.2 F | DIASTOLIC BLOOD PRESSURE: 103 MMHG | SYSTOLIC BLOOD PRESSURE: 161 MMHG | OXYGEN SATURATION: 99 % | WEIGHT: 303 LBS | RESPIRATION RATE: 18 BRPM | BODY MASS INDEX: 42.42 KG/M2 | HEART RATE: 99 BPM | HEIGHT: 71 IN

## 2022-04-06 DIAGNOSIS — D47.2 MGUS (MONOCLONAL GAMMOPATHY OF UNKNOWN SIGNIFICANCE): ICD-10-CM

## 2022-04-06 DIAGNOSIS — I10 HYPERTENSION, UNSPECIFIED TYPE: ICD-10-CM

## 2022-04-06 DIAGNOSIS — D69.6 THROMBOCYTOPENIA (HCC): ICD-10-CM

## 2022-04-06 DIAGNOSIS — D69.6 THROMBOCYTOPENIA (HCC): Primary | ICD-10-CM

## 2022-04-06 DIAGNOSIS — E88.09 PLASMA CELL DYSCRASIA: ICD-10-CM

## 2022-04-06 LAB
ABSOLUTE IMMATURE GRANULOCYTE: 0.02 THOU/MM3 (ref 0–0.07)
ALBUMIN SERPL-MCNC: 4 G/DL (ref 3.5–5.1)
ALP BLD-CCNC: 107 U/L (ref 38–126)
ALT SERPL-CCNC: 58 U/L (ref 11–66)
AST SERPL-CCNC: 40 U/L (ref 5–40)
BASINOPHIL, AUTOMATED: 1 % (ref 0–3)
BASOPHILS ABSOLUTE: 0 THOU/MM3 (ref 0–0.1)
BILIRUB SERPL-MCNC: 0.5 MG/DL (ref 0.3–1.2)
BILIRUBIN DIRECT: < 0.2 MG/DL (ref 0–0.3)
BUN, WHOLE BLOOD: 9 MG/DL (ref 8–26)
CHLORIDE, WHOLE BLOOD: 104 MEQ/L (ref 98–109)
CREATININE, WHOLE BLOOD: 0.9 MG/DL (ref 0.5–1.2)
EOSINOPHILS ABSOLUTE: 0.1 THOU/MM3 (ref 0–0.4)
EOSINOPHILS RELATIVE PERCENT: 2 % (ref 0–4)
GFR, ESTIMATED ,CON: > 90 ML/MIN/1.73M2
GLUCOSE, WHOLE BLOOD: 216 MG/DL (ref 70–108)
HCT VFR BLD CALC: 46.5 % (ref 42–52)
HEMOGLOBIN: 15.1 GM/DL (ref 14–18)
IMMATURE GRANULOCYTES: 0 %
IONIZED CALCIUM, WHOLE BLOOD: 1.12 MMOL/L (ref 1.12–1.32)
LYMPHOCYTES # BLD: 22 % (ref 15–47)
LYMPHOCYTES ABSOLUTE: 1.2 THOU/MM3 (ref 1–4.8)
MCH RBC QN AUTO: 28.4 PG (ref 26–33)
MCHC RBC AUTO-ENTMCNC: 32.5 GM/DL (ref 32.2–35.5)
MCV RBC AUTO: 88 FL (ref 80–94)
MONOCYTES ABSOLUTE: 0.2 THOU/MM3 (ref 0.4–1.3)
MONOCYTES: 3 % (ref 0–12)
PDW BLD-RTO: 15.1 % (ref 11.5–14.5)
PLATELET # BLD: 127 THOU/MM3 (ref 130–400)
PMV BLD AUTO: 10.8 FL (ref 9.4–12.4)
POTASSIUM, WHOLE BLOOD: 4.1 MEQ/L (ref 3.5–4.9)
RBC # BLD: 5.31 MILL/MM3 (ref 4.7–6.1)
SEG NEUTROPHILS: 72 % (ref 43–75)
SEGMENTED NEUTROPHILS ABSOLUTE COUNT: 4.1 THOU/MM3 (ref 1.8–7.7)
SODIUM, WHOLE BLOOD: 143 MEQ/L (ref 138–146)
TOTAL CO2, WHOLE BLOOD: 29 MEQ/L (ref 23–33)
TOTAL PROTEIN: 7.5 G/DL (ref 6.1–8)
WBC # BLD: 5.6 THOU/MM3 (ref 4.8–10.8)

## 2022-04-06 PROCEDURE — G8427 DOCREV CUR MEDS BY ELIG CLIN: HCPCS | Performed by: INTERNAL MEDICINE

## 2022-04-06 PROCEDURE — 82784 ASSAY IGA/IGD/IGG/IGM EACH: CPT

## 2022-04-06 PROCEDURE — 83883 ASSAY NEPHELOMETRY NOT SPEC: CPT

## 2022-04-06 PROCEDURE — 4004F PT TOBACCO SCREEN RCVD TLK: CPT | Performed by: INTERNAL MEDICINE

## 2022-04-06 PROCEDURE — 84165 PROTEIN E-PHORESIS SERUM: CPT

## 2022-04-06 PROCEDURE — 80076 HEPATIC FUNCTION PANEL: CPT

## 2022-04-06 PROCEDURE — 85025 COMPLETE CBC W/AUTO DIFF WBC: CPT

## 2022-04-06 PROCEDURE — 88184 FLOWCYTOMETRY/ TC 1 MARKER: CPT

## 2022-04-06 PROCEDURE — G8417 CALC BMI ABV UP PARAM F/U: HCPCS | Performed by: INTERNAL MEDICINE

## 2022-04-06 PROCEDURE — 88185 FLOWCYTOMETRY/TC ADD-ON: CPT

## 2022-04-06 PROCEDURE — 99211 OFF/OP EST MAY X REQ PHY/QHP: CPT

## 2022-04-06 PROCEDURE — 84155 ASSAY OF PROTEIN SERUM: CPT

## 2022-04-06 PROCEDURE — 99214 OFFICE O/P EST MOD 30 MIN: CPT | Performed by: INTERNAL MEDICINE

## 2022-04-06 PROCEDURE — 86334 IMMUNOFIX E-PHORESIS SERUM: CPT

## 2022-04-06 PROCEDURE — 80047 BASIC METABLC PNL IONIZED CA: CPT

## 2022-04-09 LAB
KAPPA/LAMBDA FREE LIGHT CHAINS: NORMAL
LEUK/LYMPH PHENOTYPING WB: NORMAL

## 2022-04-11 LAB — IMMUNOFIXATION WITH QUANT: NORMAL

## 2022-05-02 NOTE — PROGRESS NOTES
[facility] CANCER [facility]  CANCER NETWORK OF [facility]  [facility] [address] [facility] [address]  [address]  [address]  Dept: [phone]  Dept Fax: [phone]: [phone]     Encounter Date:  04/06/2022    Primary Provider: Brenda Baker MD     Subjective:      Chief Complaint:  Madi Medina is a 40 y.o. with thrombocytopenia. The patient has a thrombocytopenia dating back at least several years. He was originally followed for hematology evaluation by Dr. Adamaris Prieto has moved his office to [facility]. His thrombocytopenia is been mild in nature without significant complications related to his low platelet count. He did have a bone marrow biopsy completed that found a slight elevation of his plasma cells suggestive of a plasma cell dyscrasia. Cytogenetics from the bone marrow specimen were noted to be normal.      HPI: Wing Plunkett returns today for follow-up regarding his history of thrombocytopenia, possible plasma cell dyscrasia, and monoclonal gammopathy of undetermined significance. On today's evaluation he states that he feels well without specific complaints. He has not had fever, cough, shortness of breath or other signs of infection. The patient's bowel and bladder habits have been normal.  He has not seen blood in his stool or urine. Deon De La Torre does have generalized weakness and fatigue that have been chronic in nature. The patient remains active with an ECOG performance status of level 0. His platelet count today remains stable but low at 127,000. The patient's total white blood cell count, hemoglobin, and hematocrit, are normal.  He continues to have a monoclonal protein that persist as a IgG kappa at a level of 0.88 g/dL. The patient's blood pressure is modestly elevated. She will continue to monitor her blood pressure and follow-up with her primary care provider for further management. PMH, SH, and FH:   I reviewed the patients medication list and allergy list as noted on the electronic medical record. The PMH, SH and FH were also reviewed as noted on the EMR. Review of Systems  Constitutional: Fatigue. HENT: Negative. Eyes: Negative. Respiratory: Negative. Cardiovascular: Negative. Gastrointestinal: Negative. Genitourinary: Negative. Musculoskeletal: Negative. Skin: Negative. Neurological: General weakness. Hematological: Negative. Psychiatric/Behavioral: Negative. Objective:   Physical Exam  Vitals:    04/06/22 1412   BP: (!) 161/103   Pulse: 99   Resp: 16   Temp: 98.2 °F (36.8 °C)   SpO2: 99%   Vitals reviewed and are stable. Constitutional: Well-developed. No acute distress. HENT: Normocephalic and atraumatic. Eyes: PERRL. No scleral icterus. Neck: Overall appearance is symmetrical. No identifiable mass. Chest: Inspection and palpation of chest is normal.  Pulmonary: Effort normal. No respiratory distress. Cardiovascular: RRR. No edema in any of the four extremities. Abdominal: No hepatomegaly or splenomegaly. Musculoskeletal: Gait is normal. Muscle strength and tone appear normal.  Neurological: Alert and oriented to person, place, and time. Judgment and thought content normal.  Skin: Skin appears warm and dry. Psychiatric: Mood and affect appropriate for the clinical situation. Data Analysis: The following laboratory studies were reviewed with the patient today:    Hematology 4/6/2022 7/14/2021 7/11/2021   WBC 5.6 3.3 (L) 5.3   RBC 5.31 4.40 (L) 4.60 (L)   HGB 15.1 12.5 (L) 13.2 (L)   HCT 46.5 39.2 (L) 40.2 (L)   MCV 88 89.1 87.4   RDW 15.1 (H)      (L) 107 (L) 128 (L)   ESR  23 (H)      SPEP/IJEOMA Interpretation                     M-spike in the gamma region.  The monoclonal protein peak   accounts for 0.88 g/dL of the total 1.52 g/dL of protein in   the gamma region.  IJEOMA gel pattern shows an IgG type kappa   monoclonal protein.    Kappa Qnt Free Light Chains                   75.21 H   3.30-19.40    mg/L   Lambda Qnt Free Light Chains                  12.37     5.71-26.30    mg/L   Kappa/Lambda Free Light Chain Ratio          6.08 H   0.26-1.65     Assessment:   1. Thrombocytopenia. 2.  Plasma cell dyscrasia. 3.  Monoclonal gammopathy. 4.  Hypertension. Plan:   1. Monitor platelet count and for any signs of abnormal bleeding/bruising. 2.  Monitor plasma cell dyscrasia. 3.  Monitor monoclonal protein. 4.  Monitor blood pressure and follow up with primary care provider for further surveillance and management. Natty Del Valle M.D. Medical Director: [facility]  Cancer Network [address], [address] [place] [address], [address] [place] [address], [address] [place] [address] of the [facility] at the [facility]      **This report has been created using voice recognition software. It may contain minor errors which are inherent in voice recognition technology. **

## 2022-06-14 ENCOUNTER — HOSPITAL ENCOUNTER (EMERGENCY)
Age: 37
Discharge: HOME | End: 2022-06-15
Attending: EMERGENCY MEDICINE
Payer: MEDICARE

## 2022-06-14 ENCOUNTER — APPOINTMENT (OUTPATIENT)
Dept: GENERAL RADIOLOGY | Age: 37
End: 2022-06-14
Payer: MEDICARE

## 2022-06-14 ENCOUNTER — APPOINTMENT (OUTPATIENT)
Dept: CT IMAGING | Age: 37
End: 2022-06-14
Payer: MEDICARE

## 2022-06-14 DIAGNOSIS — J44.9 CHRONIC OBSTRUCTIVE PULMONARY DISEASE, UNSPECIFIED COPD TYPE (HCC): Primary | ICD-10-CM

## 2022-06-14 LAB
ALBUMIN SERPL-MCNC: 3.8 G/DL (ref 3.5–5.1)
ALP BLD-CCNC: 92 U/L (ref 38–126)
ALT SERPL-CCNC: 41 U/L (ref 11–66)
ANION GAP SERPL CALCULATED.3IONS-SCNC: 12 MEQ/L (ref 8–16)
AST SERPL-CCNC: 32 U/L (ref 5–40)
BASOPHILS # BLD: 0.7 %
BASOPHILS ABSOLUTE: 0 THOU/MM3 (ref 0–0.1)
BILIRUB SERPL-MCNC: 0.5 MG/DL (ref 0.3–1.2)
BILIRUBIN DIRECT: < 0.2 MG/DL (ref 0–0.3)
BUN BLDV-MCNC: 9 MG/DL (ref 7–22)
CALCIUM SERPL-MCNC: 8.8 MG/DL (ref 8.5–10.5)
CHLORIDE BLD-SCNC: 100 MEQ/L (ref 98–111)
CO2: 26 MEQ/L (ref 23–33)
CREAT SERPL-MCNC: 1 MG/DL (ref 0.4–1.2)
EOSINOPHIL # BLD: 3.3 %
EOSINOPHILS ABSOLUTE: 0.2 THOU/MM3 (ref 0–0.4)
ERYTHROCYTE [DISTWIDTH] IN BLOOD BY AUTOMATED COUNT: 14.2 % (ref 11.5–14.5)
ERYTHROCYTE [DISTWIDTH] IN BLOOD BY AUTOMATED COUNT: 44.7 FL (ref 35–45)
FLU A ANTIGEN: NEGATIVE
FLU B ANTIGEN: NEGATIVE
GFR SERPL CREATININE-BSD FRML MDRD: 84 ML/MIN/1.73M2
GLUCOSE BLD-MCNC: 139 MG/DL (ref 70–108)
HCT VFR BLD CALC: 40.1 % (ref 42–52)
HEMOGLOBIN: 13.5 GM/DL (ref 14–18)
IMMATURE GRANS (ABS): 0.02 THOU/MM3 (ref 0–0.07)
IMMATURE GRANULOCYTES: 0.4 %
LIPASE: 40.3 U/L (ref 5.6–51.3)
LYMPHOCYTES # BLD: 25.7 %
LYMPHOCYTES ABSOLUTE: 1.5 THOU/MM3 (ref 1–4.8)
MAGNESIUM: 1.6 MG/DL (ref 1.6–2.4)
MCH RBC QN AUTO: 29.5 PG (ref 26–33)
MCHC RBC AUTO-ENTMCNC: 33.7 GM/DL (ref 32.2–35.5)
MCV RBC AUTO: 87.6 FL (ref 80–94)
MONOCYTES # BLD: 5.6 %
MONOCYTES ABSOLUTE: 0.3 THOU/MM3 (ref 0.4–1.3)
NUCLEATED RED BLOOD CELLS: 0 /100 WBC
OSMOLALITY CALCULATION: 276.6 MOSMOL/KG (ref 275–300)
PLATELET # BLD: 107 THOU/MM3 (ref 130–400)
PMV BLD AUTO: 10.8 FL (ref 9.4–12.4)
POTASSIUM SERPL-SCNC: 4 MEQ/L (ref 3.5–5.2)
PRO-BNP: 21.3 PG/ML (ref 0–450)
RBC # BLD: 4.58 MILL/MM3 (ref 4.7–6.1)
SARS-COV-2, NAAT: NOT  DETECTED
SEG NEUTROPHILS: 64.3 %
SEGMENTED NEUTROPHILS ABSOLUTE COUNT: 3.7 THOU/MM3 (ref 1.8–7.7)
SODIUM BLD-SCNC: 138 MEQ/L (ref 135–145)
TOTAL PROTEIN: 7.2 G/DL (ref 6.1–8)
TROPONIN T: < 0.01 NG/ML
TSH SERPL DL<=0.05 MIU/L-ACNC: 2.72 UIU/ML (ref 0.4–4.2)
WBC # BLD: 5.7 THOU/MM3 (ref 4.8–10.8)

## 2022-06-14 PROCEDURE — 85025 COMPLETE CBC W/AUTO DIFF WBC: CPT

## 2022-06-14 PROCEDURE — 99285 EMERGENCY DEPT VISIT HI MDM: CPT

## 2022-06-14 PROCEDURE — 83735 ASSAY OF MAGNESIUM: CPT

## 2022-06-14 PROCEDURE — 87804 INFLUENZA ASSAY W/OPTIC: CPT

## 2022-06-14 PROCEDURE — 6360000004 HC RX CONTRAST MEDICATION: Performed by: EMERGENCY MEDICINE

## 2022-06-14 PROCEDURE — 71045 X-RAY EXAM CHEST 1 VIEW: CPT

## 2022-06-14 PROCEDURE — 83690 ASSAY OF LIPASE: CPT

## 2022-06-14 PROCEDURE — 6370000000 HC RX 637 (ALT 250 FOR IP): Performed by: EMERGENCY MEDICINE

## 2022-06-14 PROCEDURE — 71275 CT ANGIOGRAPHY CHEST: CPT

## 2022-06-14 PROCEDURE — 36415 COLL VENOUS BLD VENIPUNCTURE: CPT

## 2022-06-14 PROCEDURE — 83880 ASSAY OF NATRIURETIC PEPTIDE: CPT

## 2022-06-14 PROCEDURE — 84484 ASSAY OF TROPONIN QUANT: CPT

## 2022-06-14 PROCEDURE — 87635 SARS-COV-2 COVID-19 AMP PRB: CPT

## 2022-06-14 PROCEDURE — 82248 BILIRUBIN DIRECT: CPT

## 2022-06-14 PROCEDURE — 84443 ASSAY THYROID STIM HORMONE: CPT

## 2022-06-14 PROCEDURE — 96374 THER/PROPH/DIAG INJ IV PUSH: CPT

## 2022-06-14 PROCEDURE — 6360000002 HC RX W HCPCS: Performed by: EMERGENCY MEDICINE

## 2022-06-14 PROCEDURE — 80053 COMPREHEN METABOLIC PANEL: CPT

## 2022-06-14 PROCEDURE — 96375 TX/PRO/DX INJ NEW DRUG ADDON: CPT

## 2022-06-14 PROCEDURE — 93005 ELECTROCARDIOGRAM TRACING: CPT | Performed by: EMERGENCY MEDICINE

## 2022-06-14 RX ORDER — PANTOPRAZOLE SODIUM 40 MG/1
40 TABLET, DELAYED RELEASE ORAL ONCE
Status: COMPLETED | OUTPATIENT
Start: 2022-06-14 | End: 2022-06-14

## 2022-06-14 RX ORDER — IPRATROPIUM BROMIDE AND ALBUTEROL SULFATE 2.5; .5 MG/3ML; MG/3ML
1 SOLUTION RESPIRATORY (INHALATION) ONCE
Status: COMPLETED | OUTPATIENT
Start: 2022-06-14 | End: 2022-06-14

## 2022-06-14 RX ORDER — DEXAMETHASONE SODIUM PHOSPHATE 4 MG/ML
10 INJECTION, SOLUTION INTRA-ARTICULAR; INTRALESIONAL; INTRAMUSCULAR; INTRAVENOUS; SOFT TISSUE ONCE
Status: COMPLETED | OUTPATIENT
Start: 2022-06-14 | End: 2022-06-14

## 2022-06-14 RX ORDER — ONDANSETRON 4 MG/1
4 TABLET, ORALLY DISINTEGRATING ORAL ONCE
Status: COMPLETED | OUTPATIENT
Start: 2022-06-14 | End: 2022-06-14

## 2022-06-14 RX ORDER — MORPHINE SULFATE 2 MG/ML
4 INJECTION, SOLUTION INTRAMUSCULAR; INTRAVENOUS ONCE
Status: COMPLETED | OUTPATIENT
Start: 2022-06-14 | End: 2022-06-14

## 2022-06-14 RX ORDER — KETOROLAC TROMETHAMINE 30 MG/ML
15 INJECTION, SOLUTION INTRAMUSCULAR; INTRAVENOUS ONCE
Status: COMPLETED | OUTPATIENT
Start: 2022-06-14 | End: 2022-06-14

## 2022-06-14 RX ADMIN — IOPAMIDOL 80 ML: 755 INJECTION, SOLUTION INTRAVENOUS at 23:32

## 2022-06-14 RX ADMIN — PANTOPRAZOLE SODIUM 40 MG: 40 TABLET, DELAYED RELEASE ORAL at 21:46

## 2022-06-14 RX ADMIN — MORPHINE SULFATE 4 MG: 2 INJECTION, SOLUTION INTRAMUSCULAR; INTRAVENOUS at 22:23

## 2022-06-14 RX ADMIN — DEXAMETHASONE SODIUM PHOSPHATE 10 MG: 4 INJECTION, SOLUTION INTRAMUSCULAR; INTRAVENOUS at 23:11

## 2022-06-14 RX ADMIN — IPRATROPIUM BROMIDE AND ALBUTEROL SULFATE 1 AMPULE: .5; 3 SOLUTION RESPIRATORY (INHALATION) at 22:28

## 2022-06-14 RX ADMIN — LIDOCAINE HYDROCHLORIDE: 20 SOLUTION ORAL; TOPICAL at 21:45

## 2022-06-14 RX ADMIN — KETOROLAC TROMETHAMINE 15 MG: 30 INJECTION, SOLUTION INTRAMUSCULAR; INTRAVENOUS at 21:47

## 2022-06-14 RX ADMIN — IPRATROPIUM BROMIDE AND ALBUTEROL SULFATE 1 AMPULE: .5; 3 SOLUTION RESPIRATORY (INHALATION) at 22:26

## 2022-06-14 RX ADMIN — ONDANSETRON 4 MG: 4 TABLET, ORALLY DISINTEGRATING ORAL at 21:47

## 2022-06-14 ASSESSMENT — PAIN SCALES - GENERAL
PAINLEVEL_OUTOF10: 10
PAINLEVEL_OUTOF10: 10
PAINLEVEL_OUTOF10: 6

## 2022-06-14 ASSESSMENT — ENCOUNTER SYMPTOMS
EYE PAIN: 0
PHOTOPHOBIA: 0
EYE DISCHARGE: 0
RHINORRHEA: 0
ABDOMINAL PAIN: 0
EYE ITCHING: 0
VOMITING: 0
BACK PAIN: 0
VOICE CHANGE: 0
BLOOD IN STOOL: 0
SORE THROAT: 0
ABDOMINAL DISTENTION: 0
SINUS PRESSURE: 0
EYE REDNESS: 0
NAUSEA: 0
CONSTIPATION: 0
DIARRHEA: 0
CHOKING: 0
TROUBLE SWALLOWING: 0
CHEST TIGHTNESS: 0
SHORTNESS OF BREATH: 1
COUGH: 0
WHEEZING: 0

## 2022-06-14 ASSESSMENT — PAIN DESCRIPTION - LOCATION
LOCATION: CHEST
LOCATION: CHEST

## 2022-06-14 ASSESSMENT — PAIN - FUNCTIONAL ASSESSMENT: PAIN_FUNCTIONAL_ASSESSMENT: 0-10

## 2022-06-15 ENCOUNTER — OFFICE VISIT (OUTPATIENT)
Dept: FAMILY MEDICINE CLINIC | Age: 37
End: 2022-06-15
Payer: MEDICARE

## 2022-06-15 VITALS
WEIGHT: 310 LBS | SYSTOLIC BLOOD PRESSURE: 148 MMHG | DIASTOLIC BLOOD PRESSURE: 78 MMHG | RESPIRATION RATE: 16 BRPM | BODY MASS INDEX: 42.04 KG/M2 | HEART RATE: 76 BPM

## 2022-06-15 VITALS
OXYGEN SATURATION: 94 % | WEIGHT: 300 LBS | BODY MASS INDEX: 40.63 KG/M2 | SYSTOLIC BLOOD PRESSURE: 131 MMHG | HEIGHT: 72 IN | RESPIRATION RATE: 19 BRPM | HEART RATE: 76 BPM | DIASTOLIC BLOOD PRESSURE: 75 MMHG | TEMPERATURE: 98.2 F

## 2022-06-15 DIAGNOSIS — L73.2 HIDRADENITIS SUPPURATIVA: ICD-10-CM

## 2022-06-15 DIAGNOSIS — M62.838 MUSCLE SPASM: ICD-10-CM

## 2022-06-15 DIAGNOSIS — K04.7 TOOTH ABSCESS: Primary | ICD-10-CM

## 2022-06-15 DIAGNOSIS — F41.9 ANXIETY: ICD-10-CM

## 2022-06-15 PROBLEM — G58.0 INTERCOSTAL NEUROPATHY: Status: ACTIVE | Noted: 2022-06-15

## 2022-06-15 PROBLEM — Z90.49 HISTORY OF CHOLECYSTECTOMY: Status: ACTIVE | Noted: 2022-06-15

## 2022-06-15 LAB
EKG ATRIAL RATE: 83 BPM
EKG P AXIS: 57 DEGREES
EKG P-R INTERVAL: 166 MS
EKG Q-T INTERVAL: 364 MS
EKG QRS DURATION: 108 MS
EKG QTC CALCULATION (BAZETT): 427 MS
EKG R AXIS: -57 DEGREES
EKG T AXIS: 43 DEGREES
EKG VENTRICULAR RATE: 83 BPM
TROPONIN T: < 0.01 NG/ML

## 2022-06-15 PROCEDURE — 99214 OFFICE O/P EST MOD 30 MIN: CPT | Performed by: FAMILY MEDICINE

## 2022-06-15 PROCEDURE — 93010 ELECTROCARDIOGRAM REPORT: CPT | Performed by: NUCLEAR MEDICINE

## 2022-06-15 PROCEDURE — 4004F PT TOBACCO SCREEN RCVD TLK: CPT | Performed by: FAMILY MEDICINE

## 2022-06-15 PROCEDURE — G8417 CALC BMI ABV UP PARAM F/U: HCPCS | Performed by: FAMILY MEDICINE

## 2022-06-15 PROCEDURE — 6370000000 HC RX 637 (ALT 250 FOR IP): Performed by: EMERGENCY MEDICINE

## 2022-06-15 PROCEDURE — G8427 DOCREV CUR MEDS BY ELIG CLIN: HCPCS | Performed by: FAMILY MEDICINE

## 2022-06-15 RX ORDER — HYDROCODONE BITARTRATE AND ACETAMINOPHEN 5; 325 MG/1; MG/1
1-2 TABLET ORAL NIGHTLY PRN
Qty: 60 TABLET | Refills: 0 | Status: SHIPPED | OUTPATIENT
Start: 2022-06-15 | End: 2022-07-22 | Stop reason: SDUPTHER

## 2022-06-15 RX ORDER — PREDNISONE 20 MG/1
40 TABLET ORAL DAILY
Qty: 20 TABLET | Refills: 0 | Status: SHIPPED | OUTPATIENT
Start: 2022-06-15 | End: 2022-06-25

## 2022-06-15 RX ORDER — TIZANIDINE 4 MG/1
8 TABLET ORAL EVERY 8 HOURS PRN
Qty: 60 TABLET | Refills: 2 | Status: SHIPPED | OUTPATIENT
Start: 2022-06-15 | End: 2022-08-30 | Stop reason: SDUPTHER

## 2022-06-15 RX ORDER — CEPHALEXIN 500 MG/1
500 CAPSULE ORAL 4 TIMES DAILY
Qty: 40 CAPSULE | Refills: 0 | Status: SHIPPED | OUTPATIENT
Start: 2022-06-15 | End: 2022-06-25

## 2022-06-15 RX ORDER — BUSPIRONE HYDROCHLORIDE 5 MG/1
5 TABLET ORAL 3 TIMES DAILY
Qty: 90 TABLET | Refills: 0 | Status: SHIPPED | OUTPATIENT
Start: 2022-06-15 | End: 2022-07-15

## 2022-06-15 RX ORDER — ALBUTEROL SULFATE 90 UG/1
2 AEROSOL, METERED RESPIRATORY (INHALATION) EVERY 4 HOURS PRN
Status: DISCONTINUED | OUTPATIENT
Start: 2022-06-15 | End: 2022-06-15 | Stop reason: HOSPADM

## 2022-06-15 RX ADMIN — ALBUTEROL SULFATE 2 PUFF: 90 AEROSOL, METERED RESPIRATORY (INHALATION) at 00:53

## 2022-06-15 ASSESSMENT — ENCOUNTER SYMPTOMS
BACK PAIN: 1
ABDOMINAL PAIN: 0
SORE THROAT: 0
CHEST TIGHTNESS: 0
BLOOD IN STOOL: 0
VOMITING: 0
RHINORRHEA: 0
COUGH: 0
DIARRHEA: 0
SHORTNESS OF BREATH: 1
NAUSEA: 0
EYE PAIN: 0
WHEEZING: 0
CONSTIPATION: 0

## 2022-06-15 ASSESSMENT — PATIENT HEALTH QUESTIONNAIRE - PHQ9
SUM OF ALL RESPONSES TO PHQ QUESTIONS 1-9: 0
SUM OF ALL RESPONSES TO PHQ9 QUESTIONS 1 & 2: 0
2. FEELING DOWN, DEPRESSED OR HOPELESS: 0
SUM OF ALL RESPONSES TO PHQ QUESTIONS 1-9: 0
1. LITTLE INTEREST OR PLEASURE IN DOING THINGS: 0
SUM OF ALL RESPONSES TO PHQ QUESTIONS 1-9: 0
SUM OF ALL RESPONSES TO PHQ QUESTIONS 1-9: 0

## 2022-06-15 NOTE — ED NOTES
Patient resting in bed with wife at bedside. Respirations easy and unlabored. Pt expresses some relief following morphine and duoneb. Call light within reach.         Cynthia Salmon RN  06/14/22 0681

## 2022-06-15 NOTE — PROGRESS NOTES
Debora Becker (:  1985) is a 40 y.o. male,Established patient, here for evaluation of the following chief complaint(s):  Follow-up ([facility] )         ASSESSMENT/PLAN:  1. Tooth abscess  -     cephALEXin (KEFLEX) 500 MG capsule; Take 1 capsule by mouth 4 times daily for 10 days, Disp-40 capsule, R-0Normal  -new problem, -monitor sxs, call if not improving    2. Hidradenitis suppurativa  -     cephALEXin (KEFLEX) 500 MG capsule; Take 1 capsule by mouth 4 times daily for 10 days, Disp-40 capsule, R-0Normal  -     HYDROcodone-acetaminophen (NORCO) 5-325 MG per tablet; Take 1-2 tablets by mouth nightly as needed for Pain for up to 30 days. Take lowest dose possible to manage pain. L73.2, Disp-60 tablet, R-0Normal  -chronic condition, exacerbated, add keflex for infection and norco for pain  3. Anxiety  -     busPIRone (BUSPAR) 5 MG tablet; Take 1 tablet by mouth 3 times daily, Disp-90 tablet, R-0Normal  -chronic condition, exacerbated, add buspar for anxiety control, -monitor sxs, call if not improving    4. Muscle spasm  -     tiZANidine (ZANAFLEX) 4 MG tablet; Take 2 tablets by mouth every 8 hours as needed (spasms), Disp-60 tablet, R-2Normal  -stable, controlled on zanaflex  Controlled Substance Monitoring:    Acute and Chronic Pain Monitoring:   RX Monitoring 6/15/2022   Acute Pain Prescriptions -   Periodic Controlled Substance Monitoring Possible medication side effects, risk of tolerance/dependence & alternative treatments discussed. ;No signs of potential drug abuse or diversion identified. No follow-ups on file. Subjective   SUBJECTIVE/OBJECTIVE:  HPI  Patient here today for a check up. Reviewed BMI of 42. Encouraged diet, exercise and weight loss.'  Follow up from ED from chest pains. Reviewed ED note, CTA, early copd. Is under a lot of stress. Going for disability. Given albuterol inhaler and prednisone. Single, current smoker, pmh reviewed.       Review of Systems Constitutional: Negative for chills, fatigue, fever and unexpected weight change. HENT: Negative for congestion, ear pain, rhinorrhea and sore throat. Eyes: Negative for pain and visual disturbance. Respiratory: Positive for shortness of breath. Negative for cough, chest tightness and wheezing. Cardiovascular: Positive for chest pain. Negative for palpitations. Gastrointestinal: Negative for abdominal pain, blood in stool, constipation, diarrhea, nausea and vomiting. Genitourinary: Negative for difficulty urinating, frequency, hematuria and urgency. Musculoskeletal: Positive for back pain. Negative for joint swelling, myalgias and neck pain. Skin: Negative for rash. Neurological: Negative for dizziness and headaches. Hematological: Negative for adenopathy. Does not bruise/bleed easily. Psychiatric/Behavioral: Negative for behavioral problems and sleep disturbance. The patient is not nervous/anxious. Objective   Physical Exam  Vitals and nursing note reviewed. Constitutional:       Appearance: He is well-developed. HENT:      Head: Normocephalic and atraumatic. Right Ear: External ear normal.      Left Ear: External ear normal.      Nose: Nose normal.      Mouth/Throat:      Mouth: Mucous membranes are moist.     Eyes:      Pupils: Pupils are equal, round, and reactive to light. Neck:      Thyroid: No thyromegaly. Cardiovascular:      Rate and Rhythm: Normal rate and regular rhythm. Heart sounds: Normal heart sounds. Pulmonary:      Breath sounds: Normal breath sounds. No wheezing or rales. Abdominal:      General: Bowel sounds are normal.      Palpations: Abdomen is soft. Tenderness: There is no abdominal tenderness. There is no guarding or rebound. Musculoskeletal:         General: Normal range of motion. Cervical back: Neck supple. Lymphadenopathy:      Cervical: No cervical adenopathy. Skin:     General: Skin is warm and dry.       Findings: No rash. Neurological:      Mental Status: He is alert and oriented to person, place, and time. Cranial Nerves: No cranial nerve deficit. Deep Tendon Reflexes: Reflexes are normal and symmetric. An electronic signature was used to authenticate this note.     --Neha Esparza MD

## 2022-06-15 NOTE — ED NOTES
Pt calling out stating he is having a hard time breathing and asking for additional pain medication. Dr. Sarahi Mcpherson notified.       Myra Rhodes RN  06/14/22 [id number]

## 2022-06-15 NOTE — ED NOTES
Discharge instructions and follow up discussed with pt. Pt verbalized understanding and denied further questions. LDA removed. Pt discharged with all belongings.         Laura oK RN  06/15/22 [id number]

## 2022-06-15 NOTE — PATIENT INSTRUCTIONS
Patient Education        Hidradenitis Suppurativa: Care Instructions  Overview     Hidradenitis suppurativa (say \"pfr-clkz-bv-NY-tus sup-marcial-uh-TY-vuh\") is a skin condition that causes lumps on the skin that look like pimples or boils. The lumps are usually painful and can break open and drain blood and bad-smellingpus. The condition can come and go for many years. Treatment for this condition may include antibiotics and other medicines. You may need surgery to remove the lumps. Home care includes wearing loose-fitting clothes and washing the area gently. You can help prevent lumps from comingback by staying at a healthy weight and not smoking. Doctors don't know exactly how this condition starts. But they do know that something irritates and inflames the hair follicles, causing them to swell and form lumps. This skin condition can't be spread from person to person (isn'tcontagious). Follow-up care is a key part of your treatment and safety. Be sure to make and go to all appointments, and call your doctor if you are having problems. It's also a good idea to know your test results and keep alist of the medicines you take. How can you care for yourself at home? Skin care     Wash the area every day with mild soap. Use your hands rather than a washcloth or sponge when you wash that part of your body.      Leave the affected areas uncovered when you can. If you have lumps that are draining, you can cover them with a bandage or other dressing. Put petroleum jelly (such as Vaseline) on the dressing to help keep it from sticking.      Wear-loose fitting clothes that don't rub against the area. Avoid activities that cause skin to rub together.      If you have pain, try a warm compress. Soak a towel or washcloth in warm water, wring it out, and place it on the affected skin for about 10 minutes. Medicines     Be safe with medicines. Take your medicines exactly as prescribed.  Call your doctor if you think you are having a problem with your medicine. You will get more details on the specific medicines your doctor prescribes.      If your doctor prescribed antibiotics, take them as directed. Do not stop taking them just because you feel better. You need to take the full course of antibiotics. Lifestyle choices     If you smoke, think about quitting. Smoking can make the condition worse. If you need help quitting, talk to your doctor about stop-smoking programs and medicines. These can increase your chances of quitting for good.      Stay at a healthy weight, or lose weight, by eating healthy foods and being physically active. Being overweight could make this condition worse. When should you call for help? Call your doctor now or seek immediate medical care if:     You have symptoms of infection, such as:  ? Increased pain, swelling, warmth, or redness. ? Red streaks leading from the area. ? Pus draining from the area. ? A fever. Watch closely for changes in your health, and be sure to contact your doctor if:     You do not get better as expected. Where can you learn more? Go to [email or web address].Ocean [email or web address] org and sign in to your Hibernater account. Enter T359 in the [email or web address] box to learn more about \"Hidradenitis Suppurativa: Care Instructions. \"     If you do not have an account, please click on the \"Sign Up Now\" link. Current as of: November 15, 2021               Content Version: 13.2  © 2006-2022 Healthwise, Incorporated. Care instructions adapted under license by [facility] ([facility]). If you have questions about a medical condition or this instruction, always ask your healthcare professional. [place] [address] any warranty or liability for your use of this information. Patient Education        Abscessed Tooth: Care Instructions  Overview     An abscessed tooth is a tooth that has a pocket of pus in the tissues around it.  Pus forms when the body tries to fight an infection caused by bacteria. If the pus cannot drain, it forms an abscess. An abscessed tooth can cause red, swollen gums and throbbing pain, especially when you chew. You may have a badtaste in your mouth and a fever, and your jaw may swell. Damage to the tooth, untreated tooth decay, or gum disease can cause anabscessed tooth. An abscessed tooth needs to be treated by a dental professional right away. If it is not treated, the infection could spread to other parts of your body. Your dentist will give you antibiotics to stop the infection. If antibiotics don'tstop the infection, you may need other treatments. Follow-up care is a key part of your treatment and safety. Be sure to make and go to all appointments, and call your doctor if you are having problems. It's also a good idea to know your test results and keep alist of the medicines you take. How can you care for yourself at home?  Brush and floss gently.  Reduce pain and swelling in your face and jaw by putting ice or a cold pack on the outside of your cheek. Do this for 10 to 20 minutes at a time. Put a thin cloth between the ice and your skin.  Avoid using tobacco products. Tobacco and nicotine slow your ability to heal.   Take pain medicines exactly as directed. ? If the doctor gave you a prescription medicine for pain, take it as prescribed. ? If you are not taking a prescription pain medicine, ask your doctor if you can take an over-the-counter medicine such as acetaminophen (Tylenol) or ibuprofen (Advil or Motrin). Be safe with medicines. Read and follow all instructions on the label.  Take antibiotics as directed. Do not stop taking them just because you feel better. You need to take the full course of antibiotics. When should you call for help? Call 911 anytime you think you may need emergency care. For example, call if:     You have trouble breathing.    Call your doctor now or seek immediate medical care if:     You have new or worse symptoms of infection, such as:  ? Increased pain, swelling, warmth, or redness. ? Red streaks leading from the area. ? Pus draining from the area. ? A fever. Watch closely for changes in your health, and be sure to contact your doctor if:     You do not get better as expected. Where can you learn more? Go to https://[email or web address].[email or web address] org and sign in to your SoundHound account. Enter Z820 in the [employer] box to learn more about \"Abscessed Tooth: Care Instructions. \"     If you do not have an account, please click on the \"Sign Up Now\" link. Current as of: June 30, 2021               Content Version: 13.2  © 2006-2022 [email or web address]. Care instructions adapted under license by [facility] ([facility]). If you have questions about a medical condition or this instruction, always ask your healthcare professional. [place] [address] any warranty or liability for your use of this information. Patient Education        Anxiety Disorder: Care Instructions  Your Care Instructions     Anxiety is a normal reaction to stress. Difficult situations can cause you to have symptoms such as sweaty palms and a nervous feeling. In an anxiety disorder, the symptoms are far more severe. Constant worry, muscle tension, trouble sleeping, nausea and diarrhea, and other symptoms can make normal daily activities difficult or impossible. These symptoms may occur for no reason, and they can affect your work, school, or social life. Medicines, counseling, and self-care can all help. Follow-up care is a key part of your treatment and safety. Be sure to make and go to all appointments, and call your doctor if you are having problems. It's also a good idea to know your test results and keep a list of the medicines you take. How can you care for yourself at home? · Take medicines exactly as directed. Call your doctor if you think you are having a problem with your medicine.   · Go to your counseling sessions and follow-up appointments. · Recognize and accept your anxiety. Then, when you are in a situation that makes you anxious, say to yourself, \"This is not an emergency. I feel uncomfortable, but I am not in danger. I can keep going even if I feel anxious. \"  · Be kind to your body:  ? Relieve tension with exercise or a massage. ? Get enough rest.  ? Avoid alcohol, caffeine, nicotine, and illegal drugs. They can increase your anxiety level and cause sleep problems. ? Learn and do relaxation techniques. See below for more about these techniques. · Engage your mind. Get out and do something you enjoy. Go to a [employer] movie, or take a walk or hike. Plan your day. Having too much or too little to do can make you anxious. · Keep a record of your symptoms. Discuss your fears with a good friend or family member, or join a support group for people with similar problems. Talking to others sometimes relieves stress. · Get involved in social groups, or volunteer to help others. Being alone sometimes makes things seem worse than they are. · Get at least 30 minutes of exercise on most days of the week to relieve stress. Walking is a good choice. You also may want to do other activities, such as running, swimming, cycling, or playing tennis or team sports. Relaxation techniques  Do relaxation exercises 10 to 20 minutes a day. You can play soothing, relaxing music while you do them, if you wish. · Tell others in your house that you are going to do your relaxation exercises. Ask them not to disturb you. · Find a comfortable place, away from all distractions and noise. · Lie down on your back, or sit with your back straight. · Focus on your breathing. Make it slow and steady. · Breathe in through your nose. Breathe out through either your nose or mouth. · Breathe deeply, filling up the area between your navel and your rib cage. Breathe so that your belly goes up and down.   · Do not hold your breath. · Breathe like this for 5 to 10 minutes. Notice the feeling of calmness throughout your whole body. As you continue to breathe slowly and deeply, relax by doing the following for another 5 to 10 minutes:  · Tighten and relax each muscle group in your body. You can begin at your toes and work your way up to your head. · Imagine your muscle groups relaxing and becoming heavy. · Empty your mind of all thoughts. · Let yourself relax more and more deeply. · Become aware of the state of calmness that surrounds you. · When your relaxation time is over, you can bring yourself back to alertness by moving your fingers and toes and then your hands and feet and then stretching and moving your entire body. Sometimes people fall asleep during relaxation, but they usually wake up shortly afterward. · Always give yourself time to return to full alertness before you drive a car or do anything that might cause an accident if you are not fully alert. Never play a relaxation tape while you drive a car. When should you call for help? Call 911 anytime you think you may need emergency care. For example, call if:    · You feel you cannot stop from hurting yourself or someone else. Keep the numbers for these national suicide hotlines: 5-825-387-TALK (7-862.589.1899) and 1-250-VNRERFM (9-148.970.3758). If you or someone you know talks about suicide or feeling hopeless, get help right away. Watch closely for changes in your health, and be sure to contact your doctor if:    · You have anxiety or fear that affects your life.     · You have symptoms of anxiety that are new or different from those you had before. Where can you learn more? Go to https://MorganFranklin Consultingcatherine.[email or web address] org and sign in to your Brightleaf account. Enter P754 in the Andre Phillipe box to learn more about \"Anxiety Disorder: Care Instructions. \"     If you do not have an account, please click on the \"Sign Up Now\" link.   Current as of: September 23, 2020               Content Version: 12.9  © 2006-2021 Healthwise, Incorporated. Care instructions adapted under license by [facility] ([facility]). If you have questions about a medical condition or this instruction, always ask your healthcare professional. [address] any warranty or liability for your use of this information.

## 2022-06-15 NOTE — ED NOTES
Patient resting in bed. Respirations easy and unlabored. No distress noted. Denies further needs at this time. Call light within reach.        Mark Steel RN  06/15/22 [id number]

## 2022-06-15 NOTE — ED PROVIDER NOTES
[facility]  eMERGENCY dEPARTMENT eNCOUnter          [address] [facility]       Chief Complaint   Patient presents with    Chest Pain    Shortness of Breath       Nurses Notes reviewed and I agree except as noted in the HPI. HISTORY OF PRESENT ILLNESS    Stevan Mendez is a 40 y.o. male who presents chest pain which started this morning. Patient states has been progressively getting worse. Patient states that it starts in the center of his chest.  He states it sometimes feels like it is going over to his right shoulder. Patient has no prior heart history. Patient states he has had a cough, some fevers chills sweats. He has had nausea without vomiting. Patient states he is no abdominal pain changes in bowel or bladder habits. Patient denies any drugs or alcohol. Patient is a current smoker. Patient is otherwise resting comfortably on the cot no apparent distress no other physical complaints at this time. REVIEW OF SYSTEMS     Review of Systems   Constitutional: Negative for activity change, appetite change, diaphoresis, fatigue and unexpected weight change. HENT: Negative for congestion, ear discharge, ear pain, hearing loss, rhinorrhea, sinus pressure, sore throat, trouble swallowing and voice change. Eyes: Negative for photophobia, pain, discharge, redness and itching. Respiratory: Positive for shortness of breath. Negative for cough, choking, chest tightness and wheezing. Cardiovascular: Positive for chest pain. Negative for palpitations and leg swelling. Gastrointestinal: Negative for abdominal distention, abdominal pain, blood in stool, constipation, diarrhea, nausea and vomiting. Endocrine: Negative for polydipsia, polyphagia and polyuria. Genitourinary: Negative for decreased urine volume, difficulty urinating, dysuria, enuresis, frequency, hematuria and urgency.    Musculoskeletal: Negative for arthralgias, back pain, gait problem, myalgias, neck pain and neck stiffness. Skin: Negative for pallor and rash. Allergic/Immunologic: Negative for immunocompromised state. Neurological: Negative for dizziness, tremors, seizures, syncope, facial asymmetry, weakness, light-headedness, numbness and headaches. Hematological: Negative for adenopathy. Does not bruise/bleed easily. Psychiatric/Behavioral: Negative for agitation, hallucinations and suicidal ideas. The patient is not nervous/anxious. PAST MEDICAL HISTORY    has a past medical history of Chronic back pain, Diverticulitis, Hidradenitis suppurativa, Pancytopenia ([address] Sinusitis, and Sleep apnea. SURGICAL HISTORY      has a past surgical history that includes Rectal surgery; Ankle surgery (2001); Nose surgery (1993); Cholecystectomy; Colon surgery; Septoplasty (Left, 7/19/2017); Septoplasty (N/A, 5/8/2019); back surgery; Sinus endoscopy (Bilateral, 7/7/2021); and bone marrow biopsy (08/2021). CURRENT MEDICATIONS       Previous Medications    ACETAMINOPHEN (TYLENOL) 500 MG TABLET    Take 1,000 mg by mouth every 6 hours as needed for Pain    CHOLECALCIFEROL (VITAMIN D3) 1.25 MG (22571 UT) CAPS    Take 1 capsule by mouth once a week    IBUPROFEN (ADVIL;MOTRIN) 800 MG TABLET    Take 1 tablet by mouth every 8 hours as needed for Pain    PANTOPRAZOLE (PROTONIX) 40 MG TABLET    Take 1 tablet by mouth 2 times daily (before meals)       ALLERGIES     has No Known Allergies. FAMILY HISTORY     He indicated that his mother is alive. He indicated that his father is alive. He indicated that his brother is alive. He indicated that the status of his maternal grandmother is unknown. He indicated that the status of his maternal grandfather is unknown. He indicated that the status of his paternal grandmother is unknown.  He indicated that the status of his paternal grandfather is unknown.   family history includes COPD in his paternal grandfather and paternal grandmother; Cancer in his mother; Diabetes in his father, paternal grandfather, and paternal grandmother; Heart Disease in his father, maternal grandfather, and maternal grandmother; Other in his father. SOCIAL HISTORY      reports that he has been smoking cigarettes. He started smoking about 15 years ago. He has been smoking about 1.00 pack per day. He has never used smokeless tobacco. He reports current alcohol use. He reports that he does not use drugs. PHYSICAL EXAM     INITIAL VITALS:  height is 6' (1.829 m) and weight is 300 lb (136.1 kg). His oral temperature is 98.2 °F (36.8 °C). His blood pressure is 131/75 and his pulse is 76. His respiration is 19 and oxygen saturation is 94%. Physical Exam  Vitals and nursing note reviewed. Constitutional:       General: He is not in acute distress. Appearance: He is well-developed. He is not diaphoretic. HENT:      Head: Normocephalic and atraumatic. Right Ear: External ear normal.      Left Ear: External ear normal.      Nose: Nose normal.      Mouth/Throat:      Mouth: Mucous membranes are moist.      Pharynx: Oropharynx is clear. No oropharyngeal exudate or posterior oropharyngeal erythema. Eyes:      General: Lids are normal. No scleral icterus. Right eye: No discharge. Left eye: No discharge. Conjunctiva/sclera: Conjunctivae normal.      Right eye: No exudate. Left eye: No exudate. Pupils: Pupils are equal, round, and reactive to light. Neck:      Thyroid: No thyromegaly. Vascular: No JVD. Trachea: No tracheal deviation. Cardiovascular:      Rate and Rhythm: Normal rate and regular rhythm. Pulses: Normal pulses. Carotid pulses are 2+ on the right side and 2+ on the left side. Radial pulses are 2+ on the right side and 2+ on the left side. Femoral pulses are 2+ on the right side and 2+ on the left side. Popliteal pulses are 2+ on the right side and 2+ on the left side.         Dorsalis pedis pulses are 2+ on the right side and 2+ on the left side. Posterior tibial pulses are 2+ on the right side and 2+ on the left side. Heart sounds: Normal heart sounds, S1 normal and S2 normal. No murmur heard. No friction rub. No gallop. Pulmonary:      Effort: Pulmonary effort is normal. No respiratory distress. Breath sounds: No stridor. Examination of the right-upper field reveals wheezing. Examination of the left-upper field reveals wheezing. Examination of the right-middle field reveals wheezing. Examination of the left-middle field reveals wheezing. Examination of the right-lower field reveals decreased breath sounds. Examination of the left-lower field reveals decreased breath sounds. Decreased breath sounds and wheezing present. No rhonchi or rales. Chest:      Chest wall: No tenderness. Abdominal:      General: Bowel sounds are normal. There is no distension. Palpations: Abdomen is soft. There is no mass. Tenderness: There is no abdominal tenderness. There is no guarding or rebound. Musculoskeletal:         General: No tenderness. Normal range of motion. Cervical back: Normal range of motion and neck supple. Normal range of motion. Right lower leg: No edema. Left lower leg: No edema. Lymphadenopathy:      Cervical: No cervical adenopathy. Skin:     General: Skin is warm and dry. Findings: No bruising, ecchymosis, lesion or rash. Neurological:      Mental Status: He is alert and oriented to person, place, and time. Cranial Nerves: No cranial nerve deficit. Sensory: No sensory deficit. Coordination: Coordination normal.      Deep Tendon Reflexes: Reflexes are normal and symmetric. Psychiatric:         Speech: Speech normal.         Behavior: Behavior normal.         Thought Content:  Thought content normal.         Judgment: Judgment normal.           DIFFERENTIAL DIAGNOSIS:   GERD gastritis gastroenteritis pneumonia bronchitis COVID influenza less likely ACS    DIAGNOSTIC RESULTS     EKG: All EKG's are interpreted by the Emergency Department Physician who either signs or Co-signs this chart in the absence of a cardiologist.  EKG was normal sinus rhythm, slight left axis deviation, ventricular rate of 83 MS interval 166 QRS duration 108 QT interval 364 . RADIOLOGY: non-plain film images(s) such as CT, Ultrasound and MRI are read by the radiologist.  [address]A [address]   Final Result   1. No evidence of a pulmonary embolism. 2. Cardiomegaly. 3. Minimal scattered atelectatic changes bilaterally. No consolidation or    pleural effusion. 4. Multiple subcentimeter thyroid nodules are incidentally noted. This can    be further assessed with a follow-up outpatient thyroid ultrasound. 5. Additional findings are detailed above. This document has been electronically signed by: Imelda Magallon M.D. on    06/15/2022 12:17 AM      All CTs at this facility use dose modulation techniques and iterative    reconstructions, and/or weight-based dosing   when appropriate to reduce radiation to a low as reasonably achievable. XR CHEST PORTABLE   Final Result   1. No acute disease.       This document has been electronically signed by: Imelda Magallon M.D. on    06/14/2022 10:02 PM        .    LABS:   Labs Reviewed   CBC WITH AUTO DIFFERENTIAL - Abnormal; Notable for the following components:       Result Value    RBC 4.58 (*)     Hemoglobin 13.5 (*)     Hematocrit 40.1 (*)     Platelets 263 (*)     Monocytes Absolute 0.3 (*)     All other components within normal limits   BASIC METABOLIC PANEL - Abnormal; Notable for the following components:    Glucose 139 (*)     All other components within normal limits   GLOMERULAR FILTRATION RATE, ESTIMATED - Abnormal; Notable for the following components:    Est, Glom Filt Rate 84 (*)     All other components within normal limits   COVID-19, RAPID   RAPID INFLUENZA A/B ANTIGENS   BRAIN NATRIURETIC PEPTIDE   HEPATIC FUNCTION PANEL   LIPASE   TROPONIN   MAGNESIUM   TSH   ANION GAP   OSMOLALITY   TROPONIN       EMERGENCY DEPARTMENT COURSE:   Vitals:    Vitals:    06/14/22 2127 06/14/22 2200 06/14/22 2300 06/15/22 0010   BP: (!) 169/95 122/85 137/78 131/75   Pulse: 85 81 82 76   Resp: 20 15 14 19   Temp: 98.2 °F (36.8 °C)      TempSrc: Oral      SpO2: 100% 96% 95% 94%   Weight: 300 lb (136.1 kg)      Height: 6' (1.829 m)        Patient was assessed at bedside abs and imaging were ordered. Patient was given Zofran Protonix and GI cocktail at bedside patient also had decreased breathing sounds with wheezing. Patient is a smoker and he is a user of vapor. I reviewed the chest x-ray no acute pulm processes. Labs within normal limits. 2 sets of negative troponins. Patient was given additional pain medication. He was then given breathing treatments and steroids. This seemed to help him significantly. CTA of the chest revealed the beginnings of COPD specifically emphysema type. I went and had a long talk with him about smoking and vaping and how this would destroy his lungs. I recommended that he find another way or discontinue smoking and vaping altogether. Patient will be given an albuterol inhaler here. He is given a prescription for steroids for at home. He is instructed to use those as prescribed and follow-up with his primary care physician and return to the emergency room immediately for any new or worsening complaints. Patient understood and agreed to the plan. Patient is subsequently discharged home in stable condition. Patient has what is probably the beginnings of COPD. Patient has been given an albuterol inhaler he is instructed to use that as prescribed. He is also been given steroids he is instructed to use that as prescribed. He is instructed to use Tylenol Motrin for any pain. He is instructed to follow-up with primary care physician and do so within the next 1 to 2 days.   He is instructed return to the nearest emergency room immediately for any new or worsening complaints. CRITICAL CARE:   None    CONSULTS:  None    PROCEDURES:  None    FINAL IMPRESSION      1. Chronic obstructive pulmonary disease, unspecified COPD type ([facility]          DISPOSITION/PLAN   Discharge    PATIENT REFERRED TO:  No follow-up provider specified.     DISCHARGE MEDICATIONS:  New Prescriptions    PREDNISONE (DELTASONE) 20 MG TABLET    Take 2 tablets by mouth daily for 10 days       (Please note that portions of this note were completed with a voice recognition program.  Efforts were made to edit the dictations but occasionally words are mis-transcribed.)    Abel Mckeon, [address],   06/15/22 3105

## 2022-08-29 ENCOUNTER — PATIENT MESSAGE (OUTPATIENT)
Dept: FAMILY MEDICINE CLINIC | Age: 37
End: 2022-08-29

## 2022-08-29 DIAGNOSIS — M62.838 MUSCLE SPASM: ICD-10-CM

## 2022-08-29 DIAGNOSIS — E55.9 VITAMIN D DEFICIENCY: ICD-10-CM

## 2022-08-29 DIAGNOSIS — F41.9 ANXIETY: Primary | ICD-10-CM

## 2022-08-29 RX ORDER — TIZANIDINE 4 MG/1
8 TABLET ORAL EVERY 8 HOURS PRN
Qty: 60 TABLET | Refills: 2 | OUTPATIENT
Start: 2022-08-29

## 2022-08-29 RX ORDER — CHOLECALCIFEROL (VITAMIN D3) 1250 MCG
1 CAPSULE ORAL WEEKLY
Qty: 8 CAPSULE | Refills: 3 | Status: SHIPPED | OUTPATIENT
Start: 2022-08-29 | End: 2022-08-30 | Stop reason: ALTCHOICE

## 2022-08-29 NOTE — TELEPHONE ENCOUNTER
From: Tresa Ramirez  To: Dr. Ch Men: 8/29/2022 8:36 AM EDT  Subject: Prescripition refill    Hel Dr Ge Gave     I am currently almost out of my norco do i need to make appt to get another scribt?      Thanks   Mario De Los Santos

## 2022-08-30 ENCOUNTER — OFFICE VISIT (OUTPATIENT)
Dept: FAMILY MEDICINE CLINIC | Age: 37
End: 2022-08-30
Payer: MEDICARE

## 2022-08-30 VITALS
HEART RATE: 89 BPM | WEIGHT: 315 LBS | SYSTOLIC BLOOD PRESSURE: 136 MMHG | HEIGHT: 71 IN | RESPIRATION RATE: 24 BRPM | DIASTOLIC BLOOD PRESSURE: 88 MMHG | OXYGEN SATURATION: 98 % | BODY MASS INDEX: 44.1 KG/M2

## 2022-08-30 DIAGNOSIS — Z00.00 WELL ADULT EXAM: Primary | ICD-10-CM

## 2022-08-30 DIAGNOSIS — E55.9 VITAMIN D DEFICIENCY: ICD-10-CM

## 2022-08-30 DIAGNOSIS — G89.29 CHRONIC NONINTRACTABLE HEADACHE, UNSPECIFIED HEADACHE TYPE: ICD-10-CM

## 2022-08-30 DIAGNOSIS — L73.2 HIDRADENITIS SUPPURATIVA: ICD-10-CM

## 2022-08-30 DIAGNOSIS — K21.9 GASTROESOPHAGEAL REFLUX DISEASE, UNSPECIFIED WHETHER ESOPHAGITIS PRESENT: ICD-10-CM

## 2022-08-30 DIAGNOSIS — M62.838 MUSCLE SPASM: ICD-10-CM

## 2022-08-30 DIAGNOSIS — L40.9 PSORIASIS OF SCALP: ICD-10-CM

## 2022-08-30 DIAGNOSIS — R51.9 CHRONIC NONINTRACTABLE HEADACHE, UNSPECIFIED HEADACHE TYPE: ICD-10-CM

## 2022-08-30 LAB
ALBUMIN SERPL-MCNC: 4.4 G/DL (ref 3.5–5.1)
ALP BLD-CCNC: 100 U/L (ref 38–126)
ALT SERPL-CCNC: 31 U/L (ref 11–66)
ANION GAP SERPL CALCULATED.3IONS-SCNC: 16 MEQ/L (ref 8–16)
AST SERPL-CCNC: 33 U/L (ref 5–40)
BASOPHILS # BLD: 0.5 %
BASOPHILS ABSOLUTE: 0 THOU/MM3 (ref 0–0.1)
BILIRUB SERPL-MCNC: 0.4 MG/DL (ref 0.3–1.2)
BUN BLDV-MCNC: 11 MG/DL (ref 7–22)
C-REACTIVE PROTEIN: 1.44 MG/DL (ref 0–1)
CALCIUM SERPL-MCNC: 9.5 MG/DL (ref 8.5–10.5)
CHLORIDE BLD-SCNC: 105 MEQ/L (ref 98–111)
CO2: 26 MEQ/L (ref 23–33)
CREAT SERPL-MCNC: 0.7 MG/DL (ref 0.4–1.2)
EOSINOPHIL # BLD: 5 %
EOSINOPHILS ABSOLUTE: 0.2 THOU/MM3 (ref 0–0.4)
ERYTHROCYTE [DISTWIDTH] IN BLOOD BY AUTOMATED COUNT: 14.8 % (ref 11.5–14.5)
ERYTHROCYTE [DISTWIDTH] IN BLOOD BY AUTOMATED COUNT: 49.1 FL (ref 35–45)
GFR SERPL CREATININE-BSD FRML MDRD: > 90 ML/MIN/1.73M2
GLUCOSE BLD-MCNC: 116 MG/DL (ref 70–108)
HCT VFR BLD CALC: 38.9 % (ref 42–52)
HEMOGLOBIN: 12.5 GM/DL (ref 14–18)
IMMATURE GRANS (ABS): 0.02 THOU/MM3 (ref 0–0.07)
IMMATURE GRANULOCYTES: 0.5 %
LYMPHOCYTES # BLD: 23.3 %
LYMPHOCYTES ABSOLUTE: 0.9 THOU/MM3 (ref 1–4.8)
MCH RBC QN AUTO: 29.3 PG (ref 26–33)
MCHC RBC AUTO-ENTMCNC: 32.1 GM/DL (ref 32.2–35.5)
MCV RBC AUTO: 91.1 FL (ref 80–94)
MONOCYTES # BLD: 4.7 %
MONOCYTES ABSOLUTE: 0.2 THOU/MM3 (ref 0.4–1.3)
NUCLEATED RED BLOOD CELLS: 0 /100 WBC
PLATELET # BLD: 110 THOU/MM3 (ref 130–400)
PMV BLD AUTO: 11.8 FL (ref 9.4–12.4)
POTASSIUM SERPL-SCNC: 4.5 MEQ/L (ref 3.5–5.2)
RBC # BLD: 4.27 MILL/MM3 (ref 4.7–6.1)
SEDIMENTATION RATE, ERYTHROCYTE: 5 MM/HR (ref 0–10)
SEG NEUTROPHILS: 66 %
SEGMENTED NEUTROPHILS ABSOLUTE COUNT: 2.6 THOU/MM3 (ref 1.8–7.7)
SODIUM BLD-SCNC: 147 MEQ/L (ref 135–145)
TOTAL PROTEIN: 7 G/DL (ref 6.1–8)
VITAMIN D 25-HYDROXY: 32 NG/ML (ref 30–100)
WBC # BLD: 4 THOU/MM3 (ref 4.8–10.8)

## 2022-08-30 PROCEDURE — 99214 OFFICE O/P EST MOD 30 MIN: CPT | Performed by: FAMILY MEDICINE

## 2022-08-30 PROCEDURE — 99395 PREV VISIT EST AGE 18-39: CPT | Performed by: FAMILY MEDICINE

## 2022-08-30 PROCEDURE — 4004F PT TOBACCO SCREEN RCVD TLK: CPT | Performed by: FAMILY MEDICINE

## 2022-08-30 PROCEDURE — G8427 DOCREV CUR MEDS BY ELIG CLIN: HCPCS | Performed by: FAMILY MEDICINE

## 2022-08-30 PROCEDURE — G8417 CALC BMI ABV UP PARAM F/U: HCPCS | Performed by: FAMILY MEDICINE

## 2022-08-30 RX ORDER — FLUOCINONIDE TOPICAL SOLUTION USP, 0.05% 0.5 MG/ML
SOLUTION TOPICAL
Qty: 60 ML | Refills: 5 | Status: SHIPPED | OUTPATIENT
Start: 2022-08-30

## 2022-08-30 RX ORDER — TIZANIDINE 4 MG/1
8 TABLET ORAL EVERY 8 HOURS PRN
Qty: 180 TABLET | Refills: 11 | Status: SHIPPED | OUTPATIENT
Start: 2022-08-30

## 2022-08-30 RX ORDER — PANTOPRAZOLE SODIUM 40 MG/1
40 TABLET, DELAYED RELEASE ORAL
Qty: 60 TABLET | Refills: 11 | Status: SHIPPED | OUTPATIENT
Start: 2022-08-30

## 2022-08-30 RX ORDER — HYDROCODONE BITARTRATE AND ACETAMINOPHEN 5; 325 MG/1; MG/1
1 TABLET ORAL EVERY 6 HOURS PRN
COMMUNITY
End: 2022-08-30

## 2022-08-30 RX ORDER — IBUPROFEN 800 MG/1
800 TABLET ORAL EVERY 8 HOURS PRN
Qty: 90 TABLET | Refills: 11 | Status: SHIPPED | OUTPATIENT
Start: 2022-08-30

## 2022-08-30 RX ORDER — BUSPIRONE HYDROCHLORIDE 15 MG/1
15 TABLET ORAL 3 TIMES DAILY
Qty: 90 TABLET | Refills: 2 | Status: SHIPPED | OUTPATIENT
Start: 2022-08-30

## 2022-08-30 RX ORDER — HYDROCODONE BITARTRATE AND ACETAMINOPHEN 5; 325 MG/1; MG/1
1-2 TABLET ORAL NIGHTLY PRN
Qty: 60 TABLET | Refills: 0 | Status: SHIPPED | OUTPATIENT
Start: 2022-08-30 | End: 2022-09-29 | Stop reason: SDUPTHER

## 2022-08-30 SDOH — ECONOMIC STABILITY: FOOD INSECURITY: WITHIN THE PAST 12 MONTHS, YOU WORRIED THAT YOUR FOOD WOULD RUN OUT BEFORE YOU GOT MONEY TO BUY MORE.: NEVER TRUE

## 2022-08-30 SDOH — ECONOMIC STABILITY: FOOD INSECURITY: WITHIN THE PAST 12 MONTHS, THE FOOD YOU BOUGHT JUST DIDN'T LAST AND YOU DIDN'T HAVE MONEY TO GET MORE.: NEVER TRUE

## 2022-08-30 ASSESSMENT — ENCOUNTER SYMPTOMS
CONSTIPATION: 0
NAUSEA: 0
WHEEZING: 0
CHEST TIGHTNESS: 0
ABDOMINAL PAIN: 0
BLOOD IN STOOL: 0
DIARRHEA: 0
SHORTNESS OF BREATH: 0
SORE THROAT: 0
EYE PAIN: 0
VOMITING: 0
BACK PAIN: 0
COUGH: 0
RHINORRHEA: 0

## 2022-08-30 ASSESSMENT — SOCIAL DETERMINANTS OF HEALTH (SDOH): HOW HARD IS IT FOR YOU TO PAY FOR THE VERY BASICS LIKE FOOD, HOUSING, MEDICAL CARE, AND HEATING?: NOT HARD AT ALL

## 2022-08-30 NOTE — PROGRESS NOTES
Dinorah Flanagan (:  1985) is a 40 y.o. male,Established patient, here for evaluation of the following chief complaint(s): Annual Exam (refills)         ASSESSMENT/PLAN:  1. Well adult exam  2. Chronic nonintractable headache, unspecified headache type  -     ibuprofen (ADVIL;MOTRIN) 800 MG tablet; Take 1 tablet by mouth every 8 hours as needed for Pain, Disp-90 tablet, R-11Normal  -     MRI BRAIN WO CONTRAST; Future  -chronic condition, exacerbated, get MRI, rule out internal pathology  3. Muscle spasm  -     tiZANidine (ZANAFLEX) 4 MG tablet; Take 2 tablets by mouth every 8 hours as needed (spasms), Disp-180 tablet, R-11Normal  4. Gastroesophageal reflux disease, unspecified whether esophagitis present  -     pantoprazole (PROTONIX) 40 MG tablet; Take 1 tablet by mouth 2 times daily (before meals), Disp-60 tablet, R-11Normal  5. Hidradenitis suppurativa  -     ibuprofen (ADVIL;MOTRIN) 800 MG tablet; Take 1 tablet by mouth every 8 hours as needed for Pain, Disp-90 tablet, R-11Normal  -     HYDROcodone-acetaminophen (NORCO) 5-325 MG per tablet; Take 1-2 tablets by mouth nightly as needed for Pain for up to 30 days. Take lowest dose possible to manage pain. L73.2, Disp-60 tablet, R-0Normal  -     CBC with Auto Differential; Future  -     Comprehensive Metabolic Panel; Future  -     C-Reactive Protein; Future  -     Sedimentation Rate; Future  6. Psoriasis of scalp  -     fluocinonide (LIDEX) 0.05 % external solution; Apply topically 2 times daily. , Disp-60 mL, R-5, Normal  -new problem, trial of lidex solution, -monitor sxs, call if not improving    7. Vitamin D deficiency  -     Vitamin D 25 Hydroxy      No follow-ups on file. Subjective   SUBJECTIVE/OBJECTIVE:  HPI  Pt seen today due to headaches and pressure in the back on his head. Unsure if it is something internal or related to his HS. Also has white plaques on his scalp. Sharp pains in the back of his head. Reviewed BMI of 45. Encouraged diet, exercise and weight loss. Single, current smoker, pmh reviewed. Review of Systems   Constitutional:  Negative for chills, fatigue, fever and unexpected weight change. HENT:  Negative for congestion, ear pain, rhinorrhea and sore throat. Eyes:  Negative for pain and visual disturbance. Respiratory:  Negative for cough, chest tightness, shortness of breath and wheezing. Cardiovascular:  Negative for chest pain and palpitations. Gastrointestinal:  Negative for abdominal pain, blood in stool, constipation, diarrhea, nausea and vomiting. Genitourinary:  Negative for difficulty urinating, frequency, hematuria and urgency. Musculoskeletal:  Negative for back pain, joint swelling, myalgias and neck pain. Skin:  Positive for rash and wound. Neurological:  Positive for headaches. Negative for dizziness. Hematological:  Negative for adenopathy. Does not bruise/bleed easily. Psychiatric/Behavioral:  Negative for behavioral problems and sleep disturbance. The patient is not nervous/anxious. Objective   Physical Exam--see other note             An electronic signature was used to authenticate this note.     --Riya Reyes MD

## 2022-08-30 NOTE — PROGRESS NOTES
2022    Ector Brantley (:  1985) is a 40 y.o. male, here for a preventive medicine evaluation. Patient Active Problem List   Diagnosis    Nasal septal deviation    Hypertrophy of inferior nasal turbinate, left    GERHARD on CPAP    Intolerance of continuous positive airway pressure (CPAP) ventilation    Hypertrophy of uvula    Hypertrophic soft palate    Large tongue    Class 3 obesity with alveolar hypoventilation, serious comorbidity, and body mass index (BMI) of 40.0 to 44.9 in adult [facility])    Status post nasal septoplasty    GERD without esophagitis    Siadora bullosa    Maxillary sinus polyp    Chronic sphenoidal sinusitis    Chronic maxillary sinusitis    Chronic frontal sinusitis    Chronic ethmoidal sinusitis    Postoperative state    Pancytopenia ([address]    History of cholecystectomy    Intercostal neuropathy       Review of Systems   Constitutional:  Negative for chills, fatigue, fever and unexpected weight change. HENT:  Negative for congestion, ear pain, rhinorrhea and sore throat. Eyes:  Negative for pain and visual disturbance. Respiratory:  Negative for cough, chest tightness, shortness of breath and wheezing. Cardiovascular:  Negative for chest pain and palpitations. Gastrointestinal:  Negative for abdominal pain, blood in stool, constipation, diarrhea, nausea and vomiting. Genitourinary:  Negative for difficulty urinating, frequency, hematuria and urgency. Musculoskeletal:  Positive for neck pain. Negative for back pain, joint swelling and myalgias. Skin:  Negative for rash. Neurological:  Positive for headaches. Negative for dizziness. Hematological:  Negative for adenopathy. Does not bruise/bleed easily. Psychiatric/Behavioral:  Negative for behavioral problems and sleep disturbance. The patient is not nervous/anxious. Prior to Visit Medications    Medication Sig Taking?  Authorizing Provider   tiZANidine (ZANAFLEX) 4 MG tablet Take 2 tablets by mouth every 8 hours as needed (spasms) Yes Roxie Valentin MD   ibuprofen (ADVIL;MOTRIN) 800 MG tablet Take 1 tablet by mouth every 8 hours as needed for Pain Yes Roxie Valentin MD   pantoprazole (PROTONIX) 40 MG tablet Take 1 tablet by mouth 2 times daily (before meals) Yes Roxie Valentin MD   HYDROcodone-acetaminophen (NORCO) 5-325 MG per tablet Take 1-2 tablets by mouth nightly as needed for Pain for up to 30 days. Take lowest dose possible to manage pain. L73.2 Yes Roxie Valentin MD   fluocinonide (LIDEX) 0.05 % external solution Apply topically 2 times daily.  Yes Roxie Valentin MD   acetaminophen (TYLENOL) 500 MG tablet Take 1,000 mg by mouth every 6 hours as needed for Pain Yes Historical Provider, MD        No Known Allergies    Past Medical History:   Diagnosis Date    Anxiety     Severe countine to struggle with this    Chronic back pain     Depression     Diverticulitis     Hidradenitis suppurativa     Neuropathy     Obesity     Pancytop[facility] ([facility] 07/2021    Restless legs syndrome     Sinusitis     Sleep apnea     has CPAP       Past Surgical History:   Procedure Laterality Date    ANKLE SURGERY  2001    BACK SURGERY      x3    BONE MARROW BIOPSY  08/2021    CHOLECYSTECTOMY      COLON SURGERY      NOSE SURGERY  1993    Laser Surgery    RECTAL SURGERY      Ruptured Sigmoid Colon 2013    SEPTOPLASTY Left 07/19/2017    SEPTOPLASTY SUBMUCOUS RESECT TURBINATES, PARTIAL LEFT performed by Jovan Martins MD at [facility] N/A 05/08/2019    TONSILLECTOMY, UPPP performed by Jovan Martins MD at [address] Bilateral 07/07/2021    IMAGE GUIDED NASAL SINUS ENDOSCOPY WITH BILATERAL MAXILLARY ANTROSTOMY, PARTIAL RESECTION BETITO BULLOSA BILATERAL SUPERIOR TURBINATES, SPHENOIDOTOMY, RIGHT, TOTAL ETHMOIDECTOMY ANTERIOR AND POSTERIOR, FONTAL SINUS EXPLORATION RIGHT WITHOUT REMOVAL OF TISSUE, MAXILLARY ANTROSTOMY WITH REMOVAL OF TISSUE FROM RIGHT MAXILLARY SINUS, SEPTOPLASTY, this encounter: 5' 10.6\" (1.793 m). Weight as of this encounter: 319 lb (144.7 kg). Physical Exam  Vitals and nursing note reviewed. Constitutional:       Appearance: He is well-developed. HENT:      Head: Normocephalic and atraumatic. Comments: HS sxs in the neck, white plaque area in scalp, swelling in the posterior scalp     Right Ear: External ear normal.      Left Ear: External ear normal.      Nose: Nose normal.      Mouth/Throat:      Mouth: Mucous membranes are moist.   Eyes:      Pupils: Pupils are equal, round, and reactive to light. Neck:      Thyroid: No thyromegaly. Cardiovascular:      Rate and Rhythm: Normal rate and regular rhythm. Heart sounds: Normal heart sounds. Pulmonary:      Breath sounds: Normal breath sounds. No wheezing or rales. Abdominal:      General: Bowel sounds are normal.      Palpations: Abdomen is soft. Tenderness: There is no abdominal tenderness. There is no guarding or rebound. Musculoskeletal:         General: Normal range of motion. Cervical back: Neck supple. Lymphadenopathy:      Cervical: No cervical adenopathy. Skin:     General: Skin is warm and dry. Findings: No rash. Neurological:      Mental Status: He is alert and oriented to person, place, and time. Cranial Nerves: No cranial nerve deficit. Deep Tendon Reflexes: Reflexes are normal and symmetric. No flowsheet data found. Lab Results   Component Value Date/Time    GLUCOSE 139 06/14/2022 09:32 PM       The ASCVD Risk score (Jeralene Brunner., et al., 2013) failed to calculate for the following reasons: The 2013 ASCVD risk score is only valid for ages 36 to 78      There is no immunization history on file for this patient.     Health Maintenance   Topic Date Due    COVID-19 Vaccine (1) Never done    Varicella vaccine (1 of 2 - 2-dose childhood series) Never done    Pneumococcal 0-64 years Vaccine (1 - PCV) Never done    HIV screen  Never done

## 2022-08-31 ENCOUNTER — TELEPHONE (OUTPATIENT)
Dept: FAMILY MEDICINE CLINIC | Age: 37
End: 2022-08-31

## 2022-08-31 DIAGNOSIS — D64.9 ANEMIA, UNSPECIFIED TYPE: Primary | ICD-10-CM

## 2022-08-31 RX ORDER — FERROUS SULFATE 325(65) MG
325 TABLET ORAL
COMMUNITY

## 2022-08-31 NOTE — TELEPHONE ENCOUNTER
Patient notified of results, RAR's recommendations and he verbalized understanding.  Lab order mailed to pt

## 2022-08-31 NOTE — TELEPHONE ENCOUNTER
----- Message from James Hendrix MD sent at 8/31/2022  6:50 AM EDT -----  One inflammation marker is mildly increased but better than last year. CMP is ok. CBC shows anemia. Recommend add ferrous sulfate 325 mg daily and recheck cbc in 1 month. Await MRI results.

## 2022-09-22 ENCOUNTER — HOSPITAL ENCOUNTER (OUTPATIENT)
Dept: MRI IMAGING | Age: 37
Discharge: HOME | End: 2022-09-22
Payer: MEDICARE

## 2022-09-22 DIAGNOSIS — G89.29 CHRONIC NONINTRACTABLE HEADACHE, UNSPECIFIED HEADACHE TYPE: ICD-10-CM

## 2022-09-22 DIAGNOSIS — R51.9 CHRONIC NONINTRACTABLE HEADACHE, UNSPECIFIED HEADACHE TYPE: ICD-10-CM

## 2022-09-22 PROCEDURE — 70551 MRI BRAIN STEM W/O DYE: CPT

## 2022-09-23 ENCOUNTER — TELEPHONE (OUTPATIENT)
Dept: FAMILY MEDICINE CLINIC | Age: 37
End: 2022-09-23

## 2022-09-23 DIAGNOSIS — G89.29 CHRONIC NONINTRACTABLE HEADACHE, UNSPECIFIED HEADACHE TYPE: Primary | ICD-10-CM

## 2022-09-23 DIAGNOSIS — R51.9 CHRONIC NONINTRACTABLE HEADACHE, UNSPECIFIED HEADACHE TYPE: Primary | ICD-10-CM

## 2022-09-23 NOTE — TELEPHONE ENCOUNTER
----- Message from Myles Alberts MD sent at 9/23/2022 10:51 AM EDT -----  MRI is showing nonspecific findings in the right parietal area. Consider neurology referral to further evaluate if agreeable.

## 2022-09-23 NOTE — TELEPHONE ENCOUNTER
Pt informed of results and recommendations, Pt voiced understanding and had no further questions. He would like to see Neurology in [place]. Referral placed they will contact Pt to schedule.

## 2022-09-29 DIAGNOSIS — L73.2 HIDRADENITIS SUPPURATIVA: ICD-10-CM

## 2022-09-29 RX ORDER — HYDROCODONE BITARTRATE AND ACETAMINOPHEN 5; 325 MG/1; MG/1
1-2 TABLET ORAL NIGHTLY PRN
Qty: 60 TABLET | Refills: 0 | Status: SHIPPED | OUTPATIENT
Start: 2022-09-29 | End: 2022-10-31 | Stop reason: SDUPTHER

## 2022-09-29 NOTE — TELEPHONE ENCOUNTER
Ep'ed norco to pharm requested    Controlled Substance Monitoring:    Acute and Chronic Pain Monitoring:   RX Monitoring 8/30/2022   Acute Pain Prescriptions -   Periodic Controlled Substance Monitoring No signs of potential drug abuse or diversion identified. ;Possible medication side effects, risk of tolerance/dependence & alternative treatments discussed. ;Obtaining appropriate analgesic effect of treatment.

## 2022-09-29 NOTE — TELEPHONE ENCOUNTER
Destin Bull called requesting a refill on the following medications:  Requested Prescriptions     Pending Prescriptions Disp Refills    HYDROcodone-acetaminophen (NORCO) 5-325 MG per tablet 60 tablet 0     Sig: Take 1-2 tablets by mouth nightly as needed for Pain for up to 30 days. Take lowest dose possible to manage pain.  L73.2       Date of last visit: 8/30/2022 physical  Date of next visit (if applicable):Visit date not found  Date of last refill: 8/30/22  #60-0  Pharmacy Name: Isaias Cadena, RN

## 2022-10-14 ENCOUNTER — INITIAL CONSULT (OUTPATIENT)
Dept: NEUROLOGY | Age: 37
End: 2022-10-14
Payer: MEDICARE

## 2022-10-14 VITALS
DIASTOLIC BLOOD PRESSURE: 90 MMHG | HEART RATE: 90 BPM | HEIGHT: 72 IN | SYSTOLIC BLOOD PRESSURE: 136 MMHG | BODY MASS INDEX: 42.66 KG/M2 | OXYGEN SATURATION: 98 % | WEIGHT: 315 LBS

## 2022-10-14 DIAGNOSIS — G44.89 OTHER HEADACHE SYNDROME: Primary | ICD-10-CM

## 2022-10-14 PROCEDURE — G8484 FLU IMMUNIZE NO ADMIN: HCPCS | Performed by: PSYCHIATRY & NEUROLOGY

## 2022-10-14 PROCEDURE — G8417 CALC BMI ABV UP PARAM F/U: HCPCS | Performed by: PSYCHIATRY & NEUROLOGY

## 2022-10-14 PROCEDURE — G8427 DOCREV CUR MEDS BY ELIG CLIN: HCPCS | Performed by: PSYCHIATRY & NEUROLOGY

## 2022-10-14 PROCEDURE — 99214 OFFICE O/P EST MOD 30 MIN: CPT | Performed by: PSYCHIATRY & NEUROLOGY

## 2022-10-14 NOTE — PATIENT INSTRUCTIONS
Will order connective tissue screen including JOE, rheumatoid factor, antidouble-stranded DNA, anti-SSA, anti-SSB in addition to serum protein electrophoresis and sed rate. Cervical spine x-ray  Repeat MRI brain W/WO contrast in 3/2023, please call us to schedule  Keep headache diary  Call with any new symptoms or concerns. Follow up in 2 months.

## 2022-10-23 ENCOUNTER — APPOINTMENT (OUTPATIENT)
Dept: CT IMAGING | Age: 37
End: 2022-10-23
Payer: MEDICARE

## 2022-10-23 ENCOUNTER — HOSPITAL ENCOUNTER (EMERGENCY)
Age: 37
Discharge: HOME | End: 2022-10-23
Attending: EMERGENCY MEDICINE
Payer: MEDICARE

## 2022-10-23 VITALS
RESPIRATION RATE: 18 BRPM | HEART RATE: 67 BPM | TEMPERATURE: 98.6 F | SYSTOLIC BLOOD PRESSURE: 154 MMHG | HEIGHT: 72 IN | OXYGEN SATURATION: 98 % | WEIGHT: 300 LBS | DIASTOLIC BLOOD PRESSURE: 82 MMHG | BODY MASS INDEX: 40.63 KG/M2

## 2022-10-23 DIAGNOSIS — K57.32 DIVERTICULITIS OF COLON: Primary | ICD-10-CM

## 2022-10-23 LAB
ALBUMIN SERPL-MCNC: 3.7 G/DL (ref 3.5–5.1)
ALP BLD-CCNC: 94 U/L (ref 38–126)
ALT SERPL-CCNC: 48 U/L (ref 11–66)
ANION GAP SERPL CALCULATED.3IONS-SCNC: 11 MEQ/L (ref 8–16)
AST SERPL-CCNC: 35 U/L (ref 5–40)
BASOPHILS # BLD: 0.7 %
BASOPHILS ABSOLUTE: 0 THOU/MM3 (ref 0–0.1)
BILIRUB SERPL-MCNC: 0.5 MG/DL (ref 0.3–1.2)
BILIRUBIN URINE: NEGATIVE
BLOOD, URINE: NEGATIVE
BUN BLDV-MCNC: 7 MG/DL (ref 7–22)
CALCIUM SERPL-MCNC: 8.9 MG/DL (ref 8.5–10.5)
CHARACTER, URINE: CLEAR
CHLORIDE BLD-SCNC: 99 MEQ/L (ref 98–111)
CO2: 26 MEQ/L (ref 23–33)
COLOR: YELLOW
CREAT SERPL-MCNC: 0.7 MG/DL (ref 0.4–1.2)
EOSINOPHIL # BLD: 3.4 %
EOSINOPHILS ABSOLUTE: 0.2 THOU/MM3 (ref 0–0.4)
ERYTHROCYTE [DISTWIDTH] IN BLOOD BY AUTOMATED COUNT: 13.8 % (ref 11.5–14.5)
ERYTHROCYTE [DISTWIDTH] IN BLOOD BY AUTOMATED COUNT: 43.6 FL (ref 35–45)
GFR SERPL CREATININE-BSD FRML MDRD: > 60 ML/MIN/1.73M2
GLUCOSE BLD-MCNC: 143 MG/DL (ref 70–108)
GLUCOSE URINE: NEGATIVE MG/DL
HCT VFR BLD CALC: 40.7 % (ref 42–52)
HEMOGLOBIN: 13.7 GM/DL (ref 14–18)
IMMATURE GRANS (ABS): 0.02 THOU/MM3 (ref 0–0.07)
IMMATURE GRANULOCYTES: 0.3 %
KETONES, URINE: NEGATIVE
LEUKOCYTE ESTERASE, URINE: NEGATIVE
LIPASE: 29.9 U/L (ref 5.6–51.3)
LYMPHOCYTES # BLD: 21.5 %
LYMPHOCYTES ABSOLUTE: 1.3 THOU/MM3 (ref 1–4.8)
MCH RBC QN AUTO: 29.4 PG (ref 26–33)
MCHC RBC AUTO-ENTMCNC: 33.7 GM/DL (ref 32.2–35.5)
MCV RBC AUTO: 87.3 FL (ref 80–94)
MONOCYTES # BLD: 5.8 %
MONOCYTES ABSOLUTE: 0.3 THOU/MM3 (ref 0.4–1.3)
NITRITE, URINE: NEGATIVE
NUCLEATED RED BLOOD CELLS: 0 /100 WBC
OSMOLALITY CALCULATION: 272.4 MOSMOL/KG (ref 275–300)
PH UA: 7 (ref 5–9)
PLATELET # BLD: 110 THOU/MM3 (ref 130–400)
PMV BLD AUTO: 11.1 FL (ref 9.4–12.4)
POTASSIUM REFLEX MAGNESIUM: 4.1 MEQ/L (ref 3.5–5.2)
PROTEIN UA: NEGATIVE
RBC # BLD: 4.66 MILL/MM3 (ref 4.7–6.1)
SEG NEUTROPHILS: 68.3 %
SEGMENTED NEUTROPHILS ABSOLUTE COUNT: 4 THOU/MM3 (ref 1.8–7.7)
SODIUM BLD-SCNC: 136 MEQ/L (ref 135–145)
SPECIFIC GRAVITY, URINE: 1.03 (ref 1–1.03)
TOTAL PROTEIN: 7 G/DL (ref 6.1–8)
UROBILINOGEN, URINE: 1 EU/DL (ref 0–1)
WBC # BLD: 5.9 THOU/MM3 (ref 4.8–10.8)

## 2022-10-23 PROCEDURE — 85025 COMPLETE CBC W/AUTO DIFF WBC: CPT

## 2022-10-23 PROCEDURE — 80053 COMPREHEN METABOLIC PANEL: CPT

## 2022-10-23 PROCEDURE — 96375 TX/PRO/DX INJ NEW DRUG ADDON: CPT

## 2022-10-23 PROCEDURE — 99285 EMERGENCY DEPT VISIT HI MDM: CPT

## 2022-10-23 PROCEDURE — 6360000002 HC RX W HCPCS: Performed by: EMERGENCY MEDICINE

## 2022-10-23 PROCEDURE — 36415 COLL VENOUS BLD VENIPUNCTURE: CPT

## 2022-10-23 PROCEDURE — 81003 URINALYSIS AUTO W/O SCOPE: CPT

## 2022-10-23 PROCEDURE — 2580000003 HC RX 258: Performed by: STUDENT IN AN ORGANIZED HEALTH CARE EDUCATION/TRAINING PROGRAM

## 2022-10-23 PROCEDURE — 74177 CT ABD & PELVIS W/CONTRAST: CPT

## 2022-10-23 PROCEDURE — 96374 THER/PROPH/DIAG INJ IV PUSH: CPT

## 2022-10-23 PROCEDURE — 6370000000 HC RX 637 (ALT 250 FOR IP): Performed by: STUDENT IN AN ORGANIZED HEALTH CARE EDUCATION/TRAINING PROGRAM

## 2022-10-23 PROCEDURE — 83690 ASSAY OF LIPASE: CPT

## 2022-10-23 PROCEDURE — 6360000004 HC RX CONTRAST MEDICATION: Performed by: EMERGENCY MEDICINE

## 2022-10-23 RX ORDER — METRONIDAZOLE 500 MG/1
500 TABLET ORAL ONCE
Status: COMPLETED | OUTPATIENT
Start: 2022-10-23 | End: 2022-10-23

## 2022-10-23 RX ORDER — CIPROFLOXACIN 500 MG/1
500 TABLET, FILM COATED ORAL 2 TIMES DAILY
Qty: 20 TABLET | Refills: 0 | Status: SHIPPED | OUTPATIENT
Start: 2022-10-23 | End: 2022-11-02

## 2022-10-23 RX ORDER — KETOROLAC TROMETHAMINE 30 MG/ML
30 INJECTION, SOLUTION INTRAMUSCULAR; INTRAVENOUS ONCE
Status: COMPLETED | OUTPATIENT
Start: 2022-10-23 | End: 2022-10-23

## 2022-10-23 RX ORDER — MORPHINE SULFATE 4 MG/ML
4 INJECTION, SOLUTION INTRAMUSCULAR; INTRAVENOUS ONCE
Status: COMPLETED | OUTPATIENT
Start: 2022-10-23 | End: 2022-10-23

## 2022-10-23 RX ORDER — ONDANSETRON 4 MG/1
4 TABLET, ORALLY DISINTEGRATING ORAL 3 TIMES DAILY PRN
Qty: 21 TABLET | Refills: 0 | Status: SHIPPED | OUTPATIENT
Start: 2022-10-23

## 2022-10-23 RX ORDER — METRONIDAZOLE 500 MG/1
500 TABLET ORAL 3 TIMES DAILY
Qty: 30 TABLET | Refills: 0 | Status: SHIPPED | OUTPATIENT
Start: 2022-10-23 | End: 2022-11-02

## 2022-10-23 RX ORDER — 0.9 % SODIUM CHLORIDE 0.9 %
1000 INTRAVENOUS SOLUTION INTRAVENOUS ONCE
Status: COMPLETED | OUTPATIENT
Start: 2022-10-23 | End: 2022-10-23

## 2022-10-23 RX ORDER — ONDANSETRON 2 MG/ML
4 INJECTION INTRAMUSCULAR; INTRAVENOUS ONCE
Status: COMPLETED | OUTPATIENT
Start: 2022-10-23 | End: 2022-10-23

## 2022-10-23 RX ORDER — CIPROFLOXACIN 500 MG/1
500 TABLET, FILM COATED ORAL ONCE
Status: COMPLETED | OUTPATIENT
Start: 2022-10-23 | End: 2022-10-23

## 2022-10-23 RX ORDER — HYDROCODONE BITARTRATE AND ACETAMINOPHEN 5; 325 MG/1; MG/1
2 TABLET ORAL ONCE
Status: COMPLETED | OUTPATIENT
Start: 2022-10-23 | End: 2022-10-23

## 2022-10-23 RX ADMIN — ONDANSETRON 4 MG: 2 INJECTION INTRAMUSCULAR; INTRAVENOUS at 18:59

## 2022-10-23 RX ADMIN — CIPROFLOXACIN 500 MG: 500 TABLET, FILM COATED ORAL at 20:09

## 2022-10-23 RX ADMIN — HYDROCODONE BITARTRATE AND ACETAMINOPHEN 2 TABLET: 5; 325 TABLET ORAL at 20:09

## 2022-10-23 RX ADMIN — IOPAMIDOL 80 ML: 755 INJECTION, SOLUTION INTRAVENOUS at 19:11

## 2022-10-23 RX ADMIN — METRONIDAZOLE 500 MG: 500 TABLET ORAL at 20:09

## 2022-10-23 RX ADMIN — MORPHINE SULFATE 4 MG: 4 INJECTION, SOLUTION INTRAMUSCULAR; INTRAVENOUS at 18:59

## 2022-10-23 RX ADMIN — SODIUM CHLORIDE 1000 ML: 9 INJECTION, SOLUTION INTRAVENOUS at 20:11

## 2022-10-23 RX ADMIN — KETOROLAC TROMETHAMINE 30 MG: 30 INJECTION, SOLUTION INTRAMUSCULAR; INTRAVENOUS at 18:58

## 2022-10-23 ASSESSMENT — PAIN - FUNCTIONAL ASSESSMENT
PAIN_FUNCTIONAL_ASSESSMENT: 0-10
PAIN_FUNCTIONAL_ASSESSMENT: 0-10

## 2022-10-23 ASSESSMENT — PAIN SCALES - GENERAL
PAINLEVEL_OUTOF10: 4
PAINLEVEL_OUTOF10: 4

## 2022-10-23 NOTE — ED TRIAGE NOTES
Patient reports LLQ abdominal pain since Friday. He states the pain has continued to get worse since then, and he can no longer tolerate it. Patient has history of diverticulitis and has had to have a bowel resection in the past. He states this pain is consistent with the diverticulitis he has had before.

## 2022-10-23 NOTE — ED NOTES
ED nurse-to-nurse bedside report    Chief Complaint   Patient presents with    Abdominal Pain      LOC: alert and orientated to name, place, date  Vital signs   Vitals:    10/23/22 1811   BP: (!) 179/96   Pulse: 88   Resp: 16   Temp: 98.6 °F (37 °C)   TempSrc: Oral   SpO2: 100%   Weight: 300 lb (136.1 kg)   Height: 6' (1.829 m)      Pain:    Pain Interventions: 10  Pain Goal: 3  Oxygen: No    Current needs required Room Air   Telemetry: No  LDAs:   Peripheral IV 10/23/22 Left Forearm (Active)     Continuous Infusions:   Mobility: Independent  Bañuelos Fall Risk Score: No flowsheet data found.   Fall Interventions: Low  Report given to: Russ Mcdonald, ORALIA Camara RN  10/23/22 1120

## 2022-10-23 NOTE — ED NOTES
Patient transported to Radiology department via [employer] in stable condition.        Asim Martínez RN  10/23/22 1920

## 2022-10-23 NOTE — ED PROVIDER NOTES
[facility] ENCOUNTER          Pt Name: Blayne Lorenzana  MRN: [id number]  [place] 1985  Date of evaluation: 10/23/2022  Treating Resident Physician: Ashwini Cárdenas MD  Supervising Physician: Esperanza Anderson COMPLAINT       Chief Complaint   Patient presents with    Abdominal Pain     History obtained from the patient. HISTORY OF PRESENT ILLNESS    HPI  Blayne Lorenzana is a 40 y.o. male who presents to the emergency department for evaluation of abd pain. Patient states Hx significant of diverticulitis. Worsening pain over the past 3days, worse in the left lower quadrant, not associated with any diarrhea constipation, nausea, vomiting. Pain not radiating, currently rated 6 out of 10, pressure-like. Has any fevers, chills, back pain, frequency, urgency. The patient has no other acute complaints at this time. REVIEW OF SYSTEMS   Review of Systems   Constitutional:  Negative for fatigue and fever. HENT: Negative. Eyes:  Negative for photophobia and visual disturbance. Respiratory: Negative. Negative for cough, choking, chest tightness and shortness of breath. Cardiovascular: Negative. Gastrointestinal:  Positive for abdominal pain and nausea. Negative for abdominal distention, constipation, diarrhea and vomiting. Genitourinary:  Negative for dysuria, frequency and urgency. Musculoskeletal:  Negative for arthralgias, back pain and myalgias. Skin: Negative. Neurological:  Negative for dizziness, tremors, weakness and numbness. Hematological:  Negative for adenopathy. Does not bruise/bleed easily.         PAST MEDICAL AND SURGICAL HISTORY     Past Medical History:   Diagnosis Date    Anxiety     Severe countine to struggle with this    Chronic back pain     Depression     Diverticulitis     Hidradenitis suppurativa     Neuropathy     Obesity     Pancytopenia ([facility] [address] 07/2021    Restless legs syndrome     Sinusitis Sleep apnea     has CPAP     Past Surgical History:   Procedure Laterality Date    ANKLE SURGERY  2001    BACK SURGERY      x3    BONE MARROW BIOPSY  08/2021    CHOLECYSTECTOMY      COLON SURGERY      NOSE SURGERY  1993    Laser Surgery    RECTAL SURGERY      Ruptured Sigmoid Colon 2013    SEPTOPLASTY Left 07/19/2017    SEPTOPLASTY SUBMUCOUS RESECT TURBINATES, PARTIAL LEFT performed by Sarai Neil MD at [address] [place] N/A 05/08/2019    TONSILLECTOMY, UPPP performed by Sarai Neil MD at [address] Bilateral 07/07/2021    IMAGE GUIDED NASAL SINUS ENDOSCOPY WITH BILATERAL MAXILLARY ANTROSTOMY, PARTIAL RESECTION BETITO BULLOSA BILATERAL SUPERIOR TURBINATES, SPHENOIDOTOMY, RIGHT, TOTAL ETHMOIDECTOMY ANTERIOR AND POSTERIOR, FONTAL SINUS EXPLORATION RIGHT WITHOUT REMOVAL OF TISSUE, MAXILLARY ANTROSTOMY WITH REMOVAL OF TISSUE FROM RIGHT MAXILLARY SINUS, SEPTOPLASTY, SUBMUCOUS RESECTION TURBINATES, BILATERAL PARTIAL INFERI    SMALL INTESTINE SURGERY           MEDICATIONS   No current facility-administered medications for this encounter. Current Outpatient Medications:     Cholecalciferol (VITAMIN D3) 125 MCG (5000 UT) TABS, Take by mouth, Disp: , Rfl:     metroNIDAZOLE (FLAGYL) 500 MG tablet, Take 1 tablet by mouth 3 times daily for 10 days, Disp: 30 tablet, Rfl: 0    ciprofloxacin (CIPRO) 500 MG tablet, Take 1 tablet by mouth 2 times daily for 10 days, Disp: 20 tablet, Rfl: 0    ondansetron (ZOFRAN-ODT) 4 MG disintegrating tablet, Take 1 tablet by mouth 3 times daily as needed for Nausea or Vomiting, Disp: 21 tablet, Rfl: 0    HYDROcodone-acetaminophen (NORCO) 5-325 MG per tablet, Take 1-2 tablets by mouth nightly as needed for Pain for up to 30 days. Take lowest dose possible to manage pain.  L73.2, Disp: 60 tablet, Rfl: 0    ferrous sulfate (IRON 325) 325 (65 Fe) MG tablet, Take 325 mg by mouth daily (with breakfast), Disp: , Rfl:     tiZANidine (ZANAFLEX) 4 MG tablet, Take 2 tablets by mouth every 8 hours as needed (spasms), Disp: 180 tablet, Rfl: 11    ibuprofen (ADVIL;MOTRIN) 800 MG tablet, Take 1 tablet by mouth every 8 hours as needed for Pain, Disp: 90 tablet, Rfl: 11    pantoprazole (PROTONIX) 40 MG tablet, Take 1 tablet by mouth 2 times daily (before meals), Disp: 60 tablet, Rfl: 11    fluocinonide (LIDEX) 0.05 % external solution, Apply topically 2 times daily. (Patient not taking: Reported on 10/14/2022), Disp: 60 mL, Rfl: 5    busPIRone (BUSPAR) 15 MG tablet, Take 15 mg by mouth 3 times daily, Disp: 90 tablet, Rfl: 2    acetaminophen (TYLENOL) 500 MG tablet, Take 1,000 mg by mouth every 6 hours as needed for Pain, Disp: , Rfl:       SOCIAL HISTORY     Social History     Social History Narrative    Not on file     Social History     Tobacco Use    Smoking status: Light Smoker     Packs/day: 1.00     Years: 10.00     Pack years: 10.00     Types: Cigarettes     Start date: 5/5/2007    Smokeless tobacco: Never    Tobacco comments:     0.5-1 ppd 5/5/17 last cig 5/7/19   Vaping Use    Vaping Use: Former   Substance Use Topics    Alcohol use: Not Currently     Comment: rarely    Drug use: No         ALLERGIES   No Known Allergies      FAMILY HISTORY     Family History   Problem Relation Age of Onset    Cancer Mother         Breast Cancer    Diabetes Father     Heart Disease Father     Other Father     Heart Disease Maternal Grandmother     Heart Disease Maternal Grandfather     Diabetes Paternal Grandmother     COPD Paternal Grandmother     Diabetes Paternal Grandfather     COPD Paternal Grandfather          PREVIOUS RECORDS   Previous records reviewed: Medical, past surgical, medications, allergies        PHYSICAL EXAM     ED Triage Vitals [10/23/22 1811]   BP Temp Temp Source Heart Rate Resp SpO2 Height Weight   (!) 179/96 98.6 °F (37 °C) Oral 88 16 100 % 6' (1.829 m) 300 lb (136.1 kg)     Initial vital signs and nursing assessment reviewed and  hypertensive .  Body mass index is 40.69 kg/m². Pulsoximetry is normal per my interpretation. Additional Vital Signs:  Vitals:    10/23/22 2131   BP: (!) 154/82   Pulse: 67   Resp: 18   Temp:    SpO2: 98%       Physical Exam  Constitutional:       General: He is not in acute distress. Appearance: He is well-developed. He is not ill-appearing. HENT:      Head: Normocephalic and atraumatic. Mouth/Throat:      Mouth: Mucous membranes are moist.      Pharynx: Oropharynx is clear. Eyes:      Extraocular Movements: Extraocular movements intact. Pupils: Pupils are equal, round, and reactive to light. Cardiovascular:      Rate and Rhythm: Normal rate and regular rhythm. Heart sounds: Normal heart sounds. Pulmonary:      Effort: Pulmonary effort is normal. No respiratory distress. Breath sounds: Normal breath sounds. No wheezing or rales. Chest:      Chest wall: No tenderness. Abdominal:      General: Abdomen is flat. Bowel sounds are increased. There is no distension. There are no signs of injury. Palpations: Abdomen is soft. Tenderness: There is abdominal tenderness in the left lower quadrant. There is no guarding or rebound. Negative signs include Kessler's sign, Rovsing's sign, McBurney's sign, psoas sign and obturator sign. Skin:     General: Skin is warm and dry. Capillary Refill: Capillary refill takes less than 2 seconds. Neurological:      General: No focal deficit present. Mental Status: He is alert and oriented to person, place, and time. MEDICAL DECISION MAKING   Initial Assessment: This is a 63-year-old male, history of diverticulitis, presenting for left lower quadrant pain. Physical exam significant for left lower quadrant tenderness, no rebound, guarding, Rovsing's. Plan:   CBC, BMP, troponin, EKG, Zofran, Toradol, morphine. CT shows diverticulitis acute. Patient tolerating p.o., pain now 3 out of 10 after medications.   Patient given dose of Cipro and Flagyl here  Discharged home with strict return precautions, follow-up, antibiotics. .        ED RESULTS   Laboratory results:  Labs Reviewed   CBC WITH AUTO DIFFERENTIAL - Abnormal; Notable for the following components:       Result Value    RBC 4.66 (*)     Hemoglobin 13.7 (*)     Hematocrit 40.7 (*)     Platelets 727 (*)     Monocytes Absolute 0.3 (*)     All other components within normal limits   COMPREHENSIVE METABOLIC PANEL W/ REFLEX TO MG FOR LOW K - Abnormal; Notable for the following components:    Glucose 143 (*)     All other components within normal limits   OSMOLALITY - Abnormal; Notable for the following components:    Osmolality Calc 272.4 (*)     All other components within normal limits   LIPASE   URINALYSIS WITH REFLEX TO CULTURE   GLOMERULAR FILTRATION RATE, ESTIMATED   ANION GAP       Radiologic studies results:  CT ABDOMEN PELVIS W IV CONTRAST Additional Contrast? None   Final Result   1. Findings of mild abdominal ileus and constipation. 2. Acute diverticulitis involving the distal transverse colon in the region of the splenic flexure. 3. Hepatic steatosis. Mild splenomegaly. Prior cholecystectomy. **This report has been created using voice recognition software. It may contain minor errors which are inherent in voice recognition technology. **      Final report electronically signed by Dr. Bell Shelby on 10/23/2022 7:45 PM          ED Medications administered this visit:   Medications   morphine injection 4 mg (4 mg IntraVENous Given 10/23/22 1859)   ketorolac (TORADOL) injection 30 mg (30 mg IntraVENous Given 10/23/22 1858)   ondansetron (ZOFRAN) injection 4 mg (4 mg IntraVENous Given 10/23/22 1859)   iopamidol (ISOVUE-370) 76 % injection 80 mL (80 mLs IntraVENous Given 10/23/22 1911)   0.9 % sodium chloride bolus (0 mLs IntraVENous Stopped 10/23/22 2111)   HYDROcodone-acetaminophen (NORCO) 5-325 MG per tablet 2 tablet (2 tablets Oral Given 10/23/22 2009)   ciprofloxacin (CIPRO) tablet 500 mg (500 mg Oral Given 10/23/22 2009)   metroNIDAZOLE (FLAGYL) tablet 500 mg (500 mg Oral Given 10/23/22 2009)         ED COURSE         Strict return precautions and follow up instructions were discussed with the patient prior to discharge, with which the patient agrees. MEDICATION CHANGES     Discharge Medication List as of 10/23/2022  9:43 PM        START taking these medications    Details   metroNIDAZOLE (FLAGYL) 500 MG tablet Take 1 tablet by mouth 3 times daily for 10 days, Disp-30 tablet, R-0Normal      ciprofloxacin (CIPRO) 500 MG tablet Take 1 tablet by mouth 2 times daily for 10 days, Disp-20 tablet, R-0Normal      ondansetron (ZOFRAN-ODT) 4 MG disintegrating tablet Take 1 tablet by mouth 3 times daily as needed for Nausea or Vomiting, Disp-21 tablet, R-0Normal               FINAL DISPOSITION     Final diagnoses:   Diverticulitis of colon     Condition: condition: good  Dispo: Discharge to home      This transcription was electronically signed. Parts of this transcriptions may have been dictated by use of voice recognition software and electronically transcribed, and parts may have been transcribed with the assistance of an ED scribe. The transcription may contain errors not detected in proofreading. Please refer to my supervising physician's documentation if my documentation differs.     Electronically Signed: Ty Berman MD, 10/24/22, 3:36 AM          Ty Berman MD  Resident  10/24/22 2871

## 2022-10-24 ASSESSMENT — ENCOUNTER SYMPTOMS
PHOTOPHOBIA: 0
DIARRHEA: 0
CHOKING: 0
NAUSEA: 1
COUGH: 0
SHORTNESS OF BREATH: 0
RESPIRATORY NEGATIVE: 1
ABDOMINAL PAIN: 1
CONSTIPATION: 0
VOMITING: 0
BACK PAIN: 0
ABDOMINAL DISTENTION: 0
CHEST TIGHTNESS: 0

## 2022-10-24 NOTE — ED PROVIDER NOTES
[address]    Pt Name: Donavan Jarrell  MRN: [id number]  [place] 1985  Date of evaluation: 9/12/20      I personally saw and examined the patient. I have reviewed and agree with the Resident findings, including all diagnostic interpretations and treatment plans as written. I was present for the key portion of any procedures performed and the inclusive time noted in any critical care statement. History: This patient was seen with Adilene Torres, resident physician. Is a 59-year-old male with pain towards the left side of the abdomen with nausea and vomiting and looked uncomfortable initially. CT of the abdomen and pelvis shows evidence of acute diverticulitis with mild ileus and constipation. Patient received a dose of Toradol Zofran and morphine. Several hours later he is still feeling comfortable. We are treating him for diverticulitis with oral antibiotics and he was able to do that here and kept fluids down so he is able to take his meds looks like we will be able to get him home tonight. Follow-up needed in 2 to 3 days.     Diagnosis: Diverticulitis  Discharged                Mirela Carrasquillo DO  10/23/22 1692

## 2022-10-24 NOTE — ED NOTES
Pt resting on cot comfortably at this time. Respirations even and unlabored.  SHEA Jeffries RN  10/23/22 2131

## 2022-10-24 NOTE — ED NOTES
Pt medicated per MAR. Pt urine sample sent. Pt denies a need for anything else at this time.      Elisa Camacho RN  10/23/22 2014

## 2022-10-27 NOTE — PROGRESS NOTES
Chief Complaint   Patient presents with    Consultation     NP headaches         Alina Brown is a 40 y.o. male who presents today for evaluation of headache for the past 2 months. Location of his pain is occipital area. He describes the pain as sharp. He denies any change in vision. He is nauseated and sensitive to light and sounds. He has tried over the counter medication and norco without relief. He does have sleep apnea, however he does not wear his CPAP. Family history is significant for headache in his brother. MRI brain WO contrast done 9/22/22 showed Nonspecific punctate areas of increased signal intensity in the right parietal periventricular white matter. These could be secondary to ischemic or demyelinating disease or vasculitis. There is no evidence of an acute infarct. There is no involvementof the corpus callosum. Otherwise negative MRI scan of the brain. Family history is significant for lupus in his father. He smokes 1 ppd of cigarettes daily. He denies chest pain. No shortness of breath, no neck pain. No vision changes. No dysphagia. No fever. No rash. No weight loss. History provided by patient accompanied by his significant other.         Past Medical History:   Diagnosis Date    Anxiety     Severe countine to struggle with this    Chronic back pain     Depression     Diverticulitis     Hidradenitis suppurativa     Neuropathy     Obesity     Pancytopenia ([address] 07/2021    Restless legs syndrome     Sinusitis     Sleep apnea     has CPAP       Patient Active Problem List   Diagnosis    Nasal septal deviation    Hypertrophy of inferior nasal turbinate, left    GERHARD on CPAP    Intolerance of continuous positive airway pressure (CPAP) ventilation    Hypertrophy of uvula    Hypertrophic soft palate    Large tongue    Class 3 obesity with alveolar hypoventilation, serious comorbidity, and body mass index (BMI) of 40.0 to 44.9 in adult [facility])    Status post nasal septoplasty    GERD without esophagitis    Isadora bullosa    Maxillary sinus polyp    Chronic sphenoidal sinusitis    Chronic maxillary sinusitis    Chronic frontal sinusitis    Chronic ethmoidal sinusitis    Postoperative state    Pancytopenia (HCC)    History of cholecystectomy    Intercostal neuropathy       No Known Allergies    Current Outpatient Medications   Medication Sig Dispense Refill    HYDROcodone-acetaminophen (NORCO) 5-325 MG per tablet Take 1-2 tablets by mouth nightly as needed for Pain for up to 30 days. Take lowest dose possible to manage pain. L73.2 60 tablet 0    ferrous sulfate (IRON 325) 325 (65 Fe) MG tablet Take 325 mg by mouth daily (with breakfast)      tiZANidine (ZANAFLEX) 4 MG tablet Take 2 tablets by mouth every 8 hours as needed (spasms) 180 tablet 11    ibuprofen (ADVIL;MOTRIN) 800 MG tablet Take 1 tablet by mouth every 8 hours as needed for Pain 90 tablet 11    pantoprazole (PROTONIX) 40 MG tablet Take 1 tablet by mouth 2 times daily (before meals) 60 tablet 11    busPIRone (BUSPAR) 15 MG tablet Take 15 mg by mouth 3 times daily 90 tablet 2    acetaminophen (TYLENOL) 500 MG tablet Take 1,000 mg by mouth every 6 hours as needed for Pain      fluocinonide (LIDEX) 0.05 % external solution Apply topically 2 times daily. (Patient not taking: Reported on 10/14/2022) 60 mL 5     No current facility-administered medications for this visit.        Social History     Socioeconomic History    Marital status: Single     Spouse name: None    Number of children: None    Years of education: None    Highest education level: None   Tobacco Use    Smoking status: Light Smoker     Packs/day: 1.00     Years: 10.00     Pack years: 10.00     Types: Cigarettes     Start date: 5/5/2007    Smokeless tobacco: Never    Tobacco comments:     0.5-1 ppd 5/5/17 last cig 5/7/19   Vaping Use    Vaping Use: Former   Substance and Sexual Activity    Alcohol use: Not Currently     Comment: rarely    Drug use: No    Sexual activity: Yes Partners: Female     Social Determinants of Health     Financial Resource Strain: Low Risk     Difficulty of Paying Living Expenses: Not hard at all   Food Insecurity: No Food Insecurity    Worried About Running Out of Food in the Last Year: Never true    Ran Out of Food in the Last Year: Never true       Family History   Problem Relation Age of Onset    Cancer Mother         Breast Cancer    Diabetes Father     Heart Disease Father     Other Father     Heart Disease Maternal Grandmother     Heart Disease Maternal Grandfather     Diabetes Paternal Grandmother     COPD Paternal Grandmother     Diabetes Paternal Grandfather     COPD Paternal Grandfather          I reviewed the past medical history, allergies, medications, social history and family history. Review of Systems   All systems reviewed, and are all negative, except what is mentioned in HPI      Vitals:    10/14/22 1252   BP: (!) 136/90   Site: Right Upper Arm   Position: Sitting   Cuff Size: Large Adult   Pulse: 90   SpO2: 98%   Weight: (!) 317 lb (143.8 kg)   Height: 6' (1.829 m)       Physical Examination:  General appearance - alert, well appearing, and in no distress, oriented to person, place, and time and over weight  Mental status- Level of Alertness: awake  Orientation: person, place, time  Memory: normal  Fund of Knowledge: normal  Attention/Concentration: normal  Language: normal. Mood is normal.   Neck - supple, no significant adenopathy, carotids upstroke normal bilaterally. There is no axillary lymphadenopathy. There is no carotid bruit. No neck lymphadenopathy.  No thyroid enlargement   Neurological -   Cranial Nnwhor-AL-YJF:.   Cranial nerve II: Normal   Cranial nerve III: Pupils: equal, round, reactive to light  Cranial nerves III, IV, VI: Extraocular Movements: intact   Cranial nerve V: Facial sensation: intact   Cranial nerve VII:Facial strength: intact   Cranial nerve VIII: Hearing: intact   Cranial nerve IX: Palate Elevation intact bilaterally  Cranial nerve XI: Shoulder shrug intact bilaterally  Cranial nerve XII: Tongue midline   neck supple without rigidity, there is no limitation of range of motion of the neck. DTR's are decreased distal and symmetric  Babinski sign negative  Motor exam is 5/5 in the upper and lower extremities. Normal muscle tone. There is no muscle atrophy. Sensory is intact for light touch   Coordination: finger to nose,  intact  Gait and station intact   Abnormal movement none, vibration normal, proprioception normal  Skin - warm, dry to touch, normal coloration, no rashes, no suspicious skin lesions  Superficial temporal artery pulses are normal.   There is no limitation of range of motion of the neck. There is no resting tremor, no pin rolling, no bradykinesia, no Hypohonia, normal blink rate. Musculoskeletal: Has no hand arthritis, no limitation of ROM in any of the four extremities. There is no leg edema. The Heart was regular in rate and rhythm. No heart murmur  Chest Clear, with  good effort. Abdomen soft, intact bowel sounds. Results for orders placed during the hospital encounter of 09/22/22    MRI BRAIN WO CONTRAST    Narrative  PROCEDURE: MRI BRAIN WO CONTRAST    CLINICAL INFORMATION Chronic nonintractable headache, unspecified headache type, Chronic nonintractable headache, unspecified headache type. COMPARISON: No prior study. TECHNIQUE: Multiplanar and multiple spin echo MRI images were obtained of the brain without contrast.    FINDINGS:      The diffusion-weighted images are normal.  The brain volume is normal. There is a mild amount of signal hyperintensity on the FLAIR and T2-weighted sequences in the right parietal periventricular white matter of the brain. These are a nonspecific finding  which could be secondary to ischemic or demyelinating disease or vasculitis. There is no definite involvement of the corpus callosum. There are no intra-or extra-axial collections.   There is no hydrocephalus, midline shift or mass effect. There are  no areas of susceptibility artifact are present. The major intracranial vascular flow voids are present. The midline craniocervical junction structures are normal.  The pituitary gland and brainstem are normal.    There are inflammatory changes in the right maxillary sinus and to a lesser extent in the right sphenoid sinus. There is slightly increased signal intensity in the nasopharynx. This may represent enlarged adenoids. There are small cystic areas in the parotid glands bilaterally. Impression  1. Nonspecific punctate areas of increased signal intensity in the right parietal periventricular white matter. These could be secondary to ischemic or demyelinating disease or vasculitis. There is no evidence of an acute infarct. There is no involvement  of the corpus callosum. 2. Otherwise negative MRI scan of the brain. 3. Inflammatory changes in the right maxillary sinus and to lesser extent in the right sphenoid sinus. 4. Mild enlargement of the adenoids. 5. Small cystic areas in the superficial portions of the right and left parotid glands. **This report has been created using voice recognition software. It may contain minor errors which are inherent in voice recognition technology. **    Final report electronically signed by DR Izabella Su on 9/23/2022 9:55 AM        We reviewed the patient records from referring provider and available information in the EHR       ASSESSMENT:      Diagnosis Orders   1. Other headache syndrome           This is a 77-year-old male who presents with headache that is been ongoing for the past 2 months. Patient is pain is occipital area. He did undergo MRI brain without contrast on 9/22/2022 that showed nonspecific punctate areas of increased signal intensity in the right parietal periventricular white matter. No evidence of acute infarct.   I reviewed the MRI Brain and agree with interpretation, I also reviewed the patient pertinent labs and records in the EHR and from other providers. Of note, the patient has a family history of SLE with his father. His exam is nonfocal.  The patient was counseled about his symptoms and work up recommended. We will arrange for him to undergo lab work checking connective tissue screening as well as also obtain a cervical spine x-ray to evaluate the alignment of his neck. He will need to undergo repeat MRI brain with and without contrast in March 2023 to ensure stability. After detailed discussion with the patient and his significant other we agreed on the following plan. Plan     Will order connective tissue screen including JOE, rheumatoid factor, antidouble-stranded DNA, anti-SSA, anti-SSB in addition to serum protein electrophoresis and sed rate. Cervical spine x-ray  Repeat MRI brain W/WO contrast in 3/2023, please call us to schedule  Keep headache diary  Call with any new symptoms or concerns. Follow up in 2 months.      Total time 64 min    Sam Felder MD

## 2022-10-31 DIAGNOSIS — L73.2 HIDRADENITIS SUPPURATIVA: ICD-10-CM

## 2022-10-31 RX ORDER — HYDROCODONE BITARTRATE AND ACETAMINOPHEN 5; 325 MG/1; MG/1
1-2 TABLET ORAL NIGHTLY PRN
Qty: 60 TABLET | Refills: 0 | Status: SHIPPED | OUTPATIENT
Start: 2022-10-31 | End: 2022-11-30

## 2022-10-31 NOTE — TELEPHONE ENCOUNTER
Kamlesh Brito called requesting a refill on the following medications:  Requested Prescriptions     Pending Prescriptions Disp Refills    HYDROcodone-acetaminophen (NORCO) 5-325 MG per tablet 60 tablet 0     Sig: Take 1-2 tablets by mouth nightly as needed for Pain for up to 30 days. Take lowest dose possible to manage pain. L73.2       Date of last visit: 8/30/2022  Date of next visit (if applicable):Visit date not found  Date of last refill: 9/29/22  [facility]       Thanks,  Thang Day

## 2022-10-31 NOTE — TELEPHONE ENCOUNTER
Ep'ed norco to pharm requested    Controlled Substance Monitoring:    Acute and Chronic Pain Monitoring:   RX Monitoring 10/31/2022   Acute Pain Prescriptions -   Periodic Controlled Substance Monitoring No signs of potential drug abuse or diversion identified.

## 2022-11-22 ENCOUNTER — OFFICE VISIT (OUTPATIENT)
Dept: FAMILY MEDICINE CLINIC | Age: 37
End: 2022-11-22
Payer: MEDICARE

## 2022-11-22 VITALS
SYSTOLIC BLOOD PRESSURE: 126 MMHG | WEIGHT: 312.4 LBS | HEART RATE: 75 BPM | TEMPERATURE: 98.2 F | DIASTOLIC BLOOD PRESSURE: 84 MMHG | BODY MASS INDEX: 42.37 KG/M2 | RESPIRATION RATE: 20 BRPM | OXYGEN SATURATION: 97 %

## 2022-11-22 DIAGNOSIS — M48.062 SPINAL STENOSIS OF LUMBAR REGION WITH NEUROGENIC CLAUDICATION: ICD-10-CM

## 2022-11-22 DIAGNOSIS — J20.9 ACUTE BRONCHITIS, UNSPECIFIED ORGANISM: ICD-10-CM

## 2022-11-22 DIAGNOSIS — G62.9 PERIPHERAL POLYNEUROPATHY: ICD-10-CM

## 2022-11-22 DIAGNOSIS — Z99.89 OSA ON CPAP: ICD-10-CM

## 2022-11-22 DIAGNOSIS — L73.2 HIDRADENITIS SUPPURATIVA: Primary | ICD-10-CM

## 2022-11-22 DIAGNOSIS — G47.33 OSA ON CPAP: ICD-10-CM

## 2022-11-22 PROBLEM — M51.37 DEGENERATION OF LUMBOSACRAL INTERVERTEBRAL DISC: Status: ACTIVE | Noted: 2022-11-22

## 2022-11-22 PROBLEM — M47.817 LUMBOSACRAL SPONDYLOSIS WITHOUT MYELOPATHY: Status: ACTIVE | Noted: 2022-11-22

## 2022-11-22 PROBLEM — M51.26 DISPLACEMENT OF LUMBAR INTERVERTEBRAL DISC: Status: ACTIVE | Noted: 2022-11-22

## 2022-11-22 PROBLEM — M51.379 DEGENERATION OF LUMBOSACRAL INTERVERTEBRAL DISC: Status: ACTIVE | Noted: 2022-11-22

## 2022-11-22 PROBLEM — M48.061 LUMBAR SPINAL STENOSIS: Status: ACTIVE | Noted: 2022-11-22

## 2022-11-22 PROCEDURE — 99214 OFFICE O/P EST MOD 30 MIN: CPT | Performed by: FAMILY MEDICINE

## 2022-11-22 PROCEDURE — G8417 CALC BMI ABV UP PARAM F/U: HCPCS | Performed by: FAMILY MEDICINE

## 2022-11-22 PROCEDURE — G8427 DOCREV CUR MEDS BY ELIG CLIN: HCPCS | Performed by: FAMILY MEDICINE

## 2022-11-22 PROCEDURE — 4004F PT TOBACCO SCREEN RCVD TLK: CPT | Performed by: FAMILY MEDICINE

## 2022-11-22 PROCEDURE — G8484 FLU IMMUNIZE NO ADMIN: HCPCS | Performed by: FAMILY MEDICINE

## 2022-11-22 RX ORDER — DOXYCYCLINE HYCLATE 100 MG
100 TABLET ORAL 2 TIMES DAILY
Qty: 20 TABLET | Refills: 0 | Status: SHIPPED | OUTPATIENT
Start: 2022-11-22 | End: 2022-12-02

## 2022-11-22 ASSESSMENT — ENCOUNTER SYMPTOMS
COUGH: 1
VOMITING: 0
CONSTIPATION: 0
RHINORRHEA: 0
CHEST TIGHTNESS: 0
WHEEZING: 0
SORE THROAT: 0
DIARRHEA: 0
SHORTNESS OF BREATH: 0
NAUSEA: 0
BACK PAIN: 1
EYE PAIN: 0
BLOOD IN STOOL: 0
ABDOMINAL PAIN: 0

## 2022-11-22 NOTE — LETTER
[address] [address]  Phone: [phone]  Fax: [phone]    Harpreet Galindo MD        November 22, 2022     Patient: Shahana Bernal   YOB: 1985   Date of Visit: 11/22/2022       To Whom It May Concern: It is my medical opinion that Domingo Pringle is unable to be employed due to ongoing hidradenitis supportiva, peripheral neuropathy, and chronic back pain with claudication adn multiple back surgeries. If you have any questions or concerns, please don't hesitate to call.     Sincerely,        Harpreet Galindo MD

## 2022-11-22 NOTE — PROGRESS NOTES
Nydia Abernathy (:  1985) is a 40 y.o. male,Established patientEstablished patient, here for evaluation of the following chief complaint(s):  Numbness (Feet neuropathy is worsening, trouble walking,  having trouble with sleeping, cough)         ASSESSMENT/PLAN:  1. Hidradenitis suppurativa  -stable, controlled on prn norco  2. GERHARD on CPAP  -     External Referral To Sleep Medicine  -chronic condition, exacerbated, did not tolerate cpap and mask, consider inspire. 3. Spinal stenosis of lumbar region with neurogenic claudication  -unstable, worsening of sxs, controlled on prn norco  4. Acute bronchitis, unspecified organism  -     doxycycline hyclate (VIBRA-TABS) 100 MG tablet; Take 1 tablet by mouth 2 times daily for 10 days, Disp-20 tablet, R-0Normal  -monitor sxs, call if not improving    5. Peripheral polyneuropathy  -unstable, worsening, controlled on prn norco and zanaflex  No follow-ups on file. Subjective   SUBJECTIVE/OBJECTIVE:  Neuropathy       Patient here today for a check up. Reviewed BMI of 42. Encouraged diet, exercise and weight loss. Having worsening neuropathy sxs in his feet. Needs updated letter, unable to work due to HS, PN, and chronic low back pain with multiple surgeries. Trouble sleeping. Did not tolerate C pap/mask. Wondering about inspire. Partnered, current smoker, pmh reviewed. Review of Systems   Constitutional:  Negative for chills, fatigue, fever and unexpected weight change. HENT:  Negative for congestion, ear pain, rhinorrhea and sore throat. Eyes:  Negative for pain and visual disturbance. Respiratory:  Positive for cough. Negative for chest tightness, shortness of breath and wheezing. Cardiovascular:  Negative for chest pain and palpitations. Gastrointestinal:  Negative for abdominal pain, blood in stool, constipation, diarrhea, nausea and vomiting. Genitourinary:  Negative for difficulty urinating, frequency, hematuria and urgency. Musculoskeletal:  Positive for back pain. Negative for joint swelling, myalgias and neck pain. Skin:  Negative for rash. Neurological:  Positive for numbness. Negative for dizziness and headaches. Hematological:  Negative for adenopathy. Does not bruise/bleed easily. Psychiatric/Behavioral:  Positive for sleep disturbance. Negative for behavioral problems. The patient is not nervous/anxious. Objective   Physical Exam  Vitals and nursing note reviewed. Constitutional:       Appearance: He is well-developed. HENT:      Head: Normocephalic and atraumatic. Right Ear: External ear normal.      Left Ear: External ear normal.      Nose: Nose normal.      Mouth/Throat:      Mouth: Mucous membranes are moist.   Eyes:      Pupils: Pupils are equal, round, and reactive to light. Neck:      Thyroid: No thyromegaly. Cardiovascular:      Rate and Rhythm: Normal rate and regular rhythm. Heart sounds: Normal heart sounds. Pulmonary:      Breath sounds: Rhonchi present. No wheezing or rales. Abdominal:      General: Bowel sounds are normal.      Palpations: Abdomen is soft. Tenderness: There is no abdominal tenderness. There is no guarding or rebound. Musculoskeletal:         General: Normal range of motion. Cervical back: Neck supple. Lymphadenopathy:      Cervical: No cervical adenopathy. Skin:     General: Skin is warm and dry. Findings: No rash. Neurological:      Mental Status: He is alert and oriented to person, place, and time. Cranial Nerves: No cranial nerve deficit. Deep Tendon Reflexes: Reflexes are normal and symmetric. An electronic signature was used to authenticate this note.     --Edmundo Dickerson MD

## 2022-11-23 NOTE — PROGRESS NOTES
Sleep referral, OV notes, Pt summary faxed to [facility] sleep clinic [phone]

## 2022-12-02 DIAGNOSIS — L73.2 HIDRADENITIS SUPPURATIVA: ICD-10-CM

## 2022-12-02 RX ORDER — HYDROCODONE BITARTRATE AND ACETAMINOPHEN 5; 325 MG/1; MG/1
1-2 TABLET ORAL NIGHTLY PRN
Qty: 60 TABLET | Refills: 0 | Status: SHIPPED | OUTPATIENT
Start: 2022-12-02 | End: 2022-12-02 | Stop reason: SDUPTHER

## 2022-12-02 RX ORDER — HYDROCODONE BITARTRATE AND ACETAMINOPHEN 5; 325 MG/1; MG/1
1-2 TABLET ORAL NIGHTLY PRN
Qty: 60 TABLET | Refills: 0 | Status: SHIPPED | OUTPATIENT
Start: 2022-12-02 | End: 2023-01-01

## 2022-12-02 NOTE — TELEPHONE ENCOUNTER
Richy Che called requesting a refill on the following medications:  Requested Prescriptions     Pending Prescriptions Disp Refills    HYDROcodone-acetaminophen (NORCO) 5-325 MG per tablet 60 tablet 0     Sig: Take 1-2 tablets by mouth nightly as needed for Pain for up to 30 days. Take lowest dose possible to manage pain. L73.2     Signed Prescriptions Disp Refills    HYDROcodone-acetaminophen (NORCO) 5-325 MG per tablet 60 tablet 0     Sig: Take 1-2 tablets by mouth nightly as needed for Pain for up to 30 days. Take lowest dose possible to manage pain. L73.2     Authorizing Provider: Andrade Ch       Date of last visit: 11/22/2022  Date of next visit (if applicable):Visit date not found  Date of last refill: 12/2/22  [facility]: [facility]. Thanks,  Satnam Rondon        Rx originally sent to [facility]; however, they are not able to fill. The pt is requesting meds to be refilled at [facility] in [address] [facility].

## 2022-12-02 NOTE — TELEPHONE ENCOUNTER
Ep'ed norco to pharm requested    Controlled Substance Monitoring:    Acute and Chronic Pain Monitoring:   RX Monitoring 12/2/2022   Acute Pain Prescriptions -   Periodic Controlled Substance Monitoring No signs of potential drug abuse or diversion identified.

## 2022-12-02 NOTE — TELEPHONE ENCOUNTER
Jeanna Clark called requesting a refill on the following medications:  Requested Prescriptions     Pending Prescriptions Disp Refills    HYDROcodone-acetaminophen (NORCO) 5-325 MG per tablet 60 tablet 0     Sig: Take 1-2 tablets by mouth nightly as needed for Pain for up to 30 days. Take lowest dose possible to manage pain.  L73.2       Date of last visit: 11/22/2022  Date of next visit (if applicable):Visit date not found  Date of last refill: 10/31/2022    Pharmacy Name: [facility], [address] [facility]

## 2023-01-05 DIAGNOSIS — L73.2 HIDRADENITIS SUPPURATIVA: ICD-10-CM

## 2023-01-05 RX ORDER — HYDROCODONE BITARTRATE AND ACETAMINOPHEN 5; 325 MG/1; MG/1
1-2 TABLET ORAL NIGHTLY PRN
Qty: 60 TABLET | Refills: 0 | Status: SHIPPED | OUTPATIENT
Start: 2023-01-05 | End: 2023-02-04

## 2023-01-05 NOTE — TELEPHONE ENCOUNTER
Jerica Ramos called requesting a refill on the following medications:  Requested Prescriptions     Pending Prescriptions Disp Refills    HYDROcodone-acetaminophen (NORCO) 5-325 MG per tablet 60 tablet 0     Sig: Take 1-2 tablets by mouth nightly as needed for Pain for up to 30 days. Take lowest dose possible to manage pain. L73.2       Date of last visit: 11/22/2022 VV hidradenitis    Date of next visit: date not found    Date of last refill: 1/1/23    Pharmacy Name: [facility] (last time [facility] didn't have it, but I asked him today and he said to try [facility] again)    Pt has #2 left    Pls call pt at 21 [phone] only if probs.     Zip: [phone]     Thanks,  Chrissy Kumari

## 2023-01-05 NOTE — TELEPHONE ENCOUNTER
Ep'ed norco to pharm requested    Controlled Substance Monitoring:    Acute and Chronic Pain Monitoring:   RX Monitoring 1/5/2023   Acute Pain Prescriptions -   Periodic Controlled Substance Monitoring No signs of potential drug abuse or diversion identified.

## 2023-01-16 ENCOUNTER — TELEPHONE (OUTPATIENT)
Dept: ALLERGY | Age: 38
End: 2023-01-16

## 2023-01-16 NOTE — TELEPHONE ENCOUNTER
I called and spoke with Pattie Garcia and let her know what Danish Zurita said and offered her the first available appt with Dr. Gely Martinez. She took the appt and said if it goes worse they would go to the ER room. She thanked me.

## 2023-01-16 NOTE — TELEPHONE ENCOUNTER
[address] from [facility] who is Rogelio's girlfriend. She said that Zee Marie was out UC for ear pain bilateral. They gave him an antibiotic, but she stated its not working. He was last seen by Dr. Simon Fernández in 2021. They want to get him ASAP. Please advise.

## 2023-01-23 ENCOUNTER — OFFICE VISIT (OUTPATIENT)
Dept: NEUROLOGY | Age: 38
End: 2023-01-23
Payer: MEDICARE

## 2023-01-23 VITALS
HEART RATE: 88 BPM | HEIGHT: 72 IN | DIASTOLIC BLOOD PRESSURE: 75 MMHG | SYSTOLIC BLOOD PRESSURE: 135 MMHG | OXYGEN SATURATION: 97 % | BODY MASS INDEX: 42.66 KG/M2 | WEIGHT: 315 LBS

## 2023-01-23 DIAGNOSIS — R29.898 WEAKNESS OF BOTH HANDS: ICD-10-CM

## 2023-01-23 DIAGNOSIS — G44.89 OTHER HEADACHE SYNDROME: Primary | ICD-10-CM

## 2023-01-23 DIAGNOSIS — R20.0 BILATERAL HAND NUMBNESS: ICD-10-CM

## 2023-01-23 PROCEDURE — G8417 CALC BMI ABV UP PARAM F/U: HCPCS | Performed by: NURSE PRACTITIONER

## 2023-01-23 PROCEDURE — 99214 OFFICE O/P EST MOD 30 MIN: CPT | Performed by: NURSE PRACTITIONER

## 2023-01-23 PROCEDURE — G8427 DOCREV CUR MEDS BY ELIG CLIN: HCPCS | Performed by: NURSE PRACTITIONER

## 2023-01-23 PROCEDURE — 4004F PT TOBACCO SCREEN RCVD TLK: CPT | Performed by: NURSE PRACTITIONER

## 2023-01-23 PROCEDURE — G8484 FLU IMMUNIZE NO ADMIN: HCPCS | Performed by: NURSE PRACTITIONER

## 2023-01-23 RX ORDER — PREDNISONE 20 MG/1
20 TABLET ORAL DAILY
COMMUNITY

## 2023-01-23 RX ORDER — RIMEGEPANT SULFATE 75 MG/75MG
75 TABLET, ORALLY DISINTEGRATING ORAL EVERY OTHER DAY
Qty: 8 TABLET | Refills: 0 | COMMUNITY
Start: 2023-01-23

## 2023-01-23 NOTE — PROGRESS NOTES
NEUROLOGY OUT PATIENT FOLLOW UP NOTE:  1/23/20232:22 PM    Dorothy Andrews is here for follow up for headache. Patient Active Problem List   Diagnosis    Nasal septal deviation    Hypertrophy of inferior nasal turbinate, left    GERHARD on CPAP    Intolerance of continuous positive airway pressure (CPAP) ventilation    Hypertrophy of uvula    Hypertrophic soft palate    Large tongue    Class 3 obesity with alveolar hypoventilation, serious comorbidity, and body mass index (BMI) of 40.0 to 44.9 in adult [facility])    Status post nasal septoplasty    GERD without esophagitis    Isadora bullosa    Maxillary sinus polyp    Chronic sphenoidal sinusitis    Chronic maxillary sinusitis    Chronic frontal sinusitis    Chronic ethmoidal sinusitis    Postoperative state    Pancytopenia ([address]    History of cholecystectomy    Intercostal neuropathy    Axillary hidradenitis suppurativa    Displacement of lumbar intervertebral disc    Lumbar spinal stenosis    Degeneration of lumbosacral intervertebral disc    Lumbosacral spondylosis without myelopathy       ROS:  Respiratory : no cough, no shortness of breath  Cardiac: no chest pain. No palpitations. Renal : no flank pain, no hematuria    Skin: no rash      No Known Allergies    Current Outpatient Medications:     predniSONE (DELTASONE) 20 MG tablet, Take 20 mg by mouth daily One daily for 10 days, Disp: , Rfl:     DOXYCYCLINE PO, Take by mouth, Disp: , Rfl:     HYDROcodone-acetaminophen (NORCO) 5-325 MG per tablet, Take 1-2 tablets by mouth nightly as needed for Pain for up to 30 days. Take lowest dose possible to manage pain.  L73.2, Disp: 60 tablet, Rfl: 0    Cholecalciferol (VITAMIN D3) 125 MCG (5000 UT) TABS, Take by mouth, Disp: , Rfl:     ondansetron (ZOFRAN-ODT) 4 MG disintegrating tablet, Take 1 tablet by mouth 3 times daily as needed for Nausea or Vomiting, Disp: 21 tablet, Rfl: 0    ferrous sulfate (IRON 325) 325 (65 Fe) MG tablet, Take 325 mg by mouth daily (with breakfast), Disp: , Rfl:     tiZANidine (ZANAFLEX) 4 MG tablet, Take 2 tablets by mouth every 8 hours as needed (spasms), Disp: 180 tablet, Rfl: 11    ibuprofen (ADVIL;MOTRIN) 800 MG tablet, Take 1 tablet by mouth every 8 hours as needed for Pain, Disp: 90 tablet, Rfl: 11    pantoprazole (PROTONIX) 40 MG tablet, Take 1 tablet by mouth 2 times daily (before meals), Disp: 60 tablet, Rfl: 11    busPIRone (BUSPAR) 15 MG tablet, Take 15 mg by mouth 3 times daily, Disp: 90 tablet, Rfl: 2    acetaminophen (TYLENOL) 500 MG tablet, Take 1,000 mg by mouth every 6 hours as needed for Pain, Disp: , Rfl:     I reviewed the past medical history, allergies, medications, social history and family history.       PE:   Vitals:    01/23/23 1400   BP: 135/75   Site: Left Upper Arm   Position: Sitting   Cuff Size: Large Adult   Pulse: 88   SpO2: 97%   Weight: (!) 318 lb (144.2 kg)   Height: 6' (1.829 m)     General Appearance:  awake, alert, oriented, in no acute distress  Gen: NAD, Language is Intact. Skin: no rash, lesion, dry  to touch. warm  Head: no rash, no icterus  Neck: There is no carotid bruits. The Neck is supple. There is no neck lymphadenopathy.   Neuro: CN 2-12 grossly intact with no focal deficits. Power 5/5 Throughout symmetric, Reflexes are  symmetric. Long tracts are intact. Cerebellar exam is Intact. Sensory exam is intact to light touch.  Gait is intact.  Musculoskeletal:  Has no hand arthritis, no limitation of ROM in any of the four extremities.  Lower extremities no edema          DATA:       Results for orders placed or performed during the hospital encounter of 10/23/22   CBC with Auto Differential   Result Value Ref Range    WBC 5.9 4.8 - 10.8 thou/mm3    RBC 4.66 (L) 4.70 - 6.10 mill/mm3    Hemoglobin 13.7 (L) 14.0 - 18.0 gm/dl    Hematocrit 40.7 (L) 42.0 - 52.0 %    MCV 87.3 80.0 - 94.0 fL    MCH 29.4 26.0 - 33.0 pg    MCHC 33.7 32.2 - 35.5 gm/dl    RDW-CV 13.8 11.5 - 14.5 %    RDW-SD 43.6 35.0 - 45.0 fL     Platelets 327 (L) 334 - 400 thou/mm3    MPV 11.1 9.4 - 12.4 fL    Seg Neutrophils 68.3 %    Lymphocytes 21.5 %    Monocytes 5.8 %    Eosinophils 3.4 %    Basophils 0.7 %    Immature Granulocytes 0.3 %    Segs Absolute 4.0 1.8 - 7.7 thou/mm3    Lymphocytes Absolute 1.3 1.0 - 4.8 thou/mm3    Monocytes Absolute 0.3 (L) 0.4 - 1.3 thou/mm3    Eosinophils Absolute 0.2 0.0 - 0.4 thou/mm3    Basophils Absolute 0.0 0.0 - 0.1 thou/mm3    Immature Grans (Abs) 0.02 0.00 - 0.07 thou/mm3    nRBC 0 /100 wbc   Comprehensive Metabolic Panel w/ Reflex to MG   Result Value Ref Range    Glucose 143 (H) 70 - 108 mg/dL    Creatinine 0.7 0.4 - 1.2 mg/dL    BUN 7 7 - 22 mg/dL    Sodium 136 135 - 145 meq/L    Potassium reflex Magnesium 4.1 3.5 - 5.2 meq/L    Chloride 99 98 - 111 meq/L    CO2 26 23 - 33 meq/L    Calcium 8.9 8.5 - 10.5 mg/dL    AST 35 5 - 40 U/L    Alkaline Phosphatase 94 38 - 126 U/L    Total Protein 7.0 6.1 - 8.0 g/dL    Albumin 3.7 3.5 - 5.1 g/dL    Total Bilirubin 0.5 0.3 - 1.2 mg/dL    ALT 48 11 - 66 U/L   Lipase   Result Value Ref Range    Lipase 29.9 5.6 - 51.3 U/L   Urinalysis with Reflex to Culture    Specimen: Urine   Result Value Ref Range    Glucose, Ur NEGATIVE NEGATIVE mg/dl    Bilirubin Urine NEGATIVE NEGATIVE    Ketones, Urine NEGATIVE NEGATIVE    Specific Gravity, Urine 1.030 1.002 - 1.030    Blood, Urine NEGATIVE NEGATIVE    pH, UA 7.0 5.0 - 9.0    Protein, UA NEGATIVE NEGATIVE    Urobilinogen, Urine 1.0 0.0 - 1.0 eu/dl    Nitrite, Urine NEGATIVE NEGATIVE    Leukocyte Esterase, Urine NEGATIVE NEGATIVE    Color, UA YELLOW STRAW-YELLOW    Character, Urine CLEAR CLEAR-SL CLOUD   Glomerular Filtration Rate, Estimated   Result Value Ref Range    Est, Glom Filt Rate >60 ml/min/1.73m2   Anion Gap   Result Value Ref Range    Anion Gap 11.0 8.0 - 16.0 meq/L   Osmolality   Result Value Ref Range    Osmolality Calc 272.4 (L) 275.0 - 300.0 mOsmol/kg             Results for orders placed during the hospital encounter of 09/22/22    MRI BRAIN WO CONTRAST    Narrative  PROCEDURE: MRI BRAIN WO CONTRAST    CLINICAL INFORMATION Chronic nonintractable headache, unspecified headache type, Chronic nonintractable headache, unspecified headache type. COMPARISON: No prior study. TECHNIQUE: Multiplanar and multiple spin echo MRI images were obtained of the brain without contrast.    FINDINGS:      The diffusion-weighted images are normal.  The brain volume is normal. There is a mild amount of signal hyperintensity on the FLAIR and T2-weighted sequences in the right parietal periventricular white matter of the brain. These are a nonspecific finding  which could be secondary to ischemic or demyelinating disease or vasculitis. There is no definite involvement of the corpus callosum. There are no intra-or extra-axial collections. There is no hydrocephalus, midline shift or mass effect. There are  no areas of susceptibility artifact are present. The major intracranial vascular flow voids are present. The midline craniocervical junction structures are normal.  The pituitary gland and brainstem are normal.    There are inflammatory changes in the right maxillary sinus and to a lesser extent in the right sphenoid sinus. There is slightly increased signal intensity in the nasopharynx. This may represent enlarged adenoids. There are small cystic areas in the parotid glands bilaterally. Impression  1. Nonspecific punctate areas of increased signal intensity in the right parietal periventricular white matter. These could be secondary to ischemic or demyelinating disease or vasculitis. There is no evidence of an acute infarct. There is no involvement  of the corpus callosum. 2. Otherwise negative MRI scan of the brain. 3. Inflammatory changes in the right maxillary sinus and to lesser extent in the right sphenoid sinus. 4. Mild enlargement of the adenoids.   5. Small cystic areas in the superficial portions of the right and left parotid glands. **This report has been created using voice recognition software. It may contain minor errors which are inherent in voice recognition technology. **    Final report electronically signed by DR Cristobal Kemp on 9/23/2022 9:55 AM    No results found for this or any previous visit. Assessment:     Diagnosis Orders   1. Other headache syndrome        2. Weakness of both hands        3. Bilateral hand numbness             Follow up for headache. He is doing the same. I shared with him that his cervical spine x-ray and lab work checking connective tissue diseases showed no concerning findings. He does have sleep apnea, however he does not wear his CPAP. He reports he will be seeing pulmonary regarding the inspire device for his sleep apnea as he is unable to tolerate the mask. He complains of new problem of bilateral hand  weakness. He is dropping things and has trouble opening lids of jars. He does complain of numbness in his thumb, index, and middle fingers. His symptoms can wake him from sleep. On exam, there is+ve phalen's sign. We will arrange for him to undergo EMG of bilateral upper extremities to screen for carpal tunnel, cubital tunnel, versus cervical radiculopathy. After detailed discussion with patient we agreed on the following plan. Plan:  Start Nurtec 75 mg every other day for headache prevention, samples provided today in office. Griselda Lockwood EMG bilateral upper extremities  Repeat MRI brain W/WO contrast in 3/2023, please call us to schedule  Keep headache diary  Call with any new symptoms or concerns.    Follow up in 6 weeks       Total time 33 min    Nohemi Grove, APRN - CNP

## 2023-01-23 NOTE — PATIENT INSTRUCTIONS
Start Nurtec 75 mg every other day for headache prevention, samples provided today in office. [facility] EMG bilateral upper extremities  Repeat MRI brain W/WO contrast in 3/2023, please call us to schedule  Keep headache diary  Call with any new symptoms or concerns.    Follow up in 6 weeks

## 2023-02-06 DIAGNOSIS — L73.2 HIDRADENITIS SUPPURATIVA: ICD-10-CM

## 2023-02-06 RX ORDER — HYDROCODONE BITARTRATE AND ACETAMINOPHEN 5; 325 MG/1; MG/1
1-2 TABLET ORAL NIGHTLY PRN
Qty: 60 TABLET | Refills: 0 | Status: SHIPPED | OUTPATIENT
Start: 2023-02-06 | End: 2023-03-08

## 2023-02-06 NOTE — TELEPHONE ENCOUNTER
Ep'ed norco to pharm requested      Controlled Substance Monitoring:    Acute and Chronic Pain Monitoring:   RX Monitoring 2/6/2023   Acute Pain Prescriptions -   Periodic Controlled Substance Monitoring No signs of potential drug abuse or diversion identified.

## 2023-02-06 NOTE — TELEPHONE ENCOUNTER
----- Message from David Damon sent at 2/6/2023  9:37 AM EST -----  Subject: Refill Request    QUESTIONS  Name of Medication? HYDROcodone-acetaminophen (NORCO) 5-325 MG per tablet  Patient-reported dosage and instructions? as needed   How many days do you have left? 2  Preferred Pharmacy? [facility] phone number (if available)? [phone]  ---------------------------------------------------------------------------  --------------  CALL BACK INFO  What is the best way for the office to contact you? OK to leave message on   voicemail  Preferred Call Back Phone Number? [phone]  ---------------------------------------------------------------------------  --------------  SCRIPT ANSWERS  Relationship to Patient?  Self

## 2023-02-28 DIAGNOSIS — F41.9 ANXIETY: ICD-10-CM

## 2023-02-28 RX ORDER — BUSPIRONE HYDROCHLORIDE 15 MG/1
15 TABLET ORAL 3 TIMES DAILY
Qty: 90 TABLET | Refills: 1 | Status: SHIPPED | OUTPATIENT
Start: 2023-02-28

## 2023-02-28 NOTE — TELEPHONE ENCOUNTER
Lesly Sanchez called requesting a refill on the following medications:  Requested Prescriptions     Pending Prescriptions Disp Refills    busPIRone (BUSPAR) 15 MG tablet 90 tablet 2     Sig: Take 15 mg by mouth 3 times daily       Date of last visit: 11/22/2022  Date of next visit (if applicable):Visit date not found  Date of last refill: 08/30/2022  Pharmacy Name: [facility], [address] [place] [address] ([address] [place] [address])

## 2023-03-06 DIAGNOSIS — L73.2 HIDRADENITIS SUPPURATIVA: ICD-10-CM

## 2023-03-06 RX ORDER — HYDROCODONE BITARTRATE AND ACETAMINOPHEN 5; 325 MG/1; MG/1
1-2 TABLET ORAL NIGHTLY PRN
Qty: 60 TABLET | Refills: 0 | Status: SHIPPED | OUTPATIENT
Start: 2023-03-06 | End: 2023-04-05

## 2023-03-06 NOTE — TELEPHONE ENCOUNTER
Ep'ed norco to pharm requested    Controlled Substance Monitoring:    Acute and Chronic Pain Monitoring:   RX Monitoring 3/6/2023   Acute Pain Prescriptions -   Periodic Controlled Substance Monitoring No signs of potential drug abuse or diversion identified.

## 2023-03-06 NOTE — TELEPHONE ENCOUNTER
[facility] called requesting a refill on the following medications:  Requested Prescriptions     Pending Prescriptions Disp Refills    HYDROcodone-acetaminophen (NORCO) 5-325 MG per tablet 60 tablet 0     Sig: Take 1-2 tablets by mouth nightly as needed for Pain for up to 30 days. Take lowest dose possible to manage pain. L73.2       Date of last visit: 11/22/2022  Date of next visit (if applicable):Visit date not found  Date of last refill:  2/6/23  Pharmacy Name: [address] [place] [address].       Thanks,  Alie Kimble

## 2023-03-14 ENCOUNTER — PROCEDURE VISIT (OUTPATIENT)
Dept: NEUROLOGY | Age: 38
End: 2023-03-14

## 2023-03-14 DIAGNOSIS — M79.602 BILATERAL ARM PAIN: Primary | ICD-10-CM

## 2023-03-14 DIAGNOSIS — M54.12 CERVICAL RADICULOPATHY: ICD-10-CM

## 2023-03-14 DIAGNOSIS — M54.2 NECK PAIN: ICD-10-CM

## 2023-03-14 DIAGNOSIS — M79.601 BILATERAL ARM PAIN: Primary | ICD-10-CM

## 2023-03-17 ENCOUNTER — TELEPHONE (OUTPATIENT)
Dept: NEUROLOGY | Age: 38
End: 2023-03-17

## 2023-03-17 DIAGNOSIS — R20.0 BILATERAL HAND NUMBNESS: ICD-10-CM

## 2023-03-17 DIAGNOSIS — R29.898 WEAKNESS OF BOTH HANDS: ICD-10-CM

## 2023-03-17 DIAGNOSIS — G44.89 OTHER HEADACHE SYNDROME: Primary | ICD-10-CM

## 2023-03-23 ENCOUNTER — OFFICE VISIT (OUTPATIENT)
Dept: FAMILY MEDICINE CLINIC | Age: 38
End: 2023-03-23
Payer: COMMERCIAL

## 2023-03-23 VITALS
DIASTOLIC BLOOD PRESSURE: 88 MMHG | RESPIRATION RATE: 20 BRPM | TEMPERATURE: 98 F | WEIGHT: 315 LBS | SYSTOLIC BLOOD PRESSURE: 140 MMHG | HEART RATE: 100 BPM | BODY MASS INDEX: 42.83 KG/M2

## 2023-03-23 DIAGNOSIS — Z99.89 OSA ON CPAP: ICD-10-CM

## 2023-03-23 DIAGNOSIS — L73.2 HIDRADENITIS SUPPURATIVA: Primary | ICD-10-CM

## 2023-03-23 DIAGNOSIS — G47.33 OSA ON CPAP: ICD-10-CM

## 2023-03-23 DIAGNOSIS — M48.062 SPINAL STENOSIS OF LUMBAR REGION WITH NEUROGENIC CLAUDICATION: ICD-10-CM

## 2023-03-23 PROCEDURE — G8417 CALC BMI ABV UP PARAM F/U: HCPCS | Performed by: FAMILY MEDICINE

## 2023-03-23 PROCEDURE — G8484 FLU IMMUNIZE NO ADMIN: HCPCS | Performed by: FAMILY MEDICINE

## 2023-03-23 PROCEDURE — 99214 OFFICE O/P EST MOD 30 MIN: CPT | Performed by: FAMILY MEDICINE

## 2023-03-23 PROCEDURE — 4004F PT TOBACCO SCREEN RCVD TLK: CPT | Performed by: FAMILY MEDICINE

## 2023-03-23 PROCEDURE — G8427 DOCREV CUR MEDS BY ELIG CLIN: HCPCS | Performed by: FAMILY MEDICINE

## 2023-03-23 RX ORDER — HYDROCODONE BITARTRATE AND ACETAMINOPHEN 5; 325 MG/1; MG/1
1 TABLET ORAL EVERY 6 HOURS PRN
Qty: 120 TABLET | Refills: 0 | Status: SHIPPED | OUTPATIENT
Start: 2023-03-23 | End: 2023-04-22

## 2023-03-23 RX ORDER — LEVOFLOXACIN 500 MG/1
500 TABLET, FILM COATED ORAL DAILY
Qty: 10 TABLET | Refills: 0 | Status: SHIPPED | OUTPATIENT
Start: 2023-03-23 | End: 2023-04-02

## 2023-03-23 SDOH — ECONOMIC STABILITY: FOOD INSECURITY: WITHIN THE PAST 12 MONTHS, THE FOOD YOU BOUGHT JUST DIDN'T LAST AND YOU DIDN'T HAVE MONEY TO GET MORE.: NEVER TRUE

## 2023-03-23 SDOH — ECONOMIC STABILITY: INCOME INSECURITY: HOW HARD IS IT FOR YOU TO PAY FOR THE VERY BASICS LIKE FOOD, HOUSING, MEDICAL CARE, AND HEATING?: NOT HARD AT ALL

## 2023-03-23 SDOH — ECONOMIC STABILITY: FOOD INSECURITY: WITHIN THE PAST 12 MONTHS, YOU WORRIED THAT YOUR FOOD WOULD RUN OUT BEFORE YOU GOT MONEY TO BUY MORE.: NEVER TRUE

## 2023-03-23 SDOH — ECONOMIC STABILITY: HOUSING INSECURITY
IN THE LAST 12 MONTHS, WAS THERE A TIME WHEN YOU DID NOT HAVE A STEADY PLACE TO SLEEP OR SLEPT IN A SHELTER (INCLUDING NOW)?: NO

## 2023-03-23 ASSESSMENT — PATIENT HEALTH QUESTIONNAIRE - PHQ9
SUM OF ALL RESPONSES TO PHQ QUESTIONS 1-9: 2
SUM OF ALL RESPONSES TO PHQ QUESTIONS 1-9: 2
SUM OF ALL RESPONSES TO PHQ9 QUESTIONS 1 & 2: 2
SUM OF ALL RESPONSES TO PHQ QUESTIONS 1-9: 2
2. FEELING DOWN, DEPRESSED OR HOPELESS: 1
1. LITTLE INTEREST OR PLEASURE IN DOING THINGS: 1
SUM OF ALL RESPONSES TO PHQ QUESTIONS 1-9: 2

## 2023-03-23 ASSESSMENT — ENCOUNTER SYMPTOMS
ABDOMINAL PAIN: 0
WHEEZING: 0
BACK PAIN: 1
CONSTIPATION: 0
CHEST TIGHTNESS: 0
VOMITING: 0
SORE THROAT: 0
EYE PAIN: 0
SHORTNESS OF BREATH: 0
COUGH: 0
RHINORRHEA: 0
BLOOD IN STOOL: 0
NAUSEA: 0
DIARRHEA: 0

## 2023-04-06 ENCOUNTER — HOSPITAL ENCOUNTER (OUTPATIENT)
Dept: MRI IMAGING | Age: 38
Discharge: HOME | End: 2023-04-06
Payer: COMMERCIAL

## 2023-04-06 DIAGNOSIS — M79.602 BILATERAL ARM PAIN: ICD-10-CM

## 2023-04-06 DIAGNOSIS — M79.601 BILATERAL ARM PAIN: ICD-10-CM

## 2023-04-06 DIAGNOSIS — M54.2 NECK PAIN: ICD-10-CM

## 2023-04-06 DIAGNOSIS — R29.898 WEAKNESS OF BOTH HANDS: ICD-10-CM

## 2023-04-06 DIAGNOSIS — G44.89 OTHER HEADACHE SYNDROME: ICD-10-CM

## 2023-04-06 DIAGNOSIS — M54.12 CERVICAL RADICULOPATHY: ICD-10-CM

## 2023-04-06 DIAGNOSIS — R20.0 BILATERAL HAND NUMBNESS: ICD-10-CM

## 2023-04-06 PROCEDURE — 72141 MRI NECK SPINE W/O DYE: CPT

## 2023-04-06 PROCEDURE — 70553 MRI BRAIN STEM W/O & W/DYE: CPT

## 2023-04-06 PROCEDURE — 6360000004 HC RX CONTRAST MEDICATION: Performed by: NURSE PRACTITIONER

## 2023-04-06 PROCEDURE — A9579 GAD-BASE MR CONTRAST NOS,1ML: HCPCS | Performed by: NURSE PRACTITIONER

## 2023-04-06 RX ADMIN — GADOTERIDOL 20 ML: 279.3 INJECTION, SOLUTION INTRAVENOUS at 13:53

## 2023-04-06 NOTE — RESULT ENCOUNTER NOTE
Please let patient Know MRI Cervical spine shows evidence of C5-6 foraminal stenosis. He needs to see a spine specialist, does he have a preference?  Huyen Turcios MD .

## 2023-04-10 ENCOUNTER — OFFICE VISIT (OUTPATIENT)
Dept: NEUROLOGY | Age: 38
End: 2023-04-10
Payer: COMMERCIAL

## 2023-04-10 VITALS
OXYGEN SATURATION: 96 % | HEART RATE: 86 BPM | HEIGHT: 71 IN | WEIGHT: 315 LBS | SYSTOLIC BLOOD PRESSURE: 135 MMHG | DIASTOLIC BLOOD PRESSURE: 80 MMHG | BODY MASS INDEX: 44.1 KG/M2

## 2023-04-10 DIAGNOSIS — G44.89 OTHER HEADACHE SYNDROME: Primary | ICD-10-CM

## 2023-04-10 DIAGNOSIS — M54.2 NECK PAIN: ICD-10-CM

## 2023-04-10 DIAGNOSIS — M54.12 CERVICAL RADICULOPATHY: ICD-10-CM

## 2023-04-10 PROCEDURE — 99214 OFFICE O/P EST MOD 30 MIN: CPT | Performed by: PSYCHIATRY & NEUROLOGY

## 2023-04-10 PROCEDURE — G8417 CALC BMI ABV UP PARAM F/U: HCPCS | Performed by: PSYCHIATRY & NEUROLOGY

## 2023-04-10 PROCEDURE — G8427 DOCREV CUR MEDS BY ELIG CLIN: HCPCS | Performed by: PSYCHIATRY & NEUROLOGY

## 2023-04-10 PROCEDURE — 4004F PT TOBACCO SCREEN RCVD TLK: CPT | Performed by: PSYCHIATRY & NEUROLOGY

## 2023-04-26 DIAGNOSIS — L73.2 HIDRADENITIS SUPPURATIVA: ICD-10-CM

## 2023-04-26 DIAGNOSIS — M48.062 SPINAL STENOSIS OF LUMBAR REGION WITH NEUROGENIC CLAUDICATION: ICD-10-CM

## 2023-04-26 RX ORDER — HYDROCODONE BITARTRATE AND ACETAMINOPHEN 5; 325 MG/1; MG/1
1 TABLET ORAL EVERY 6 HOURS PRN
Qty: 120 TABLET | Refills: 0 | Status: SHIPPED | OUTPATIENT
Start: 2023-04-26 | End: 2023-05-26

## 2023-04-26 NOTE — TELEPHONE ENCOUNTER
Ep'ed norco to pharm requested    Controlled Substance Monitoring:    Acute and Chronic Pain Monitoring:   RX Monitoring 4/26/2023   Acute Pain Prescriptions -   Periodic Controlled Substance Monitoring No signs of potential drug abuse or diversion identified.

## 2023-04-26 NOTE — TELEPHONE ENCOUNTER
Eli Han called requesting a refill on the following medications:  Requested Prescriptions     Pending Prescriptions Disp Refills    HYDROcodone-acetaminophen (NORCO) 5-325 MG per tablet 120 tablet 0     Sig: Take 1 tablet by mouth every 6 hours as needed for Pain for up to 30 days. Take lowest dose possible to manage pain.  L73.2 Max Daily Amount: 4 tablets       Date of last visit: 3/23/2023  Date of next visit (if applicable):Visit date not found  Date of last refill: 3/23/23  Pharmacy Name: [address] [place] [address]       Thanks,  Martell Rivers, [address] [place] [address]

## 2023-05-25 DIAGNOSIS — M48.062 SPINAL STENOSIS OF LUMBAR REGION WITH NEUROGENIC CLAUDICATION: ICD-10-CM

## 2023-05-25 DIAGNOSIS — L73.2 HIDRADENITIS SUPPURATIVA: ICD-10-CM

## 2023-05-25 RX ORDER — HYDROCODONE BITARTRATE AND ACETAMINOPHEN 5; 325 MG/1; MG/1
1 TABLET ORAL EVERY 6 HOURS PRN
Qty: 120 TABLET | Refills: 0 | Status: SHIPPED | OUTPATIENT
Start: 2023-05-25 | End: 2023-06-24

## 2023-05-25 NOTE — TELEPHONE ENCOUNTER
Ep'ed norco to pharm requested    Controlled Substance Monitoring:    Acute and Chronic Pain Monitoring:   RX Monitoring 5/25/2023   Acute Pain Prescriptions -   Periodic Controlled Substance Monitoring No signs of potential drug abuse or diversion identified.

## 2023-05-25 NOTE — TELEPHONE ENCOUNTER
[facility]ieser e[facility]ry called requesting a refill on the following medications:  Requested Prescriptions     Pending Prescriptions Disp Refills    HYDROcodone-acetaminophen (NORCO) 5-325 MG per tablet 120 tablet 0     Sig: Take 1 tablet by mouth every 6 hours as needed for Pain for up to 30 days. Take lowest dose possible to manage pain. L73.2 Max Daily Amount: 4 tablets       Date of last visit: 3/23/2023 hidradenitis    Date of next visit: date not found    Date of last refill: 4/26/23    Pharm[facility] Name: [facility]    Pt has #2-3 left. Pls call pt at 21 [phone] only if probs. Zip: [address]    Pt confirmed dose as stated above.     Thanks,  Trixie Allison

## 2023-06-26 DIAGNOSIS — L73.2 HIDRADENITIS SUPPURATIVA: ICD-10-CM

## 2023-06-26 DIAGNOSIS — M48.062 SPINAL STENOSIS OF LUMBAR REGION WITH NEUROGENIC CLAUDICATION: ICD-10-CM

## 2023-06-26 RX ORDER — HYDROCODONE BITARTRATE AND ACETAMINOPHEN 5; 325 MG/1; MG/1
1 TABLET ORAL EVERY 6 HOURS PRN
Qty: 120 TABLET | Refills: 0 | Status: SHIPPED | OUTPATIENT
Start: 2023-06-26 | End: 2023-07-26

## 2023-06-30 ENCOUNTER — OFFICE VISIT (OUTPATIENT)
Dept: NEUROSURGERY | Age: 38
End: 2023-06-30

## 2023-06-30 VITALS
HEIGHT: 71 IN | BODY MASS INDEX: 44.1 KG/M2 | DIASTOLIC BLOOD PRESSURE: 90 MMHG | WEIGHT: 315 LBS | SYSTOLIC BLOOD PRESSURE: 130 MMHG

## 2023-06-30 DIAGNOSIS — G62.9 PERIPHERAL NEUROPATHY DUE TO INFLAMMATION: ICD-10-CM

## 2023-06-30 DIAGNOSIS — M48.02 CERVICAL SPINAL STENOSIS: ICD-10-CM

## 2023-06-30 DIAGNOSIS — M79.2 PERIPHERAL NEUROPATHY DUE TO INFLAMMATION: ICD-10-CM

## 2023-06-30 DIAGNOSIS — M54.12 CERVICAL RADICULOPATHY AT C6: ICD-10-CM

## 2023-06-30 DIAGNOSIS — M54.16 LUMBAR RADICULOPATHY: Primary | ICD-10-CM

## 2023-07-31 ENCOUNTER — OFFICE VISIT (OUTPATIENT)
Dept: CARDIOTHORACIC SURGERY | Age: 38
End: 2023-07-31

## 2023-07-31 VITALS
DIASTOLIC BLOOD PRESSURE: 124 MMHG | BODY MASS INDEX: 45.1 KG/M2 | HEART RATE: 80 BPM | HEIGHT: 70 IN | WEIGHT: 315 LBS | SYSTOLIC BLOOD PRESSURE: 181 MMHG

## 2023-07-31 DIAGNOSIS — L73.2 HIDRADENITIS SUPPURATIVA: ICD-10-CM

## 2023-07-31 DIAGNOSIS — I75.023 BLUE TOE SYNDROME OF BOTH LOWER EXTREMITIES (HCC): Primary | ICD-10-CM

## 2023-07-31 DIAGNOSIS — M48.062 SPINAL STENOSIS OF LUMBAR REGION WITH NEUROGENIC CLAUDICATION: ICD-10-CM

## 2023-07-31 DIAGNOSIS — I83.813 VARICOSE VEINS OF BOTH LOWER EXTREMITIES WITH PAIN: ICD-10-CM

## 2023-07-31 RX ORDER — HYDROCODONE BITARTRATE AND ACETAMINOPHEN 5; 325 MG/1; MG/1
1 TABLET ORAL EVERY 6 HOURS PRN
Qty: 120 TABLET | Refills: 0 | Status: SHIPPED | OUTPATIENT
Start: 2023-07-31 | End: 2023-08-30

## 2023-07-31 ASSESSMENT — ENCOUNTER SYMPTOMS
EYES NEGATIVE: 1
COUGH: 1
COLOR CHANGE: 1
WHEEZING: 1
BACK PAIN: 1
ABDOMINAL DISTENTION: 1
SHORTNESS OF BREATH: 1
ALLERGIC/IMMUNOLOGIC NEGATIVE: 1
CONSTIPATION: 1

## 2023-07-31 NOTE — PROGRESS NOTES
Bilateral legs and toe coolness, pain, discoloration, numbness and tingling  Trouble at night  Sometimes trouble walking
Minimal scattered atelectatic changes bilaterally. No consolidation or   pleural effusion. 4. Multiple subcentimeter thyroid nodules are incidentally noted. This can   be further assessed with a follow-up outpatient thyroid ultrasound. 5. Additional findings are detailed above. This document has been electronically signed by: Wilbur Guzmán M.D. on   06/15/2022 12:17 AM    All CTs at this facility use dose modulation techniques and iterative   reconstructions, and/or weight-based dosing  when appropriate to reduce radiation to a low as reasonably achievable. Assessment/Plan     1. Blue toe syndrome of both lower extremities ([address])    2. Varicose veins of both lower extremities with pain      Orders Placed This Encounter   Procedures    CTA ABDOMINAL AORTA W BILAT RUNOFF W WO CONTRAST    VL REFLUX VENOUS INSUFFICIENCY STUDY BILATERAL     Weight loss and tobacco cessation emphasized. Healthy diet and avoidance of sugary soda emphasized. (He drinks a case of Mountain Dew daily~4600 kcal!!)  F/U after above testing. Return in about 2 weeks (around 8/14/2023).       Electronically signed by Laxmi Lozano MD   7/31/2023 at 10:14 AM [facility]

## 2023-07-31 NOTE — TELEPHONE ENCOUNTER
Bryson Willis called requesting a refill on the following medications:  Requested Prescriptions     Pending Prescriptions Disp Refills    HYDROcodone-acetaminophen (NORCO) 5-325 MG per tablet 120 tablet 0     Sig: Take 1 tablet by mouth every 6 hours as needed for Pain for up to 30 days. Take lowest dose possible to manage pain. L73.2 Max Daily Amount: 4 tablets       Date of last visit: 3/23/2023 hidradenitis suppurativa    Date of next visit: date not found    Date of last refill: 6/26/23    Pharmacy Name: [facility]    Pt ran out yesterday. Pls call pt at 21 [phone] only if probs    Zip: [address]    Dose confirmed by pt as stated above.     Thanks,  Trinidad Berry

## 2023-07-31 NOTE — TELEPHONE ENCOUNTER
Ep'ed norco to pharm requested      Controlled Substance Monitoring:    Acute and Chronic Pain Monitoring:   RX Monitoring 7/31/2023   Acute Pain Prescriptions -   Periodic Controlled Substance Monitoring No signs of potential drug abuse or diversion identified.

## 2023-08-22 ENCOUNTER — HOSPITAL ENCOUNTER (OUTPATIENT)
Dept: INTERVENTIONAL RADIOLOGY/VASCULAR | Age: 38
Discharge: HOME | End: 2023-08-22
Attending: THORACIC SURGERY (CARDIOTHORACIC VASCULAR SURGERY)
Payer: COMMERCIAL

## 2023-08-22 ENCOUNTER — HOSPITAL ENCOUNTER (OUTPATIENT)
Dept: CT IMAGING | Age: 38
Discharge: HOME | End: 2023-08-22
Attending: THORACIC SURGERY (CARDIOTHORACIC VASCULAR SURGERY)
Payer: COMMERCIAL

## 2023-08-22 DIAGNOSIS — I75.023 BLUE TOE SYNDROME OF BOTH LOWER EXTREMITIES (HCC): ICD-10-CM

## 2023-08-22 PROCEDURE — 6360000004 HC RX CONTRAST MEDICATION: Performed by: THORACIC SURGERY (CARDIOTHORACIC VASCULAR SURGERY)

## 2023-08-22 PROCEDURE — 75635 CT ANGIO ABDOMINAL ARTERIES: CPT

## 2023-08-22 PROCEDURE — 93970 EXTREMITY STUDY: CPT

## 2023-08-22 RX ADMIN — IOPAMIDOL 120 ML: 755 INJECTION, SOLUTION INTRAVENOUS at 14:59

## 2023-08-28 ENCOUNTER — OFFICE VISIT (OUTPATIENT)
Dept: CARDIOTHORACIC SURGERY | Age: 38
End: 2023-08-28
Payer: COMMERCIAL

## 2023-08-28 VITALS
HEIGHT: 71 IN | HEART RATE: 85 BPM | BODY MASS INDEX: 43.96 KG/M2 | SYSTOLIC BLOOD PRESSURE: 155 MMHG | DIASTOLIC BLOOD PRESSURE: 110 MMHG | WEIGHT: 314 LBS

## 2023-08-28 DIAGNOSIS — R29.818 NEUROGENIC CLAUDICATION: Primary | ICD-10-CM

## 2023-08-28 PROCEDURE — 4004F PT TOBACCO SCREEN RCVD TLK: CPT | Performed by: THORACIC SURGERY (CARDIOTHORACIC VASCULAR SURGERY)

## 2023-08-28 PROCEDURE — 99213 OFFICE O/P EST LOW 20 MIN: CPT | Performed by: THORACIC SURGERY (CARDIOTHORACIC VASCULAR SURGERY)

## 2023-08-28 PROCEDURE — G8417 CALC BMI ABV UP PARAM F/U: HCPCS | Performed by: THORACIC SURGERY (CARDIOTHORACIC VASCULAR SURGERY)

## 2023-08-28 PROCEDURE — G8427 DOCREV CUR MEDS BY ELIG CLIN: HCPCS | Performed by: THORACIC SURGERY (CARDIOTHORACIC VASCULAR SURGERY)

## 2023-08-28 NOTE — PROGRESS NOTES
[facility]  [address] [place] [address]  Dept: [phone]  Dept Fax: [phone]: [phone]    Visit Date: 8/28/2023    Mr. Mary Altamirano is a 45 y.o.male  who presented for:  Chief Complaint   Patient presents with    Follow-up     FU CTA ABD. APRTA AND VENOUS REFLUX        HPI:   HPI     Mr. Mary Altamirano returns to clinic for follow-up of his bilateral leg numbness and pain and claudication with normal pulses. He had a CT angiogram and venous reflux studies ordered on his last visit. His CT angiogram was negative for any arterial insufficiency to the lower extremities. His venous reflux study shows only focal right common femoral vein reflux but otherwise no superficial reflux bilaterally and no evidence of previous DVT or chronic DVT changes. Upon further questioning he states that his main issue is chronic back pain as he has had 3 back surgeries including fusion and discectomies. Lower back pain radiates anteriorly to the bilateral thigh and lower leg areas. The symptoms are 10 out of 10 on the pain scale and are severe. Current Outpatient Medications:     HYDROcodone-acetaminophen (NORCO) 5-325 MG per tablet, Take 1 tablet by mouth every 6 hours as needed for Pain for up to 30 days. Take lowest dose possible to manage pain.  L73.2 Max Daily Amount: 4 tablets, Disp: 120 tablet, Rfl: 0    Ubrogepant (UBRELVY) 100 MG TABS, Take 100 mg by mouth daily as needed (Headache), Disp: 4 tablet, Rfl: 0    busPIRone (BUSPAR) 15 MG tablet, Take 15 mg by mouth 3 times daily, Disp: 90 tablet, Rfl: 1    ondansetron (ZOFRAN-ODT) 4 MG disintegrating tablet, Take 1 tablet by mouth 3 times daily as needed for Nausea or Vomiting, Disp: 21 tablet, Rfl: 0    tiZANidine (ZANAFLEX) 4 MG tablet, Take 2 tablets by mouth every 8 hours as needed (spasms), Disp: 180 tablet, Rfl: 11    ibuprofen (ADVIL;MOTRIN) 800

## 2023-09-01 ENCOUNTER — OFFICE VISIT (OUTPATIENT)
Dept: FAMILY MEDICINE CLINIC | Age: 38
End: 2023-09-01

## 2023-09-01 ENCOUNTER — TELEPHONE (OUTPATIENT)
Dept: FAMILY MEDICINE CLINIC | Age: 38
End: 2023-09-01

## 2023-09-01 VITALS
DIASTOLIC BLOOD PRESSURE: 88 MMHG | RESPIRATION RATE: 26 BRPM | SYSTOLIC BLOOD PRESSURE: 150 MMHG | OXYGEN SATURATION: 94 % | HEART RATE: 67 BPM | BODY MASS INDEX: 44.77 KG/M2 | TEMPERATURE: 97.8 F | WEIGHT: 315 LBS

## 2023-09-01 DIAGNOSIS — M62.838 MUSCLE SPASM: ICD-10-CM

## 2023-09-01 DIAGNOSIS — Z00.00 WELL ADULT EXAM: Primary | ICD-10-CM

## 2023-09-01 DIAGNOSIS — M48.062 SPINAL STENOSIS OF LUMBAR REGION WITH NEUROGENIC CLAUDICATION: ICD-10-CM

## 2023-09-01 DIAGNOSIS — K21.9 GASTROESOPHAGEAL REFLUX DISEASE, UNSPECIFIED WHETHER ESOPHAGITIS PRESENT: ICD-10-CM

## 2023-09-01 DIAGNOSIS — I10 BENIGN ESSENTIAL HTN: ICD-10-CM

## 2023-09-01 DIAGNOSIS — F41.9 ANXIETY: ICD-10-CM

## 2023-09-01 DIAGNOSIS — G89.29 CHRONIC NONINTRACTABLE HEADACHE, UNSPECIFIED HEADACHE TYPE: ICD-10-CM

## 2023-09-01 DIAGNOSIS — R51.9 CHRONIC NONINTRACTABLE HEADACHE, UNSPECIFIED HEADACHE TYPE: ICD-10-CM

## 2023-09-01 DIAGNOSIS — L73.2 HIDRADENITIS SUPPURATIVA: ICD-10-CM

## 2023-09-01 LAB
ALBUMIN SERPL BCG-MCNC: 3.9 G/DL (ref 3.5–5.1)
ALP SERPL-CCNC: 96 U/L (ref 38–126)
ALT SERPL W/O P-5'-P-CCNC: 59 U/L (ref 11–66)
ANION GAP SERPL CALC-SCNC: 11 MEQ/L (ref 8–16)
AST SERPL-CCNC: 35 U/L (ref 5–40)
BASOPHILS ABSOLUTE: 0 THOU/MM3 (ref 0–0.1)
BASOPHILS NFR BLD AUTO: 1 %
BILIRUB SERPL-MCNC: 0.6 MG/DL (ref 0.3–1.2)
BUN SERPL-MCNC: 6 MG/DL (ref 7–22)
CALCIUM SERPL-MCNC: 9.4 MG/DL (ref 8.5–10.5)
CHLORIDE SERPL-SCNC: 104 MEQ/L (ref 98–111)
CHOLEST SERPL-MCNC: 139 MG/DL (ref 100–199)
CK SERPL-CCNC: 475 U/L (ref 55–170)
CO2 SERPL-SCNC: 28 MEQ/L (ref 23–33)
CREAT SERPL-MCNC: 0.8 MG/DL (ref 0.4–1.2)
CRP SERPL-MCNC: 0.96 MG/DL (ref 0–1)
DEPRECATED RDW RBC AUTO: 44.8 FL (ref 35–45)
EOSINOPHIL NFR BLD AUTO: 4.5 %
EOSINOPHILS ABSOLUTE: 0.2 THOU/MM3 (ref 0–0.4)
ERYTHROCYTE [DISTWIDTH] IN BLOOD BY AUTOMATED COUNT: 14 % (ref 11.5–14.5)
ERYTHROCYTE [SEDIMENTATION RATE] IN BLOOD BY WESTERGREN METHOD: 7 MM/HR (ref 0–10)
GFR SERPL CREATININE-BSD FRML MDRD: > 60 ML/MIN/1.73M2
GLUCOSE SERPL-MCNC: 144 MG/DL (ref 70–108)
HCT VFR BLD AUTO: 43.9 % (ref 42–52)
HDLC SERPL-MCNC: 30 MG/DL
HGB BLD-MCNC: 14.6 GM/DL (ref 14–18)
IMM GRANULOCYTES # BLD AUTO: 0.02 THOU/MM3 (ref 0–0.07)
IMM GRANULOCYTES NFR BLD AUTO: 0.5 %
LDLC SERPL CALC-MCNC: 75 MG/DL
LYMPHOCYTES ABSOLUTE: 1.2 THOU/MM3 (ref 1–4.8)
LYMPHOCYTES NFR BLD AUTO: 30.6 %
MAGNESIUM SERPL-MCNC: 1.8 MG/DL (ref 1.6–2.4)
MCH RBC QN AUTO: 29.2 PG (ref 26–33)
MCHC RBC AUTO-ENTMCNC: 33.3 GM/DL (ref 32.2–35.5)
MCV RBC AUTO: 87.8 FL (ref 80–94)
MONOCYTES ABSOLUTE: 0.2 THOU/MM3 (ref 0.4–1.3)
MONOCYTES NFR BLD AUTO: 5.7 %
NEUTROPHILS NFR BLD AUTO: 57.7 %
NRBC BLD AUTO-RTO: 0 /100 WBC
PLATELET # BLD AUTO: 98 THOU/MM3 (ref 130–400)
PMV BLD AUTO: 11.5 FL (ref 9.4–12.4)
POTASSIUM SERPL-SCNC: 4.7 MEQ/L (ref 3.5–5.2)
PROT SERPL-MCNC: 7.3 G/DL (ref 6.1–8)
RBC # BLD AUTO: 5 MILL/MM3 (ref 4.7–6.1)
SCAN OF BLOOD SMEAR: NORMAL
SEGMENTED NEUTROPHILS ABSOLUTE COUNT: 2.3 THOU/MM3 (ref 1.8–7.7)
SODIUM SERPL-SCNC: 143 MEQ/L (ref 135–145)
TRIGL SERPL-MCNC: 168 MG/DL (ref 0–199)
WBC # BLD AUTO: 4 THOU/MM3 (ref 4.8–10.8)

## 2023-09-01 RX ORDER — ESCITALOPRAM OXALATE 10 MG/1
10 TABLET ORAL DAILY
Qty: 30 TABLET | Refills: 11 | Status: SHIPPED | OUTPATIENT
Start: 2023-09-01

## 2023-09-01 RX ORDER — IBUPROFEN 800 MG/1
800 TABLET ORAL EVERY 8 HOURS PRN
Qty: 90 TABLET | Refills: 11 | Status: SHIPPED | OUTPATIENT
Start: 2023-09-01

## 2023-09-01 RX ORDER — BUSPIRONE HYDROCHLORIDE 15 MG/1
15 TABLET ORAL 3 TIMES DAILY
Qty: 90 TABLET | Refills: 11 | Status: SHIPPED | OUTPATIENT
Start: 2023-09-01

## 2023-09-01 RX ORDER — HYDROCODONE BITARTRATE AND ACETAMINOPHEN 5; 325 MG/1; MG/1
1 TABLET ORAL EVERY 6 HOURS PRN
Qty: 120 TABLET | Refills: 0 | Status: SHIPPED | OUTPATIENT
Start: 2023-09-01 | End: 2023-10-01

## 2023-09-01 RX ORDER — TIZANIDINE 4 MG/1
8 TABLET ORAL EVERY 8 HOURS PRN
Qty: 180 TABLET | Refills: 11 | Status: SHIPPED | OUTPATIENT
Start: 2023-09-01

## 2023-09-01 RX ORDER — PANTOPRAZOLE SODIUM 40 MG/1
40 TABLET, DELAYED RELEASE ORAL
Qty: 60 TABLET | Refills: 11 | Status: SHIPPED | OUTPATIENT
Start: 2023-09-01

## 2023-09-01 RX ORDER — OLMESARTAN MEDOXOMIL 20 MG/1
20 TABLET ORAL DAILY
Qty: 30 TABLET | Refills: 11 | Status: SHIPPED | OUTPATIENT
Start: 2023-09-01

## 2023-09-01 ASSESSMENT — ENCOUNTER SYMPTOMS
COUGH: 0
VOMITING: 0
SORE THROAT: 0
CONSTIPATION: 0
BACK PAIN: 0
RHINORRHEA: 0
WHEEZING: 0
DIARRHEA: 0
ABDOMINAL PAIN: 0
EYE PAIN: 0
CHEST TIGHTNESS: 1
CHEST TIGHTNESS: 0
NAUSEA: 0
SHORTNESS OF BREATH: 0
BLOOD IN STOOL: 0

## 2023-09-01 NOTE — PROGRESS NOTES
Blood work drawn today in office, venous puncture left arm
Reynaldo Mckay (:  1985) is a 45 y.o. male,Established patient, here for evaluation of the following chief complaint(s):  Hypertension (refills)         ASSESSMENT/PLAN:  1. Well adult exam  -     Lipid Panel; Future  -     CBC with Auto Differential; Future  -     CK; Future  -     Comprehensive Metabolic Panel; Future  -     C-Reactive Protein; Future  -     Sedimentation Rate; Future  -     Magnesium; Future  2. Gastroesophageal reflux disease, unspecified whether esophagitis present  -     pantoprazole (PROTONIX) 40 MG tablet; Take 1 tablet by mouth 2 times daily (before meals), Disp-60 tablet, R-11Normal  3. Chronic nonintractable headache, unspecified headache type  -     ibuprofen (ADVIL;MOTRIN) 800 MG tablet; Take 1 tablet by mouth every 8 hours as needed for Pain, Disp-90 tablet, R-11Normal  4. Hidradenitis suppurativa  -     ibuprofen (ADVIL;MOTRIN) 800 MG tablet; Take 1 tablet by mouth every 8 hours as needed for Pain, Disp-90 tablet, R-11Normal  -     HYDROcodone-acetaminophen (NORCO) 5-325 MG per tablet; Take 1 tablet by mouth every 6 hours as needed for Pain for up to 30 days. Take lowest dose possible to manage pain. L73.2 Max Daily Amount: 4 tablets, Disp-120 tablet, R-0Normal  5. Muscle spasm  -     tiZANidine (ZANAFLEX) 4 MG tablet; Take 2 tablets by mouth every 8 hours as needed (spasms), Disp-180 tablet, R-11Normal  -     CBC with Auto Differential; Future  -     CK; Future  -     Comprehensive Metabolic Panel; Future  -     C-Reactive Protein; Future  -     Sedimentation Rate; Future  -     Magnesium; Future  -chronic condition, exacerbated, test for channeopathy. 6. Anxiety  -     busPIRone (BUSPAR) 15 MG tablet; Take 15 mg by mouth 3 times daily, Disp-90 tablet, R-11Normal  -     escitalopram (LEXAPRO) 10 MG tablet;  Take 1 tablet by mouth daily, Disp-30 tablet, R-11Normal  -chronic condition, exacerbated, continue buspar, add lexapro daily, -monitor sxs, call if not
tablet; Take 1 tablet by mouth every 8 hours as needed for Pain, Disp-90 tablet, R-11Normal  Hidradenitis suppurativa  -     ibuprofen (ADVIL;MOTRIN) 800 MG tablet; Take 1 tablet by mouth every 8 hours as needed for Pain, Disp-90 tablet, R-11Normal  -     HYDROcodone-acetaminophen (NORCO) 5-325 MG per tablet; Take 1 tablet by mouth every 6 hours as needed for Pain for up to 30 days. Take lowest dose possible to manage pain. L73.2 Max Daily Amount: 4 tablets, Disp-120 tablet, R-0Normal  Muscle spasm  -     tiZANidine (ZANAFLEX) 4 MG tablet; Take 2 tablets by mouth every 8 hours as needed (spasms), Disp-180 tablet, R-11Normal  -     CBC with Auto Differential; Future  -     CK; Future  -     Comprehensive Metabolic Panel; Future  -     C-Reactive Protein; Future  -     Sedimentation Rate; Future  -     Magnesium; Future  Anxiety  -     busPIRone (BUSPAR) 15 MG tablet; Take 15 mg by mouth 3 times daily, Disp-90 tablet, R-11Normal  -     escitalopram (LEXAPRO) 10 MG tablet; Take 1 tablet by mouth daily, Disp-30 tablet, R-11Normal  Benign essential HTN  -     olmesartan (BENICAR) 20 MG tablet; Take 1 tablet by mouth daily, Disp-30 tablet, R-11Normal  Spinal stenosis of lumbar region with neurogenic claudication  -     HYDROcodone-acetaminophen (NORCO) 5-325 MG per tablet; Take 1 tablet by mouth every 6 hours as needed for Pain for up to 30 days. Take lowest dose possible to manage pain. L73.2 Max Daily Amount: 4 tablets, Disp-120 tablet, R-0Normal  Encouraged diet, exercise and weight loss. Reviewed health maintenance  Controlled Substance Monitoring:    Acute and Chronic Pain Monitoring:   RX Monitoring 9/1/2023   Acute Pain Prescriptions -   Periodic Controlled Substance Monitoring Possible medication side effects, risk of tolerance/dependence & alternative treatments discussed. ;No signs of potential drug abuse or diversion identified.;Obtaining appropriate analgesic effect of treatment.          No follow-ups on

## 2023-09-02 PROBLEM — D69.6 THROMBOCYTOPENIA (HCC): Status: ACTIVE | Noted: 2023-09-02

## 2023-09-05 ENCOUNTER — TELEPHONE (OUTPATIENT)
Dept: FAMILY MEDICINE CLINIC | Age: 38
End: 2023-09-05

## 2023-09-05 DIAGNOSIS — I10 BENIGN ESSENTIAL HTN: Primary | ICD-10-CM

## 2023-09-05 RX ORDER — LISINOPRIL 20 MG/1
20 TABLET ORAL DAILY
Qty: 30 TABLET | Refills: 11 | Status: SHIPPED | OUTPATIENT
Start: 2023-09-05

## 2023-09-05 NOTE — TELEPHONE ENCOUNTER
----- Message from Lazarus Budds, MD sent at 9/2/2023  8:06 AM EDT -----  CMP is ok except for glucose of 144. Muscle enzyme is increased. CBC is ok, platelets remain decreased. Cholesterol good at 139. Magnesium level is normal.  Inflammation markers are ok. CK ( muscle) increased, await channeopathy labs.

## 2023-09-05 NOTE — TELEPHONE ENCOUNTER
Pt called in this morning stating that the Benicar is causing him to have extreme nausea, weakness and joint pain. Pt stated that his father had the same reaction to the medication. Is wondering if there is another medication he could try.      Pt is asking is he can get a bp cuff ordered through insurance

## 2023-09-28 ENCOUNTER — HOSPITAL ENCOUNTER (EMERGENCY)
Age: 38
Discharge: HOME | End: 2023-09-28
Attending: STUDENT IN AN ORGANIZED HEALTH CARE EDUCATION/TRAINING PROGRAM
Payer: COMMERCIAL

## 2023-09-28 ENCOUNTER — APPOINTMENT (OUTPATIENT)
Dept: GENERAL RADIOLOGY | Age: 38
End: 2023-09-28
Payer: COMMERCIAL

## 2023-09-28 VITALS
RESPIRATION RATE: 18 BRPM | OXYGEN SATURATION: 97 % | DIASTOLIC BLOOD PRESSURE: 95 MMHG | TEMPERATURE: 97.9 F | HEART RATE: 80 BPM | SYSTOLIC BLOOD PRESSURE: 164 MMHG | WEIGHT: 305 LBS | BODY MASS INDEX: 42.54 KG/M2

## 2023-09-28 DIAGNOSIS — M76.61 ACHILLES TENDINITIS OF RIGHT LOWER EXTREMITY: Primary | ICD-10-CM

## 2023-09-28 LAB
ALBUMIN SERPL BCG-MCNC: 3.8 G/DL (ref 3.5–5.1)
ALP SERPL-CCNC: 92 U/L (ref 38–126)
ALT SERPL W/O P-5'-P-CCNC: 59 U/L (ref 11–66)
ANION GAP SERPL CALC-SCNC: 9 MEQ/L (ref 8–16)
AST SERPL-CCNC: 44 U/L (ref 5–40)
BASOPHILS ABSOLUTE: 0 THOU/MM3 (ref 0–0.1)
BASOPHILS NFR BLD AUTO: 0.8 %
BILIRUB CONJ SERPL-MCNC: < 0.2 MG/DL (ref 0–0.3)
BILIRUB SERPL-MCNC: 0.4 MG/DL (ref 0.3–1.2)
BUN SERPL-MCNC: 8 MG/DL (ref 7–22)
CALCIUM SERPL-MCNC: 8.8 MG/DL (ref 8.5–10.5)
CHLORIDE SERPL-SCNC: 104 MEQ/L (ref 98–111)
CO2 SERPL-SCNC: 24 MEQ/L (ref 23–33)
CREAT SERPL-MCNC: 0.6 MG/DL (ref 0.4–1.2)
DEPRECATED RDW RBC AUTO: 45.1 FL (ref 35–45)
EOSINOPHIL NFR BLD AUTO: 3.8 %
EOSINOPHILS ABSOLUTE: 0.2 THOU/MM3 (ref 0–0.4)
ERYTHROCYTE [DISTWIDTH] IN BLOOD BY AUTOMATED COUNT: 13.8 % (ref 11.5–14.5)
GFR SERPL CREATININE-BSD FRML MDRD: > 60 ML/MIN/1.73M2
GLUCOSE SERPL-MCNC: 166 MG/DL (ref 70–108)
HCT VFR BLD AUTO: 40.4 % (ref 42–52)
HGB BLD-MCNC: 13.3 GM/DL (ref 14–18)
IMM GRANULOCYTES # BLD AUTO: 0.01 THOU/MM3 (ref 0–0.07)
IMM GRANULOCYTES NFR BLD AUTO: 0.3 %
LYMPHOCYTES ABSOLUTE: 1.1 THOU/MM3 (ref 1–4.8)
LYMPHOCYTES NFR BLD AUTO: 26.8 %
MCH RBC QN AUTO: 29.6 PG (ref 26–33)
MCHC RBC AUTO-ENTMCNC: 32.9 GM/DL (ref 32.2–35.5)
MCV RBC AUTO: 90 FL (ref 80–94)
MONOCYTES ABSOLUTE: 0.2 THOU/MM3 (ref 0.4–1.3)
MONOCYTES NFR BLD AUTO: 5.8 %
NEUTROPHILS NFR BLD AUTO: 62.5 %
NRBC BLD AUTO-RTO: 0 /100 WBC
OSMOLALITY SERPL CALC.SUM OF ELEC: 275.9 MOSMOL/KG (ref 275–300)
PLATELET # BLD AUTO: 108 THOU/MM3 (ref 130–400)
PMV BLD AUTO: 11.3 FL (ref 9.4–12.4)
POTASSIUM SERPL-SCNC: 4.1 MEQ/L (ref 3.5–5.2)
PROT SERPL-MCNC: 7 G/DL (ref 6.1–8)
RBC # BLD AUTO: 4.49 MILL/MM3 (ref 4.7–6.1)
SEGMENTED NEUTROPHILS ABSOLUTE COUNT: 2.5 THOU/MM3 (ref 1.8–7.7)
SODIUM SERPL-SCNC: 137 MEQ/L (ref 135–145)
TROPONIN, HIGH SENSITIVITY: < 6 NG/L (ref 0–12)
WBC # BLD AUTO: 4 THOU/MM3 (ref 4.8–10.8)

## 2023-09-28 PROCEDURE — 99284 EMERGENCY DEPT VISIT MOD MDM: CPT

## 2023-09-28 PROCEDURE — 80053 COMPREHEN METABOLIC PANEL: CPT

## 2023-09-28 PROCEDURE — 36415 COLL VENOUS BLD VENIPUNCTURE: CPT

## 2023-09-28 PROCEDURE — 84484 ASSAY OF TROPONIN QUANT: CPT

## 2023-09-28 PROCEDURE — 73630 X-RAY EXAM OF FOOT: CPT

## 2023-09-28 PROCEDURE — 6370000000 HC RX 637 (ALT 250 FOR IP): Performed by: STUDENT IN AN ORGANIZED HEALTH CARE EDUCATION/TRAINING PROGRAM

## 2023-09-28 PROCEDURE — 73564 X-RAY EXAM KNEE 4 OR MORE: CPT

## 2023-09-28 PROCEDURE — 82248 BILIRUBIN DIRECT: CPT

## 2023-09-28 PROCEDURE — 85025 COMPLETE CBC W/AUTO DIFF WBC: CPT

## 2023-09-28 RX ORDER — HYDROCODONE BITARTRATE AND ACETAMINOPHEN 5; 325 MG/1; MG/1
1 TABLET ORAL ONCE
Status: COMPLETED | OUTPATIENT
Start: 2023-09-28 | End: 2023-09-28

## 2023-09-28 RX ORDER — KETOROLAC TROMETHAMINE 30 MG/ML
15 INJECTION, SOLUTION INTRAMUSCULAR; INTRAVENOUS ONCE
Status: DISCONTINUED | OUTPATIENT
Start: 2023-09-28 | End: 2023-09-29 | Stop reason: HOSPADM

## 2023-09-28 RX ORDER — LIDOCAINE 50 MG/G
1 PATCH TOPICAL DAILY
Qty: 10 PATCH | Refills: 0 | Status: SHIPPED | OUTPATIENT
Start: 2023-09-28 | End: 2023-10-08

## 2023-09-28 RX ADMIN — HYDROCODONE BITARTRATE AND ACETAMINOPHEN 1 TABLET: 5; 325 TABLET ORAL at 22:22

## 2023-09-28 ASSESSMENT — PAIN SCALES - GENERAL
PAINLEVEL_OUTOF10: 8
PAINLEVEL_OUTOF10: 10

## 2023-09-28 ASSESSMENT — PAIN - FUNCTIONAL ASSESSMENT: PAIN_FUNCTIONAL_ASSESSMENT: 0-10

## 2023-09-28 NOTE — ED TRIAGE NOTES
Pt to ED for right foot selling and leg pain. Pt states he dropped a sppo of heavy wire on his foot 2 weeks ago. States last week his knee started bother him. Yesterday his right foot began swelling and now his pain is not tolerable. Pt is hypertensive. Pt has not taken his BP meds for 1 month.

## 2023-09-29 ENCOUNTER — TELEPHONE (OUTPATIENT)
Dept: FAMILY MEDICINE CLINIC | Age: 38
End: 2023-09-29

## 2023-09-29 DIAGNOSIS — M48.062 SPINAL STENOSIS OF LUMBAR REGION WITH NEUROGENIC CLAUDICATION: ICD-10-CM

## 2023-09-29 DIAGNOSIS — L73.2 HIDRADENITIS SUPPURATIVA: ICD-10-CM

## 2023-09-29 DIAGNOSIS — Z82.69 FAMILY HISTORY OF MUSCULOSKELETAL DISEASE: Primary | ICD-10-CM

## 2023-09-29 RX ORDER — HYDROCODONE BITARTRATE AND ACETAMINOPHEN 5; 325 MG/1; MG/1
1 TABLET ORAL EVERY 6 HOURS PRN
Qty: 120 TABLET | Refills: 0 | Status: SHIPPED | OUTPATIENT
Start: 2023-09-29 | End: 2023-10-29

## 2023-09-29 RX ORDER — HYDROCODONE BITARTRATE AND ACETAMINOPHEN 5; 325 MG/1; MG/1
1 TABLET ORAL EVERY 6 HOURS PRN
Qty: 120 TABLET | Refills: 0 | OUTPATIENT
Start: 2023-09-29 | End: 2023-10-29

## 2023-09-29 NOTE — TELEPHONE ENCOUNTER
Ep'ed norco to pharm requested    Controlled Substance Monitoring:    Acute and Chronic Pain Monitoring:   RX Monitoring Periodic Controlled Substance Monitoring   9/29/2023  12:16 PM No signs of potential drug abuse or diversion identified.

## 2023-09-29 NOTE — TELEPHONE ENCOUNTER
Bolivar Martino called requesting a refill on the following medications:  Requested Prescriptions     Pending Prescriptions Disp Refills    HYDROcodone-acetaminophen (NORCO) 5-325 MG per tablet 120 tablet 0     Sig: Take 1 tablet by mouth every 6 hours as needed for Pain for up to 30 days. Take lowest dose possible to manage pain.  L73.2 Max Daily Amount: 4 tablets       Date of last visit: 9/1/2023  Date of next visit (if applicable):10/2/2023  Date of last refill: 9/1/23  Pharmacy Name:[facility],  [address] [place] [address]

## 2023-09-29 NOTE — ED PROVIDER NOTES
[facility] EMERGENCY DEPT      EMERGENCY MEDICINE     Pt Name: Kavitha Stuart  MRN: [id number]  [facility] 1985  Date of evaluation: 9/28/2023  Provider: Keith Lowry MD    CHIEF COMPLAINT       Chief Complaint   Patient presents with    Foot Swelling     HISTORY OF PRESENT ILLNESS   Kavitha Stuart is a pleasant 45 y.o. male who presents to the emergency department from from home, by private vehicle for evaluation of foot/knee pain. Patient presents emergency department complaining of at least 2 days of right foot/right knee pain. Patient states 2 weeks ago had a right foot trauma, heavy wire fell up on his right foot; patient states had mild pain however pain resolved some days; 2 days ago patient started feeling right knee pain, unbalanced at walking, sensation of me going backwards additionally with pain on right foot. Patient denies calf pain, no shortness of breath, no chest pain denies any trauma, no fever, no redness, no erythema. Patient states has hypertension, has not been able to take antihypertensives were prescribed 1 month ago because it made him feel sick of his stomach, arriving SBP elevated. Patient states feeling unwell denying chest pain no shortness of breath no abdominal pain no lightheadedness or syncope. PASTMEDICAL HISTORY     Past Medical History:   Diagnosis Date    Anxiety     Severe countine to struggle with this    Chronic back pain     Depression     Diverticulitis     Headache     Pain mostly located in back of my head.     Hidradenitis suppurativa     Neuropathy     Obesity     Pancytopenia ([address]) 07/2021    Restless legs syndrome     Sinusitis     Sleep apnea     has CPAP    Thrombocytopenia ([address])        Patient Active Problem List   Diagnosis Code    Nasal septal deviation J34.2    Hypertrophy of inferior nasal turbinate, left J34.3    GERHARD on CPAP G47.33, Z99.89    Intolerance of continuous positive airway pressure (CPAP) ventilation Z78.9    Hypertrophy of uvula K13.79    Hypertrophic soft palate K13.79    Large tongue K14.8    Class 3 obesity with alveolar hypoventilation, serious comorbidity, and body mass index (BMI) of 40.0 to 44.9 in adult [facility]) E66.2, Z68.41    Status post nasal septoplasty Z98.890    GERD without esophagitis K21.9    Betito bullosa J34.89    Maxillary sinus polyp J33.8    Chronic sphenoidal sinusitis J32.3    Chronic maxillary sinusitis J32.0    Chronic frontal sinusitis J32.1    Chronic ethmoidal sinusitis J32.2    Postoperative state Z98.890    Pancytopenia (HCC) D61.818    History of cholecystectomy Z90.49    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    Axillary hidradenitis suppurativa L73.2    Displacement of lumbar intervertebral disc M51.26    Lumbar spinal stenosis M48.061    Degeneration of lumbosacral intervertebral disc M51.37    Lumbosacral spondylosis without myelopathy M47.817    Thrombocytopenia (HCC) D69.6     SURGICAL HISTORY       Past Surgical History:   Procedure Laterality Date    ANKLE SURGERY  2001    BACK SURGERY      x3    BONE MARROW BIOPSY  08/2021    CHOLECYSTECTOMY      COLON SURGERY      NOSE SURGERY  1993    Laser Surgery    RECTAL SURGERY      Ruptured Sigmoid Colon 2013    SEPTOPLASTY Left 07/19/2017    SEPTOPLASTY SUBMUCOUS RESECT TURBINATES, PARTIAL LEFT performed by Robin Nunez MD at [facility] N/A 05/08/2019    TONSILLECTOMY, UPPP performed by Robin Nunez MD at [facility] 07/07/2021    IMAGE GUIDED NASAL SINUS ENDOSCOPY WITH BILATERAL MAXILLARY ANTROSTOMY, PARTIAL RESECTION BETITO BULLOSA BILATERAL SUPERIOR TURBINATES, SPHENOIDOTOMY, RIGHT, TOTAL ETHMOIDECTOMY ANTERIOR AND POSTERIOR, FONTAL SINUS EXPLORATION RIGHT WITHOUT REMOVAL OF TISSUE, MAXILLARY ANTROSTOMY WITH REMOVAL OF TISSUE FROM RIGHT MAXILLARY SINUS, SEPTOPLASTY, SUBMUCOUS RESECTION TURBINATES, BILATERAL PARTIAL INFERI    SMALL INTESTINE SURGERY         CURRENT MEDICATIONS       Discharge Medication List as of

## 2023-10-09 ENCOUNTER — NURSE ONLY (OUTPATIENT)
Dept: LAB | Age: 38
End: 2023-10-09

## 2023-10-09 ENCOUNTER — TELEPHONE (OUTPATIENT)
Dept: FAMILY MEDICINE CLINIC | Age: 38
End: 2023-10-09

## 2023-10-09 DIAGNOSIS — Z82.69 FAMILY HISTORY OF MUSCULOSKELETAL DISEASE: ICD-10-CM

## 2023-10-30 ENCOUNTER — TELEPHONE (OUTPATIENT)
Dept: FAMILY MEDICINE CLINIC | Age: 38
End: 2023-10-30

## 2023-10-30 NOTE — TELEPHONE ENCOUNTER
[facility] called and asked several neurological questions on patient to help cover test. Patient is having headaches and has head MRI done back in April for headaches and hand weakness and numbness and was seeing neurology for headaches and hand weakness and numbness.

## 2023-11-06 ENCOUNTER — OFFICE VISIT (OUTPATIENT)
Dept: ENT CLINIC | Age: 38
End: 2023-11-06
Payer: COMMERCIAL

## 2023-11-06 VITALS
HEIGHT: 71 IN | RESPIRATION RATE: 18 BRPM | SYSTOLIC BLOOD PRESSURE: 146 MMHG | WEIGHT: 315 LBS | TEMPERATURE: 98.4 F | OXYGEN SATURATION: 95 % | DIASTOLIC BLOOD PRESSURE: 94 MMHG | HEART RATE: 78 BPM | BODY MASS INDEX: 44.1 KG/M2

## 2023-11-06 DIAGNOSIS — R06.5 MOUTH BREATHING: ICD-10-CM

## 2023-11-06 DIAGNOSIS — H93.8X3 EAR FULLNESS, BILATERAL: ICD-10-CM

## 2023-11-06 DIAGNOSIS — Z78.9 INTOLERANCE OF CONTINUOUS POSITIVE AIRWAY PRESSURE (CPAP) VENTILATION: ICD-10-CM

## 2023-11-06 DIAGNOSIS — L73.2 HIDRADENITIS SUPPURATIVA: Primary | ICD-10-CM

## 2023-11-06 DIAGNOSIS — J32.9 CHRONIC RHINOSINUSITIS: ICD-10-CM

## 2023-11-06 DIAGNOSIS — G47.33 OBSTRUCTIVE SLEEP APNEA: Primary | ICD-10-CM

## 2023-11-06 DIAGNOSIS — J34.89 NASAL OBSTRUCTION: ICD-10-CM

## 2023-11-06 PROCEDURE — G8417 CALC BMI ABV UP PARAM F/U: HCPCS | Performed by: NURSE PRACTITIONER

## 2023-11-06 PROCEDURE — 99204 OFFICE O/P NEW MOD 45 MIN: CPT | Performed by: NURSE PRACTITIONER

## 2023-11-06 PROCEDURE — 4004F PT TOBACCO SCREEN RCVD TLK: CPT | Performed by: NURSE PRACTITIONER

## 2023-11-06 PROCEDURE — G8484 FLU IMMUNIZE NO ADMIN: HCPCS | Performed by: NURSE PRACTITIONER

## 2023-11-06 PROCEDURE — G8427 DOCREV CUR MEDS BY ELIG CLIN: HCPCS | Performed by: NURSE PRACTITIONER

## 2023-11-06 RX ORDER — AMOXICILLIN AND CLAVULANATE POTASSIUM 875; 125 MG/1; MG/1
1 TABLET, FILM COATED ORAL 2 TIMES DAILY
Qty: 42 TABLET | Refills: 0 | Status: SHIPPED | OUTPATIENT
Start: 2023-11-06 | End: 2023-11-27

## 2023-11-06 RX ORDER — HYDROCODONE BITARTRATE AND ACETAMINOPHEN 5; 325 MG/1; MG/1
1 TABLET ORAL EVERY 6 HOURS PRN
Qty: 120 TABLET | Refills: 0 | Status: SHIPPED | OUTPATIENT
Start: 2023-11-06 | End: 2023-12-06

## 2023-11-06 RX ORDER — FLUTICASONE PROPIONATE 50 MCG
2 SPRAY, SUSPENSION (ML) NASAL DAILY
Qty: 16 G | Refills: 2 | Status: SHIPPED | OUTPATIENT
Start: 2023-11-06

## 2023-11-06 RX ORDER — HYDROCODONE BITARTRATE AND ACETAMINOPHEN 5; 325 MG/1; MG/1
1 TABLET ORAL EVERY 6 HOURS PRN
COMMUNITY
End: 2023-11-06 | Stop reason: SDUPTHER

## 2023-11-06 RX ORDER — FLUCONAZOLE 150 MG/1
TABLET ORAL
Qty: 1 TABLET | Refills: 0 | Status: SHIPPED | OUTPATIENT
Start: 2023-11-06

## 2023-11-06 NOTE — PROGRESS NOTES
Patients BP was high offered to come back and recheck but patient refused and said he is normally high and did not take his BP medication this morning.

## 2023-11-06 NOTE — PATIENT INSTRUCTIONS
For the nose/sinuses:  - Saline (to help moisturize, hygiene, clean out mucous)  - Flonase (nasal steroid to help decrease inflammation and mucous production)  - Antibiotic  - CT scan once done with antibiotic    Work on reducing acid reflux tendencies (see below)  Avoid the \"5 C's\" - Caffeine, Carbonation, Citrus, Chocolate and Cocktails (alcohol, including beer). If you are a smoker, Cigarettes are considered another \"C\". Do not eat within 2-3 hours of lying down for the night. Elevate head of bed. Gastroesophageal Reflux Disease (GERD): Care Instructions  Overview     Gastroesophageal reflux disease (GERD) is the backward flow of stomach acid into the esophagus. The esophagus is the tube that leads from your throat to your stomach. A one-way valve prevents the stomach acid from backing up into this tube. But when you have GERD, this valve does not close tightly enough. This can also cause pain and swelling in your esophagus. (This is called esophagitis.)  If you have mild GERD symptoms including heartburn, you may be able to control the problem with antacids or over-the-counter medicine. You can also make lifestyle changes to help reduce your symptoms. These include changing your diet and eating habits, such as not eating late at night and losing weight. Follow-up care is a key part of your treatment and safety. Be sure to make and go to all appointments, and call your doctor if you are having problems. It's also a good idea to know your test results and keep a list of the medicines you take. How can you care for yourself at home? Take your medicines exactly as prescribed. Call your doctor if you think you are having a problem with your medicine. Your doctor may recommend over-the-counter medicine. For mild or occasional indigestion, antacids, such as Tums, Gaviscon, Mylanta, or Maalox, may help.  Your doctor also may recommend over-the-counter acid reducers, such as famotidine (Pepcid AC), cimetidine
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
## 2023-11-06 NOTE — TELEPHONE ENCOUNTER
Ep'ed norco to pharm requested    Controlled Substance Monitoring:    Acute and Chronic Pain Monitoring:   RX Monitoring Periodic Controlled Substance Monitoring   11/6/2023  12:19 PM No signs of potential drug abuse or diversion identified.

## 2023-11-06 NOTE — TELEPHONE ENCOUNTER
Gato Dawson called requesting a refill on the following medications:  Requested Prescriptions     Pending Prescriptions Disp Refills    HYDROcodone-acetaminophen (NORCO) 5-325 MG per tablet       Sig: Take 1 tablet by mouth every 6 hours as needed for Pain. Max Daily Amount: 4 tablets       Date of last visit: 9/1/2023 well exam    Date of next visit: date not found    Date of last refill: 11/6/23? [facility]: [facility]    Pt has #2 left. Pls call pt at 21 [phone] only if probs. Zip: [address]    Dose confirmed by pt as stated above.     Thanks,  Meng Rivera

## 2023-11-09 ENCOUNTER — TELEPHONE (OUTPATIENT)
Dept: FAMILY MEDICINE CLINIC | Age: 38
End: 2023-11-09

## 2023-11-09 NOTE — TELEPHONE ENCOUNTER
Michael Patterson MD   SentClemon Phlegm November 09, 2023 12:10 PM   To: Darcy Contreras CMA       Message    Notify Michael channelopathy panel was negative for abnormalities. Pt informed of results and recommendations, Pt voiced understanding and had no further questions. Patient Education     Abdominal Pain  Abdominal pain is pain in the stomach or belly area. Everyone has this pain from time to time. In many cases it goes away on its own. But abdominal pain can sometimes be due to a serious problem, such as appendicitis. So it’s important to know when to get help.     Causes of abdominal pain   There are many possible causes of abdominal pain. Common causes in adults include:  · Constipation, diarrhea, or gas  · Stomach acid flowing back up into the esophagus (acid reflux or heartburn)  · Severe acid reflux, called GERD (gastroesophageal reflux disease)  · A sore in the lining of the stomach or small intestine (peptic ulcer)  · Inflammation of the gallbladder, liver, or pancreas  · Gallstones or kidney stones  · Appendicitis   · Intestinal blockage   · An internal organ pushing through a muscle or other tissue (hernia)  · Urinary tract infections  · In women, menstrual cramps, fibroids, ovarian cysts, pelvic inflammatory disease, or endometriosis  · Inflammation or infection of the intestines, including Crohn's disease and ulcerative colitis  · Irritable bowel syndrome  Diagnosing the cause of abdominal pain   Your healthcare provider will give you a physical exam help find the cause of your pain. If needed, you will have tests. Belly pain has many possible causes. So it can be hard to find the reason for your pain. Giving details about your pain can help. Tell your provider where and when you feel the pain, and what makes it better or worse. Also let your provider know if you have other symptoms such as:   · Fever  · Tiredness  · Upset stomach (nausea)  · Vomiting  · Changes in bathroom habits  · Blood in the stool or black, tarry stool  · Weight loss that you can't explain (involuntary weight loss?)  Also report any family history of stomach or intestinal problems, or cancers. Tell your provider about all your alcohol use and drug use. Tell your provider about all medicines you  use, including herbs, vitamins, and supplements.   Treating abdominal pain   Some causes of pain need emergency medical treatment right away. These include appendicitis or a bowel blockage. Other problems can be treated with rest, fluids, or medicines. Your healthcare provider can give you specific instructions for treatment or self-care based on what is causing your pain.      If you have vomiting or diarrhea, sip water or other clear fluids. When you are ready to eat solid foods again, start with small amounts of easy-to-digest, low-fat foods. These include apple sauce, toast, or crackers.   When to get medical care   Call 911 or go to the hospital right away if you:   · Can’t pass stool and are vomiting  · Are vomiting blood or have bloody diarrhea or black, tarry diarrhea  · Have chest, neck, or shoulder pain  · Feel like you might pass out  · Have pain in your shoulder blades with nausea  · Have sudden, severe belly pain  · Have new, severe pain unlike any you have felt before  · Have a belly that is rigid, hard, and hurts to touch  Call your healthcare provider if you have:   · Pain for more than 5 days  · Bloating for more than 2 days  · Diarrhea for more than 5 days  · A fever of 100.4°F (38°C) or higher, or as directed by your healthcare provider  · Pain that gets worse  · Weight loss for no reason  · Continued lack of appetite  · Blood in your stool  How to prevent abdominal pain   Here are some tips to help prevent abdominal pain:   · Eat smaller amounts of food at each meal.  · Don't eat greasy, fried, or other high-fat foods.  · Don't eat foods that give you gas.  · Exercise regularly.  · Drink plenty of fluids.  To help prevent GERD symptoms:   · Quit smoking.  · Reduce alcohol and foods that increase stomach acid.  · Don't use aspirin or over-the-counter pain and fever medicines, if possible. This includes nonsteroidal anti-inflammatory drugs (NSAIDs).  · Lose excess weight.  · Finish eating at least  2 hours before you go to bed or lie down.  · Raise the head of your bed.  [employer] last reviewed this educational content on 4/1/2019 © 2000-2020 [employer]. [address] [place] [address], [place] [address]. All rights reserved. This information is not intended as a substitute for professional medical care. Always follow your healthcare professional's instructions.    Patient Education     Vomiting (Adult)  Vomiting is a common symptom that may be due to different causes. These include gastroenteritis (\"stomach flu\"), food poisoning and gastritis. There are other more serious causes of vomiting which may be hard to diagnose early in the illness. Therefore, it is important to watch for the warning signs listed below.  The main danger from repeated vomiting is dehydration. This is due to excess loss of water and minerals from the body. When this occurs, your body fluids must be replaced.  Home care  · If symptoms are severe, rest at home for the next 24 hours.  · Because your symptoms may be from an infection, wash your hands often and well. If soap and water are not available, use alcohol-based  to keep from spreading the infection to others.  · Wash your hands for at least 20 seconds. Humming the happy birthday song twice while you wash is an easy way to make sure you've washed for 20 seconds.  · Wash your hands after using the toilet, before and after preparing food, before eating food, after changing a diaper, cleaning a wound, caring for a sick person, and blowing your nose, coughing, or sneezing. You should also wash your hands after caring for someone who is sick, touching pet food, or treats, and touching an animal, or animal waste.  · You may use acetaminophen or NSAID medicines like ibuprofen or naproxen to control fever, unless another medicine was prescribed. If you have chronic liver or kidney disease or ever had a stomach ulcer or gastrointestinal bleeding, talk with your doctor before  using these medicines. Aspirin should never be used in anyone under 18 years of age who is ill with a fever. It may cause severe liver damage. Don't use NSAID medicines if you are already taking one for another condition (like arthritis) or are on aspirin (such as for heart disease, or after a stroke)  · Don't use tobacco and or drink alcohol, which may worsen your symptoms.  · If medicines for vomiting were prescribed, take as directed.  · Once vomiting stops, then follow these guidelines:  During the first 12 to 24 hours follow the diet below:  · Fruit juices. Apple, grape juice, clear fruit drinks, and electrolyte replacement drinks.  · Beverages. Soft drinks without caffeine; mineral water (plain or flavored), decaffeinated tea and coffee.  · Soups. Clear broth and bouillon  · Desserts. Plain gelatin, ice pops, and fruit juice bars. As you feel better, you may add 6 to 8 ounces of yogurt per day.  During the next 24 hours you may add the following to the above:  · Hot cereal, plain toast, bread, rolls, crackers  · Plain noodles, rice, mashed potatoes, chicken noodle or rice soup  · Unsweetened canned fruit such as applesauce, bananas (avoid pineapple and citrus)  · Limit caffeine and chocolate. No spices or seasonings except salt.  During the next 24 hours:  Gradually resume a normal diet, as you feel better and your symptoms lessen.  Follow-up care  Follow up with your healthcare provider, or as advised.  When to seek medical advice  Call your healthcare provider right away if any of these occur:  · Constant right-sided lower belly pain or increasing general belly pain  · Continued vomiting (unable to keep liquids down) for 24 hours  · Vomiting blood or coffee grounds  · Swollen belly  · Frequent diarrhea (more than 5 times a day); blood (red or black color) or mucus in diarrhea  · Reduced urine output or extreme thirst  · Weakness, dizziness or fainting  · Unusually drowsy or confused  · Fever of 100.4°F  (38°C) oral or higher, or as directed  · Yellow color of the eyes or skin  [employer] last reviewed this educational content on 3/1/2018  © 0557-7458 [employer]. All rights reserved. This information is not intended as a substitute for professional medical care. Always follow your healthcare professional's instructions.    PLEASE GO TO THE NEAREST ER YOU NEED LABS AND A CT SCAN TO RULE OUT AN INTRAABDOMINAL PATHOLOGY

## 2023-11-13 ENCOUNTER — PATIENT MESSAGE (OUTPATIENT)
Dept: ENT CLINIC | Age: 38
End: 2023-11-13

## 2023-11-14 LAB — MISC REFERENCE: NORMAL

## 2023-11-14 NOTE — TELEPHONE ENCOUNTER
If he is doing the rinses, those could be making his ears feel more pressure. He can stop those if so.

## 2023-11-15 NOTE — TELEPHONE ENCOUNTER
The CT needs done so we can move forward with further evaluating the airway and deciding on our first steps to lessen the obstructive sleep apnea. Until that is fixed, the ear issues will continue. I do not have a quick/easy fix for that since it is all caused by the severe apnea (negative pressure). We are moving up his follow up appt to 12/6 at 1130 with Dr Anu Andrea - please let him know. I cannot get him back in here any sooner with him. If his symptoms are severe/worsening, he may need evaluated in the ER. There is nothing I can offer acutely in the office (imaging, labs, medications, etc).

## 2023-12-04 ENCOUNTER — HOSPITAL ENCOUNTER (OUTPATIENT)
Dept: CT IMAGING | Age: 38
Discharge: HOME | End: 2023-12-04
Payer: COMMERCIAL

## 2023-12-04 DIAGNOSIS — J34.89 NASAL OBSTRUCTION: ICD-10-CM

## 2023-12-04 DIAGNOSIS — G47.33 OBSTRUCTIVE SLEEP APNEA: ICD-10-CM

## 2023-12-04 PROCEDURE — 70486 CT MAXILLOFACIAL W/O DYE: CPT

## 2023-12-06 ENCOUNTER — OFFICE VISIT (OUTPATIENT)
Dept: ENT CLINIC | Age: 38
End: 2023-12-06
Payer: COMMERCIAL

## 2023-12-06 VITALS
BODY MASS INDEX: 44.1 KG/M2 | HEART RATE: 74 BPM | OXYGEN SATURATION: 97 % | DIASTOLIC BLOOD PRESSURE: 76 MMHG | RESPIRATION RATE: 16 BRPM | TEMPERATURE: 96.8 F | WEIGHT: 315 LBS | SYSTOLIC BLOOD PRESSURE: 138 MMHG | HEIGHT: 71 IN

## 2023-12-06 DIAGNOSIS — Z01.818 PRE-OP TESTING: Primary | ICD-10-CM

## 2023-12-06 DIAGNOSIS — J34.89 NASAL OBSTRUCTION: ICD-10-CM

## 2023-12-06 DIAGNOSIS — J34.3 HYPERTROPHY OF INFERIOR NASAL TURBINATE: ICD-10-CM

## 2023-12-06 DIAGNOSIS — H69.83 EUSTACHIAN TUBE DYSFUNCTION, BILATERAL: ICD-10-CM

## 2023-12-06 DIAGNOSIS — H93.8X3 EAR FULLNESS, BILATERAL: ICD-10-CM

## 2023-12-06 DIAGNOSIS — R06.5 MOUTH BREATHING: ICD-10-CM

## 2023-12-06 DIAGNOSIS — J38.7 ACQUIRED LARYNGOMALACIA IN ADULT: ICD-10-CM

## 2023-12-06 DIAGNOSIS — J35.1 LINGUAL TONSIL HYPERTROPHY: ICD-10-CM

## 2023-12-06 DIAGNOSIS — G47.33 OBSTRUCTIVE SLEEP APNEA: Primary | ICD-10-CM

## 2023-12-06 DIAGNOSIS — Z78.9 INTOLERANCE OF CONTINUOUS POSITIVE AIRWAY PRESSURE (CPAP) VENTILATION: ICD-10-CM

## 2023-12-06 DIAGNOSIS — G47.33 OBSTRUCTIVE SLEEP APNEA: ICD-10-CM

## 2023-12-06 PROBLEM — H69.93 EUSTACHIAN TUBE DYSFUNCTION, BILATERAL: Status: ACTIVE | Noted: 2023-12-06

## 2023-12-06 PROCEDURE — 4004F PT TOBACCO SCREEN RCVD TLK: CPT | Performed by: OTOLARYNGOLOGY

## 2023-12-06 PROCEDURE — 99214 OFFICE O/P EST MOD 30 MIN: CPT | Performed by: OTOLARYNGOLOGY

## 2023-12-06 PROCEDURE — G8484 FLU IMMUNIZE NO ADMIN: HCPCS | Performed by: OTOLARYNGOLOGY

## 2023-12-06 PROCEDURE — G8427 DOCREV CUR MEDS BY ELIG CLIN: HCPCS | Performed by: OTOLARYNGOLOGY

## 2023-12-06 PROCEDURE — G8417 CALC BMI ABV UP PARAM F/U: HCPCS | Performed by: OTOLARYNGOLOGY

## 2023-12-08 ENCOUNTER — HOSPITAL ENCOUNTER (EMERGENCY)
Age: 38
Discharge: HOME | End: 2023-12-08
Attending: EMERGENCY MEDICINE
Payer: COMMERCIAL

## 2023-12-08 VITALS
SYSTOLIC BLOOD PRESSURE: 165 MMHG | OXYGEN SATURATION: 100 % | DIASTOLIC BLOOD PRESSURE: 112 MMHG | WEIGHT: 305 LBS | RESPIRATION RATE: 20 BRPM | BODY MASS INDEX: 42.56 KG/M2 | HEART RATE: 80 BPM | TEMPERATURE: 97.9 F

## 2023-12-08 DIAGNOSIS — R10.9 RIGHT FLANK PAIN: Primary | ICD-10-CM

## 2023-12-08 LAB
ALBUMIN SERPL BCG-MCNC: 4 G/DL (ref 3.5–5.1)
ALP SERPL-CCNC: 91 U/L (ref 38–126)
ALT SERPL W/O P-5'-P-CCNC: 72 U/L (ref 11–66)
ANION GAP SERPL CALC-SCNC: 7 MEQ/L (ref 8–16)
AST SERPL-CCNC: 53 U/L (ref 5–40)
BASOPHILS ABSOLUTE: 0 THOU/MM3 (ref 0–0.1)
BASOPHILS NFR BLD AUTO: 0.9 %
BILIRUB CONJ SERPL-MCNC: < 0.2 MG/DL (ref 0–0.3)
BILIRUB SERPL-MCNC: 0.7 MG/DL (ref 0.3–1.2)
BILIRUB UR QL STRIP.AUTO: NEGATIVE
BUN SERPL-MCNC: 9 MG/DL (ref 7–22)
CALCIUM SERPL-MCNC: 9 MG/DL (ref 8.5–10.5)
CHARACTER UR: CLEAR
CHLORIDE SERPL-SCNC: 104 MEQ/L (ref 98–111)
CO2 SERPL-SCNC: 28 MEQ/L (ref 23–33)
COLOR: YELLOW
CREAT SERPL-MCNC: 0.8 MG/DL (ref 0.4–1.2)
DEPRECATED RDW RBC AUTO: 44.9 FL (ref 35–45)
EOSINOPHIL NFR BLD AUTO: 3.8 %
EOSINOPHILS ABSOLUTE: 0.2 THOU/MM3 (ref 0–0.4)
ERYTHROCYTE [DISTWIDTH] IN BLOOD BY AUTOMATED COUNT: 14.1 % (ref 11.5–14.5)
GFR SERPL CREATININE-BSD FRML MDRD: > 60 ML/MIN/1.73M2
GLUCOSE SERPL-MCNC: 135 MG/DL (ref 70–108)
GLUCOSE UR QL STRIP.AUTO: NEGATIVE MG/DL
HCT VFR BLD AUTO: 42.3 % (ref 42–52)
HGB BLD-MCNC: 14.1 GM/DL (ref 14–18)
HGB UR QL STRIP.AUTO: NEGATIVE
IMM GRANULOCYTES # BLD AUTO: 0.02 THOU/MM3 (ref 0–0.07)
IMM GRANULOCYTES NFR BLD AUTO: 0.4 %
KETONES UR QL STRIP.AUTO: NEGATIVE
LIPASE SERPL-CCNC: 23.4 U/L (ref 5.6–51.3)
LYMPHOCYTES ABSOLUTE: 1.3 THOU/MM3 (ref 1–4.8)
LYMPHOCYTES NFR BLD AUTO: 28 %
MCH RBC QN AUTO: 29.3 PG (ref 26–33)
MCHC RBC AUTO-ENTMCNC: 33.3 GM/DL (ref 32.2–35.5)
MCV RBC AUTO: 87.9 FL (ref 80–94)
MONOCYTES ABSOLUTE: 0.2 THOU/MM3 (ref 0.4–1.3)
MONOCYTES NFR BLD AUTO: 4.9 %
NEUTROPHILS NFR BLD AUTO: 62 %
NITRITE UR QL STRIP: NEGATIVE
NRBC BLD AUTO-RTO: 0 /100 WBC
OSMOLALITY SERPL CALC.SUM OF ELEC: 278.3 MOSMOL/KG (ref 275–300)
PH UR STRIP.AUTO: 6 [PH] (ref 5–9)
PLATELET # BLD AUTO: 96 THOU/MM3 (ref 130–400)
PMV BLD AUTO: 11.3 FL (ref 9.4–12.4)
POTASSIUM SERPL-SCNC: 4.2 MEQ/L (ref 3.5–5.2)
PROT SERPL-MCNC: 7.2 G/DL (ref 6.1–8)
PROT UR STRIP.AUTO-MCNC: NEGATIVE MG/DL
RBC # BLD AUTO: 4.81 MILL/MM3 (ref 4.7–6.1)
SCAN OF BLOOD SMEAR: NORMAL
SEGMENTED NEUTROPHILS ABSOLUTE COUNT: 2.8 THOU/MM3 (ref 1.8–7.7)
SODIUM SERPL-SCNC: 139 MEQ/L (ref 135–145)
SP GR UR REFRACT.AUTO: 1.02 (ref 1–1.03)
UROBILINOGEN, URINE: 1 EU/DL (ref 0–1)
WBC # BLD AUTO: 4.5 THOU/MM3 (ref 4.8–10.8)
WBC #/AREA URNS HPF: NEGATIVE /[HPF]

## 2023-12-08 PROCEDURE — 85025 COMPLETE CBC W/AUTO DIFF WBC: CPT

## 2023-12-08 PROCEDURE — 81003 URINALYSIS AUTO W/O SCOPE: CPT

## 2023-12-08 PROCEDURE — 36415 COLL VENOUS BLD VENIPUNCTURE: CPT

## 2023-12-08 PROCEDURE — 99283 EMERGENCY DEPT VISIT LOW MDM: CPT

## 2023-12-08 PROCEDURE — 83690 ASSAY OF LIPASE: CPT

## 2023-12-08 PROCEDURE — 80053 COMPREHEN METABOLIC PANEL: CPT

## 2023-12-08 PROCEDURE — 82248 BILIRUBIN DIRECT: CPT

## 2023-12-08 RX ORDER — ACYCLOVIR 800 MG/1
800 TABLET ORAL
Qty: 50 TABLET | Refills: 0 | Status: SHIPPED | OUTPATIENT
Start: 2023-12-08 | End: 2023-12-18

## 2023-12-08 RX ORDER — ACETAMINOPHEN 500 MG
1000 TABLET ORAL ONCE
Status: DISCONTINUED | OUTPATIENT
Start: 2023-12-08 | End: 2023-12-08 | Stop reason: HOSPADM

## 2023-12-08 RX ORDER — KETOROLAC TROMETHAMINE 30 MG/ML
30 INJECTION, SOLUTION INTRAMUSCULAR; INTRAVENOUS ONCE
Status: DISCONTINUED | OUTPATIENT
Start: 2023-12-08 | End: 2023-12-08 | Stop reason: HOSPADM

## 2023-12-08 ASSESSMENT — PAIN - FUNCTIONAL ASSESSMENT: PAIN_FUNCTIONAL_ASSESSMENT: 0-10

## 2023-12-08 ASSESSMENT — PAIN DESCRIPTION - LOCATION: LOCATION: FLANK;ABDOMEN

## 2023-12-08 ASSESSMENT — PAIN SCALES - GENERAL: PAINLEVEL_OUTOF10: 10

## 2023-12-08 ASSESSMENT — PAIN DESCRIPTION - FREQUENCY: FREQUENCY: CONTINUOUS

## 2023-12-08 ASSESSMENT — PAIN DESCRIPTION - PAIN TYPE: TYPE: ACUTE PAIN

## 2023-12-08 ASSESSMENT — PAIN DESCRIPTION - DESCRIPTORS: DESCRIPTORS: SHARP;SQUEEZING

## 2023-12-08 NOTE — DISCHARGE INSTRUCTIONS
Take acyclovir 5 times daily for 10 days    Follow-up with PCP as needed    Make sure that you wash everything with cleaning agents. Do not let children play with any of your objects (ie remote / cellphone). This virus is highly contagious. Avoid anyone that is pregnant until the lesions are all scabbed over. Make sure that you wash your hands if you touch the skin lesions. Take your medication as indicated and prescribed. If you were given a prescription for prednisone or any other steroid then, take Pepcid (famotidine - over the counter) every day while you are taking the steroids. If you are a diabetic, you should check your blood sugar more frequently while taking prednisone. For pain use acetaminophen (Tylenol) or ibuprofen (Motrin / Advil), unless prescribed medications that have acetaminophen or ibuprofen (or similar medications) in it. You can take over the counter acetaminophen tablets (1 - 2 tablets of the 500-mg strength every 6 hours) or ibuprofen tablets (2 tablets every 4 hours). PLEASE RETURN TO THE EMERGENCY DEPARTMENT IMMEDIATELY for worsening symptoms, notice rash on the face / tip of nose, hearing changes, any change in vision, the red area has increased in size, red streaks appearing on the skin, the blisters start to drain pus (white matter), or if you develop any concerning symptoms such as: high fever not relieved by acetaminophen (Tylenol) and/or ibuprofen (Motrin / Advil), chills, shortness of breath, chest pain, feeling of your heart fluttering or racing, persistent nausea and/or vomiting, vomiting up blood, blood in your stool, numbness, loss of consciousness, weakness or tingling in the arms or legs or change in color of the extremities, changes in mental status, persistent headache, blurry vision, loss of bladder / bowel control, unable to follow up with your physician, or other any other care or concern.

## 2023-12-08 NOTE — ED PROVIDER NOTES
[facility]  EMERGENCY MEDICINE ATTENDING ATTESTATION      Evaluation of Maisha Suazo. Case discussed and care plan developed with resident physician. I agree with the resident physician documentation and plan as documented by him, except if my documentation differs. Patient seen under indirect supervision. I reviewed the medical, surgical, family and social history, medications and allergies. I have reviewed and interpreted all available lab, radiology and ekg results available at the moment. I have reviewed the nursing documentation. Please see the resident physician completed note for final disposition except as documented on this attestation. I have reviewed and interpreted all available lab, radiology and ekg results available at the moment. Diagnosis, treatment and disposition plans were discussed and agreed upon by patient. This transcription was electronically signed. It was dictated by use of voice recognition software and electronically transcribed. The transcription may contain errors not detected in proofreading.      I performed direct supervision and was present for the critical portion following procedures: None  Critical care time on this case: None    Electronically signed by Satya Bang MD on 12/8/23 at 4:08 PM [facility]        Satya Bang MD  12/08/23 [id number]

## 2023-12-08 NOTE — ED TRIAGE NOTES
Pt to ED with c/o R sided flank pain, radiating under R rib and into abdomen. Pt reports having a history of enlarged spleen and states \"it feels like it might be really really swollen right now. \" Pt reports increased fatigue as well.

## 2023-12-08 NOTE — ED PROVIDER NOTES
[facility] DEPT  EMERGENCY DEPARTMENT ENCOUNTER          Pt Name: Margo Altaimrano  MRN: [id number]  [address] [facility] 1985  Date of evaluation: 12/8/2023  Physician: Oumou Warren MD  Supervising Attending Physician: Harvinder Byrd MD       CHIEF COMPLAINT       Chief Complaint   Patient presents with    Flank Pain     R sided    Abdominal Pain         HISTORY OF PRESENT ILLNESS    HPI  Margo Altamirano is a 45 y.o. male with PMH significant for diverticulitis, bowel perforation s/p surgery, splenomegaly with thrombocytopenia, and PSH significant for cholecystectomy who presents to the emergency department from home, by private vehicle for evaluation of right-sided flank pain. Patient states that pain started right-sided back lumbar region without trauma or strain 2 days ago spontaneously and worsening since. Pain radiates to front, stopping at midline, appearing to be in a dermatomal distribution. Pain is constant, stabbing, without aggravating or alleviating factors. Has attempted to alleviate pain with Norco and Tylenol 4 without relief. Patient states that he has not taken any of his daily medications since last night. Of note, last BM around 0730 today. States he has had some rigors associated with the pain, increased fatigue, and nausea. Denies fevers, headache, changes in vision, chest pain, shortness of breath, vomiting, constipation, diarrhea, melena, hematochezia, hematuria, dysuria, numbness/tingling/weakness in extremities. The patient has no other acute complaints at this time. PAST MEDICAL AND SURGICAL HISTORY     Past Medical History:   Diagnosis Date    Anxiety     Severe countine to struggle with this    Chronic back pain     Depression     Diverticulitis     Headache     Pain mostly located in back of my head.     Hidradenitis suppurativa     Neuropathy     Obesity     Pancytopenia ([address]) 07/2021    Restless legs syndrome     Sinusitis     Sleep apnea

## 2023-12-14 ENCOUNTER — TELEPHONE (OUTPATIENT)
Dept: FAMILY MEDICINE CLINIC | Age: 38
End: 2023-12-14

## 2023-12-14 DIAGNOSIS — L73.2 HIDRADENITIS SUPPURATIVA: ICD-10-CM

## 2023-12-14 RX ORDER — HYDROCODONE BITARTRATE AND ACETAMINOPHEN 5; 325 MG/1; MG/1
1 TABLET ORAL EVERY 6 HOURS PRN
Qty: 120 TABLET | Refills: 0 | Status: SHIPPED | OUTPATIENT
Start: 2023-12-14 | End: 2024-01-13

## 2023-12-14 NOTE — TELEPHONE ENCOUNTER
Ep'ed norco to pharm requested    Controlled Substance Monitoring:    Acute and Chronic Pain Monitoring:   RX Monitoring Periodic Controlled Substance Monitoring   12/14/2023  12:45 PM No signs of potential drug abuse or diversion identified.

## 2023-12-14 NOTE — TELEPHONE ENCOUNTER
----- Message from Kristin Whaley sent at 12/14/2023  2:26 PM EST -----  Subject: Message to Provider    QUESTIONS  Information for Provider? Patient would like to inform office staff that   he does have a low grade fever, would like if office staff could call back   if it's okay to come into office for scheduled appointment 12/15/2023  ---------------------------------------------------------------------------  --------------  [address]  [phone]; OK to leave message on voicemail  ---------------------------------------------------------------------------  --------------  SCRIPT ANSWERS  Relationship to Patient?  Self

## 2023-12-14 NOTE — TELEPHONE ENCOUNTER
Bryson Willis called requesting a refill on the following medications:  Requested Prescriptions     Pending Prescriptions Disp Refills    HYDROcodone-acetaminophen (NORCO) 5-325 MG per tablet 120 tablet 0     Sig: Take 1 tablet by mouth every 6 hours as needed for Pain for up to 30 days.  Max Daily Amount: 4 tablets       Date of last visit: 9/1/2023  Date of next visit (if applicable):12/15/2023  Date of last refill: 11/6/23  Pharmacy Name: [facility],  [facility] Dorita Kvng

## 2023-12-15 ENCOUNTER — OFFICE VISIT (OUTPATIENT)
Dept: FAMILY MEDICINE CLINIC | Age: 38
End: 2023-12-15
Payer: COMMERCIAL

## 2023-12-15 ENCOUNTER — HOSPITAL ENCOUNTER (OUTPATIENT)
Age: 38
Discharge: HOME | End: 2023-12-15
Payer: COMMERCIAL

## 2023-12-15 ENCOUNTER — HOSPITAL ENCOUNTER (OUTPATIENT)
Dept: GENERAL RADIOLOGY | Age: 38
Discharge: HOME | End: 2023-12-15
Payer: COMMERCIAL

## 2023-12-15 VITALS
BODY MASS INDEX: 45.35 KG/M2 | RESPIRATION RATE: 28 BRPM | DIASTOLIC BLOOD PRESSURE: 80 MMHG | HEART RATE: 65 BPM | OXYGEN SATURATION: 94 % | SYSTOLIC BLOOD PRESSURE: 128 MMHG | TEMPERATURE: 97.8 F | WEIGHT: 315 LBS

## 2023-12-15 DIAGNOSIS — N52.9 ERECTILE DYSFUNCTION, UNSPECIFIED ERECTILE DYSFUNCTION TYPE: ICD-10-CM

## 2023-12-15 DIAGNOSIS — Z01.818 PRE-OP TESTING: ICD-10-CM

## 2023-12-15 DIAGNOSIS — J01.90 ACUTE BACTERIAL SINUSITIS: Primary | ICD-10-CM

## 2023-12-15 DIAGNOSIS — G47.33 OBSTRUCTIVE SLEEP APNEA: ICD-10-CM

## 2023-12-15 DIAGNOSIS — I10 BENIGN ESSENTIAL HTN: ICD-10-CM

## 2023-12-15 DIAGNOSIS — B96.89 ACUTE BACTERIAL SINUSITIS: Primary | ICD-10-CM

## 2023-12-15 DIAGNOSIS — L73.2 HIDRADENITIS SUPPURATIVA: ICD-10-CM

## 2023-12-15 LAB
ALBUMIN SERPL BCG-MCNC: 4.1 G/DL (ref 3.5–5.1)
ALP SERPL-CCNC: 101 U/L (ref 38–126)
ALT SERPL W/O P-5'-P-CCNC: 70 U/L (ref 11–66)
ANION GAP SERPL CALC-SCNC: 11 MEQ/L (ref 8–16)
AST SERPL-CCNC: 53 U/L (ref 5–40)
BASOPHILS ABSOLUTE: 0 THOU/MM3 (ref 0–0.1)
BASOPHILS NFR BLD AUTO: 0.8 %
BILIRUB SERPL-MCNC: 0.7 MG/DL (ref 0.3–1.2)
BUN SERPL-MCNC: 8 MG/DL (ref 7–22)
CALCIUM SERPL-MCNC: 9.1 MG/DL (ref 8.5–10.5)
CHLORIDE SERPL-SCNC: 102 MEQ/L (ref 98–111)
CO2 SERPL-SCNC: 26 MEQ/L (ref 23–33)
CREAT SERPL-MCNC: 0.9 MG/DL (ref 0.4–1.2)
DEPRECATED RDW RBC AUTO: 45 FL (ref 35–45)
EKG ATRIAL RATE: 85 BPM
EKG P AXIS: 50 DEGREES
EKG P-R INTERVAL: 162 MS
EKG Q-T INTERVAL: 370 MS
EKG QRS DURATION: 116 MS
EKG QTC CALCULATION (BAZETT): 440 MS
EKG R AXIS: -58 DEGREES
EKG T AXIS: 47 DEGREES
EKG VENTRICULAR RATE: 85 BPM
EOSINOPHIL NFR BLD AUTO: 3.8 %
EOSINOPHILS ABSOLUTE: 0.1 THOU/MM3 (ref 0–0.4)
ERYTHROCYTE [DISTWIDTH] IN BLOOD BY AUTOMATED COUNT: 14 % (ref 11.5–14.5)
GFR SERPL CREATININE-BSD FRML MDRD: > 60 ML/MIN/1.73M2
GLUCOSE SERPL-MCNC: 182 MG/DL (ref 70–108)
HCT VFR BLD AUTO: 40.8 % (ref 42–52)
HGB BLD-MCNC: 13.7 GM/DL (ref 14–18)
IMM GRANULOCYTES # BLD AUTO: 0.01 THOU/MM3 (ref 0–0.07)
IMM GRANULOCYTES NFR BLD AUTO: 0.3 %
LYMPHOCYTES ABSOLUTE: 1 THOU/MM3 (ref 1–4.8)
LYMPHOCYTES NFR BLD AUTO: 26.8 %
MCH RBC QN AUTO: 29.5 PG (ref 26–33)
MCHC RBC AUTO-ENTMCNC: 33.6 GM/DL (ref 32.2–35.5)
MCV RBC AUTO: 87.9 FL (ref 80–94)
MONOCYTES ABSOLUTE: 0.2 THOU/MM3 (ref 0.4–1.3)
MONOCYTES NFR BLD AUTO: 5.8 %
NEUTROPHILS NFR BLD AUTO: 62.5 %
NRBC BLD AUTO-RTO: 0 /100 WBC
PLATELET # BLD AUTO: 101 THOU/MM3 (ref 130–400)
PMV BLD AUTO: 11.4 FL (ref 9.4–12.4)
POTASSIUM SERPL-SCNC: 4 MEQ/L (ref 3.5–5.2)
PROT SERPL-MCNC: 7.4 G/DL (ref 6.1–8)
RBC # BLD AUTO: 4.64 MILL/MM3 (ref 4.7–6.1)
SEGMENTED NEUTROPHILS ABSOLUTE COUNT: 2.3 THOU/MM3 (ref 1.8–7.7)
SODIUM SERPL-SCNC: 139 MEQ/L (ref 135–145)
WBC # BLD AUTO: 3.7 THOU/MM3 (ref 4.8–10.8)

## 2023-12-15 PROCEDURE — 80053 COMPREHEN METABOLIC PANEL: CPT

## 2023-12-15 PROCEDURE — 71046 X-RAY EXAM CHEST 2 VIEWS: CPT

## 2023-12-15 PROCEDURE — 85025 COMPLETE CBC W/AUTO DIFF WBC: CPT

## 2023-12-15 PROCEDURE — G8417 CALC BMI ABV UP PARAM F/U: HCPCS | Performed by: FAMILY MEDICINE

## 2023-12-15 PROCEDURE — G8427 DOCREV CUR MEDS BY ELIG CLIN: HCPCS | Performed by: FAMILY MEDICINE

## 2023-12-15 PROCEDURE — 93010 ELECTROCARDIOGRAM REPORT: CPT | Performed by: INTERNAL MEDICINE

## 2023-12-15 PROCEDURE — 4004F PT TOBACCO SCREEN RCVD TLK: CPT | Performed by: FAMILY MEDICINE

## 2023-12-15 PROCEDURE — G8484 FLU IMMUNIZE NO ADMIN: HCPCS | Performed by: FAMILY MEDICINE

## 2023-12-15 PROCEDURE — 99214 OFFICE O/P EST MOD 30 MIN: CPT | Performed by: FAMILY MEDICINE

## 2023-12-15 PROCEDURE — 36415 COLL VENOUS BLD VENIPUNCTURE: CPT

## 2023-12-15 PROCEDURE — 3079F DIAST BP 80-89 MM HG: CPT | Performed by: FAMILY MEDICINE

## 2023-12-15 PROCEDURE — 93005 ELECTROCARDIOGRAM TRACING: CPT

## 2023-12-15 PROCEDURE — 3074F SYST BP LT 130 MM HG: CPT | Performed by: FAMILY MEDICINE

## 2023-12-15 RX ORDER — LEVOFLOXACIN 500 MG/1
500 TABLET, FILM COATED ORAL DAILY
Qty: 10 TABLET | Refills: 0 | Status: SHIPPED | OUTPATIENT
Start: 2023-12-15 | End: 2023-12-25

## 2023-12-15 RX ORDER — SILDENAFIL 100 MG/1
50-100 TABLET, FILM COATED ORAL PRN
Qty: 10 TABLET | Refills: 5 | Status: SHIPPED | OUTPATIENT
Start: 2023-12-15

## 2023-12-15 NOTE — PROGRESS NOTES
Neurological:  Negative for dizziness and headaches. Hematological:  Negative for adenopathy. Does not bruise/bleed easily. Psychiatric/Behavioral:  Negative for behavioral problems and sleep disturbance. The patient is not nervous/anxious. Objective   Physical Exam  Vitals and nursing note reviewed. Constitutional:       Appearance: He is well-developed. HENT:      Head: Normocephalic and atraumatic. Right Ear: External ear normal.      Left Ear: External ear normal.      Nose:      Right Sinus: Maxillary sinus tenderness present. Left Sinus: Maxillary sinus tenderness present. Mouth/Throat:      Mouth: Mucous membranes are moist.   Eyes:      Pupils: Pupils are equal, round, and reactive to light. Neck:      Thyroid: No thyromegaly. Cardiovascular:      Rate and Rhythm: Normal rate and regular rhythm. Heart sounds: Normal heart sounds. Pulmonary:      Breath sounds: Normal breath sounds. No wheezing or rales. Abdominal:      General: Bowel sounds are normal.      Palpations: Abdomen is soft. Tenderness: There is no abdominal tenderness. There is no guarding or rebound. Musculoskeletal:         General: Normal range of motion. Cervical back: Neck supple. Lymphadenopathy:      Cervical: No cervical adenopathy. Skin:     General: Skin is warm and dry. Findings: No rash. Neurological:      Mental Status: He is alert and oriented to person, place, and time. Cranial Nerves: No cranial nerve deficit. Deep Tendon Reflexes: Reflexes are normal and symmetric. An electronic signature was used to authenticate this note.     --Lazarus Budds, MD

## 2024-01-02 ENCOUNTER — TELEPHONE (OUTPATIENT)
Dept: ENT CLINIC | Age: 39
End: 2024-01-02

## 2024-01-02 NOTE — TELEPHONE ENCOUNTER
I spoke to Rogelio about rescheduling his surgery from 2/29/24 to the following week on 3/7/24. He is okay with doing that date for the majority of the surgery but is asking if he can please get his ear tubes done prior? His ears are giving him the most grief.  I could work on trying to get this worked in somewhere if that is ok?     Please advise.

## 2024-01-15 DIAGNOSIS — L73.2 HIDRADENITIS SUPPURATIVA: ICD-10-CM

## 2024-01-15 RX ORDER — HYDROCODONE BITARTRATE AND ACETAMINOPHEN 5; 325 MG/1; MG/1
1 TABLET ORAL EVERY 6 HOURS PRN
Qty: 120 TABLET | Refills: 0 | Status: SHIPPED | OUTPATIENT
Start: 2024-01-15 | End: 2024-02-14

## 2024-01-15 NOTE — TELEPHONE ENCOUNTER
Ep'ed norco to pharm requested    Controlled Substance Monitoring:    Acute and Chronic Pain Monitoring:   RX Monitoring Periodic Controlled Substance Monitoring   1/15/2024  12:51 PM No signs of potential drug abuse or diversion identified.

## 2024-01-15 NOTE — TELEPHONE ENCOUNTER
Rogelio Gonzalez called requesting a refill on the following medications:  Requested Prescriptions     Pending Prescriptions Disp Refills    HYDROcodone-acetaminophen (NORCO) 5-325 MG per tablet 120 tablet 0     Sig: Take 1 tablet by mouth every 6 hours as needed for Pain for up to 30 days. Max Daily Amount: 4 tablets       Date of last visit: 12/15/2023  Date of next visit (if applicable):Visit date not found  Date of last refill: 12/14/23  Pharmacy Name: [facility]

## 2024-01-24 NOTE — PROGRESS NOTES
PAT call attempted, patient unavailable, left message to please call us back at your earliest convenience; [phone]

## 2024-01-25 NOTE — PROGRESS NOTES
Unable to use CPAP machine due to pt states he drools and it fills us and chokes him.  He is hoping the surgery he has lined up works

## 2024-01-25 NOTE — FLOWSHEET NOTE
you have the Dr./office phone # to call if you have any concerns or questions about your wound.     needed at discharge and someone over 18 to stay with you for 24 hours overnight (surgery may be cancelled if you don't have this) zoe   Report to [facility] on 2nd floor  If you would become ill prior to surgery, please call the surgeon  May have a visitor with you, we request that you limit to 2 visitors in pre-op area  Masks are recommended but not required, new masks at entrance desk  Call [phone] for any questions

## 2024-01-26 ENCOUNTER — PREP FOR PROCEDURE (OUTPATIENT)
Dept: ENT CLINIC | Age: 39
End: 2024-01-26

## 2024-01-31 ENCOUNTER — ANESTHESIA EVENT (OUTPATIENT)
Dept: OPERATING ROOM | Age: 39
End: 2024-01-31
Payer: COMMERCIAL

## 2024-01-31 ENCOUNTER — HOSPITAL ENCOUNTER (OUTPATIENT)
Age: 39
Setting detail: OUTPATIENT SURGERY
Discharge: HOME | End: 2024-01-31
Attending: OTOLARYNGOLOGY | Admitting: OTOLARYNGOLOGY
Payer: COMMERCIAL

## 2024-01-31 ENCOUNTER — ANESTHESIA (OUTPATIENT)
Dept: OPERATING ROOM | Age: 39
End: 2024-01-31
Payer: COMMERCIAL

## 2024-01-31 VITALS
OXYGEN SATURATION: 98 % | SYSTOLIC BLOOD PRESSURE: 140 MMHG | HEIGHT: 71 IN | RESPIRATION RATE: 16 BRPM | DIASTOLIC BLOOD PRESSURE: 89 MMHG | HEART RATE: 65 BPM | TEMPERATURE: 97 F | WEIGHT: 315 LBS | BODY MASS INDEX: 44.1 KG/M2

## 2024-01-31 PROBLEM — H69.93 DYSFUNCTION OF BOTH EUSTACHIAN TUBES: Status: ACTIVE | Noted: 2024-01-31

## 2024-01-31 PROBLEM — H90.0 CONDUCTIVE HEARING LOSS, BILATERAL: Status: ACTIVE | Noted: 2024-01-31

## 2024-01-31 PROBLEM — H65.23 BILATERAL CHRONIC SEROUS OTITIS MEDIA: Status: ACTIVE | Noted: 2024-01-31

## 2024-01-31 PROCEDURE — APPNB45 APP NON BILLABLE 31-45 MINUTES: Performed by: NURSE PRACTITIONER

## 2024-01-31 PROCEDURE — 3600000012 HC SURGERY LEVEL 2 ADDTL 15MIN: Performed by: OTOLARYNGOLOGY

## 2024-01-31 PROCEDURE — 3700000000 HC ANESTHESIA ATTENDED CARE: Performed by: OTOLARYNGOLOGY

## 2024-01-31 PROCEDURE — 2580000003 HC RX 258: Performed by: NURSE PRACTITIONER

## 2024-01-31 PROCEDURE — 6370000000 HC RX 637 (ALT 250 FOR IP): Performed by: NURSE PRACTITIONER

## 2024-01-31 PROCEDURE — 7100000001 HC PACU RECOVERY - ADDTL 15 MIN: Performed by: OTOLARYNGOLOGY

## 2024-01-31 PROCEDURE — 6360000002 HC RX W HCPCS: Performed by: NURSE ANESTHETIST, CERTIFIED REGISTERED

## 2024-01-31 PROCEDURE — 94761 N-INVAS EAR/PLS OXIMETRY MLT: CPT

## 2024-01-31 PROCEDURE — 7100000000 HC PACU RECOVERY - FIRST 15 MIN: Performed by: OTOLARYNGOLOGY

## 2024-01-31 PROCEDURE — 7100000010 HC PHASE II RECOVERY - FIRST 15 MIN: Performed by: OTOLARYNGOLOGY

## 2024-01-31 PROCEDURE — 6370000000 HC RX 637 (ALT 250 FOR IP): Performed by: OTOLARYNGOLOGY

## 2024-01-31 PROCEDURE — 2709999900 HC NON-CHARGEABLE SUPPLY: Performed by: OTOLARYNGOLOGY

## 2024-01-31 PROCEDURE — 69436 CREATE EARDRUM OPENING: CPT | Performed by: OTOLARYNGOLOGY

## 2024-01-31 PROCEDURE — 7100000011 HC PHASE II RECOVERY - ADDTL 15 MIN: Performed by: OTOLARYNGOLOGY

## 2024-01-31 PROCEDURE — L8699 PROSTHETIC IMPLANT NOS: HCPCS | Performed by: OTOLARYNGOLOGY

## 2024-01-31 PROCEDURE — 3600000002 HC SURGERY LEVEL 2 BASE: Performed by: OTOLARYNGOLOGY

## 2024-01-31 PROCEDURE — 94640 AIRWAY INHALATION TREATMENT: CPT

## 2024-01-31 PROCEDURE — 3700000001 HC ADD 15 MINUTES (ANESTHESIA): Performed by: OTOLARYNGOLOGY

## 2024-01-31 DEVICE — IMPLANTABLE DEVICE: Type: IMPLANTABLE DEVICE | Site: EAR | Status: FUNCTIONAL

## 2024-01-31 RX ORDER — FENTANYL CITRATE 50 UG/ML
INJECTION, SOLUTION INTRAMUSCULAR; INTRAVENOUS PRN
Status: DISCONTINUED | OUTPATIENT
Start: 2024-01-31 | End: 2024-01-31 | Stop reason: SDUPTHER

## 2024-01-31 RX ORDER — IPRATROPIUM BROMIDE AND ALBUTEROL SULFATE 2.5; .5 MG/3ML; MG/3ML
1 SOLUTION RESPIRATORY (INHALATION) EVERY 4 HOURS PRN
Status: DISCONTINUED | OUTPATIENT
Start: 2024-01-31 | End: 2024-01-31 | Stop reason: HOSPADM

## 2024-01-31 RX ORDER — SODIUM CHLORIDE 0.9 % (FLUSH) 0.9 %
5-40 SYRINGE (ML) INJECTION PRN
Status: DISCONTINUED | OUTPATIENT
Start: 2024-01-31 | End: 2024-01-31 | Stop reason: HOSPADM

## 2024-01-31 RX ORDER — SODIUM CHLORIDE 9 MG/ML
INJECTION, SOLUTION INTRAVENOUS PRN
Status: CANCELLED | OUTPATIENT
Start: 2024-01-31

## 2024-01-31 RX ORDER — SODIUM CHLORIDE 0.9 % (FLUSH) 0.9 %
5-40 SYRINGE (ML) INJECTION EVERY 12 HOURS SCHEDULED
Status: DISCONTINUED | OUTPATIENT
Start: 2024-01-31 | End: 2024-01-31 | Stop reason: HOSPADM

## 2024-01-31 RX ORDER — LIDOCAINE HCL/PF 100 MG/5ML
SYRINGE (ML) INJECTION PRN
Status: DISCONTINUED | OUTPATIENT
Start: 2024-01-31 | End: 2024-01-31 | Stop reason: SDUPTHER

## 2024-01-31 RX ORDER — CIPROFLOXACIN HYDROCHLORIDE 3.5 MG/ML
SOLUTION/ DROPS TOPICAL
Qty: 5 ML | Refills: 0 | Status: SHIPPED | OUTPATIENT
Start: 2024-01-31

## 2024-01-31 RX ORDER — DEXAMETHASONE SODIUM PHOSPHATE 10 MG/ML
INJECTION, EMULSION INTRAMUSCULAR; INTRAVENOUS PRN
Status: DISCONTINUED | OUTPATIENT
Start: 2024-01-31 | End: 2024-01-31 | Stop reason: SDUPTHER

## 2024-01-31 RX ORDER — SODIUM CHLORIDE 0.9 % (FLUSH) 0.9 %
5-40 SYRINGE (ML) INJECTION PRN
Status: CANCELLED | OUTPATIENT
Start: 2024-01-31

## 2024-01-31 RX ORDER — ONDANSETRON 2 MG/ML
INJECTION INTRAMUSCULAR; INTRAVENOUS PRN
Status: DISCONTINUED | OUTPATIENT
Start: 2024-01-31 | End: 2024-01-31 | Stop reason: SDUPTHER

## 2024-01-31 RX ORDER — SODIUM CHLORIDE 9 MG/ML
INJECTION, SOLUTION INTRAVENOUS PRN
Status: DISCONTINUED | OUTPATIENT
Start: 2024-01-31 | End: 2024-01-31 | Stop reason: HOSPADM

## 2024-01-31 RX ORDER — PROPOFOL 10 MG/ML
INJECTION, EMULSION INTRAVENOUS PRN
Status: DISCONTINUED | OUTPATIENT
Start: 2024-01-31 | End: 2024-01-31 | Stop reason: SDUPTHER

## 2024-01-31 RX ORDER — MIDAZOLAM HYDROCHLORIDE 1 MG/ML
INJECTION INTRAMUSCULAR; INTRAVENOUS PRN
Status: DISCONTINUED | OUTPATIENT
Start: 2024-01-31 | End: 2024-01-31 | Stop reason: SDUPTHER

## 2024-01-31 RX ORDER — SODIUM CHLORIDE 0.9 % (FLUSH) 0.9 %
5-40 SYRINGE (ML) INJECTION EVERY 12 HOURS SCHEDULED
Status: CANCELLED | OUTPATIENT
Start: 2024-01-31

## 2024-01-31 RX ORDER — CIPROFLOXACIN HYDROCHLORIDE 3.5 MG/ML
SOLUTION/ DROPS TOPICAL PRN
Status: DISCONTINUED | OUTPATIENT
Start: 2024-01-31 | End: 2024-01-31 | Stop reason: ALTCHOICE

## 2024-01-31 RX ORDER — IPRATROPIUM BROMIDE AND ALBUTEROL SULFATE 2.5; .5 MG/3ML; MG/3ML
1 SOLUTION RESPIRATORY (INHALATION) EVERY 4 HOURS PRN
Status: CANCELLED | OUTPATIENT
Start: 2024-01-31

## 2024-01-31 RX ADMIN — ONDANSETRON 4 MG: 2 INJECTION INTRAMUSCULAR; INTRAVENOUS at 13:44

## 2024-01-31 RX ADMIN — PROPOFOL 200 MG: 10 INJECTION, EMULSION INTRAVENOUS at 13:35

## 2024-01-31 RX ADMIN — MIDAZOLAM 2 MG: 1 INJECTION INTRAMUSCULAR; INTRAVENOUS at 13:31

## 2024-01-31 RX ADMIN — Medication 100 MG: at 13:35

## 2024-01-31 RX ADMIN — DEXAMETHASONE SODIUM PHOSPHATE 10 MG: 10 INJECTION, EMULSION INTRAMUSCULAR; INTRAVENOUS at 13:44

## 2024-01-31 RX ADMIN — SODIUM CHLORIDE: 9 INJECTION, SOLUTION INTRAVENOUS at 11:37

## 2024-01-31 RX ADMIN — IPRATROPIUM BROMIDE AND ALBUTEROL SULFATE 1 DOSE: .5; 3 SOLUTION RESPIRATORY (INHALATION) at 12:10

## 2024-01-31 RX ADMIN — FENTANYL CITRATE 100 MCG: 50 INJECTION, SOLUTION INTRAMUSCULAR; INTRAVENOUS at 13:46

## 2024-01-31 ASSESSMENT — PAIN SCALES - GENERAL: PAINLEVEL_OUTOF10: 0

## 2024-01-31 NOTE — DISCHARGE INSTRUCTIONS
- Resume regular diet and activities  - May take cotton balls out later tonight unless they fall out prior to that  - A little bloody ear or yellowish drainage the next few days is not unusual  - Use the ear drops as prescribed  - Wear cotton balls coated with Vaseline or ear plugs with showers or any other water exposure

## 2024-01-31 NOTE — ANESTHESIA PRE PROCEDURE
Department of Anesthesiology  Preprocedure Note       Name:  Rogelio Gonzalez   Age:  39 y.o.  :  1985                                          MRN:  [id number]         Date:  2024      Surgeon: Surgeon(s):  Watson Juarez MD    Procedure: Procedure(s):  Bilateral Myringotomy Tympanostomy Tube Placement    Medications prior to admission:   Prior to Admission medications    Medication Sig Start Date End Date Taking? Authorizing Provider   HYDROcodone-acetaminophen (NORCO) 5-325 MG per tablet Take 1 tablet by mouth every 6 hours as needed for Pain for up to 30 days. Max Daily Amount: 4 tablets 1/15/24 2/14/24  Elkin Ramirez MD   sildenafil (VIAGRA) 100 MG tablet Take 0.5-1 tablets by mouth as needed for Erectile Dysfunction 12/15/23   Elkin Ramirez MD   fluticasone (FLONASE) 50 MCG/ACT nasal spray 2 sprays by Each Nostril route daily 23   Lucila Yip APRN - CNP   lisinopril (PRINIVIL;ZESTRIL) 20 MG tablet Take 1 tablet by mouth daily 23   Elkin Ramirez MD   pantoprazole (PROTONIX) 40 MG tablet Take 1 tablet by mouth 2 times daily (before meals) 23   Elkin Ramirez MD   ibuprofen (ADVIL;MOTRIN) 800 MG tablet Take 1 tablet by mouth every 8 hours as needed for Pain 23   Elkin Ramirez MD   tiZANidine (ZANAFLEX) 4 MG tablet Take 2 tablets by mouth every 8 hours as needed (spasms) 23   Elkin Ramirez MD   busPIRone (BUSPAR) 15 MG tablet Take 15 mg by mouth 3 times daily 23   Elkin Ramirez MD   escitalopram (LEXAPRO) 10 MG tablet Take 1 tablet by mouth daily 23   Elkin Ramirez MD   Cholecalciferol (VITAMIN D3) 125 MCG (5000 UT) TABS Take by mouth    Provider, MD Pako   ondansetron (ZOFRAN-ODT) 4 MG disintegrating tablet Take 1 tablet by mouth 3 times daily as needed for Nausea or Vomiting  Patient not taking: Reported on 2024 10/23/22   Janina Mancera MD   acetaminophen (TYLENOL) 500 MG tablet Take 2

## 2024-01-31 NOTE — PROGRESS NOTES
Pt returned to [facility] room 12. Vitals and assessment as charted. 0.9 infusing,  to count from PACU. Pt has crackers and water. Family at the bedside. Pt and family verbalized understanding of discharge criteria and call light use. Call light in reach.

## 2024-01-31 NOTE — PROGRESS NOTES
Pt has met discharge criteria and states he is ready for discharge to home. IV removed, gauze and tape applied. Dressed in own clothes and personal belongings gathered. Discharge instructions (with opioid medication education information) given to pt and family; pt and family verbalized understanding of discharge instructions, prescriptions and follow up appointments. Pt transported to discharge lobby by [facility] staff.

## 2024-01-31 NOTE — PROGRESS NOTES
Patient admitted to [facility] room 12 with fiance at bedside. Bed in low position side rails up call light in reach. Patient denies questions at this time.

## 2024-01-31 NOTE — H&P
09/28/2023 08:32 PM     K 4.7 09/01/2023 10:23 AM     K 4.1 10/23/2022 06:32 PM     K 4.5 08/30/2022 04:03 PM      09/28/2023 08:32 PM      09/01/2023 10:23 AM     CL 99 10/23/2022 06:32 PM     CO2 24 09/28/2023 08:32 PM     CO2 28 09/01/2023 10:23 AM     CO2 26 10/23/2022 06:32 PM     BUN 8 09/28/2023 08:32 PM     BUN 6 09/01/2023 10:23 AM     BUN 7 10/23/2022 06:32 PM     CREATININE 0.6 09/28/2023 08:32 PM     CREATININE 0.8 09/01/2023 10:23 AM     CREATININE 0.7 10/23/2022 06:32 PM     CALCIUM 8.8 09/28/2023 08:32 PM     CALCIUM 9.4 09/01/2023 10:23 AM     CALCIUM 8.9 10/23/2022 06:32 PM     PROT 7.0 09/28/2023 08:32 PM     PROT 7.3 09/01/2023 10:23 AM     PROT 7.0 10/23/2022 06:32 PM     LABALBU 3.8 09/28/2023 08:32 PM     LABALBU 3.9 09/01/2023 10:23 AM     LABALBU 3.7 10/23/2022 06:32 PM     BILITOT 0.4 09/28/2023 08:32 PM     BILITOT 0.6 09/01/2023 10:23 AM     BILITOT 0.5 10/23/2022 06:32 PM     ALKPHOS 92 09/28/2023 08:32 PM     ALKPHOS 96 09/01/2023 10:23 AM     ALKPHOS 94 10/23/2022 06:32 PM     AST 44 09/28/2023 08:32 PM     AST 35 09/01/2023 10:23 AM     AST 35 10/23/2022 06:32 PM     ALT 59 09/28/2023 08:32 PM     ALT 59 09/01/2023 10:23 AM     ALT 48 10/23/2022 06:32 PM         All of the past medical history, past surgical history, family history,social history, allergies and current medications were reviewed with the patient.  Assessment & Plan   Diagnoses and all orders for this visit:       Diagnosis Orders   1. Obstructive sleep apnea  MN DISE DYN EVAL SLEEP DISORDERED BREATHING FLX DX     MN LARYNGOSCOPY W/WO TRACHEOSCOPY W/MICRO/TELESCOPE       2. Intolerance of continuous positive airway pressure (CPAP) ventilation  MN DISE DYN EVAL SLEEP DISORDERED BREATHING FLX DX     MN LARYNGOSCOPY W/WO TRACHEOSCOPY W/MICRO/TELESCOPE       3. Ear fullness, bilateral  MN DISE DYN EVAL SLEEP DISORDERED BREATHING FLX DX       4. Mouth breathing  MN DISE DYN EVAL SLEEP DISORDERED BREATHING FLX DX

## 2024-01-31 NOTE — ANESTHESIA POSTPROCEDURE EVALUATION
Department of Anesthesiology  Postprocedure Note    Patient: Rogelio Gonzalez  MRN: [id number]  YOB: 1985  Date of evaluation: 1/31/2024    Procedure Summary       Date: 01/31/24 Room / Location: [facility] OR [address] [place] [address] OR    Anesthesia Start: 1331 Anesthesia Stop: 1416    Procedure: Bilateral Myringotomy Tympanostomy Tube Placement (Bilateral: Ear) Diagnosis:       Dysfunction of both eustachian tubes      (Dysfunction of both eustachian tubes [H69.93])    Surgeons: Watson Juarez MD Responsible Provider: Tavo Loya MD    Anesthesia Type: general ASA Status: 3            Anesthesia Type: No value filed.    Daisy Phase I: Daisy Score: 9    Daisy Phase II: Daisy Score: 10    Anesthesia Post Evaluation    Patient location during evaluation: PACU  Patient participation: complete - patient participated  Level of consciousness: awake and alert  Airway patency: patent  Nausea & Vomiting: no nausea  Cardiovascular status: blood pressure returned to baseline and hemodynamically stable  Respiratory status: acceptable and spontaneous ventilation  Hydration status: euvolemic  Pain management: adequate    No notable events documented.

## 2024-01-31 NOTE — PROGRESS NOTES
1413 Non reactive on arrival to PACU with spontaneous resp , LMA and O2 to LMA , HOB elevated   1425 remains non reactive   1434 eyes open , pt trying to sit up LMA removed   O2 3L NC applied   1435 awake and oriented c/o not being able to hear , pt informed of cottonballs in ear , denies any pain and drifts back to sleep   1438 Crystal to bedside to speak to pt   1445 pt denies any ear pain but states his tailbone sore , helped pt turn onto Lt side   1455 meets criteria for discharge transported to [facility] [place]

## 2024-02-01 NOTE — OP NOTE
Operative Note      Patient: Rogelio Gonzalez  YOB: 1985  MRN: [id number]    Date of Procedure: 1/31/2024    Pre-Op Diagnosis Codes:     * Dysfunction of both eustachian tubes [H69.93]    Post-Op Diagnosis:  Same and bilateral serous otitis media with secondary conductive hearing deficit       Procedure(s):  Bilateral Myringotomy Tympanostomy Tube Placement    Surgeon(s):  Watson Juarez MD    Assistant:   First Assistant: Lucila Yip APRN - CNP    Anesthesia: General    Estimated Blood Loss (mL): Minimal    Complications: None    Specimens:   * No specimens in log *    Implants:  Implant Name Type Inv. Item Serial No.  Lot No. LRB No. Used Action   TUBE VENT ID1.32MM NAM MAUREEN T MOD FOR MYR PHELPS 6PK - [id number]  TUBE VENT ID1.32MM NAM MAUREEN T MOD FOR MYR PHELPS 6PK  [employer]WD [id number] Right 1 Implanted   TUBE VENT ID1.32MM NAM MAUREEN T MOD FOR MYR PHELPS 6PK - [id number]  TUBE VENT ID1.32MM NAM MAUREEN T MOD FOR MYR PHELPS 6PK  [employer] [id number] Left 1 Implanted         Drains: * No LDAs found *    Findings: 1.  Bilateral tympanic membrane retraction with serous and mucus otitis media          Detailed Description of Procedure:   The patient was taken to the operating room awake and placed in supine position.  General anesthesia was induced and the patient was intubated with an LMA device.  A timeout was employed verifying the patient's identity and planned procedure.    Bring on the field and operating microscope and the appropriate instruments with Flores modified T tubes, I used an ear speculum and cleaned out some obstructing cerumen until I could see the patient's tympanic membrane broadly.  I made an anterior inferior myringotomy and open it widely suctioning out serous and mucoid middle ear fluid that was a very large volume.  I then obtained a modified Flores T-tube and attempted to pass it through the myringotomy at the depth of the

## 2024-02-16 DIAGNOSIS — L73.2 HIDRADENITIS SUPPURATIVA: ICD-10-CM

## 2024-02-16 RX ORDER — HYDROCODONE BITARTRATE AND ACETAMINOPHEN 5; 325 MG/1; MG/1
1 TABLET ORAL EVERY 6 HOURS PRN
Qty: 120 TABLET | Refills: 0 | Status: SHIPPED | OUTPATIENT
Start: 2024-02-16 | End: 2024-03-17

## 2024-02-16 NOTE — TELEPHONE ENCOUNTER
Rogelio Gonzalez called requesting a refill on the following medications:  Requested Prescriptions     Pending Prescriptions Disp Refills    HYDROcodone-acetaminophen (NORCO) 5-325 MG per tablet 120 tablet 0     Sig: Take 1 tablet by mouth every 6 hours as needed for Pain for up to 30 days. Max Daily Amount: 4 tablets       Date of last visit: 12/15/2023  Date of next visit (if applicable):Visit date not found  Date of last refill: 1/15/24  Pharmacy Name: [facility]

## 2024-02-16 NOTE — TELEPHONE ENCOUNTER
Ep'ed norco to pharm requested    Controlled Substance Monitoring:    Acute and Chronic Pain Monitoring:   RX Monitoring Periodic Controlled Substance Monitoring   2/16/2024   8:23 AM No signs of potential drug abuse or diversion identified.

## 2024-02-18 ENCOUNTER — APPOINTMENT (OUTPATIENT)
Dept: CT IMAGING | Age: 39
End: 2024-02-18
Payer: COMMERCIAL

## 2024-02-18 ENCOUNTER — HOSPITAL ENCOUNTER (EMERGENCY)
Age: 39
Discharge: HOME | End: 2024-02-18
Attending: FAMILY MEDICINE
Payer: COMMERCIAL

## 2024-02-18 VITALS
WEIGHT: 315 LBS | DIASTOLIC BLOOD PRESSURE: 80 MMHG | HEART RATE: 83 BPM | BODY MASS INDEX: 44.35 KG/M2 | TEMPERATURE: 97.5 F | OXYGEN SATURATION: 94 % | RESPIRATION RATE: 14 BRPM | SYSTOLIC BLOOD PRESSURE: 137 MMHG

## 2024-02-18 DIAGNOSIS — S39.012A LUMBOSACRAL STRAIN, INITIAL ENCOUNTER: ICD-10-CM

## 2024-02-18 DIAGNOSIS — M54.50 ACUTE EXACERBATION OF CHRONIC LOW BACK PAIN: Primary | ICD-10-CM

## 2024-02-18 DIAGNOSIS — G89.29 ACUTE EXACERBATION OF CHRONIC LOW BACK PAIN: Primary | ICD-10-CM

## 2024-02-18 LAB
ANION GAP SERPL CALC-SCNC: 12 MEQ/L (ref 8–16)
BASOPHILS ABSOLUTE: 0 THOU/MM3 (ref 0–0.1)
BASOPHILS NFR BLD AUTO: 1.2 %
BUN SERPL-MCNC: 8 MG/DL (ref 7–22)
CALCIUM SERPL-MCNC: 9 MG/DL (ref 8.5–10.5)
CHLORIDE SERPL-SCNC: 104 MEQ/L (ref 98–111)
CO2 SERPL-SCNC: 25 MEQ/L (ref 23–33)
CREAT SERPL-MCNC: 0.8 MG/DL (ref 0.4–1.2)
DEPRECATED RDW RBC AUTO: 42.8 FL (ref 35–45)
EOSINOPHIL NFR BLD AUTO: 2.6 %
EOSINOPHILS ABSOLUTE: 0.1 THOU/MM3 (ref 0–0.4)
ERYTHROCYTE [DISTWIDTH] IN BLOOD BY AUTOMATED COUNT: 13.9 % (ref 11.5–14.5)
GFR SERPL CREATININE-BSD FRML MDRD: > 60 ML/MIN/1.73M2
GLUCOSE SERPL-MCNC: 145 MG/DL (ref 70–108)
HCT VFR BLD AUTO: 39.8 % (ref 42–52)
HGB BLD-MCNC: 13.4 GM/DL (ref 14–18)
IMM GRANULOCYTES # BLD AUTO: 0.02 THOU/MM3 (ref 0–0.07)
IMM GRANULOCYTES NFR BLD AUTO: 0.6 %
LYMPHOCYTES ABSOLUTE: 0.8 THOU/MM3 (ref 1–4.8)
LYMPHOCYTES NFR BLD AUTO: 22 %
MCH RBC QN AUTO: 29 PG (ref 26–33)
MCHC RBC AUTO-ENTMCNC: 33.7 GM/DL (ref 32.2–35.5)
MCV RBC AUTO: 86.1 FL (ref 80–94)
MONOCYTES ABSOLUTE: 0.2 THOU/MM3 (ref 0.4–1.3)
MONOCYTES NFR BLD AUTO: 4.9 %
NEUTROPHILS NFR BLD AUTO: 68.7 %
NRBC BLD AUTO-RTO: 0 /100 WBC
OSMOLALITY SERPL CALC.SUM OF ELEC: 282.2 MOSMOL/KG (ref 275–300)
PLATELET # BLD AUTO: 98 THOU/MM3 (ref 130–400)
PMV BLD AUTO: 11 FL (ref 9.4–12.4)
POTASSIUM SERPL-SCNC: 3.9 MEQ/L (ref 3.5–5.2)
RBC # BLD AUTO: 4.62 MILL/MM3 (ref 4.7–6.1)
SCAN OF BLOOD SMEAR: NORMAL
SEGMENTED NEUTROPHILS ABSOLUTE COUNT: 2.4 THOU/MM3 (ref 1.8–7.7)
SODIUM SERPL-SCNC: 141 MEQ/L (ref 135–145)
WBC # BLD AUTO: 3.5 THOU/MM3 (ref 4.8–10.8)

## 2024-02-18 PROCEDURE — 93005 ELECTROCARDIOGRAM TRACING: CPT

## 2024-02-18 PROCEDURE — 80048 BASIC METABOLIC PNL TOTAL CA: CPT

## 2024-02-18 PROCEDURE — 93010 ELECTROCARDIOGRAM REPORT: CPT | Performed by: INTERNAL MEDICINE

## 2024-02-18 PROCEDURE — 99284 EMERGENCY DEPT VISIT MOD MDM: CPT

## 2024-02-18 PROCEDURE — 96374 THER/PROPH/DIAG INJ IV PUSH: CPT

## 2024-02-18 PROCEDURE — 72131 CT LUMBAR SPINE W/O DYE: CPT

## 2024-02-18 PROCEDURE — 36415 COLL VENOUS BLD VENIPUNCTURE: CPT

## 2024-02-18 PROCEDURE — 85025 COMPLETE CBC W/AUTO DIFF WBC: CPT

## 2024-02-18 PROCEDURE — 6360000002 HC RX W HCPCS

## 2024-02-18 PROCEDURE — 96376 TX/PRO/DX INJ SAME DRUG ADON: CPT

## 2024-02-18 PROCEDURE — 96375 TX/PRO/DX INJ NEW DRUG ADDON: CPT

## 2024-02-18 RX ORDER — KETOROLAC TROMETHAMINE 30 MG/ML
60 INJECTION, SOLUTION INTRAMUSCULAR; INTRAVENOUS ONCE
Status: COMPLETED | OUTPATIENT
Start: 2024-02-18 | End: 2024-02-18

## 2024-02-18 RX ORDER — LIDOCAINE 50 MG/G
1 PATCH TOPICAL DAILY
Qty: 10 PATCH | Refills: 0 | Status: SHIPPED | OUTPATIENT
Start: 2024-02-18 | End: 2024-02-28

## 2024-02-18 RX ORDER — HYDROCODONE BITARTRATE AND ACETAMINOPHEN 5; 325 MG/1; MG/1
2 TABLET ORAL EVERY 6 HOURS PRN
Status: DISCONTINUED | OUTPATIENT
Start: 2024-02-18 | End: 2024-02-18

## 2024-02-18 RX ORDER — CYCLOBENZAPRINE HCL 5 MG
5 TABLET ORAL 2 TIMES DAILY PRN
Qty: 6 TABLET | Refills: 0 | Status: SHIPPED | OUTPATIENT
Start: 2024-02-18 | End: 2024-02-21

## 2024-02-18 RX ADMIN — HYDROMORPHONE HYDROCHLORIDE 1 MG: 1 INJECTION, SOLUTION INTRAMUSCULAR; INTRAVENOUS; SUBCUTANEOUS at 12:15

## 2024-02-18 RX ADMIN — HYDROMORPHONE HYDROCHLORIDE 0.5 MG: 1 INJECTION, SOLUTION INTRAMUSCULAR; INTRAVENOUS; SUBCUTANEOUS at 14:40

## 2024-02-18 RX ADMIN — KETOROLAC TROMETHAMINE 60 MG: 30 INJECTION INTRAMUSCULAR; INTRAVENOUS at 14:40

## 2024-02-18 ASSESSMENT — PAIN DESCRIPTION - ORIENTATION
ORIENTATION: LOWER
ORIENTATION: LOWER

## 2024-02-18 ASSESSMENT — PAIN SCALES - GENERAL: PAINLEVEL_OUTOF10: 7

## 2024-02-18 ASSESSMENT — PAIN DESCRIPTION - DESCRIPTORS
DESCRIPTORS: ACHING
DESCRIPTORS: ACHING

## 2024-02-18 ASSESSMENT — PAIN DESCRIPTION - FREQUENCY: FREQUENCY: CONTINUOUS

## 2024-02-18 ASSESSMENT — PAIN DESCRIPTION - LOCATION
LOCATION: BACK
LOCATION: BACK

## 2024-02-18 ASSESSMENT — PAIN DESCRIPTION - PAIN TYPE: TYPE: ACUTE PAIN

## 2024-02-18 ASSESSMENT — PAIN - FUNCTIONAL ASSESSMENT: PAIN_FUNCTIONAL_ASSESSMENT: 0-10

## 2024-02-18 ASSESSMENT — PAIN DESCRIPTION - ONSET: ONSET: ON-GOING

## 2024-02-18 NOTE — DISCHARGE INSTRUCTIONS
Take Norco as prescribed by outside provider.  Use lidocaine patches and Flexeril over the next couple days for pain.  Follow-up with PCP for further evaluation.  If start experiencing numbness/tingling in thighs/groin area, inability to walk, or bowel/bladder incontinence, please return to ED for further evaluation.

## 2024-02-18 NOTE — ED PROVIDER NOTES
kg/m².     Vitals:    02/18/24 1204 02/18/24 1205 02/18/24 1319   BP:  (!) 156/90 137/80   Pulse: 81  83   Resp: 17  14   Temp: 97.5 °F (36.4 °C)     SpO2: 100%  94%   Weight: (!) 144.2 kg (318 lb)         Physical Exam  Constitutional:       Appearance: Normal appearance. He is obese. He is not diaphoretic.      Comments: Appears very uncomfortable and anxious 2/2 pain   HENT:      Head: Normocephalic.      Right Ear: External ear normal.      Left Ear: External ear normal.      Nose: Nose normal.      Mouth/Throat:      Mouth: Mucous membranes are moist.      Pharynx: Oropharynx is clear. No oropharyngeal exudate or posterior oropharyngeal erythema.   Eyes:      General: No scleral icterus.     Extraocular Movements: Extraocular movements intact.      Pupils: Pupils are equal, round, and reactive to light.   Cardiovascular:      Rate and Rhythm: Normal rate and regular rhythm.      Pulses: Normal pulses.   Pulmonary:      Effort: Pulmonary effort is normal.      Breath sounds: Normal breath sounds. No wheezing, rhonchi or rales.   Abdominal:      Palpations: Abdomen is soft. There is no mass.      Tenderness: There is no abdominal tenderness. There is no right CVA tenderness, left CVA tenderness, guarding or rebound.      Comments: Well-healed chronic midline surgical incision as well as old surgical incision left of umbilicus.   Musculoskeletal:         General: Tenderness present.      Right lower leg: No edema.      Left lower leg: No edema.      Comments: Midline lumbar spine tenderness extending down sacral region.  Unable to lift legs off the table bilaterally 2/2 pain in back.   Skin:     Capillary Refill: Capillary refill takes 2 to 3 seconds.      Findings: No bruising, lesion or rash.   Neurological:      Mental Status: He is alert and oriented to person, place, and time.      Cranial Nerves: Cranial nerve deficit present.      Sensory: No sensory deficit.      Comments: Able to wiggle toes bilaterally.

## 2024-02-21 ENCOUNTER — TELEMEDICINE (OUTPATIENT)
Dept: FAMILY MEDICINE CLINIC | Age: 39
End: 2024-02-21
Payer: COMMERCIAL

## 2024-02-21 DIAGNOSIS — M48.062 SPINAL STENOSIS OF LUMBAR REGION WITH NEUROGENIC CLAUDICATION: Primary | ICD-10-CM
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
## 2024-02-21 PROCEDURE — 99214 OFFICE O/P EST MOD 30 MIN: CPT | Performed by: FAMILY MEDICINE

## 2024-02-21 PROCEDURE — G8427 DOCREV CUR MEDS BY ELIG CLIN: HCPCS | Performed by: FAMILY MEDICINE

## 2024-02-21 PROCEDURE — G2211 COMPLEX E/M VISIT ADD ON: HCPCS | Performed by: FAMILY MEDICINE

## 2024-02-21 RX ORDER — KETOROLAC TROMETHAMINE 10 MG/1
10 TABLET, FILM COATED ORAL EVERY 6 HOURS PRN
Qty: 20 TABLET | Refills: 0 | Status: SHIPPED | OUTPATIENT
Start: 2024-02-21

## 2024-02-21 ASSESSMENT — PATIENT HEALTH QUESTIONNAIRE - PHQ9
SUM OF ALL RESPONSES TO PHQ QUESTIONS 1-9: 0
2. FEELING DOWN, DEPRESSED OR HOPELESS: 0
SUM OF ALL RESPONSES TO PHQ QUESTIONS 1-9: 0
SUM OF ALL RESPONSES TO PHQ QUESTIONS 1-9: 0
SUM OF ALL RESPONSES TO PHQ9 QUESTIONS 1 & 2: 0
1. LITTLE INTEREST OR PLEASURE IN DOING THINGS: 0
SUM OF ALL RESPONSES TO PHQ QUESTIONS 1-9: 0

## 2024-02-21 ASSESSMENT — ENCOUNTER SYMPTOMS
SORE THROAT: 0
CHEST TIGHTNESS: 0
COUGH: 0
VOMITING: 0
EYE PAIN: 0
RHINORRHEA: 0
SHORTNESS OF BREATH: 0
ABDOMINAL PAIN: 0
BLOOD IN STOOL: 0
BACK PAIN: 1
WHEEZING: 0
CONSTIPATION: 0
NAUSEA: 0
DIARRHEA: 0

## 2024-02-21 NOTE — PROGRESS NOTES
Rogelio Gonzalez, was evaluated through a synchronous (real-time) audio-video encounter. The patient (or guardian if applicable) is aware that this is a billable service, which includes applicable co-pays. This Virtual Visit was conducted with patient's (and/or legal guardian's) consent. Patient identification was verified, and a caregiver was present when appropriate.   The patient was located at Home: [address] [address]  Provider was located at Facility (Appt Dept): [address] [place], [place] [id number]      Rogelio Gonzalez (:  1985) is a Established patient, presenting virtually for evaluation of the following:    Assessment & Plan   Below is the assessment and plan developed based on review of pertinent history, physical exam, labs, studies, and medications.  1. Spinal stenosis of lumbar region with neurogenic claudication  -     ketorolac (TORADOL) 10 MG tablet; Take 1 tablet by mouth every 6 hours as needed for Pain, Disp-20 tablet, R-0Normal  -     MRI LUMBAR SPINE W WO CONTRAST; Future  2. Fall in home, subsequent encounter  -     ketorolac (TORADOL) 10 MG tablet; Take 1 tablet by mouth every 6 hours as needed for Pain, Disp-20 tablet, R-0Normal  -     MRI LUMBAR SPINE W WO CONTRAST; Future  -chronic condition, exacerbated, due to trauma, add toradol, max out norco to 6 tabs daily, get new imaging to further evaluate.    No follow-ups on file.       Subjective   Back Pain  Pertinent negatives include no abdominal pain, chest pain, fever or headaches.     Pt seen today to follow up fall at home on ice.  Felt a pop in his lower back.  Has had 3 previous back surgeries  [facility]  Went by squad to ED.  CT scan was ok with hardware.  Recommending MRI.  Having hard time ambulating.  Takes norco 4 tabs per day.  Single, former smoker, pmh reviewed.    Review of Systems   Constitutional:  Negative for chills, fatigue, fever and unexpected weight

## 2024-02-22 LAB
EKG ATRIAL RATE: 78 BPM
EKG P AXIS: 51 DEGREES
EKG P-R INTERVAL: 180 MS
EKG Q-T INTERVAL: 384 MS
EKG QRS DURATION: 100 MS
EKG QTC CALCULATION (BAZETT): 437 MS
EKG R AXIS: -40 DEGREES
EKG T AXIS: 45 DEGREES
EKG VENTRICULAR RATE: 78 BPM

## 2024-02-29 ENCOUNTER — PREP FOR PROCEDURE (OUTPATIENT)
Dept: ENT CLINIC | Age: 39
End: 2024-02-29

## 2024-03-04 DIAGNOSIS — L73.2 HIDRADENITIS SUPPURATIVA: ICD-10-CM

## 2024-03-04 RX ORDER — HYDROCODONE BITARTRATE AND ACETAMINOPHEN 5; 325 MG/1; MG/1
2 TABLET ORAL EVERY 8 HOURS PRN
Qty: 120 TABLET | Refills: 0 | Status: SHIPPED | OUTPATIENT
Start: 2024-03-04 | End: 2024-03-05 | Stop reason: SDUPTHER

## 2024-03-04 NOTE — TELEPHONE ENCOUNTER
Rogelio Gonzalez called requesting a refill on the following medications:  Requested Prescriptions     Pending Prescriptions Disp Refills    HYDROcodone-acetaminophen (NORCO) 5-325 MG per tablet 120 tablet 0     Sig: Take 1 tablet by mouth every 6 hours as needed for Pain for up to 30 days. Max Daily Amount: 4 tablets       Date of last visit: 2/21/2024  Date of next visit (if applicable):Visit date not found  Date of last refill: 2/16/24  Pharmacy Name: [facility]    Pt states that during virtual visit  on 2/21/24 he was told to increase norco to 2 every 6hrs       Thanks,  Iftikhar Sharma

## 2024-03-04 NOTE — TELEPHONE ENCOUNTER
Ep'ed norco to pharm requested    Controlled Substance Monitoring:    Acute and Chronic Pain Monitoring:   RX Monitoring Periodic Controlled Substance Monitoring   3/4/2024  12:20 PM No signs of potential drug abuse or diversion identified.

## 2024-03-05 ENCOUNTER — TELEPHONE (OUTPATIENT)
Dept: FAMILY MEDICINE CLINIC | Age: 39
End: 2024-03-05

## 2024-03-05 DIAGNOSIS — L73.2 HIDRADENITIS SUPPURATIVA: ICD-10-CM

## 2024-03-05 RX ORDER — IPRATROPIUM BROMIDE AND ALBUTEROL SULFATE 2.5; .5 MG/3ML; MG/3ML
1 SOLUTION RESPIRATORY (INHALATION) EVERY 4 HOURS PRN
Status: CANCELLED | OUTPATIENT
Start: 2024-03-05

## 2024-03-05 RX ORDER — SODIUM CHLORIDE 9 MG/ML
INJECTION, SOLUTION INTRAVENOUS PRN
Status: CANCELLED | OUTPATIENT
Start: 2024-03-05

## 2024-03-05 RX ORDER — SODIUM CHLORIDE 0.9 % (FLUSH) 0.9 %
5-40 SYRINGE (ML) INJECTION PRN
Status: CANCELLED | OUTPATIENT
Start: 2024-03-05

## 2024-03-05 RX ORDER — HYDROCODONE BITARTRATE AND ACETAMINOPHEN 5; 325 MG/1; MG/1
2 TABLET ORAL EVERY 8 HOURS PRN
Qty: 180 TABLET | Refills: 0 | Status: SHIPPED | OUTPATIENT
Start: 2024-03-05 | End: 2024-04-04

## 2024-03-05 RX ORDER — SODIUM CHLORIDE 0.9 % (FLUSH) 0.9 %
5-40 SYRINGE (ML) INJECTION EVERY 12 HOURS SCHEDULED
Status: CANCELLED | OUTPATIENT
Start: 2024-03-05

## 2024-03-05 NOTE — TELEPHONE ENCOUNTER
Shamar pharmacist from [facility] called requesting clarification on Pt [place]. Pt told him the dose was increased at his last visit to 2 tabs q6h. Please advise.      HYDROcodone-acetaminophen (NORCO) 5-325 MG per tablet Take 2 tablets by mouth every 8 hours as needed for Pain for up to 30 days. Max Daily Amount: 6 tablets Dispense: 120 tablet, Refills: 0 ordered

## 2024-03-05 NOTE — H&P
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    Hidradenitis suppurativa     Hypertension     Neuropathy     numbnes in feet leg and hands bilaterally    Obesity     Pancytopenia (HCC) 07/2021    PONV (postoperative nausea and vomiting)     Restless legs syndrome     Sinusitis     Sleep apnea     has CPAP doesn't use it    Thrombocytopenia ([facility])        Past Surgical History:   Procedure Laterality Date    ANKLE SURGERY Right 2001    BACK SURGERY      x3  2014-present    BONE MARROW BIOPSY  08/2021    CHOLECYSTECTOMY  2018    COLON SURGERY  2013    had diverticus resection    MYRINGOTOMY Bilateral 1/31/2024    Bilateral Myringotomy Tympanostomy Tube Placement performed by Watson Juarez MD at [facility] OR    NOSE SURGERY  1993    Laser Surgery    RECTAL SURGERY      Ruptured Sigmoid Colon 2013    SEPTOPLASTY Left 07/19/2017    SEPTOPLASTY SUBMUCOUS RESECT TURBINATES, PARTIAL LEFT performed by Osbaldo Aquino MD at [facility] OR    SEPTOPLASTY N/A 05/08/2019    TONSILLECTOMY, UPPP performed by Osbaldo Aquino MD at [facility] OR    SINUS ENDOSCOPY Bilateral 07/07/2021    IMAGE GUIDED NASAL SINUS ENDOSCOPY WITH BILATERAL MAXILLARY ANTROSTOMY, PARTIAL RESECTION BETITO BULLOSA BILATERAL SUPERIOR TURBINATES, SPHENOIDOTOMY, RIGHT, TOTAL ETHMOIDECTOMY ANTERIOR AND POSTERIOR, FONTAL SINUS EXPLORATION RIGHT WITHOUT REMOVAL OF TISSUE, MAXILLARY ANTROSTOMY WITH REMOVAL OF TISSUE FROM RIGHT MAXILLARY SINUS, SEPTOPLASTY, SUBMUCOUS RESECTION TURBINATES, BILATERAL PARTIAL INFERI    SMALL INTESTINE SURGERY   Problem: Adult Inpatient Plan of Care  Goal: Plan of Care Review  Outcome: Ongoing, Progressing    Safety maintained at all times. Sitter 1:1 at bedside. Call bell within reach. Bed on low position. Bed alarm on. Will continue to monitor.     WITHOUT REMOVAL OF TISSUE, MAXILLARY ANTROSTOMY WITH REMOVAL OF TISSUE FROM RIGHT MAXILLARY SINUS, SEPTOPLASTY, SUBMUCOUS RESECTION TURBINATES, BILATERAL PARTIAL INFERI    SMALL INTESTINE SURGERY             Family History         Family History   Problem Relation Age of Onset    Cancer Mother           Breast Cancer    Diabetes Father      Heart Disease Father      Other Father           Channelophathy    Heart Disease Brother      Tremors Brother      Epilepsy Brother      Seizures Brother      Heart Disease Maternal Grandmother      Heart Disease Maternal Grandfather      Diabetes Paternal Grandmother      COPD Paternal Grandmother      Diabetes Paternal Grandfather      COPD Paternal Grandfather      Heart Disease Brother           Social History            Tobacco Use    Smoking status: Every Day       Packs/day: 1.00       Years: 10.00       Additional pack years: 0.00       Total pack years: 10.00       Types: Cigarettes       Start date: 5/5/2007       Passive exposure: Current    Smokeless tobacco: Never    Tobacco comments:       0.5-1 ppd 5/5/17 last cig 5/7/19   Substance Use Topics    Alcohol use: Not Currently       Comment: rarely                    Subjective:      Review of Systems  Rest of review of systems are negative, except as noted in HPI.      Objective:      /76 (Site: Right Upper Arm, Position: Sitting)   Pulse 74   Temp 96.8 °F (36 °C) (Infrared)   Resp 16   Ht 1.803 m (5' 10.98\")   Wt (!) 148.6 kg (327 lb 9.6 oz)   SpO2 97%   BMI 45.71 kg/m²      Physical Exam         On general physical exam the patient is an ill-appearing older than stated age appearing adult male who appears plethoric and somewhat obtunded.  He is chronically mouth breathing and has a high resistance nasal airway bilaterally.  Placing a nasal speculum within his nostrils markedly improved his nasal breathing on both sides, right more so than left.  Sniffing through his nose completely collapsed both nasal

## 2024-03-05 NOTE — TELEPHONE ENCOUNTER
Quantity is wrong ( 120), increased to  # 180.  Max of 6 tabs per day.  That's max I can write.  2 tabs tid.      Controlled Substance Monitoring:    Acute and Chronic Pain Monitoring:   RX Monitoring Periodic Controlled Substance Monitoring   3/5/2024  11:51 AM No signs of potential drug abuse or diversion identified.

## 2024-03-06 DIAGNOSIS — I10 BENIGN ESSENTIAL HTN: ICD-10-CM
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
## 2024-03-06 DIAGNOSIS — M48.062 SPINAL STENOSIS OF LUMBAR REGION WITH NEUROGENIC CLAUDICATION: ICD-10-CM
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
## 2024-03-06 RX ORDER — LISINOPRIL 20 MG/1
20 TABLET ORAL DAILY
Qty: 30 TABLET | Refills: 11 | Status: CANCELLED | OUTPATIENT
Start: 2024-03-06

## 2024-03-07 ENCOUNTER — ANESTHESIA (OUTPATIENT)
Dept: OPERATING ROOM | Age: 39
End: 2024-03-07
Payer: COMMERCIAL

## 2024-03-07 ENCOUNTER — HOSPITAL ENCOUNTER (OUTPATIENT)
Age: 39
Setting detail: OBSERVATION
Discharge: HOME | End: 2024-03-08
Attending: OTOLARYNGOLOGY | Admitting: OTOLARYNGOLOGY
Payer: COMMERCIAL

## 2024-03-07 ENCOUNTER — ANESTHESIA EVENT (OUTPATIENT)
Dept: OPERATING ROOM | Age: 39
End: 2024-03-07
Payer: COMMERCIAL

## 2024-03-07 DIAGNOSIS — J34.89 NASAL OBSTRUCTION: ICD-10-CM

## 2024-03-07 DIAGNOSIS — G47.33 OSA (OBSTRUCTIVE SLEEP APNEA): ICD-10-CM

## 2024-03-07 DIAGNOSIS — H93.8X3 EAR FULLNESS, BILATERAL: ICD-10-CM

## 2024-03-07 DIAGNOSIS — J35.1 LINGUAL TONSIL HYPERTROPHY: ICD-10-CM

## 2024-03-07 DIAGNOSIS — R06.5 MOUTH BREATHING: ICD-10-CM

## 2024-03-07 DIAGNOSIS — J34.3 HYPERTROPHY OF INFERIOR NASAL TURBINATE: ICD-10-CM

## 2024-03-07 PROBLEM — G89.18 POST-OP PAIN: Status: ACTIVE | Noted: 2024-03-07

## 2024-03-07 LAB
POTASSIUM SERPL-SCNC: 4.1 MEQ/L (ref 3.5–5.2)
REASON FOR REJECTION: NORMAL
REJECTED TEST: NORMAL

## 2024-03-07 PROCEDURE — 2580000003 HC RX 258: Performed by: NURSE PRACTITIONER

## 2024-03-07 PROCEDURE — 7100000011 HC PHASE II RECOVERY - ADDTL 15 MIN: Performed by: OTOLARYNGOLOGY

## 2024-03-07 PROCEDURE — 6360000002 HC RX W HCPCS: Performed by: NURSE PRACTITIONER

## 2024-03-07 PROCEDURE — 3700000001 HC ADD 15 MINUTES (ANESTHESIA): Performed by: OTOLARYNGOLOGY

## 2024-03-07 PROCEDURE — 2580000003 HC RX 258: Performed by: OTOLARYNGOLOGY

## 2024-03-07 PROCEDURE — 2709999900 HC NON-CHARGEABLE SUPPLY: Performed by: OTOLARYNGOLOGY

## 2024-03-07 PROCEDURE — 6360000002 HC RX W HCPCS: Performed by: OTOLARYNGOLOGY

## 2024-03-07 PROCEDURE — G0378 HOSPITAL OBSERVATION PER HR: HCPCS

## 2024-03-07 PROCEDURE — 6360000002 HC RX W HCPCS: Performed by: ANESTHESIOLOGY

## 2024-03-07 PROCEDURE — 36415 COLL VENOUS BLD VENIPUNCTURE: CPT

## 2024-03-07 PROCEDURE — 2500000003 HC RX 250 WO HCPCS: Performed by: NURSE ANESTHETIST, CERTIFIED REGISTERED

## 2024-03-07 PROCEDURE — 3700000000 HC ANESTHESIA ATTENDED CARE: Performed by: OTOLARYNGOLOGY

## 2024-03-07 PROCEDURE — 3600000014 HC SURGERY LEVEL 4 ADDTL 15MIN: Performed by: OTOLARYNGOLOGY

## 2024-03-07 PROCEDURE — 88305 TISSUE EXAM BY PATHOLOGIST: CPT

## 2024-03-07 PROCEDURE — 6360000002 HC RX W HCPCS: Performed by: NURSE ANESTHETIST, CERTIFIED REGISTERED

## 2024-03-07 PROCEDURE — 6370000000 HC RX 637 (ALT 250 FOR IP): Performed by: REGISTERED NURSE

## 2024-03-07 PROCEDURE — A4216 STERILE WATER/SALINE, 10 ML: HCPCS | Performed by: OTOLARYNGOLOGY

## 2024-03-07 PROCEDURE — APPNB45 APP NON BILLABLE 31-45 MINUTES: Performed by: NURSE PRACTITIONER

## 2024-03-07 PROCEDURE — 6370000000 HC RX 637 (ALT 250 FOR IP): Performed by: NURSE PRACTITIONER

## 2024-03-07 PROCEDURE — 7100000010 HC PHASE II RECOVERY - FIRST 15 MIN: Performed by: OTOLARYNGOLOGY

## 2024-03-07 PROCEDURE — 7100000000 HC PACU RECOVERY - FIRST 15 MIN: Performed by: OTOLARYNGOLOGY

## 2024-03-07 PROCEDURE — 3600000004 HC SURGERY LEVEL 4 BASE: Performed by: OTOLARYNGOLOGY

## 2024-03-07 PROCEDURE — 84132 ASSAY OF SERUM POTASSIUM: CPT

## 2024-03-07 PROCEDURE — 7100000001 HC PACU RECOVERY - ADDTL 15 MIN: Performed by: OTOLARYNGOLOGY

## 2024-03-07 PROCEDURE — 2720000010 HC SURG SUPPLY STERILE: Performed by: OTOLARYNGOLOGY

## 2024-03-07 PROCEDURE — 88300 SURGICAL PATH GROSS: CPT

## 2024-03-07 RX ORDER — SODIUM CHLORIDE 9 MG/ML
INJECTION, SOLUTION INTRAVENOUS PRN
Status: DISCONTINUED | OUTPATIENT
Start: 2024-03-07 | End: 2024-03-07 | Stop reason: HOSPADM

## 2024-03-07 RX ORDER — SUCCINYLCHOLINE/SOD CL,ISO/PF 200MG/10ML
SYRINGE (ML) INTRAVENOUS PRN
Status: DISCONTINUED | OUTPATIENT
Start: 2024-03-07 | End: 2024-03-07 | Stop reason: SDUPTHER

## 2024-03-07 RX ORDER — SODIUM CHLORIDE 0.9 % (FLUSH) 0.9 %
5-40 SYRINGE (ML) INJECTION PRN
Status: DISCONTINUED | OUTPATIENT
Start: 2024-03-07 | End: 2024-03-07 | Stop reason: HOSPADM

## 2024-03-07 RX ORDER — FENTANYL CITRATE 50 UG/ML
INJECTION, SOLUTION INTRAMUSCULAR; INTRAVENOUS PRN
Status: DISCONTINUED | OUTPATIENT
Start: 2024-03-07 | End: 2024-03-07 | Stop reason: SDUPTHER

## 2024-03-07 RX ORDER — AMOXICILLIN AND CLAVULANATE POTASSIUM 875; 125 MG/1; MG/1
1 TABLET, FILM COATED ORAL 2 TIMES DAILY
Qty: 20 TABLET | Refills: 0 | Status: SHIPPED | OUTPATIENT
Start: 2024-03-07 | End: 2024-03-17

## 2024-03-07 RX ORDER — PANTOPRAZOLE SODIUM 40 MG/1
40 TABLET, DELAYED RELEASE ORAL
Status: DISCONTINUED | OUTPATIENT
Start: 2024-03-08 | End: 2024-03-08 | Stop reason: HOSPADM

## 2024-03-07 RX ORDER — SODIUM CHLORIDE 0.9 % (FLUSH) 0.9 %
5-40 SYRINGE (ML) INJECTION EVERY 12 HOURS SCHEDULED
Status: DISCONTINUED | OUTPATIENT
Start: 2024-03-07 | End: 2024-03-07 | Stop reason: HOSPADM

## 2024-03-07 RX ORDER — SODIUM CHLORIDE 9 MG/ML
INJECTION, SOLUTION INTRAVENOUS CONTINUOUS
Status: DISCONTINUED | OUTPATIENT
Start: 2024-03-07 | End: 2024-03-08 | Stop reason: HOSPADM

## 2024-03-07 RX ORDER — HYDRALAZINE HYDROCHLORIDE 20 MG/ML
5 INJECTION INTRAMUSCULAR; INTRAVENOUS EVERY 6 HOURS PRN
Status: DISCONTINUED | OUTPATIENT
Start: 2024-03-07 | End: 2024-03-08 | Stop reason: HOSPADM

## 2024-03-07 RX ORDER — DEXAMETHASONE SODIUM PHOSPHATE 10 MG/ML
10 INJECTION, EMULSION INTRAMUSCULAR; INTRAVENOUS ONCE
Status: COMPLETED | OUTPATIENT
Start: 2024-03-07 | End: 2024-03-07

## 2024-03-07 RX ORDER — HYDRALAZINE HYDROCHLORIDE 20 MG/ML
10 INJECTION INTRAMUSCULAR; INTRAVENOUS
Status: COMPLETED | OUTPATIENT
Start: 2024-03-07 | End: 2024-03-07

## 2024-03-07 RX ORDER — HYDROCODONE BITARTRATE AND ACETAMINOPHEN 5; 325 MG/1; MG/1
2 TABLET ORAL EVERY 6 HOURS PRN
Status: DISCONTINUED | OUTPATIENT
Start: 2024-03-07 | End: 2024-03-07

## 2024-03-07 RX ORDER — ROCURONIUM BROMIDE 10 MG/ML
INJECTION, SOLUTION INTRAVENOUS PRN
Status: DISCONTINUED | OUTPATIENT
Start: 2024-03-07 | End: 2024-03-07 | Stop reason: SDUPTHER

## 2024-03-07 RX ORDER — ACETAMINOPHEN 325 MG/1
650 TABLET ORAL EVERY 4 HOURS PRN
Status: DISCONTINUED | OUTPATIENT
Start: 2024-03-07 | End: 2024-03-08 | Stop reason: HOSPADM

## 2024-03-07 RX ORDER — HYDROCODONE BITARTRATE AND ACETAMINOPHEN 5; 325 MG/1; MG/1
1 TABLET ORAL ONCE
Status: DISCONTINUED | OUTPATIENT
Start: 2024-03-07 | End: 2024-03-07

## 2024-03-07 RX ORDER — HYDROCODONE BITARTRATE AND ACETAMINOPHEN 5; 325 MG/1; MG/1
1 TABLET ORAL EVERY 6 HOURS PRN
Status: DISCONTINUED | OUTPATIENT
Start: 2024-03-07 | End: 2024-03-07

## 2024-03-07 RX ORDER — NALOXONE HYDROCHLORIDE 0.4 MG/ML
INJECTION, SOLUTION INTRAMUSCULAR; INTRAVENOUS; SUBCUTANEOUS PRN
Status: DISCONTINUED | OUTPATIENT
Start: 2024-03-07 | End: 2024-03-07

## 2024-03-07 RX ORDER — HYDROCODONE BITARTRATE AND ACETAMINOPHEN 5; 217 MG/10ML; MG/10ML
10 SOLUTION ORAL EVERY 6 HOURS PRN
Status: DISCONTINUED | OUTPATIENT
Start: 2024-03-07 | End: 2024-03-08 | Stop reason: HOSPADM

## 2024-03-07 RX ORDER — HYDROMORPHONE HYDROCHLORIDE 2 MG/ML
INJECTION, SOLUTION INTRAMUSCULAR; INTRAVENOUS; SUBCUTANEOUS PRN
Status: DISCONTINUED | OUTPATIENT
Start: 2024-03-07 | End: 2024-03-07 | Stop reason: SDUPTHER

## 2024-03-07 RX ORDER — ONDANSETRON 4 MG/1
4 TABLET, ORALLY DISINTEGRATING ORAL EVERY 8 HOURS PRN
Status: DISCONTINUED | OUTPATIENT
Start: 2024-03-07 | End: 2024-03-08 | Stop reason: HOSPADM

## 2024-03-07 RX ORDER — ONDANSETRON 2 MG/ML
4 INJECTION INTRAMUSCULAR; INTRAVENOUS
Status: DISCONTINUED | OUTPATIENT
Start: 2024-03-07 | End: 2024-03-07 | Stop reason: HOSPADM

## 2024-03-07 RX ORDER — OXYMETAZOLINE HYDROCHLORIDE 0.05 G/100ML
3 SPRAY NASAL EVERY 6 HOURS
Status: DISCONTINUED | OUTPATIENT
Start: 2024-03-07 | End: 2024-03-08 | Stop reason: HOSPADM

## 2024-03-07 RX ORDER — SODIUM CHLORIDE 0.9 % (FLUSH) 0.9 %
5-40 SYRINGE (ML) INJECTION PRN
Status: DISCONTINUED | OUTPATIENT
Start: 2024-03-07 | End: 2024-03-08 | Stop reason: HOSPADM

## 2024-03-07 RX ORDER — TIZANIDINE 4 MG/1
8 TABLET ORAL EVERY 8 HOURS PRN
Status: DISCONTINUED | OUTPATIENT
Start: 2024-03-07 | End: 2024-03-08 | Stop reason: HOSPADM

## 2024-03-07 RX ORDER — PROPOFOL 10 MG/ML
INJECTION, EMULSION INTRAVENOUS PRN
Status: DISCONTINUED | OUTPATIENT
Start: 2024-03-07 | End: 2024-03-07 | Stop reason: SDUPTHER

## 2024-03-07 RX ORDER — EPINEPHRINE 1 MG/ML
INJECTION, SOLUTION, CONCENTRATE INTRAVENOUS PRN
Status: DISCONTINUED | OUTPATIENT
Start: 2024-03-07 | End: 2024-03-07 | Stop reason: ALTCHOICE

## 2024-03-07 RX ORDER — POLYETHYLENE GLYCOL 3350 17 G/17G
17 POWDER, FOR SOLUTION ORAL DAILY PRN
Status: DISCONTINUED | OUTPATIENT
Start: 2024-03-07 | End: 2024-03-08 | Stop reason: HOSPADM

## 2024-03-07 RX ORDER — LIDOCAINE HCL/PF 100 MG/5ML
SYRINGE (ML) INJECTION PRN
Status: DISCONTINUED | OUTPATIENT
Start: 2024-03-07 | End: 2024-03-07 | Stop reason: SDUPTHER

## 2024-03-07 RX ORDER — OXYMETAZOLINE HYDROCHLORIDE 0.05 G/100ML
1 SPRAY NASAL 3 TIMES DAILY PRN
Status: DISCONTINUED | OUTPATIENT
Start: 2024-03-07 | End: 2024-03-08 | Stop reason: HOSPADM

## 2024-03-07 RX ORDER — ONDANSETRON 2 MG/ML
INJECTION INTRAMUSCULAR; INTRAVENOUS PRN
Status: DISCONTINUED | OUTPATIENT
Start: 2024-03-07 | End: 2024-03-07 | Stop reason: SDUPTHER

## 2024-03-07 RX ORDER — SODIUM CHLORIDE 9 MG/ML
INJECTION, SOLUTION INTRAMUSCULAR; INTRAVENOUS; SUBCUTANEOUS PRN
Status: DISCONTINUED | OUTPATIENT
Start: 2024-03-07 | End: 2024-03-07 | Stop reason: ALTCHOICE

## 2024-03-07 RX ORDER — SODIUM CHLORIDE 0.9 % (FLUSH) 0.9 %
5-40 SYRINGE (ML) INJECTION EVERY 12 HOURS SCHEDULED
Status: DISCONTINUED | OUTPATIENT
Start: 2024-03-07 | End: 2024-03-08 | Stop reason: HOSPADM

## 2024-03-07 RX ORDER — HYDROCODONE BITARTRATE AND ACETAMINOPHEN 5; 217 MG/10ML; MG/10ML
5 SOLUTION ORAL EVERY 6 HOURS PRN
Status: DISCONTINUED | OUTPATIENT
Start: 2024-03-07 | End: 2024-03-08 | Stop reason: HOSPADM

## 2024-03-07 RX ORDER — HYDROXYZINE HCL 10 MG/5 ML
10 SOLUTION, ORAL ORAL 3 TIMES DAILY
Status: DISCONTINUED | OUTPATIENT
Start: 2024-03-07 | End: 2024-03-08 | Stop reason: HOSPADM

## 2024-03-07 RX ORDER — SODIUM CHLORIDE 9 MG/ML
INJECTION, SOLUTION INTRAVENOUS PRN
Status: DISCONTINUED | OUTPATIENT
Start: 2024-03-07 | End: 2024-03-08 | Stop reason: HOSPADM

## 2024-03-07 RX ORDER — MINERAL OIL, WHITE PETROLATUM .03; .94 G/G; G/G
OINTMENT OPHTHALMIC PRN
Status: DISCONTINUED | OUTPATIENT
Start: 2024-03-07 | End: 2024-03-07 | Stop reason: SDUPTHER

## 2024-03-07 RX ORDER — MIDAZOLAM HYDROCHLORIDE 1 MG/ML
INJECTION INTRAMUSCULAR; INTRAVENOUS PRN
Status: DISCONTINUED | OUTPATIENT
Start: 2024-03-07 | End: 2024-03-07 | Stop reason: SDUPTHER

## 2024-03-07 RX ORDER — IPRATROPIUM BROMIDE AND ALBUTEROL SULFATE 2.5; .5 MG/3ML; MG/3ML
1 SOLUTION RESPIRATORY (INHALATION) EVERY 4 HOURS PRN
Status: DISCONTINUED | OUTPATIENT
Start: 2024-03-07 | End: 2024-03-07 | Stop reason: HOSPADM

## 2024-03-07 RX ORDER — HYDRALAZINE HYDROCHLORIDE 20 MG/ML
5 INJECTION INTRAMUSCULAR; INTRAVENOUS ONCE
Status: COMPLETED | OUTPATIENT
Start: 2024-03-07 | End: 2024-03-07

## 2024-03-07 RX ORDER — ONDANSETRON 2 MG/ML
4 INJECTION INTRAMUSCULAR; INTRAVENOUS EVERY 6 HOURS PRN
Status: DISCONTINUED | OUTPATIENT
Start: 2024-03-07 | End: 2024-03-08 | Stop reason: HOSPADM

## 2024-03-07 RX ORDER — TRANEXAMIC ACID 100 MG/ML
INJECTION, SOLUTION INTRAVENOUS PRN
Status: DISCONTINUED | OUTPATIENT
Start: 2024-03-07 | End: 2024-03-07 | Stop reason: SDUPTHER

## 2024-03-07 RX ORDER — METOPROLOL TARTRATE 1 MG/ML
INJECTION, SOLUTION INTRAVENOUS PRN
Status: DISCONTINUED | OUTPATIENT
Start: 2024-03-07 | End: 2024-03-07 | Stop reason: SDUPTHER

## 2024-03-07 RX ORDER — LABETALOL HYDROCHLORIDE 5 MG/ML
10 INJECTION, SOLUTION INTRAVENOUS
Status: COMPLETED | OUTPATIENT
Start: 2024-03-07 | End: 2024-03-07

## 2024-03-07 RX ADMIN — HYDROMORPHONE HYDROCHLORIDE 0.5 MG: 1 INJECTION, SOLUTION INTRAMUSCULAR; INTRAVENOUS; SUBCUTANEOUS at 21:33

## 2024-03-07 RX ADMIN — SODIUM CHLORIDE: 9 INJECTION, SOLUTION INTRAVENOUS at 11:00

## 2024-03-07 RX ADMIN — ROCURONIUM BROMIDE 10 MG: 10 INJECTION INTRAVENOUS at 10:59

## 2024-03-07 RX ADMIN — MUPIROCIN: 20 OINTMENT TOPICAL at 20:26

## 2024-03-07 RX ADMIN — Medication 100 MG: at 10:45

## 2024-03-07 RX ADMIN — MINERAL OIL, WHITE PETROLATUM 1 MCG: .03; .94 OINTMENT OPHTHALMIC at 11:07

## 2024-03-07 RX ADMIN — ROCURONIUM BROMIDE 50 MG: 10 INJECTION INTRAVENOUS at 12:52

## 2024-03-07 RX ADMIN — HYDROMORPHONE HYDROCHLORIDE 0.5 MG: 2 INJECTION INTRAMUSCULAR; INTRAVENOUS; SUBCUTANEOUS at 15:06

## 2024-03-07 RX ADMIN — FENTANYL CITRATE 100 MCG: 50 INJECTION INTRAMUSCULAR; INTRAVENOUS at 11:21

## 2024-03-07 RX ADMIN — ROCURONIUM BROMIDE 40 MG: 10 INJECTION INTRAVENOUS at 11:08

## 2024-03-07 RX ADMIN — PIPERACILLIN AND TAZOBACTAM 3375 MG: 3; .375 INJECTION, POWDER, LYOPHILIZED, FOR SOLUTION INTRAVENOUS at 20:26

## 2024-03-07 RX ADMIN — ONDANSETRON 4 MG: 2 INJECTION INTRAMUSCULAR; INTRAVENOUS at 21:55

## 2024-03-07 RX ADMIN — HYDROCODONE BITARTRATE AND ACETAMINOPHEN 10 ML: 5; 217 SOLUTION ORAL at 23:25

## 2024-03-07 RX ADMIN — SODIUM CHLORIDE: 9 INJECTION, SOLUTION INTRAVENOUS at 22:03

## 2024-03-07 RX ADMIN — FENTANYL CITRATE 100 MCG: 50 INJECTION INTRAMUSCULAR; INTRAVENOUS at 10:59

## 2024-03-07 RX ADMIN — HYDROMORPHONE HYDROCHLORIDE 0.5 MG: 1 INJECTION, SOLUTION INTRAMUSCULAR; INTRAVENOUS; SUBCUTANEOUS at 17:30

## 2024-03-07 RX ADMIN — SODIUM CHLORIDE: 9 INJECTION, SOLUTION INTRAVENOUS at 08:10

## 2024-03-07 RX ADMIN — DEXAMETHASONE SODIUM PHOSPHATE 10 MG: 10 INJECTION, EMULSION INTRAMUSCULAR; INTRAVENOUS at 08:17

## 2024-03-07 RX ADMIN — PROPOFOL 280 MG: 10 INJECTION, EMULSION INTRAVENOUS at 10:45

## 2024-03-07 RX ADMIN — METOPROLOL TARTRATE 2.5 MG: 5 INJECTION INTRAVENOUS at 13:31

## 2024-03-07 RX ADMIN — OXYMETAZOLINE HCL 3 SPRAY: 0.05 SPRAY NASAL at 21:57

## 2024-03-07 RX ADMIN — MIDAZOLAM 2 MG: 1 INJECTION INTRAMUSCULAR; INTRAVENOUS at 10:59

## 2024-03-07 RX ADMIN — HYDROMORPHONE HYDROCHLORIDE 0.6 MG: 2 INJECTION INTRAMUSCULAR; INTRAVENOUS; SUBCUTANEOUS at 11:36

## 2024-03-07 RX ADMIN — Medication 10 MG: at 20:26

## 2024-03-07 RX ADMIN — LABETALOL HYDROCHLORIDE 10 MG: 5 INJECTION, SOLUTION INTRAVENOUS at 15:11

## 2024-03-07 RX ADMIN — LABETALOL HYDROCHLORIDE 10 MG: 5 INJECTION, SOLUTION INTRAVENOUS at 15:24

## 2024-03-07 RX ADMIN — HYDRALAZINE HYDROCHLORIDE 5 MG: 20 INJECTION INTRAMUSCULAR; INTRAVENOUS at 15:29

## 2024-03-07 RX ADMIN — ONDANSETRON 4 MG: 2 INJECTION INTRAMUSCULAR; INTRAVENOUS at 14:31

## 2024-03-07 RX ADMIN — METOPROLOL TARTRATE 2.5 MG: 5 INJECTION INTRAVENOUS at 13:05

## 2024-03-07 RX ADMIN — OXYMETAZOLINE HYDROCHLORIDE 1 SPRAY: 0.05 SPRAY NASAL at 20:19

## 2024-03-07 RX ADMIN — PIPERACILLIN SODIUM AND TAZOBACTAM SODIUM 3375 MG: 3; .375 INJECTION, POWDER, LYOPHILIZED, FOR SOLUTION INTRAVENOUS at 10:59

## 2024-03-07 RX ADMIN — PROPOFOL 120 MG: 10 INJECTION, EMULSION INTRAVENOUS at 10:59

## 2024-03-07 RX ADMIN — HYDROMORPHONE HYDROCHLORIDE 0.4 MG: 2 INJECTION INTRAMUSCULAR; INTRAVENOUS; SUBCUTANEOUS at 12:35

## 2024-03-07 RX ADMIN — ROCURONIUM BROMIDE 50 MG: 10 INJECTION INTRAVENOUS at 11:30

## 2024-03-07 RX ADMIN — HYDROMORPHONE HYDROCHLORIDE 0.5 MG: 1 INJECTION, SOLUTION INTRAMUSCULAR; INTRAVENOUS; SUBCUTANEOUS at 18:15

## 2024-03-07 RX ADMIN — TRANEXAMIC ACID 1000 MG: 1 INJECTION, SOLUTION INTRAVENOUS at 14:27

## 2024-03-07 RX ADMIN — Medication 180 MG: at 10:59

## 2024-03-07 RX ADMIN — SUGAMMADEX 300 MG: 100 INJECTION, SOLUTION INTRAVENOUS at 14:44

## 2024-03-07 RX ADMIN — FENTANYL CITRATE 50 MCG: 50 INJECTION INTRAMUSCULAR; INTRAVENOUS at 11:28

## 2024-03-07 RX ADMIN — HYDROMORPHONE HYDROCHLORIDE 0.5 MG: 2 INJECTION INTRAMUSCULAR; INTRAVENOUS; SUBCUTANEOUS at 15:01

## 2024-03-07 ASSESSMENT — PAIN SCALES - GENERAL
PAINLEVEL_OUTOF10: 7
PAINLEVEL_OUTOF10: 7
PAINLEVEL_OUTOF10: 5
PAINLEVEL_OUTOF10: 9
PAINLEVEL_OUTOF10: 10
PAINLEVEL_OUTOF10: 7
PAINLEVEL_OUTOF10: 9
PAINLEVEL_OUTOF10: 8

## 2024-03-07 ASSESSMENT — PAIN DESCRIPTION - ONSET
ONSET: ON-GOING

## 2024-03-07 ASSESSMENT — PAIN DESCRIPTION - LOCATION
LOCATION: FACE

## 2024-03-07 ASSESSMENT — PAIN DESCRIPTION - PAIN TYPE
TYPE: SURGICAL PAIN

## 2024-03-07 ASSESSMENT — PAIN - FUNCTIONAL ASSESSMENT
PAIN_FUNCTIONAL_ASSESSMENT: ACTIVITIES ARE NOT PREVENTED
PAIN_FUNCTIONAL_ASSESSMENT: PREVENTS OR INTERFERES SOME ACTIVE ACTIVITIES AND ADLS
PAIN_FUNCTIONAL_ASSESSMENT: ACTIVITIES ARE NOT PREVENTED

## 2024-03-07 ASSESSMENT — PAIN DESCRIPTION - DESCRIPTORS
DESCRIPTORS: ACHING;DULL;DISCOMFORT
DESCRIPTORS: SHARP
DESCRIPTORS: SHARP

## 2024-03-07 ASSESSMENT — PAIN DESCRIPTION - FREQUENCY
FREQUENCY: CONTINUOUS

## 2024-03-07 ASSESSMENT — PAIN DESCRIPTION - ORIENTATION
ORIENTATION: MID
ORIENTATION: MID

## 2024-03-07 NOTE — ANESTHESIA POSTPROCEDURE EVALUATION
Department of Anesthesiology  Postprocedure Note    Patient: Rogelio Gonzalez  MRN: [id number]  YOB: 1985  Date of evaluation: 3/7/2024    Procedure Summary       Date: 03/07/24 Room / Location: [facility] OR [address] [place] [address] OR    Anesthesia Start: 1023 Anesthesia Stop: 1503    Procedures:       Revision Septoplasty Bilateral Inferior Turbinate Reduction Middle Turbinate Reduction Nasal Valve Repair, Resection of Left Lip Mole (Bilateral: Nose)      Laryngoscopy Diagnostic (Throat)      DRUG INDUCED SLEEP ENDOSCOPY (Nose) Diagnosis:       GERHARD (obstructive sleep apnea)      Ear fullness, bilateral      Mouth breathing      Nasal obstruction      Hypertrophy of inferior nasal turbinate      Lingual tonsil hypertrophy      (GERHARD (obstructive sleep apnea) [G47.33])      (Ear fullness, bilateral [H93.8X3])      (Mouth breathing [R06.5])      (Nasal obstruction [J34.89])      (Hypertrophy of inferior nasal turbinate [J34.3])      (Lingual tonsil hypertrophy [J35.1])    Surgeons: Watson Juarez MD Responsible Provider: Tavo Loya MD    Anesthesia Type: general ASA Status: 3            Anesthesia Type: No value filed.    Daisy Phase I: Daisy Score: 6    Daisy Phase II:      Anesthesia Post Evaluation    Patient location during evaluation: PACU  Patient participation: complete - patient participated  Level of consciousness: awake  Airway patency: patent  Nausea & Vomiting: no nausea  Cardiovascular status: hemodynamically stable and hypertensive  Respiratory status: acceptable and face mask  Hydration status: stable  Pain management: adequate    No notable events documented.

## 2024-03-07 NOTE — PROGRESS NOTES
1459- pt to pacu. BP elevated. Pt impulsive, pt with frequent movement. Pt reports pain, denies nausea. Pt appears agitated.     1511- pt with brief periods of restlessness. Reports feeling of unable to breathe. BP elevated. Labetalol given    1524- pt continues with frequent impulsive movements, frequent yelling out. Reports can't breath. Face tent remains in place for oxygenation assist. Oxygen saturation 98% labetalol given     1529- hydralazine given for BP.     1537- Dr Juarez at bedside. Request pt not get pain meds to wake up, help with breathing.     1546- Pt remains restless at times. Shakes legs. Moans. VSS. Pt agreeable to keep oxygen mask in place.    1549- Dr WHITING at bedside, asks PACU to remove oxygen to see if pt can maintain.    1556- pt did desat to 88% recovered on own to 94%.encouraged to breathe calmly, not moan, yell out.    1610- pt meets criteria for discharge from pacu.     1615- pt returned to [facility] in stable condition. Family at bedside. Report given to Marshall

## 2024-03-07 NOTE — OP NOTE
Operative Note      Patient: Rogelio Gonzalez  YOB: 1985  MRN: [id number]    Date of Procedure: 3/7/2024    Pre-Op Diagnosis Codes:     * GERHARD (obstructive sleep apnea) [G47.33]     * Ear fullness, bilateral [H93.8X3]     * Mouth breathing [R06.5]     * Nasal obstruction [J34.89]     * Hypertrophy of inferior nasal turbinate [J34.3]     * Lingual tonsil hypertrophy [J35.1]    Post-Op Diagnosis: Same       Procedure(s):  Revision Septoplasty Bilateral Inferior Turbinate Reduction Middle Turbinate Reduction Nasal Valve Repair, Resection of Left Lip Mole  Laryngoscopy Diagnostic  DRUG INDUCED SLEEP ENDOSCOPY    Surgeon(s):  Watson Juarez MD    Assistant:   First Assistant: Lucila Yip APRN - CNP    Anesthesia: General    Estimated Blood Loss (mL): less than 100     Complications: None    Specimens:   ID Type Source Tests Collected by Time Destination   A : LEFT INFERIOR TURBINATE Tissue Nose SURGICAL PATHOLOGY Watson Juarez MD 3/7/2024 1310    B : RIGHT INFERIOR TURBINATE Tissue Nose SURGICAL PATHOLOGY Watson Juarez MD 3/7/2024 1318    C : RIGHT MIDDLE TURBINATE Tissue Nose SURGICAL PATHOLOGY Watson Juarez MD 3/7/2024 1334    D : RIGHT LATERAL NOSTRIL EXCESS SKIN Tissue Nose SURGICAL PATHOLOGY Watson Juarez MD 3/7/2024 1352    E : LEFT LATERAL NOSTRIL EXCESS SKIN Tissue Nose SURGICAL PATHOLOGY Watson Juarez MD 3/7/2024 1353    F : SEPTAL CONTENTS- GROSS ONLY Tissue Nose SURGICAL PATHOLOGY Watson Juarez MD 3/7/2024 1427    G : LEFT LIP MOLE Tissue Lip SURGICAL PATHOLOGY Watson Juarez MD 3/7/2024 1435        Implants:  * No implants in log *      Drains: * No LDAs found *    Findings: 1.  Signs of severe obstructive sleep apnea with post UPPP tonsillectomy soft tissue collapse of the nasopharynx and tongue base subsumed being of supraglottis with complete obstruction of multiple occasions.  2.  The palatal collapse was entirely anterior posterior with no  tissues.  I suction with bloody effluent and rinse the nasal septal space again.  I placed Gonzalez splints coated with Bactroban and to staff antibiotic on either side of the septum and attached to the columella with a through and through 2-0 Prolene suture and FS cutting needle in a horizontal mattress configuration in the usual manner.  I placed Bactroban in the nostrils well with a Q-tip and consider the operation concluded.  I suction with bloody effluent from the nasal airway in the pharynx and placed an oral airway before turning the patient back to anesthesia for reversal next patient in the operating room.  This was carried out without incident and the patient was taken to recovery in satisfactory condition.     Estimated blood loss in the case was approximately 160 cc and the patient received approximately a liter and half of intravenous lactated Ringer's intraoperatively.  No blood products were transfused.  No intraoperative complications were detected.  Counts were considered accurate x 3.     I was present for and participated in the patient's entire operation.          Electronically signed by Watson Juarez MD on 3/7/2024 at 3:57 PM

## 2024-03-07 NOTE — ANESTHESIA PRE PROCEDURE
2/29/2024 9/1/23   Elkin Ramirez MD   Cholecalciferol (VITAMIN D3) 125 MCG (5000 UT) TABS Take by mouth  Patient not taking: Reported on 2/29/2024    Provider, MD Pako   acetaminophen (TYLENOL) 500 MG tablet Take 2 tablets by mouth every 6 hours as needed for Pain    Provider, MD Pako       Current medications:    Current Facility-Administered Medications   Medication Dose Route Frequency Provider Last Rate Last Admin    piperacillin-tazobactam (ZOSYN) 3,375 mg in sodium chloride 0.9 % 50 mL IVPB (mini-bag)  3,375 mg IntraVENous Once DunifonLucila L, APRN - CNP        sodium chloride flush 0.9 % injection 5-40 mL  5-40 mL IntraVENous 2 times per day Dunifon Crystal L, APRN - CNP        sodium chloride flush 0.9 % injection 5-40 mL  5-40 mL IntraVENous PRN Dunifon Crystal L, APRN - CNP        0.9 % sodium chloride infusion   IntraVENous PRN DunifonLucila L, APRN - CNP 50 mL/hr at 03/07/24 0810 New Bag at 03/07/24 0810    ipratropium 0.5 mg-albuterol 2.5 mg (DUONEB) nebulizer solution 1 Dose  1 Dose Inhalation Q4H PRN RobertifonLucila L, APRN - CNP           Allergies:  No Known Allergies    Problem List:    Patient Active Problem List   Diagnosis Code    Nasal septal deviation J34.2    Hypertrophy of inferior nasal turbinate, left J34.3    GERHARD on CPAP G47.33    Intolerance of continuous positive airway pressure (CPAP) ventilation Z78.9    Hypertrophy of uvula K13.79    Hypertrophic soft palate K13.79    Large tongue K14.8    Class 3 obesity with alveolar hypoventilation, serious comorbidity, and body mass index (BMI) of 40.0 to 44.9 in adult ([facility]) E66.2, Z68.41    Status post nasal septoplasty Z98.890    GERD without esophagitis K21.9    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

## 2024-03-07 NOTE — DISCHARGE INSTRUCTIONS
Instructions specifically for  from Dr. Watson Juarez:  1.  Rest; sleep propped up or in an easy chair/recliner for the first 2-3 nights  2.  Apply antibiotic ointment to the nostrils with a Q-tip three times daily  3.  Do not rub or blow your nose until after the splints have been removed  4.  Change your nasal drip pad twice a day and as needed for saturation; you can stop placing a drip pad once your nose no longer has bloody discharge  5.  For the first 2 to 3 days you may need to change the drip pad several times per day and at short intervals even once an hour  6.  If your bleeding becomes profuse, lean forward and allowed to flow out of your nose and come to the emergency department to be evaluated.  Have me or one of my colleagues contacted to examine you.  It is possible you will need a nasal cautery procedure.  Fortunately, this is very unlikely.  7.  Start trying to use your PAP machine in 2-3 days sitting upright in an \"easy\"/reclining chair.  8.  You were recently prescribed higher dose [place] by your primary care.  This is also what we use for post op pain and cannot double prescribe this.  If you are needing the Norco more often than prescribed, please make ENT and your primary care aware.     Utilize Afrin nasal spray 3 sprays to each nare every 6 hours for the next 3 days.    For emergencies:  Watson Juarez MD  Cell: [phone]

## 2024-03-07 NOTE — PROGRESS NOTES
1630: pt states pain is 12/10 but refuses pain pill due to tongue hurting and feeling like he can't swallow  1640: pt and family request to be admitted for pain control.   1645: Dr Juarez notified of pt and pt family concerns. New orders placed.   1655: family   1708: Report called to [facility]A   1724: pt states he is in extreme pain and BP is elevated. Dr jules notified of hypertension. New orders given.   1730: medicated with .5mg dilaudid for pain. Pt requests urinal, voided 750ml.   1737: pt states pain has decreased since medication and feels less anxious.   1744: pt transported to 6A15 in stable condition.

## 2024-03-07 NOTE — PROGRESS NOTES
Patient admitted to [facility] room 5 with girlfriend at bedside. Bed in low position side rails up call light in reach. Patient denies questions at this time.

## 2024-03-08 ENCOUNTER — TELEPHONE (OUTPATIENT)
Dept: FAMILY MEDICINE CLINIC | Age: 39
End: 2024-03-08

## 2024-03-08 VITALS
HEART RATE: 78 BPM | WEIGHT: 314.6 LBS | RESPIRATION RATE: 16 BRPM | SYSTOLIC BLOOD PRESSURE: 165 MMHG | DIASTOLIC BLOOD PRESSURE: 92 MMHG | HEIGHT: 71 IN | TEMPERATURE: 97.6 F | BODY MASS INDEX: 44.04 KG/M2 | OXYGEN SATURATION: 94 %

## 2024-03-08 PROCEDURE — 2580000003 HC RX 258: Performed by: NURSE PRACTITIONER

## 2024-03-08 PROCEDURE — 96376 TX/PRO/DX INJ SAME DRUG ADON: CPT

## 2024-03-08 PROCEDURE — 96374 THER/PROPH/DIAG INJ IV PUSH: CPT

## 2024-03-08 PROCEDURE — 99024 POSTOP FOLLOW-UP VISIT: CPT | Performed by: REGISTERED NURSE

## 2024-03-08 PROCEDURE — 6360000002 HC RX W HCPCS: Performed by: REGISTERED NURSE

## 2024-03-08 PROCEDURE — 6360000002 HC RX W HCPCS: Performed by: NURSE PRACTITIONER

## 2024-03-08 PROCEDURE — 6370000000 HC RX 637 (ALT 250 FOR IP): Performed by: NURSE PRACTITIONER

## 2024-03-08 PROCEDURE — G0378 HOSPITAL OBSERVATION PER HR: HCPCS

## 2024-03-08 PROCEDURE — 96375 TX/PRO/DX INJ NEW DRUG ADDON: CPT

## 2024-03-08 RX ORDER — KETOROLAC TROMETHAMINE 10 MG/1
10 TABLET, FILM COATED ORAL EVERY 6 HOURS PRN
Qty: 20 TABLET | Refills: 0 | Status: SHIPPED | OUTPATIENT
Start: 2024-03-08

## 2024-03-08 RX ADMIN — PIPERACILLIN AND TAZOBACTAM 3375 MG: 3; .375 INJECTION, POWDER, LYOPHILIZED, FOR SOLUTION INTRAVENOUS at 04:47

## 2024-03-08 RX ADMIN — SODIUM CHLORIDE: 9 INJECTION, SOLUTION INTRAVENOUS at 08:02

## 2024-03-08 RX ADMIN — OXYMETAZOLINE HCL 3 SPRAY: 0.05 SPRAY NASAL at 04:48

## 2024-03-08 RX ADMIN — OXYMETAZOLINE HCL 3 SPRAY: 0.05 SPRAY NASAL at 10:23

## 2024-03-08 RX ADMIN — HYDROMORPHONE HYDROCHLORIDE 0.5 MG: 1 INJECTION, SOLUTION INTRAMUSCULAR; INTRAVENOUS; SUBCUTANEOUS at 00:43

## 2024-03-08 RX ADMIN — HYDRALAZINE HYDROCHLORIDE 5 MG: 20 INJECTION, SOLUTION INTRAMUSCULAR; INTRAVENOUS at 00:00

## 2024-03-08 RX ADMIN — HYDROMORPHONE HYDROCHLORIDE 0.5 MG: 1 INJECTION, SOLUTION INTRAMUSCULAR; INTRAVENOUS; SUBCUTANEOUS at 04:09

## 2024-03-08 RX ADMIN — HYDROMORPHONE HYDROCHLORIDE 0.5 MG: 1 INJECTION, SOLUTION INTRAMUSCULAR; INTRAVENOUS; SUBCUTANEOUS at 07:37

## 2024-03-08 RX ADMIN — Medication 10 MG: at 10:24

## 2024-03-08 RX ADMIN — MUPIROCIN: 20 OINTMENT TOPICAL at 07:44

## 2024-03-08 ASSESSMENT — PAIN DESCRIPTION - LOCATION
LOCATION: NOSE
LOCATION: NOSE

## 2024-03-08 ASSESSMENT — PAIN SCALES - GENERAL
PAINLEVEL_OUTOF10: 3
PAINLEVEL_OUTOF10: 7
PAINLEVEL_OUTOF10: 7
PAINLEVEL_OUTOF10: 5
PAINLEVEL_OUTOF10: 8
PAINLEVEL_OUTOF10: 7
PAINLEVEL_OUTOF10: 5

## 2024-03-08 ASSESSMENT — PAIN DESCRIPTION - PAIN TYPE: TYPE: SURGICAL PAIN

## 2024-03-08 ASSESSMENT — PAIN DESCRIPTION - DESCRIPTORS
DESCRIPTORS: SHARP
DESCRIPTORS: ACHING

## 2024-03-08 ASSESSMENT — PAIN DESCRIPTION - ORIENTATION: ORIENTATION: MID

## 2024-03-08 ASSESSMENT — PAIN - FUNCTIONAL ASSESSMENT
PAIN_FUNCTIONAL_ASSESSMENT: ACTIVITIES ARE NOT PREVENTED
PAIN_FUNCTIONAL_ASSESSMENT: ACTIVITIES ARE NOT PREVENTED

## 2024-03-08 NOTE — DISCHARGE INSTR - DIET
Good nutrition is important when healing from an illness, injury, or surgery.  Follow any nutrition recommendations given to you during your hospital stay.   If you were given an oral nutrition supplement while in the hospital, continue to take this supplement at home.  You can take it with meals, in-between meals, and/or before bedtime. These supplements can be purchased at most local grocery stores, pharmacies, and chain [employer]-stores.   If you have any questions about your diet or nutrition, call the hospital and ask for the dietitian.  Diet as tolerated, advance as tolerated

## 2024-03-08 NOTE — TELEPHONE ENCOUNTER
Norco approved, informed pharmacy.     Rogelio Gonzalez called requesting a refill on the following medications:  Requested Prescriptions     Pending Prescriptions Disp Refills    ketorolac (TORADOL) 10 MG tablet 20 tablet 0     Sig: Take 1 tablet by mouth every 6 hours as needed for Pain       Date of last visit: 2/21/2024  Date of next visit (if applicable):Visit date not found  Date of last refill: 2/21/2024  Pharmacy Name: Felipa Jones, [facility]

## 2024-03-08 NOTE — PLAN OF CARE
Problem: Discharge Planning  Goal: Discharge to home or other facility with appropriate resources  Outcome: Progressing  Flowsheets (Taken 3/7/2024 2016)  Discharge to home or other facility with appropriate resources:   Identify barriers to discharge with patient and caregiver   Arrange for needed discharge resources and transportation as appropriate   Identify discharge learning needs (meds, wound care, etc)     Problem: Pain  Goal: Verbalizes/displays adequate comfort level or baseline comfort level  Outcome: Progressing  Flowsheets (Taken 3/7/2024 2016)  Verbalizes/displays adequate comfort level or baseline comfort level:   Encourage patient to monitor pain and request assistance   Assess pain using appropriate pain scale   Administer analgesics based on type and severity of pain and evaluate response   Implement non-pharmacological measures as appropriate and evaluate response   Consider cultural and social influences on pain and pain management     Problem: Safety - Adult  Goal: Free from fall injury  Outcome: Progressing  Flowsheets (Taken 3/7/2024 2316)  Free From Fall Injury:   Instruct family/caregiver on patient safety   Based on caregiver fall risk screen, instruct family/caregiver to ask for assistance with transferring infant if caregiver noted to have fall risk factors     Problem: Respiratory - Adult  Goal: Achieves optimal ventilation and oxygenation  Outcome: Progressing  Flowsheets (Taken 3/7/2024 2316)  Achieves optimal ventilation and oxygenation:   Assess for changes in respiratory status   Assess for changes in mentation and behavior   Position to facilitate oxygenation and minimize respiratory effort   Oxygen supplementation based on oxygen saturation or arterial blood gases   Encourage broncho-pulmonary hygiene including cough, deep breathe, incentive spirometry   Assess the need for suctioning and aspirate as needed   Assess and instruct to report shortness of breath or any respiratory  difficulty     Problem: Skin/Tissue Integrity - Adult  Goal: Incisions, wounds, or drain sites healing without S/S of infection  Outcome: Progressing  Flowsheets (Taken 3/7/2024 2316)  Incisions, Wounds, or Drain Sites Healing Without Sign and Symptoms of Infection: TWICE DAILY: Assess and document dressing/incision, wound bed, drain sites and surrounding tissue     Problem: Hematologic - Adult  Goal: Maintains hematologic stability  Outcome: Progressing  Flowsheets (Taken 3/7/2024 2316)  Maintains hematologic stability:   Assess for signs and symptoms of bleeding or hemorrhage   Monitor labs for bleeding or clotting disorders   Administer blood products/factors as ordered     Care plan reviewed with patient and wife.  Patient and wife verbalize understanding of the plan of care and contribute to goal setting.

## 2024-03-08 NOTE — FLOWSHEET NOTE
2137- Pt has a large clot in each nostril anterior to hanson splints   with large amount of drainage. Use of nasal dressing/sling/hammock for drainage, changed 5 times from 5691-4127. Pt c/o feeling full in the back of his throat, SOB, anxious. Communication with Earnestine Stallings on call for Dr Juarez with orders of Afrin 3 sprays q6 hrs, and hydralazine q6 hours for high blood pressures of 170's systolic added, Norco changed to Lortab.

## 2024-03-08 NOTE — PROGRESS NOTES
DISCHARGE INSTRUCTIONS GIVEN WITH PATIENT AND SIGNIFICANT OTHER VERALIZING THE UNDERSTANDING OF. PATIENT DISCHARGED TO HOME

## 2024-03-08 NOTE — CARE COORDINATION
DISCHARGE PLANNING EVALUATION: OP/OBSERVATION        3/8/24, 7:41 AM EST    Rogelio Gonzalez       Admitted from: [facility] 3/7/2024/ 0703   Location: [facility] Reason for admit: GERHARD (obstructive sleep apnea) [G47.33]  Ear fullness, bilateral [H93.8X3]  Mouth breathing [R06.5]  Nasal obstruction [J34.89]  Hypertrophy of inferior nasal turbinate [J34.3]  Lingual tonsil hypertrophy [J35.1]  Post-op pain [G89.18]     Procedure:   3/7 OR with Dr. Juarez: Revision Septoplasty Bilateral Inferior Turbinate Reduction Middle Turbinate Reduction Nasal Valve Repair, Resection of Left Lip Mole. Laryngoscopy Diagnostic.    Pertinent Info/Orders/Treatment Plan:  Here for elective procedure. POD #1. Pt with large amount of drainage and having pain. Complains of feeling full in the back of his throat, SOB and anxious. Afrin spray ordered. BP elevated (high of 188/97), started hydralazine q6hr.     PCP: Elkin Ramirez MD    Patient Goals/Plan/Treatment Preferences: Spoke with pt and S.O. They live at home together in an apartment. Denies any DME or HH needs.     Transportation/Food Security/Housekeeping Addressed:  No issues identified.     If patient is discharged prior to next notation, then this note serves as note for discharge by case management.

## 2024-03-08 NOTE — DISCHARGE SUMMARY
DISCHARGE SUMMARY    Pt Name: Rogelio Gonzalez  MRN: [id number]  YOB: 1985  Primary Care Physician: Elkin Ramirez MD    Admit date:  3/7/2024  7:03 AM  Discharge date:  3/8/24  Disposition: Home  Admitting Diagnosis:   1. GERHARD (obstructive sleep apnea)    2. Ear fullness, bilateral    3. Mouth breathing    4. Nasal obstruction    5. Hypertrophy of inferior nasal turbinate    6. Lingual tonsil hypertrophy      Discharge Diagnosis:   Patient Active Problem List   Diagnosis Code    Nasal septal deviation J34.2    Hypertrophy of inferior nasal turbinate, left J34.3    GERHARD on CPAP G47.33    Intolerance of continuous positive airway pressure (CPAP) ventilation Z78.9    Hypertrophy of uvula K13.79    Hypertrophic soft palate K13.79    Large tongue K14.8    Class 3 obesity with alveolar hypoventilation, serious comorbidity, and body mass index (BMI) of 40.0 to 44.9 in adult ([facility]) E66.2, Z68.41    Status post nasal septoplasty Z98.890    GERD without esophagitis K21.9    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    Maxillary sinus polyp J33.8    Chronic sphenoidal sinusitis J32.3    Chronic maxillary sinusitis J32.0    Chronic frontal sinusitis J32.1    Chronic ethmoidal sinusitis J32.2    Postoperative state Z98.890    Pancytopenia ([facility]) D61.818    History of cholecystectomy Z90.49    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    Axillary hidradenitis suppurativa L73.2    Displacement of lumbar intervertebral disc M51.26    Lumbar spinal stenosis M48.061    Degeneration of lumbosacral intervertebral disc M51.37    Lumbosacral spondylosis without myelopathy M47.817    Thrombocytopenia ([facility]) D69.6    Obstructive sleep apnea G47.33    Ear fullness, bilateral H93.8X3    Mouth breathing R06.5    Nasal obstruction J34.89    Eustachian tube dysfunction, bilateral H69.93    Lingual tonsil hypertrophy J35.1    Acquired laryngomalacia in adult J38.7    Bilateral chronic serous otitis media H65.23    Conductive hearing loss, bilateral H90.0

## 2024-03-09 PROBLEM — J34.89 NASAL VALVE BLOCKAGE: Status: ACTIVE | Noted: 2024-03-09

## 2024-03-09 PROBLEM — G47.33 OSA (OBSTRUCTIVE SLEEP APNEA): Status: ACTIVE | Noted: 2024-03-09

## 2024-03-11 NOTE — TELEPHONE ENCOUNTER
Needs to call ENT about the antibiotic.      Benzos are not recommended since on daily pain pills.  Stacking of scheduled drugs can lead to side effects and complications.  How much buspar is he taking?

## 2024-03-11 NOTE — TELEPHONE ENCOUNTER
Spoke to Hien bradford was on speaker. She says pt can can not swallow the amoxicillin she is having to cut it in small pieces. They are asking for a liquid to be called in. She also claims pt is having sever anxiety attacks and she has been giving him her xanax & yes she is aware she is not supposed to do that. They said the buspar is also not working.    Pt has upcoming appt on 03/13/24.

## 2024-03-11 NOTE — TELEPHONE ENCOUNTER
Care Transitions Initial Follow Up Call    Outreach made within 2 business days of discharge: Yes    Patient: Rogelio Gonzalez Patient : 1985   MRN: [id number]  Reason for Admission: There are no discharge diagnoses documented for the most recent discharge.  Discharge Date: 3/8/24       Spoke with: Hien     Discharge department/facility: [facility]    TCM Interactive Patient Contact:  Was patient able to fill all prescriptions: Yes  Was patient instructed to bring all medications to the follow-up visit: NO  Is patient taking all medications as directed in the discharge summary? Yes  Does patient understand their discharge instructions: Yes  Does patient have questions or concerns that need addressed prior to 7-14 day follow up office visit: no    Scheduled appointment with PCP within 7-14 days    Spoke to Hien pt was on speaker. She says pt can can not swallow the amoxicillin she is having to cut it in small pieces. They are asking for a liquid to be called in. She also claims pt is having sever anxiety attacks and she has been giving him her xanax & yes she is aware she is not supposed to do that. They said the buspar is also not working.    Follow Up  Future Appointments   Date Time Provider Department Center   3/13/2024  2:00 PM Elkin Ramirez MD [facility]   3/18/2024  1:45 PM Watson Juarez MD N E[facility] [place]   2024  3:45 PM [facility]

## 2024-03-11 NOTE — TELEPHONE ENCOUNTER
Care Transitions Initial Follow Up Call    Outreach made within 2 business days of discharge: Yes    Patient: Rogelio Gonzalez Patient : 1985   MRN: [id number]  Reason for Admission: There are no discharge diagnoses documented for the most recent discharge.  Discharge Date: 3/8/24       Spoke with: ELPIDIO    Discharge department/facility: [facility]        Scheduled appointment with PCP within 7-14 days    Follow Up  Future Appointments   Date Time Provider Department Center   3/13/2024  2:00 PM [facility]   3/18/2024  1:45 PM Watson Juarez [facility]   2024  3:45 PM Watson Juarez MD [facility]       Zuleyma Cruz [facility]

## 2024-03-13 ENCOUNTER — OFFICE VISIT (OUTPATIENT)
Dept: FAMILY MEDICINE CLINIC | Age: 39
End: 2024-03-13
Payer: COMMERCIAL

## 2024-03-13 VITALS
WEIGHT: 315 LBS | RESPIRATION RATE: 24 BRPM | HEART RATE: 96 BPM | BODY MASS INDEX: 43.96 KG/M2 | SYSTOLIC BLOOD PRESSURE: 128 MMHG | TEMPERATURE: 97.8 F | OXYGEN SATURATION: 98 % | DIASTOLIC BLOOD PRESSURE: 88 MMHG

## 2024-03-13 DIAGNOSIS — F41.0 PANIC ATTACKS: ICD-10-CM

## 2024-03-13 DIAGNOSIS — Z09 HOSPITAL DISCHARGE FOLLOW-UP: ICD-10-CM

## 2024-03-13 DIAGNOSIS — Z98.890 S/P NASAL SEPTOPLASTY: Primary | ICD-10-CM

## 2024-03-13 PROBLEM — M43.16 SPONDYLOLISTHESIS, LUMBAR REGION: Status: ACTIVE | Noted: 2024-03-13

## 2024-03-13 PROCEDURE — 1111F DSCHRG MED/CURRENT MED MERGE: CPT | Performed by: FAMILY MEDICINE

## 2024-03-13 PROCEDURE — 99214 OFFICE O/P EST MOD 30 MIN: CPT | Performed by: FAMILY MEDICINE

## 2024-03-13 RX ORDER — ALPRAZOLAM 0.5 MG/1
0.5 TABLET ORAL 3 TIMES DAILY PRN
Qty: 30 TABLET | Refills: 0 | Status: SHIPPED | OUTPATIENT
Start: 2024-03-13 | End: 2024-04-12

## 2024-03-13 ASSESSMENT — ENCOUNTER SYMPTOMS
BACK PAIN: 0
NAUSEA: 0
SHORTNESS OF BREATH: 1
EYE PAIN: 0
CHEST TIGHTNESS: 0
COUGH: 0
BLOOD IN STOOL: 0
VOMITING: 0
DIARRHEA: 0
CONSTIPATION: 0
RHINORRHEA: 0
WHEEZING: 0
ABDOMINAL PAIN: 0
SORE THROAT: 0

## 2024-03-13 NOTE — PROGRESS NOTES
He is well-developed.   HENT:      Head: Normocephalic and atraumatic.      Right Ear: External ear normal.      Left Ear: External ear normal.      Nose: Nose normal.      Mouth/Throat:      Mouth: Mucous membranes are moist.   Eyes:      Pupils: Pupils are equal, round, and reactive to light.   Neck:      Thyroid: No thyromegaly.   Cardiovascular:      Rate and Rhythm: Normal rate and regular rhythm.      Heart sounds: Normal heart sounds.   Pulmonary:      Breath sounds: Normal breath sounds. No wheezing or rales.   Abdominal:      General: Bowel sounds are normal.      Palpations: Abdomen is soft.      Tenderness: There is no abdominal tenderness. There is no guarding or rebound.   Musculoskeletal:         General: Normal range of motion.      Cervical back: Neck supple.   Lymphadenopathy:      Cervical: No cervical adenopathy.   Skin:     General: Skin is warm and dry.      Findings: No rash.   Neurological:      Mental Status: He is alert and oriented to person, place, and time.      Cranial Nerves: No cranial nerve deficit.      Deep Tendon Reflexes: Reflexes are normal and symmetric.         An electronic signature was used to authenticate this note.  --Elkin Ramirez MD

## 2024-03-18 ENCOUNTER — OFFICE VISIT (OUTPATIENT)
Dept: ENT CLINIC | Age: 39
End: 2024-03-18
Payer: COMMERCIAL

## 2024-03-18 VITALS
RESPIRATION RATE: 20 BRPM | BODY MASS INDEX: 44.1 KG/M2 | HEIGHT: 71 IN | DIASTOLIC BLOOD PRESSURE: 74 MMHG | OXYGEN SATURATION: 96 % | TEMPERATURE: 96.4 F | WEIGHT: 315 LBS | SYSTOLIC BLOOD PRESSURE: 138 MMHG | HEART RATE: 94 BPM

## 2024-03-18 DIAGNOSIS — J34.3 HYPERTROPHY OF INFERIOR NASAL TURBINATE: ICD-10-CM

## 2024-03-18 DIAGNOSIS — J34.89 NASAL OBSTRUCTION: ICD-10-CM

## 2024-03-18 DIAGNOSIS — G47.33 OBSTRUCTIVE SLEEP APNEA: Primary | ICD-10-CM

## 2024-03-18 DIAGNOSIS — J35.9: ICD-10-CM

## 2024-03-18 DIAGNOSIS — J38.7 ACQUIRED LARYNGOMALACIA IN ADULT: ICD-10-CM

## 2024-03-18 DIAGNOSIS — J35.1 LINGUAL TONSIL HYPERTROPHY: ICD-10-CM

## 2024-03-18 DIAGNOSIS — R06.5 MOUTH BREATHING: ICD-10-CM

## 2024-03-18 DIAGNOSIS — Z78.9 INTOLERANCE OF CONTINUOUS POSITIVE AIRWAY PRESSURE (CPAP) VENTILATION: ICD-10-CM

## 2024-03-18 PROCEDURE — 99024 POSTOP FOLLOW-UP VISIT: CPT | Performed by: OTOLARYNGOLOGY

## 2024-03-18 PROCEDURE — 31237 NSL/SINS NDSC SURG BX POLYPC: CPT | Performed by: OTOLARYNGOLOGY

## 2024-03-18 NOTE — PROGRESS NOTES
[facility] [place] [facility]  [facility] EAR, NOSE AND THROAT  [address]  [address]  [facility] [address]  Dept: [phone]  Dept Fax: [phone]  Loc: [phone]    Rogelio Gonzalez is a 39 y.o. male who was referred by No ref. provider found for:  Chief Complaint   Patient presents with    Post-Op Check     Post op.   DISE, turbs, nasal valve, laryngoscopy 3/7.  Patient said he is doing ok.   He said his nasal passages feel a little dry.   Blood clots still coming out of left side.  He thinks he has thrush from antibiotic use.  He has no sense of taste.       HPI:     Rogelio Gonzalez is a 39 y.o. male status post nasal septal reconstruction and turbinate reduction with bilateral graft implants to his nasal valves.  The patient reports having had problems with postoperative bleeding in the hospital and continued for several days even once he was discharged.  He states that now it is mostly old looking blood with the occasional new blood coming out and he is continue to keep a drip pad underneath his nostrils.  He has been using Afrin for what sounds like at least twice per day in each nostril to try to continue to breathe through his nose and to hold the bleeding down but that still constitutes a very large amount of decongestant in healing tissues which was very concerning.  He has already had a UPPP tonsillectomy.     History:     No Known Allergies  Current Outpatient Medications   Medication Sig Dispense Refill    ALPRAZolam (XANAX) 0.5 MG tablet Take 1 tablet by mouth 3 times daily as needed for Anxiety for up to 30 days. Max Daily Amount: 1.5 mg 30 tablet 0    ketorolac (TORADOL) 10 MG tablet Take 1 tablet by mouth every 6 hours as needed for Pain 20 tablet 0    HYDROcodone-acetaminophen (NORCO) 5-325 MG per tablet Take 2 tablets by mouth every 8 hours as needed for Pain for up to 30 days. Max Daily Amount: 6 tablets 180 tablet 0    fluticasone (FLONASE) 50 MCG/ACT nasal spray  needs device

## 2024-04-01 ENCOUNTER — TELEPHONE (OUTPATIENT)
Dept: ENT CLINIC | Age: 39
End: 2024-04-01

## 2024-04-01 DIAGNOSIS — F41.0 PANIC ATTACKS: ICD-10-CM

## 2024-04-01 DIAGNOSIS — L73.2 HIDRADENITIS SUPPURATIVA: ICD-10-CM

## 2024-04-01 RX ORDER — HYDROCODONE BITARTRATE AND ACETAMINOPHEN 5; 325 MG/1; MG/1
2 TABLET ORAL EVERY 8 HOURS PRN
Qty: 180 TABLET | Refills: 0 | Status: SHIPPED | OUTPATIENT
Start: 2024-04-05 | End: 2024-05-05

## 2024-04-01 RX ORDER — ALPRAZOLAM 0.5 MG/1
0.5 TABLET ORAL 3 TIMES DAILY PRN
Qty: 60 TABLET | Refills: 0 | Status: SHIPPED | OUTPATIENT
Start: 2024-04-01 | End: 2024-04-02 | Stop reason: SDUPTHER

## 2024-04-01 NOTE — TELEPHONE ENCOUNTER
Patient returned my call. I offered patient the 04/08/24 appointment. Patient declined the appointment due to the Eclipse happening that day. Informed patient I would have to look for another appointment.  Patient verbalized understanding and thanked me.

## 2024-04-01 NOTE — TELEPHONE ENCOUNTER
Patient called in this morning to cancel his post op appointment for today due to being sick. Informed him I will need to look over Dr Juarez schedule to find an appointment for him. Told him I would call him back when I find a spot.  Patient verbalized understanding and thanked me.     I have an appointment on hold for the patient on 04/08/24 at 3:00 pm.     Attempted to call patient. Got voicemail, left message to call the office.

## 2024-04-01 NOTE — TELEPHONE ENCOUNTER
Ep'ed xanax and norco to pharm requested      Controlled Substance Monitoring:    Acute and Chronic Pain Monitoring:   RX Monitoring Periodic Controlled Substance Monitoring   4/1/2024  12:37 PM No signs of potential drug abuse or diversion identified.

## 2024-04-01 NOTE — TELEPHONE ENCOUNTER
I had another appointment come open on Wednesday, 4/03/24 at 10:00 am. Appointment placed on hold.    Attempted to call patient. Got voicemail, left message to call the office.

## 2024-04-01 NOTE — TELEPHONE ENCOUNTER
Rogelio Gonzalez called requesting a refill on the following medications:  Requested Prescriptions     Pending Prescriptions Disp Refills    ALPRAZolam (XANAX) 0.5 MG tablet 30 tablet 0     Sig: Take 1 tablet by mouth 3 times daily as needed for Anxiety for up to 30 days. Max Daily Amount: 1.5 mg    HYDROcodone-acetaminophen (NORCO) 5-325 MG per tablet 180 tablet 0     Sig: Take 2 tablets by mouth every 8 hours as needed for Pain for up to 30 days. Max Daily Amount: 6 tablets       Date of last visit: 3/13/2024 [facility]    Date of next visit: date not found    Date of last refill: 3/5/24    Pharmacy Name: [facility]    Pt ran out of Xanax this past weekend; has #4 left of [place].    Pls call pt at  only if probs.    Zip: [phone]    Doses confirmed by pt as stated above.    Thanks,  Josefina Flores

## 2024-04-02 ENCOUNTER — TELEPHONE (OUTPATIENT)
Dept: FAMILY MEDICINE CLINIC | Age: 39
End: 2024-04-02

## 2024-04-02 DIAGNOSIS — F41.0 PANIC ATTACKS: ICD-10-CM

## 2024-04-02 RX ORDER — ALPRAZOLAM 0.5 MG/1
0.5 TABLET ORAL 3 TIMES DAILY PRN
Qty: 75 TABLET | Refills: 0 | Status: SHIPPED | OUTPATIENT
Start: 2024-04-02 | End: 2024-05-02

## 2024-04-02 NOTE — TELEPHONE ENCOUNTER
Ep'ed xanax #75 ( 30 day supply) to pharm requested    Controlled Substance Monitoring:    Acute and Chronic Pain Monitoring:   RX Monitoring Periodic Controlled Substance Monitoring   4/2/2024  11:51 AM No signs of potential drug abuse or diversion identified.

## 2024-04-02 NOTE — TELEPHONE ENCOUNTER
Pt called, [facility] pharmacy will not fill xanax until 04/10/2024 because of the way the prescription is written.  Verbal per ILIANA, okay to fill early.  I called [facility] and spoke with Shamar, ananth early fill. He asked if prescription is for 20 or 30 days.   Please send new prescription to [facility] in [facility]

## 2024-04-02 NOTE — TELEPHONE ENCOUNTER
Patient returned my call. Appointment scheduled.  Patient verbalized understanding and thanked me.

## 2024-04-03 ENCOUNTER — OFFICE VISIT (OUTPATIENT)
Dept: ENT CLINIC | Age: 39
End: 2024-04-03

## 2024-04-03 VITALS
HEART RATE: 90 BPM | TEMPERATURE: 97.8 F | DIASTOLIC BLOOD PRESSURE: 90 MMHG | OXYGEN SATURATION: 95 % | WEIGHT: 315 LBS | SYSTOLIC BLOOD PRESSURE: 140 MMHG | HEIGHT: 71 IN | BODY MASS INDEX: 44.1 KG/M2

## 2024-04-03 DIAGNOSIS — T14.8XXA WOUND INFECTION: ICD-10-CM

## 2024-04-03 DIAGNOSIS — Z78.9 INTOLERANCE OF CONTINUOUS POSITIVE AIRWAY PRESSURE (CPAP) VENTILATION: Primary | ICD-10-CM

## 2024-04-03 DIAGNOSIS — J34.89 NASAL OBSTRUCTION: ICD-10-CM

## 2024-04-03 DIAGNOSIS — L08.9 WOUND INFECTION: ICD-10-CM

## 2024-04-03 DIAGNOSIS — G47.33 OBSTRUCTIVE SLEEP APNEA: ICD-10-CM

## 2024-04-03 PROCEDURE — 99024 POSTOP FOLLOW-UP VISIT: CPT | Performed by: OTOLARYNGOLOGY

## 2024-04-03 RX ORDER — AMOXICILLIN AND CLAVULANATE POTASSIUM 562.5; 437.5; 62.5 MG/1; MG/1; MG/1
1 TABLET, MULTILAYER, EXTENDED RELEASE ORAL 2 TIMES DAILY
Qty: 20 TABLET | Refills: 0 | Status: SHIPPED | OUTPATIENT
Start: 2024-04-03 | End: 2024-04-13

## 2024-04-03 NOTE — PROGRESS NOTES
[facility] [place] [facility]  [facility] EAR, NOSE AND THROAT  [address]  [address]  [facility] [address]  Dept: [phone]  Dept Fax: [phone]  Loc: [phone]    Rogelio Gonzalez is a 39 y.o. male who was referred by No ref. provider found for:  Chief Complaint   Patient presents with    Post-Op Check     Post op check. DISE, septo, turbs, nasal valve, Laryngoscopy 3/7, (90 global). Pt states he is still getting mucus when he does sinus flushes, sometimes feels like water going through ears. Pt also states tip of tongue hurts, stings and burns.       HPI:     Rogelio Gonzalez is a 39 y.o. male status post nasal septal reconstruction turbinate reduction and nasal valve surgery.  The patient is gone back to smoking and has been back to using marijuana.  Starting couple weeks ago he was developing soreness in his right nostril that has now become facial soreness.  He cannot breathe nearly as well through the right sinus to the left.  He has had trouble irrigating that side as well.  He denies fever and chills.  His next phase of care is lingual tonsillectomy and epiglottic pexy but he states that he is \"not ready for another operation\"     History:     No Known Allergies  Current Outpatient Medications   Medication Sig Dispense Refill    amoxicillin-clavulanate (AUGMENTIN XR) 1000-62.5 MG per extended release tablet Take 1 tablet by mouth 2 times daily for 10 days With meals 20 tablet 0    mupirocin (BACTROBAN) 2 % ointment Apply topically 3 times daily to right nostril with Q-tip. 1 each 0    ALPRAZolam (XANAX) 0.5 MG tablet Take 1 tablet by mouth 3 times daily as needed for Anxiety for up to 30 days. Max Daily Amount: 1.5 mg 75 tablet 0    [START ON 4/5/2024] HYDROcodone-acetaminophen (NORCO) 5-325 MG per tablet Take 2 tablets by mouth every 8 hours as needed for Pain for up to 30 days. Max Daily Amount: 6 tablets 180 tablet 0    ketorolac (TORADOL) 10 MG tablet Take 1 tablet by

## 2024-04-23 ENCOUNTER — OFFICE VISIT (OUTPATIENT)
Dept: ENT CLINIC | Age: 39
End: 2024-04-23

## 2024-04-23 VITALS
DIASTOLIC BLOOD PRESSURE: 86 MMHG | WEIGHT: 315 LBS | SYSTOLIC BLOOD PRESSURE: 138 MMHG | RESPIRATION RATE: 18 BRPM | TEMPERATURE: 97.4 F | BODY MASS INDEX: 44.1 KG/M2 | HEART RATE: 73 BPM | OXYGEN SATURATION: 95 % | HEIGHT: 71 IN

## 2024-04-23 DIAGNOSIS — G47.33 OBSTRUCTIVE SLEEP APNEA: ICD-10-CM

## 2024-04-23 DIAGNOSIS — J35.9: ICD-10-CM

## 2024-04-23 DIAGNOSIS — J34.89 NASAL OBSTRUCTION: ICD-10-CM

## 2024-04-23 DIAGNOSIS — J38.7 ACQUIRED LARYNGOMALACIA IN ADULT: ICD-10-CM

## 2024-04-23 DIAGNOSIS — Z78.9 INTOLERANCE OF CONTINUOUS POSITIVE AIRWAY PRESSURE (CPAP) VENTILATION: Primary | ICD-10-CM

## 2024-04-23 DIAGNOSIS — Q38.2 MACROGLOSSIA: ICD-10-CM

## 2024-04-23 DIAGNOSIS — Z01.818 PRE-OP TESTING: Primary | ICD-10-CM

## 2024-04-23 DIAGNOSIS — J35.1 LINGUAL TONSIL HYPERTROPHY: ICD-10-CM

## 2024-04-23 PROCEDURE — 99024 POSTOP FOLLOW-UP VISIT: CPT | Performed by: OTOLARYNGOLOGY

## 2024-04-23 NOTE — PROGRESS NOTES
Nasal obstruction        4. Lingual tonsil hypertrophy        5. 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 Pharyngeal tonsil        7. Macroglossia            The findings were explained and his questions were answered.  There is based on his history and his physical exam tissues apparently he has not been excellent result from his nasal and pharyngeal surgery.  The next step is clear and is to pursue the lingual tonsils and his acquired adult laryngomalacia with a transoral robotic assisted lingual tonsillectomy supraglottic laryngoplasty with epiglottic pexy and removal of any residual distal pharyngeal lymphoid tissue to widen the rest of the laryngal pharyngeal tube.  Because of the narrowness of his oral cavity oropharynx and the relative macroglossia that he has, he has a high chance of needing a midline mandibulotomy.  I spent a great deal time discussing this with him and with his wife to their satisfaction.  They reported understanding the basis of my recommendations and being willing to proceed as recommended.              Return in about 6 weeks (around 6/4/2024) for Post robotic surgery follow-up.           **This report has been created using voice recognition software. It may contain minor errors which are inherent in voice recognition technology.**

## 2024-05-06 DIAGNOSIS — L73.2 HIDRADENITIS SUPPURATIVA: ICD-10-CM

## 2024-05-06 DIAGNOSIS — F41.0 PANIC ATTACKS: ICD-10-CM

## 2024-05-06 RX ORDER — ALPRAZOLAM 0.5 MG/1
0.5 TABLET ORAL 3 TIMES DAILY PRN
Qty: 75 TABLET | Refills: 0 | Status: SHIPPED | OUTPATIENT
Start: 2024-05-06 | End: 2024-06-05

## 2024-05-06 RX ORDER — HYDROCODONE BITARTRATE AND ACETAMINOPHEN 5; 325 MG/1; MG/1
2 TABLET ORAL EVERY 8 HOURS PRN
Qty: 180 TABLET | Refills: 0 | Status: SHIPPED | OUTPATIENT
Start: 2024-05-06 | End: 2024-06-05

## 2024-05-06 NOTE — TELEPHONE ENCOUNTER
Ep'ed requested meds to pharm requested      Controlled Substance Monitoring:    Acute and Chronic Pain Monitoring:   RX Monitoring Periodic Controlled Substance Monitoring   5/6/2024   1:45 PM No signs of potential drug abuse or diversion identified.

## 2024-05-06 NOTE — TELEPHONE ENCOUNTER
Rogelio Gonzalez called requesting a refill on the following medications:  Requested Prescriptions     Pending Prescriptions Disp Refills    HYDROcodone-acetaminophen (NORCO) 5-325 MG per tablet 180 tablet 0     Sig: Take 2 tablets by mouth every 8 hours as needed for Pain for up to 30 days. Max Daily Amount: 6 tablets    ALPRAZolam (XANAX) 0.5 MG tablet 75 tablet 0     Sig: Take 1 tablet by mouth 3 times daily as needed for Anxiety for up to 30 days. Max Daily Amount: 1.5 mg       Date of last visit: 3/13/2024  Date of next visit (if applicable):Visit date not found  Date of last refill: 4/2/24  [facility]

## 2024-05-17 ENCOUNTER — TELEPHONE (OUTPATIENT)
Dept: ENT CLINIC | Age: 39
End: 2024-05-17

## 2024-05-17 NOTE — TELEPHONE ENCOUNTER
Patient called to r/s surgery.  He said he thought he was scheduled for 7/18 and his wife is going to be having her kidney removed on 07/12.  I advised that I would have Daisy call to r/s next week.

## 2024-05-21 NOTE — TELEPHONE ENCOUNTER
I was able to speak with Michael. He would like to wait until the end of October so he has been rescheduled for 10/24/24.

## 2024-06-10 DIAGNOSIS — L73.2 HIDRADENITIS SUPPURATIVA: ICD-10-CM

## 2024-06-10 RX ORDER — HYDROCODONE BITARTRATE AND ACETAMINOPHEN 5; 325 MG/1; MG/1
2 TABLET ORAL EVERY 8 HOURS PRN
Qty: 180 TABLET | Refills: 0 | Status: SHIPPED | OUTPATIENT
Start: 2024-06-10 | End: 2024-07-10

## 2024-06-10 NOTE — TELEPHONE ENCOUNTER
Ep'ed norco to pharm requested    Controlled Substance Monitoring:    Acute and Chronic Pain Monitoring:   RX Monitoring Periodic Controlled Substance Monitoring   6/10/2024   9:37 AM No signs of potential drug abuse or diversion identified.

## 2024-06-10 NOTE — TELEPHONE ENCOUNTER
Rogelio Gonzalez called requesting a refill on the following medications:  Requested Prescriptions     Pending Prescriptions Disp Refills    HYDROcodone-acetaminophen (NORCO) 5-325 MG per tablet 180 tablet 0     Sig: Take 2 tablets by mouth every 8 hours as needed for Pain for up to 30 days. Max Daily Amount: 6 tablets       Date of last visit: 3/13/2024  Date of next visit (if applicable):Visit date not found  Date of last refill: 5/6/24  [facility]

## 2024-06-13 DIAGNOSIS — F41.0 PANIC ATTACKS: ICD-10-CM

## 2024-06-13 RX ORDER — ALPRAZOLAM 0.5 MG/1
0.5 TABLET ORAL 3 TIMES DAILY PRN
Qty: 75 TABLET | Refills: 0 | Status: SHIPPED | OUTPATIENT
Start: 2024-06-13 | End: 2024-07-13

## 2024-06-13 NOTE — TELEPHONE ENCOUNTER
Rogelio Gonzalez called requesting a refill on the following medications:  Requested Prescriptions     Pending Prescriptions Disp Refills    ALPRAZolam (XANAX) 0.5 MG tablet 75 tablet 0     Sig: Take 1 tablet by mouth 3 times daily as needed for Anxiety for up to 30 days. Max Daily Amount: 1.5 mg       Date of last visit: 3/13/2024  Date of next visit (if applicable):Visit date not found  Date of last refill: 5/6/24  [facility]

## 2024-06-13 NOTE — TELEPHONE ENCOUNTER
Ep'ed xanax to pharm requested    Controlled Substance Monitoring:    Acute and Chronic Pain Monitoring:   RX Monitoring Periodic Controlled Substance Monitoring   6/13/2024   9:24 AM No signs of potential drug abuse or diversion identified.

## 2024-07-15 DIAGNOSIS — L73.2 HIDRADENITIS SUPPURATIVA: ICD-10-CM

## 2024-07-15 DIAGNOSIS — F41.0 PANIC ATTACKS: ICD-10-CM

## 2024-07-15 RX ORDER — HYDROCODONE BITARTRATE AND ACETAMINOPHEN 5; 325 MG/1; MG/1
2 TABLET ORAL EVERY 8 HOURS PRN
Qty: 180 TABLET | Refills: 0 | Status: SHIPPED | OUTPATIENT
Start: 2024-07-15 | End: 2024-08-14

## 2024-07-15 RX ORDER — ALPRAZOLAM 0.5 MG/1
0.5 TABLET ORAL 3 TIMES DAILY PRN
Qty: 75 TABLET | Refills: 0 | Status: SHIPPED | OUTPATIENT
Start: 2024-07-15 | End: 2024-08-14

## 2024-07-15 NOTE — TELEPHONE ENCOUNTER
Ep'ed requested meds to pharm requested      Controlled Substance Monitoring:    Acute and Chronic Pain Monitoring:   RX Monitoring Periodic Controlled Substance Monitoring   7/15/2024  12:17 PM No signs of potential drug abuse or diversion identified.

## 2024-07-15 NOTE — TELEPHONE ENCOUNTER
Rogelio Gonzalez called requesting a refill on the following medications:  Requested Prescriptions     Pending Prescriptions Disp Refills    ALPRAZolam (XANAX) 0.5 MG tablet 75 tablet 0     Sig: Take 1 tablet by mouth 3 times daily as needed for Anxiety for up to 30 days. Max Daily Amount: 1.5 mg    HYDROcodone-acetaminophen (NORCO) 5-325 MG per tablet 180 tablet 0     Sig: Take 2 tablets by mouth every 8 hours as needed for Pain for up to 30 days. Max Daily Amount: 6 tablets       Date of last visit: 3/13/2024  Date of next visit (if applicable):Visit date not found  Date of last refill: 6/13/24  [facility]

## 2024-07-16 DIAGNOSIS — L73.2 HIDRADENITIS SUPPURATIVA: ICD-10-CM

## 2024-07-16 DIAGNOSIS — R51.9 CHRONIC NONINTRACTABLE HEADACHE, UNSPECIFIED HEADACHE TYPE: ICD-10-CM

## 2024-07-16 DIAGNOSIS — G89.29 CHRONIC NONINTRACTABLE HEADACHE, UNSPECIFIED HEADACHE TYPE: ICD-10-CM

## 2024-07-16 RX ORDER — IBUPROFEN 800 MG/1
800 TABLET ORAL EVERY 8 HOURS PRN
Qty: 90 TABLET | Refills: 1 | Status: SHIPPED | OUTPATIENT
Start: 2024-07-16

## 2024-07-16 NOTE — TELEPHONE ENCOUNTER
Rogelio Gonzalez called requesting a refill on the following medications:  Requested Prescriptions     Pending Prescriptions Disp Refills    ibuprofen (ADVIL;MOTRIN) 800 MG tablet 90 tablet 11     Sig: Take 1 tablet by mouth every 8 hours as needed for Pain       Date of last visit: 3/13/2024  Date of next visit (if applicable):Visit date not found  Date of last refill: 9/1/23  [facility]  Pt went to fill medication and was told it was discontinued   Last filled in may       [facility]

## 2024-08-02 ENCOUNTER — OFFICE VISIT (OUTPATIENT)
Dept: FAMILY MEDICINE CLINIC | Age: 39
End: 2024-08-02
Payer: COMMERCIAL

## 2024-08-02 VITALS
OXYGEN SATURATION: 97 % | DIASTOLIC BLOOD PRESSURE: 88 MMHG | HEART RATE: 65 BPM | SYSTOLIC BLOOD PRESSURE: 124 MMHG | WEIGHT: 315 LBS | BODY MASS INDEX: 45.19 KG/M2 | RESPIRATION RATE: 26 BRPM | TEMPERATURE: 97.6 F

## 2024-08-02 DIAGNOSIS — G25.81 RLS (RESTLESS LEGS SYNDROME): ICD-10-CM

## 2024-08-02 DIAGNOSIS — L73.2 HIDRADENITIS SUPPURATIVA: Primary | ICD-10-CM

## 2024-08-02 DIAGNOSIS — B96.89 ACUTE BACTERIAL SINUSITIS: ICD-10-CM

## 2024-08-02 DIAGNOSIS — J01.90 ACUTE BACTERIAL SINUSITIS: ICD-10-CM

## 2024-08-02 PROCEDURE — 1036F TOBACCO NON-USER: CPT | Performed by: FAMILY MEDICINE

## 2024-08-02 PROCEDURE — G8427 DOCREV CUR MEDS BY ELIG CLIN: HCPCS | Performed by: FAMILY MEDICINE

## 2024-08-02 PROCEDURE — 99214 OFFICE O/P EST MOD 30 MIN: CPT | Performed by: FAMILY MEDICINE

## 2024-08-02 PROCEDURE — G8417 CALC BMI ABV UP PARAM F/U: HCPCS | Performed by: FAMILY MEDICINE

## 2024-08-02 RX ORDER — ROPINIROLE 0.5 MG/1
0.5 TABLET, FILM COATED ORAL 3 TIMES DAILY
Qty: 90 TABLET | Refills: 3 | Status: SHIPPED | OUTPATIENT
Start: 2024-08-02

## 2024-08-02 RX ORDER — DOXYCYCLINE HYCLATE 100 MG
100 TABLET ORAL 2 TIMES DAILY
Qty: 20 TABLET | Refills: 0 | Status: SHIPPED | OUTPATIENT
Start: 2024-08-02 | End: 2024-08-12

## 2024-08-02 SDOH — ECONOMIC STABILITY: FOOD INSECURITY: WITHIN THE PAST 12 MONTHS, THE FOOD YOU BOUGHT JUST DIDN'T LAST AND YOU DIDN'T HAVE MONEY TO GET MORE.: NEVER TRUE

## 2024-08-02 SDOH — ECONOMIC STABILITY: FOOD INSECURITY: WITHIN THE PAST 12 MONTHS, YOU WORRIED THAT YOUR FOOD WOULD RUN OUT BEFORE YOU GOT MONEY TO BUY MORE.: NEVER TRUE

## 2024-08-02 SDOH — ECONOMIC STABILITY: INCOME INSECURITY: HOW HARD IS IT FOR YOU TO PAY FOR THE VERY BASICS LIKE FOOD, HOUSING, MEDICAL CARE, AND HEATING?: NOT HARD AT ALL

## 2024-08-02 ASSESSMENT — ENCOUNTER SYMPTOMS
WHEEZING: 0
NAUSEA: 0
SORE THROAT: 0
EYE PAIN: 0
DIARRHEA: 0
VOMITING: 0
BLOOD IN STOOL: 0
ABDOMINAL PAIN: 0
CHEST TIGHTNESS: 0
CONSTIPATION: 0
COUGH: 0
BACK PAIN: 0
RHINORRHEA: 0
SHORTNESS OF BREATH: 0

## 2024-08-19 DIAGNOSIS — L73.2 HIDRADENITIS SUPPURATIVA: ICD-10-CM

## 2024-08-19 DIAGNOSIS — F41.0 PANIC ATTACKS: ICD-10-CM

## 2024-08-19 RX ORDER — ALPRAZOLAM 0.5 MG/1
0.5 TABLET ORAL 3 TIMES DAILY PRN
Qty: 75 TABLET | Refills: 0 | Status: SHIPPED | OUTPATIENT
Start: 2024-08-19 | End: 2024-09-18

## 2024-08-19 RX ORDER — HYDROCODONE BITARTRATE AND ACETAMINOPHEN 5; 325 MG/1; MG/1
2 TABLET ORAL EVERY 8 HOURS PRN
Qty: 180 TABLET | Refills: 0 | Status: SHIPPED | OUTPATIENT
Start: 2024-08-19 | End: 2024-09-18

## 2024-08-19 NOTE — TELEPHONE ENCOUNTER
Rogelio Gonzalez called requesting a refill on the following medications:  Requested Prescriptions     Pending Prescriptions Disp Refills    ALPRAZolam (XANAX) 0.5 MG tablet 75 tablet 0     Sig: Take 1 tablet by mouth 3 times daily as needed for Anxiety for up to 30 days. Max Daily Amount: 1.5 mg    HYDROcodone-acetaminophen (NORCO) 5-325 MG per tablet 180 tablet 0     Sig: Take 2 tablets by mouth every 8 hours as needed for Pain for up to 30 days. Max Daily Amount: 6 tablets       Date of last visit: 8/2/2024 hidradentisis suppurativa    Date of next visit: date not found    Date of last refill: 7/15/24    [facility]    Pt has #2 left of Norco and #3 of Xanax.    Pls call pt at  only if probs.    Zip: [phone]    Doses confirmed by pt as stated above.    Thanks,  Josefina Flores

## 2024-08-24 ENCOUNTER — TELEPHONE (OUTPATIENT)
Dept: ENT CLINIC | Age: 39
End: 2024-08-24

## 2024-08-25 NOTE — TELEPHONE ENCOUNTER
Received call from [facility] ER - patient presented earlier this evening with bleeding from ear.  Has tubes.  Recommend strict water precautions and start Ciloxan drops.  Was going to find a place for patient this week, but he already has an appt that was intended for post op Tuesday morning with Dr Juarez.  Please call patient to remind of this appt and advise to keep so we can check his ears.  Thank you.

## 2024-08-26 NOTE — TELEPHONE ENCOUNTER
Spoke with patient and informed him that we would like him to keep his appointment tomorrow morning and confirmed the time with him. Patient agreed to this and thanked me.

## 2024-08-27 ENCOUNTER — OFFICE VISIT (OUTPATIENT)
Dept: ENT CLINIC | Age: 39
End: 2024-08-27
Payer: COMMERCIAL

## 2024-08-27 VITALS
HEIGHT: 71 IN | HEART RATE: 69 BPM | RESPIRATION RATE: 18 BRPM | SYSTOLIC BLOOD PRESSURE: 144 MMHG | WEIGHT: 315 LBS | BODY MASS INDEX: 44.1 KG/M2 | TEMPERATURE: 97.4 F | OXYGEN SATURATION: 96 % | DIASTOLIC BLOOD PRESSURE: 88 MMHG

## 2024-08-27 DIAGNOSIS — J34.89 NASAL OBSTRUCTION: ICD-10-CM

## 2024-08-27 DIAGNOSIS — G47.33 OBSTRUCTIVE SLEEP APNEA: ICD-10-CM

## 2024-08-27 DIAGNOSIS — H66.002 NON-RECURRENT ACUTE SUPPURATIVE OTITIS MEDIA OF LEFT EAR WITHOUT SPONTANEOUS RUPTURE OF TYMPANIC MEMBRANE: ICD-10-CM

## 2024-08-27 DIAGNOSIS — Z78.9 INTOLERANCE OF CONTINUOUS POSITIVE AIRWAY PRESSURE (CPAP) VENTILATION: Primary | ICD-10-CM

## 2024-08-27 PROCEDURE — 1036F TOBACCO NON-USER: CPT | Performed by: OTOLARYNGOLOGY

## 2024-08-27 PROCEDURE — G8427 DOCREV CUR MEDS BY ELIG CLIN: HCPCS | Performed by: OTOLARYNGOLOGY

## 2024-08-27 PROCEDURE — 99214 OFFICE O/P EST MOD 30 MIN: CPT | Performed by: OTOLARYNGOLOGY

## 2024-08-27 PROCEDURE — G8417 CALC BMI ABV UP PARAM F/U: HCPCS | Performed by: OTOLARYNGOLOGY

## 2024-08-27 RX ORDER — CIPROFLOXACIN AND DEXAMETHASONE 3; 1 MG/ML; MG/ML
4 SUSPENSION/ DROPS AURICULAR (OTIC) 2 TIMES DAILY
Qty: 1 EACH | Refills: 1 | Status: SHIPPED | OUTPATIENT
Start: 2024-08-27 | End: 2024-09-10

## 2024-08-27 NOTE — PROGRESS NOTES
[facility] [place] [facility]  [facility] EAR, NOSE AND THROAT  [address]  [address]  [facility] [address]  Dept: [phone]  Dept Fax: [phone]  Loc: [phone]    Rogelio Gonzalez is a 39 y.o. male who was referred by No ref. provider found for:  Chief Complaint   Patient presents with    Follow-up     Patient is here today to f/u from the ER for bleeding from L ear. He states his R ear has been aching as well. Patient didn't have surgery. Patient has some concerns as far as pre approvals and stuff for surgery.        HPI:     Rogelio Gonzalez is a 39 y.o. male who is in the process of getting his severe obstructive sleep apnea with CPAP intolerance surgically treated.  He returns today early for a visit because of her recent purulent drainage from his right ear and bleeding.  The patient overall reports doing much better with much more comfortable breathing through his nose and even some improvements in his ability to sleep.  He is still far from normal and is pending a transoral robotic assisted lingual tonsillectomy and epiglottopexy sometime in the near future.  He was started on eardrops by the emergency department and his bleeding promptly stopped.  He is still reporting some discomfort in his left ear and decreased hearing on that side.  Apart from that he has no new problems.     History:     No Known Allergies  Current Outpatient Medications   Medication Sig Dispense Refill    ciprofloxacin-dexAMETHasone (CIPRODEX) 0.3-0.1 % otic suspension Place 4 drops into the left ear 2 times daily for 14 days 1 each 1    ALPRAZolam (XANAX) 0.5 MG tablet Take 1 tablet by mouth 3 times daily as needed for Anxiety for up to 30 days. Max Daily Amount: 1.5 mg 75 tablet 0    HYDROcodone-acetaminophen (NORCO) 5-325 MG per tablet Take 2 tablets by mouth every 8 hours as needed for Pain for up to 30 days. Max Daily Amount: 6 tablets 180 tablet 0    rOPINIRole (REQUIP) 0.5 MG tablet Take 1  9.0 12/08/2023 02:56 PM    BILITOT 0.7 12/15/2023 04:16 PM    BILITOT 0.7 12/08/2023 02:56 PM    BILITOT 0.4 09/28/2023 08:32 PM    ALKPHOS 101 12/15/2023 04:16 PM    ALKPHOS 91 12/08/2023 02:56 PM    ALKPHOS 92 09/28/2023 08:32 PM    AST 53 12/15/2023 04:16 PM    AST 53 12/08/2023 02:56 PM    AST 44 09/28/2023 08:32 PM    ALT 70 12/15/2023 04:16 PM    ALT 72 12/08/2023 02:56 PM    ALT 59 09/28/2023 08:32 PM       All of the past medical history, past surgical history, family history,social history, allergies and current medications were reviewed with the patient.  Assessment & Plan   Assessment & Plan   Diagnoses and all orders for this visit:     Diagnosis Orders   1. Intolerance of continuous positive airway pressure (CPAP) ventilation        2. Obstructive sleep apnea        3. Nasal obstruction        4. Non-recurrent acute suppurative otitis media of left ear without spontaneous rupture of tympanic membrane            The findings were explained and his questions were answered.  Based on the patient's history and these physical findings, the patient's otitis media/externa of the left ear likely was associated with his instrumenting his own ear canal after itching.  He likely disrupted the PE tube and cause the bleeding that way.  I did not see his PE tube in place but he has a lot of dried blood and some coagulum in the middle ear.  I will get him on drops today for the next couple of weeks and have him see Lucila Yip so that she can clean his ear under the microscope upon his return.    He needs to irrigate his nasal airways much more assiduously with his right side being almost completely occluded by crusted mucus.    We will proceed as planned for his transoral robotic assisted lingual tonsillectomy and epiglottopexy with distal pharyngeal mucosa and lymphoid tissue resected and bilaterally reconstructed.     I spent over 30 minutes of face-to-face time with the patient the majority of which was

## 2024-09-04 ENCOUNTER — OFFICE VISIT (OUTPATIENT)
Dept: FAMILY MEDICINE CLINIC | Age: 39
End: 2024-09-04
Payer: COMMERCIAL

## 2024-09-04 VITALS
BODY MASS INDEX: 45.18 KG/M2 | TEMPERATURE: 97.2 F | WEIGHT: 315 LBS | DIASTOLIC BLOOD PRESSURE: 76 MMHG | OXYGEN SATURATION: 96 % | HEART RATE: 77 BPM | RESPIRATION RATE: 22 BRPM | SYSTOLIC BLOOD PRESSURE: 128 MMHG

## 2024-09-04 DIAGNOSIS — F41.9 ANXIETY: ICD-10-CM

## 2024-09-04 DIAGNOSIS — M62.838 MUSCLE SPASM: ICD-10-CM

## 2024-09-04 DIAGNOSIS — L73.2 HIDRADENITIS SUPPURATIVA: ICD-10-CM

## 2024-09-04 DIAGNOSIS — K21.9 GASTROESOPHAGEAL REFLUX DISEASE, UNSPECIFIED WHETHER ESOPHAGITIS PRESENT: Primary | ICD-10-CM

## 2024-09-04 DIAGNOSIS — M48.062 SPINAL STENOSIS OF LUMBAR REGION WITH NEUROGENIC CLAUDICATION: ICD-10-CM

## 2024-09-04 PROCEDURE — G8427 DOCREV CUR MEDS BY ELIG CLIN: HCPCS | Performed by: FAMILY MEDICINE

## 2024-09-04 PROCEDURE — 1036F TOBACCO NON-USER: CPT | Performed by: FAMILY MEDICINE

## 2024-09-04 PROCEDURE — G8417 CALC BMI ABV UP PARAM F/U: HCPCS | Performed by: FAMILY MEDICINE

## 2024-09-04 PROCEDURE — 99214 OFFICE O/P EST MOD 30 MIN: CPT | Performed by: FAMILY MEDICINE

## 2024-09-04 RX ORDER — PANTOPRAZOLE SODIUM 40 MG/1
40 TABLET, DELAYED RELEASE ORAL
Qty: 60 TABLET | Refills: 11 | Status: SHIPPED | OUTPATIENT
Start: 2024-09-04

## 2024-09-04 ASSESSMENT — ENCOUNTER SYMPTOMS
COUGH: 0
CONSTIPATION: 0
WHEEZING: 0
RHINORRHEA: 0
BACK PAIN: 1
SHORTNESS OF BREATH: 0
SORE THROAT: 0
CHEST TIGHTNESS: 0
EYE PAIN: 0
ABDOMINAL PAIN: 0
VOMITING: 0
NAUSEA: 0
DIARRHEA: 0
BLOOD IN STOOL: 0

## 2024-09-04 NOTE — PROGRESS NOTES
Rogelio Gonzalez (:  1985) is a 39 y.o. male,Established patient, here for evaluation of the following chief complaint(s):  6 Month Follow-Up (Wants a letter written for social security, refills)         Assessment & Plan  Gastroesophageal reflux disease, unspecified whether esophagitis present   Chronic, at goal (stable), continue current treatment plan    Orders:    pantoprazole (PROTONIX) 40 MG tablet; Take 1 tablet by mouth 2 times daily (before meals)    Muscle spasm       Orders:    tiZANidine (ZANAFLEX) 4 MG tablet; Take 2 tablets by mouth every 8 hours as needed (spasms)    Hidradenitis suppurativa   Chronic, not at goal (unstable), debilitating, controlled on routine opioids, unable to sustain employment due to condition.           Spinal stenosis of lumbar region with neurogenic claudication   Chronic, not at goal (unstable), failed back surgeries, unable to lift > 5 pounds, unable to twist, bend, walk long distances, makes him unemployable.           Anxiety   Chronic, not at goal (unstable), continue current treatment plan  -maintained on xanax     Letter of support written.      No follow-ups on file.       Subjective   HPI  Patient here today for a check up.  Reviewed BMI of 45.  Encouraged diet, exercise and weight loss. Needs 6 month check up for chronic norco and xanax.  Continues to struggle with anxiety. HS, chronic back pain affecting ADLs, and muscle twitching/spasms.   Single, former smoker, pmh reviewed.      Review of Systems   Constitutional:  Negative for chills, fatigue, fever and unexpected weight change.   HENT:  Negative for congestion, ear pain, rhinorrhea and sore throat.    Eyes:  Negative for pain and visual disturbance.   Respiratory:  Negative for cough, chest tightness, shortness of breath and wheezing.    Cardiovascular:  Negative for chest pain and palpitations.   Gastrointestinal:  Negative for abdominal pain, blood in stool, constipation, diarrhea, nausea and

## 2024-09-04 NOTE — ASSESSMENT & PLAN NOTE
Chronic, not at goal (unstable), failed back surgeries, unable to lift > 5 pounds, unable to twist, bend, walk long distances, makes him unemployable.

## 2024-09-30 DIAGNOSIS — L73.2 HIDRADENITIS SUPPURATIVA: ICD-10-CM

## 2024-09-30 DIAGNOSIS — F41.0 PANIC ATTACKS: ICD-10-CM

## 2024-09-30 RX ORDER — ALPRAZOLAM 0.5 MG
0.5 TABLET ORAL 3 TIMES DAILY PRN
Qty: 75 TABLET | Refills: 0 | Status: SHIPPED | OUTPATIENT
Start: 2024-09-30 | End: 2024-10-30

## 2024-09-30 RX ORDER — HYDROCODONE BITARTRATE AND ACETAMINOPHEN 5; 325 MG/1; MG/1
2 TABLET ORAL EVERY 8 HOURS PRN
Qty: 180 TABLET | Refills: 0 | Status: SHIPPED | OUTPATIENT
Start: 2024-09-30 | End: 2024-10-30

## 2024-09-30 NOTE — TELEPHONE ENCOUNTER
Rogelio Gonzalez called requesting a refill on the following medications:  Requested Prescriptions     Pending Prescriptions Disp Refills    ALPRAZolam (XANAX) 0.5 MG tablet 75 tablet 0     Sig: Take 1 tablet by mouth 3 times daily as needed for Anxiety for up to 30 days. Max Daily Amount: 1.5 mg    HYDROcodone-acetaminophen (NORCO) 5-325 MG per tablet 180 tablet 0     Sig: Take 2 tablets by mouth every 8 hours as needed for Pain for up to 30 days. Max Daily Amount: 6 tablets       Date of last visit: 9/4/2024  Date of next visit (if applicable):Visit date not found  Date of last refill: 8/19//24  [facility]

## 2024-09-30 NOTE — TELEPHONE ENCOUNTER
Ep'ed requested meds to pharm requested    Controlled Substance Monitoring:    Acute and Chronic Pain Monitoring:   RX Monitoring Periodic Controlled Substance Monitoring   9/30/2024  12:13 PM No signs of potential drug abuse or diversion identified.

## 2024-10-16 ENCOUNTER — TELEPHONE (OUTPATIENT)
Dept: ENT CLINIC | Age: 39
End: 2024-10-16

## 2024-10-16 NOTE — TELEPHONE ENCOUNTER
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 It is not draining and is not on any antibiotics currently but it is painful to sit on hard surfaces. He just wanted Dr Juarez to be aware.

## 2024-10-17 ENCOUNTER — LAB (OUTPATIENT)
Dept: LAB | Age: 39
End: 2024-10-17

## 2024-10-17 DIAGNOSIS — G47.33 OBSTRUCTIVE SLEEP APNEA: ICD-10-CM

## 2024-10-17 DIAGNOSIS — Z01.818 PRE-OP TESTING: ICD-10-CM

## 2024-10-17 LAB
ALBUMIN SERPL BCG-MCNC: 4 G/DL (ref 3.5–5.1)
ALP SERPL-CCNC: 100 U/L (ref 38–126)
ALT SERPL W/O P-5'-P-CCNC: 50 U/L (ref 11–66)
ANION GAP SERPL CALC-SCNC: 10 MEQ/L (ref 8–16)
AST SERPL-CCNC: 41 U/L (ref 5–40)
BASOPHILS ABSOLUTE: 0 THOU/MM3 (ref 0–0.1)
BASOPHILS NFR BLD AUTO: 0.8 %
BILIRUB SERPL-MCNC: 0.6 MG/DL (ref 0.3–1.2)
BUN SERPL-MCNC: 7 MG/DL (ref 7–22)
CALCIUM SERPL-MCNC: 8.9 MG/DL (ref 8.5–10.5)
CHLORIDE SERPL-SCNC: 103 MEQ/L (ref 98–111)
CO2 SERPL-SCNC: 26 MEQ/L (ref 23–33)
CREAT SERPL-MCNC: 0.7 MG/DL (ref 0.4–1.2)
DEPRECATED RDW RBC AUTO: 45.3 FL (ref 35–45)
EOSINOPHIL NFR BLD AUTO: 2.7 %
EOSINOPHILS ABSOLUTE: 0.1 THOU/MM3 (ref 0–0.4)
ERYTHROCYTE [DISTWIDTH] IN BLOOD BY AUTOMATED COUNT: 14.2 % (ref 11.5–14.5)
GFR SERPL CREATININE-BSD FRML MDRD: > 90 ML/MIN/1.73M2
GLUCOSE SERPL-MCNC: 139 MG/DL (ref 70–108)
HCT VFR BLD AUTO: 40 % (ref 42–52)
HGB BLD-MCNC: 13.1 GM/DL (ref 14–18)
IMM GRANULOCYTES # BLD AUTO: 0.02 THOU/MM3 (ref 0–0.07)
IMM GRANULOCYTES NFR BLD AUTO: 0.4 %
LYMPHOCYTES ABSOLUTE: 1.3 THOU/MM3 (ref 1–4.8)
LYMPHOCYTES NFR BLD AUTO: 26.1 %
MCH RBC QN AUTO: 28.9 PG (ref 26–33)
MCHC RBC AUTO-ENTMCNC: 32.8 GM/DL (ref 32.2–35.5)
MCV RBC AUTO: 88.1 FL (ref 80–94)
MONOCYTES ABSOLUTE: 0.3 THOU/MM3 (ref 0.4–1.3)
MONOCYTES NFR BLD AUTO: 6.1 %
NEUTROPHILS ABSOLUTE: 3.1 THOU/MM3 (ref 1.8–7.7)
NEUTROPHILS NFR BLD AUTO: 63.9 %
NRBC BLD AUTO-RTO: 0 /100 WBC
PLATELET # BLD AUTO: 89 THOU/MM3 (ref 130–400)
PLATELET BLD QL SMEAR: ABNORMAL
PMV BLD AUTO: 12.6 FL (ref 9.4–12.4)
POTASSIUM SERPL-SCNC: 4 MEQ/L (ref 3.5–5.2)
PROT SERPL-MCNC: 7.2 G/DL (ref 6.1–8)
RBC # BLD AUTO: 4.54 MILL/MM3 (ref 4.7–6.1)
SCAN OF BLOOD SMEAR: NORMAL
SODIUM SERPL-SCNC: 139 MEQ/L (ref 135–145)
WBC # BLD AUTO: 4.9 THOU/MM3 (ref 4.8–10.8)

## 2024-10-18 ENCOUNTER — TELEPHONE (OUTPATIENT)
Dept: ENT CLINIC | Age: 39
End: 2024-10-18

## 2024-10-18 NOTE — TELEPHONE ENCOUNTER
Pre admission testing wanting to be sure Platelets 89 from CBC 10/17/24 were ok for surgery next week 10/24/24?    Please advise.

## 2024-10-18 NOTE — TELEPHONE ENCOUNTER
Will defer to Dr. Juarez. Hgb stable at 13.1 which is baseline, plts baseline 100-98 generally so not far off from baseline.

## 2024-10-21 NOTE — TELEPHONE ENCOUNTER
[facility]eles (mike) called to cancel Michael's surgery for Thursday.  He has a bad flare of his HS skin disease in 8 different spots now. They did not want to reschedule right now but will call back to reschedule. I did inform Angeles that Dr Juarez is booking about 3 months away so to call asap to get back on the schedule. She said she will discuss with Michael and call us back.

## 2024-10-22 NOTE — TELEPHONE ENCOUNTER
Spoke to Dr Juarez. We will reschedule when the patient feels ready.   Specialty Care (Immediate)...

## 2024-11-04 ENCOUNTER — OFFICE VISIT (OUTPATIENT)
Dept: FAMILY MEDICINE CLINIC | Age: 39
End: 2024-11-04

## 2024-11-04 VITALS
DIASTOLIC BLOOD PRESSURE: 78 MMHG | BODY MASS INDEX: 45.54 KG/M2 | SYSTOLIC BLOOD PRESSURE: 148 MMHG | OXYGEN SATURATION: 97 % | HEART RATE: 89 BPM | RESPIRATION RATE: 28 BRPM | TEMPERATURE: 98.1 F | WEIGHT: 315 LBS

## 2024-11-04 DIAGNOSIS — J44.9 CHRONIC OBSTRUCTIVE PULMONARY DISEASE, UNSPECIFIED COPD TYPE (HCC): ICD-10-CM

## 2024-11-04 DIAGNOSIS — L73.2 HIDRADENITIS SUPPURATIVA: Primary | ICD-10-CM

## 2024-11-04 DIAGNOSIS — I75.023 BLUE TOE SYNDROME OF BOTH LOWER EXTREMITIES (HCC): ICD-10-CM

## 2024-11-04 DIAGNOSIS — E66.01 MORBID OBESITY WITH BMI OF 45.0-49.9, ADULT: ICD-10-CM

## 2024-11-04 DIAGNOSIS — G47.9 SLEEP DISTURBANCE: ICD-10-CM

## 2024-11-04 DIAGNOSIS — D69.6 THROMBOCYTOPENIA (HCC): ICD-10-CM

## 2024-11-04 DIAGNOSIS — J30.9 ALLERGIC RHINITIS, UNSPECIFIED SEASONALITY, UNSPECIFIED TRIGGER: ICD-10-CM

## 2024-11-04 DIAGNOSIS — M62.838 MUSCLE SPASM: ICD-10-CM

## 2024-11-04 RX ORDER — METHOCARBAMOL 750 MG/1
750 TABLET, FILM COATED ORAL 4 TIMES DAILY
Qty: 120 TABLET | Refills: 0 | Status: SHIPPED | OUTPATIENT
Start: 2024-11-04 | End: 2024-12-04

## 2024-11-04 RX ORDER — METHYLPREDNISOLONE ACETATE 80 MG/ML
80 INJECTION, SUSPENSION INTRA-ARTICULAR; INTRALESIONAL; INTRAMUSCULAR; SOFT TISSUE ONCE
Status: COMPLETED | OUTPATIENT
Start: 2024-11-04 | End: 2024-11-04

## 2024-11-04 RX ORDER — ZOLPIDEM TARTRATE 10 MG/1
10 TABLET ORAL NIGHTLY PRN
Qty: 30 TABLET | Refills: 0 | Status: SHIPPED | OUTPATIENT
Start: 2024-11-04 | End: 2024-12-04

## 2024-11-04 RX ORDER — HYDROCODONE BITARTRATE AND ACETAMINOPHEN 5; 325 MG/1; MG/1
2 TABLET ORAL EVERY 8 HOURS PRN
Qty: 180 TABLET | Refills: 0 | Status: SHIPPED | OUTPATIENT
Start: 2024-11-04 | End: 2024-12-04

## 2024-11-04 RX ADMIN — METHYLPREDNISOLONE ACETATE 80 MG: 80 INJECTION, SUSPENSION INTRA-ARTICULAR; INTRALESIONAL; INTRAMUSCULAR; SOFT TISSUE at 13:42

## 2024-11-04 ASSESSMENT — ENCOUNTER SYMPTOMS
VOMITING: 0
CRAMPS: 1
EYE ITCHING: 1
RHINORRHEA: 1
CHEST TIGHTNESS: 0
NAUSEA: 0
BACK PAIN: 0
SHORTNESS OF BREATH: 0
WHEEZING: 0
SORE THROAT: 0
ABDOMINAL PAIN: 0
COUGH: 0
EYE PAIN: 0
BLOOD IN STOOL: 0
CONSTIPATION: 0
DIARRHEA: 0

## 2024-11-04 NOTE — PROGRESS NOTES
Administrations This Visit       hydrocortisone sodium succinate PF (SOLU-CORTEF) injection 50 mg       Admin Date  11/04/2024  13:41 Action  Given Dose  50 mg Route  IntraMUSCular Site  Dorsogluteal Right Documented By  Felipa Bocanegra CMA    NDC: 0565-8302-82    Lot#: [id number]    : [employer]    Patient Supplied?: No              methylPREDNISolone acetate (DEPO-MEDROL) injection 80 mg       Admin Date  11/04/2024  13:42 Action  Given Dose  80 mg Route  IntraMUSCular Site  Dorsogluteal Right Documented By  Felipa Bocanegra CMA    NDC: 0832-4847-58    Lot#: [id number]    : [employer]    Patient Supplied?: No                    
     Appearance: He is well-developed.   HENT:      Head: Normocephalic and atraumatic.      Right Ear: External ear normal.      Left Ear: External ear normal.      Nose:      Right Sinus: Maxillary sinus tenderness and frontal sinus tenderness present.      Left Sinus: Maxillary sinus tenderness and frontal sinus tenderness present.      Mouth/Throat:      Mouth: Mucous membranes are moist.   Eyes:      Pupils: Pupils are equal, round, and reactive to light.   Neck:      Thyroid: No thyromegaly.   Cardiovascular:      Rate and Rhythm: Normal rate and regular rhythm.      Heart sounds: Normal heart sounds.   Pulmonary:      Breath sounds: Normal breath sounds. No wheezing or rales.   Abdominal:      General: Bowel sounds are normal.      Palpations: Abdomen is soft.      Tenderness: There is no abdominal tenderness. There is no guarding or rebound.   Musculoskeletal:         General: Normal range of motion.      Cervical back: Neck supple.   Lymphadenopathy:      Cervical: Cervical adenopathy present.      Right cervical: Superficial cervical adenopathy present.      Left cervical: Superficial cervical adenopathy present.   Skin:     General: Skin is warm and dry.      Findings: No rash.   Neurological:      Mental Status: He is alert and oriented to person, place, and time.      Cranial Nerves: No cranial nerve deficit.      Deep Tendon Reflexes: Reflexes are normal and symmetric.                  An electronic signature was used to authenticate this note.    --Elkin Ramirez MD

## 2024-11-05 DIAGNOSIS — F41.0 PANIC ATTACKS: ICD-10-CM

## 2024-11-05 RX ORDER — ALPRAZOLAM 0.5 MG
0.5 TABLET ORAL 3 TIMES DAILY PRN
Qty: 75 TABLET | Refills: 0 | Status: SHIPPED | OUTPATIENT
Start: 2024-11-05 | End: 2024-12-05

## 2024-11-05 NOTE — TELEPHONE ENCOUNTER
Aura'ed xanax to pharm requested    Controlled Substance Monitoring:    Acute and Chronic Pain Monitoring:   RX Monitoring Periodic Controlled Substance Monitoring   11/5/2024  12:43 PM No signs of potential drug abuse or diversion identified.

## 2024-11-05 NOTE — TELEPHONE ENCOUNTER
Rogelio Gonzalez called requesting a refill on the following medications:  Requested Prescriptions     Pending Prescriptions Disp Refills    ALPRAZolam (XANAX) 0.5 MG tablet 75 tablet 0     Sig: Take 1 tablet by mouth 3 times daily as needed for Anxiety for up to 30 days. Max Daily Amount: 1.5 mg       Date of last visit: 11/4/2024 hidradentitis suppurativa    Date of next visit: date not found    Date of last refill: 9/30/24    Pharmacy Name: [facility]    Pt forgot to ask you to refill this when he was here yesterday. Has #3 left.    Pls call pt at  only if probs.    Zip: [phone]    Dose confirmed by pt as stated above.    Thanks,  Josefina Flores

## 2024-12-06 DIAGNOSIS — K21.9 GASTROESOPHAGEAL REFLUX DISEASE, UNSPECIFIED WHETHER ESOPHAGITIS PRESENT: ICD-10-CM

## 2024-12-06 DIAGNOSIS — L73.2 HIDRADENITIS SUPPURATIVA: ICD-10-CM

## 2024-12-06 DIAGNOSIS — G25.81 RLS (RESTLESS LEGS SYNDROME): ICD-10-CM

## 2024-12-06 DIAGNOSIS — G47.9 SLEEP DISTURBANCE: ICD-10-CM

## 2024-12-06 DIAGNOSIS — F41.0 PANIC ATTACKS: ICD-10-CM

## 2024-12-06 DIAGNOSIS — M62.838 MUSCLE SPASM: ICD-10-CM

## 2024-12-06 RX ORDER — ZOLPIDEM TARTRATE 10 MG/1
10 TABLET ORAL NIGHTLY PRN
Qty: 30 TABLET | Refills: 0 | Status: SHIPPED | OUTPATIENT
Start: 2024-12-06 | End: 2025-01-05

## 2024-12-06 RX ORDER — PANTOPRAZOLE SODIUM 40 MG/1
40 TABLET, DELAYED RELEASE ORAL
Qty: 60 TABLET | Refills: 0 | Status: SHIPPED | OUTPATIENT
Start: 2024-12-06

## 2024-12-06 RX ORDER — ROPINIROLE 0.5 MG/1
0.5 TABLET, FILM COATED ORAL 3 TIMES DAILY
Qty: 90 TABLET | Refills: 0 | Status: SHIPPED | OUTPATIENT
Start: 2024-12-06

## 2024-12-06 RX ORDER — HYDROCODONE BITARTRATE AND ACETAMINOPHEN 5; 325 MG/1; MG/1
2 TABLET ORAL EVERY 8 HOURS PRN
Qty: 180 TABLET | Refills: 0 | Status: SHIPPED | OUTPATIENT
Start: 2024-12-06 | End: 2025-01-05

## 2024-12-06 RX ORDER — ALPRAZOLAM 0.5 MG
0.5 TABLET ORAL 3 TIMES DAILY PRN
Qty: 75 TABLET | Refills: 0 | Status: SHIPPED | OUTPATIENT
Start: 2024-12-06 | End: 2025-01-05

## 2024-12-06 NOTE — TELEPHONE ENCOUNTER
Ep'ed 1 month of each.  Needs yearly physical.      Controlled Substance Monitoring:    Acute and Chronic Pain Monitoring:   RX Monitoring Periodic Controlled Substance Monitoring   12/6/2024  12:09 PM No signs of potential drug abuse or diversion identified.

## 2024-12-06 NOTE — TELEPHONE ENCOUNTER
Rogelio Gonzalez called requesting a refill on the following medications:  Requested Prescriptions     Pending Prescriptions Disp Refills    pantoprazole (PROTONIX) 40 MG tablet 60 tablet 11     Sig: Take 1 tablet by mouth 2 times daily (before meals)    ALPRAZolam (XANAX) 0.5 MG tablet 75 tablet 0     Sig: Take 1 tablet by mouth 3 times daily as needed for Anxiety for up to 30 days. Max Daily Amount: 1.5 mg    HYDROcodone-acetaminophen (NORCO) 5-325 MG per tablet 180 tablet 0     Sig: Take 2 tablets by mouth every 8 hours as needed for Pain for up to 30 days. Max Daily Amount: 6 tablets    zolpidem (AMBIEN) 10 MG tablet 30 tablet 0     Sig: Take 1 tablet by mouth nightly as needed for Sleep for up to 30 days. Max Daily Amount: 10 mg    rOPINIRole (REQUIP) 0.5 MG tablet 90 tablet 3     Sig: Take 1 tablet by mouth 3 times daily    tiZANidine (ZANAFLEX) 4 MG tablet 180 tablet 11     Sig: Take 2 tablets by mouth every 8 hours as needed (spasms)       Date of last visit: 11/4/2024  Date of next visit (if applicable):Visit date not found  Date of last refill: 9/4/24  [facility]

## 2024-12-10 ENCOUNTER — OFFICE VISIT (OUTPATIENT)
Dept: FAMILY MEDICINE CLINIC | Age: 39
End: 2024-12-10
Payer: COMMERCIAL

## 2024-12-10 VITALS
DIASTOLIC BLOOD PRESSURE: 84 MMHG | HEIGHT: 71 IN | OXYGEN SATURATION: 95 % | BODY MASS INDEX: 44.1 KG/M2 | RESPIRATION RATE: 20 BRPM | HEART RATE: 66 BPM | SYSTOLIC BLOOD PRESSURE: 126 MMHG | TEMPERATURE: 98.2 F | WEIGHT: 315 LBS

## 2024-12-10 DIAGNOSIS — N52.9 ERECTILE DYSFUNCTION, UNSPECIFIED ERECTILE DYSFUNCTION TYPE: ICD-10-CM

## 2024-12-10 DIAGNOSIS — L73.2 HIDRADENITIS SUPPURATIVA: ICD-10-CM

## 2024-12-10 DIAGNOSIS — Z00.00 WELL ADULT EXAM: Primary | ICD-10-CM

## 2024-12-10 DIAGNOSIS — M62.838 MUSCLE SPASM: ICD-10-CM

## 2024-12-10 DIAGNOSIS — R51.9 CHRONIC NONINTRACTABLE HEADACHE, UNSPECIFIED HEADACHE TYPE: ICD-10-CM

## 2024-12-10 DIAGNOSIS — G89.29 CHRONIC NONINTRACTABLE HEADACHE, UNSPECIFIED HEADACHE TYPE: ICD-10-CM

## 2024-12-10 DIAGNOSIS — G25.81 RLS (RESTLESS LEGS SYNDROME): ICD-10-CM

## 2024-12-10 DIAGNOSIS — K21.9 GASTROESOPHAGEAL REFLUX DISEASE, UNSPECIFIED WHETHER ESOPHAGITIS PRESENT: ICD-10-CM

## 2024-12-10 DIAGNOSIS — R73.9 HYPERGLYCEMIA: ICD-10-CM

## 2024-12-10 PROCEDURE — G8484 FLU IMMUNIZE NO ADMIN: HCPCS | Performed by: FAMILY MEDICINE

## 2024-12-10 PROCEDURE — 99395 PREV VISIT EST AGE 18-39: CPT | Performed by: FAMILY MEDICINE

## 2024-12-10 RX ORDER — PANTOPRAZOLE SODIUM 40 MG/1
40 TABLET, DELAYED RELEASE ORAL
Qty: 180 TABLET | Refills: 3 | Status: SHIPPED | OUTPATIENT
Start: 2024-12-10

## 2024-12-10 RX ORDER — ROPINIROLE 0.5 MG/1
TABLET, FILM COATED ORAL
Qty: 360 TABLET | Refills: 3 | Status: SHIPPED | OUTPATIENT
Start: 2024-12-10

## 2024-12-10 RX ORDER — SILDENAFIL 100 MG/1
50-100 TABLET, FILM COATED ORAL PRN
Qty: 30 TABLET | Refills: 3 | Status: SHIPPED | OUTPATIENT
Start: 2024-12-10

## 2024-12-10 RX ORDER — IBUPROFEN 800 MG/1
800 TABLET, FILM COATED ORAL EVERY 8 HOURS PRN
Qty: 270 TABLET | Refills: 3 | Status: SHIPPED | OUTPATIENT
Start: 2024-12-10

## 2024-12-10 ASSESSMENT — ENCOUNTER SYMPTOMS
SORE THROAT: 0
VOMITING: 0
WHEEZING: 0
ABDOMINAL PAIN: 0
SHORTNESS OF BREATH: 0
NAUSEA: 0
EYE PAIN: 0
CHEST TIGHTNESS: 0
COUGH: 0
BACK PAIN: 0
DIARRHEA: 0
CONSTIPATION: 0
BLOOD IN STOOL: 0
RHINORRHEA: 0

## 2024-12-10 NOTE — PROGRESS NOTES
Blood work drawn today in the office, venous puncture, left arm, pt tolerated well.  
Heart sounds: Normal heart sounds.   Pulmonary:      Breath sounds: Normal breath sounds. No wheezing or rales.   Abdominal:      General: Bowel sounds are normal.      Palpations: Abdomen is soft.      Tenderness: There is no abdominal tenderness. There is no guarding or rebound.   Musculoskeletal:         General: Normal range of motion.      Cervical back: Neck supple.   Lymphadenopathy:      Cervical: No cervical adenopathy.   Skin:     General: Skin is warm and dry.      Findings: No rash.   Neurological:      Mental Status: He is alert and oriented to person, place, and time.      Cranial Nerves: No cranial nerve deficit.      Deep Tendon Reflexes: Reflexes are normal and symmetric.             Latest Ref Rng & Units 10/17/2024     2:46 PM 3/7/2024     8:35 AM 2/18/2024    12:48 PM   LAB PRIMARY [facility]ARE   GLU random 70 - 108 mg/dL 139   145     - 145 meq/L 139   141    K 3.5 - 5.2 meq/L 4.0  4.1  3.9    BUN 7 - 22 mg/dL 7   8    CR 0.4 - 1.2 mg/dL 0.7   0.8    GFR >60 ml/min/1.73m2 > 90   >60    CA 8.5 - 10.5 mg/dL 8.9   9.0    ALT 11 - 66 U/L 50      AST 5 - 40 U/L 41      HGB 14.0 - 18.0 gm/dl 13.1   13.4        Lab Results   Component Value Date/Time    CHOL 139 09/01/2023 10:23 AM    TRIG 168 09/01/2023 10:23 AM    HDL 30 09/01/2023 10:23 AM    GLUCOSE 139 10/17/2024 02:46 PM       The ASCVD Risk score (Yessenia DK, et al., 2019) failed to calculate for the following reasons:    The 2019 ASCVD risk score is only valid for ages 40 to 79      There is no immunization history on file for this patient.    Health Maintenance   Topic Date Due    Pneumococcal 0-64 years Vaccine (1 of 2 - PCV) Never done    Varicella vaccine (1 of 2 - 13+ 2-dose series) Never done    HIV screen  Never done    Hepatitis C screen  Never done    Hepatitis B vaccine (1 of 3 - 19+ 3-dose series) Never done    DTaP/Tdap/Td vaccine (1 - Tdap) Never done    Diabetes screen  Never done    Flu vaccine (1) Never done    COVID-19 Vaccine (1

## 2024-12-11 ENCOUNTER — TELEPHONE (OUTPATIENT)
Dept: FAMILY MEDICINE CLINIC | Age: 39
End: 2024-12-11

## 2024-12-11 DIAGNOSIS — E11.9 TYPE 2 DIABETES MELLITUS WITHOUT COMPLICATION, WITHOUT LONG-TERM CURRENT USE OF INSULIN (HCC): ICD-10-CM

## 2024-12-11 DIAGNOSIS — R74.8 ELEVATED CK: Primary | ICD-10-CM

## 2024-12-11 DIAGNOSIS — Z82.0 FAMILY HISTORY OF MUSCULAR DYSTROPHY: ICD-10-CM

## 2024-12-11 LAB
ALBUMIN SERPL BCG-MCNC: 3.9 G/DL (ref 3.5–5.1)
ALP SERPL-CCNC: 103 U/L (ref 38–126)
ALT SERPL W/O P-5'-P-CCNC: 50 U/L (ref 11–66)
ANION GAP SERPL CALC-SCNC: 9 MEQ/L (ref 8–16)
AST SERPL-CCNC: 30 U/L (ref 5–40)
BASOPHILS ABSOLUTE: 0 THOU/MM3 (ref 0–0.1)
BASOPHILS NFR BLD AUTO: 0.9 %
BILIRUB SERPL-MCNC: 0.5 MG/DL (ref 0.3–1.2)
BUN SERPL-MCNC: 6 MG/DL (ref 7–22)
CALCIUM SERPL-MCNC: 9.1 MG/DL (ref 8.5–10.5)
CHLORIDE SERPL-SCNC: 101 MEQ/L (ref 98–111)
CHOLEST SERPL-MCNC: 132 MG/DL (ref 100–199)
CK SERPL-CCNC: 448 U/L (ref 55–170)
CO2 SERPL-SCNC: 26 MEQ/L (ref 23–33)
CREAT SERPL-MCNC: 0.7 MG/DL (ref 0.4–1.2)
DEPRECATED MEAN GLUCOSE BLD GHB EST-ACNC: 150 MG/DL (ref 70–126)
DEPRECATED RDW RBC AUTO: 43.9 FL (ref 35–45)
EOSINOPHIL NFR BLD AUTO: 2.8 %
EOSINOPHILS ABSOLUTE: 0.1 THOU/MM3 (ref 0–0.4)
ERYTHROCYTE [DISTWIDTH] IN BLOOD BY AUTOMATED COUNT: 13.8 % (ref 11.5–14.5)
GFR SERPL CREATININE-BSD FRML MDRD: > 90 ML/MIN/1.73M2
GLUCOSE SERPL-MCNC: 119 MG/DL (ref 70–108)
HBA1C MFR BLD HPLC: 7 % (ref 4.4–6.4)
HCT VFR BLD AUTO: 42 % (ref 42–52)
HDLC SERPL-MCNC: 34 MG/DL
HGB BLD-MCNC: 13.7 GM/DL (ref 14–18)
IMM GRANULOCYTES # BLD AUTO: 0.01 THOU/MM3 (ref 0–0.07)
IMM GRANULOCYTES NFR BLD AUTO: 0.2 %
LDLC SERPL CALC-MCNC: 74 MG/DL
LYMPHOCYTES ABSOLUTE: 1.3 THOU/MM3 (ref 1–4.8)
LYMPHOCYTES NFR BLD AUTO: 26.9 %
MCH RBC QN AUTO: 28.5 PG (ref 26–33)
MCHC RBC AUTO-ENTMCNC: 32.6 GM/DL (ref 32.2–35.5)
MCV RBC AUTO: 87.5 FL (ref 80–94)
MONOCYTES ABSOLUTE: 0.3 THOU/MM3 (ref 0.4–1.3)
MONOCYTES NFR BLD AUTO: 6.2 %
NEUTROPHILS ABSOLUTE: 3 THOU/MM3 (ref 1.8–7.7)
NEUTROPHILS NFR BLD AUTO: 63 %
NRBC BLD AUTO-RTO: 0 /100 WBC
PLATELET # BLD AUTO: 94 THOU/MM3 (ref 130–400)
PLATELET BLD QL SMEAR: ABNORMAL
PMV BLD AUTO: 11.8 FL (ref 9.4–12.4)
POTASSIUM SERPL-SCNC: 4.3 MEQ/L (ref 3.5–5.2)
PROT SERPL-MCNC: 7.3 G/DL (ref 6.1–8)
RBC # BLD AUTO: 4.8 MILL/MM3 (ref 4.7–6.1)
SCAN OF BLOOD SMEAR: NORMAL
SODIUM SERPL-SCNC: 136 MEQ/L (ref 135–145)
TRIGL SERPL-MCNC: 119 MG/DL (ref 0–199)
WBC # BLD AUTO: 4.7 THOU/MM3 (ref 4.8–10.8)

## 2024-12-11 NOTE — TELEPHONE ENCOUNTER
----- Message from Dr. Elkin Ramirez MD sent at 12/11/2024  7:08 AM EST -----  CK ( muscle enzyme) remains elevated.  CMP is good.  CBC is good except for low platelets ( stable).  A1C is increased at 7.0 c/w diabetes.  Low carb diet/exercise as tolerated.  Is he interested in diabetic education?  Cholesterol good at 132. Recommend adding metformin 500 mg BID if agreeable, recheck A1c in 3 months.  Recommend seeing PM&R ( DR. Edmonds et al) for persistent elevated CK levels and family history of MD.

## 2024-12-11 NOTE — TELEPHONE ENCOUNTER
Pt notified and is agreeable to referral. Referral placed and faxed.    Pt not interested in diabetic education at this time. States he is familiar with it through family and friends that have it.   He is agreeable to starting metformin, [facility] Lab order to recheck A1c in 3 months mailed to pt

## 2024-12-12 ENCOUNTER — HOSPITAL ENCOUNTER (OUTPATIENT)
Dept: INFUSION THERAPY | Age: 39
Discharge: HOME | End: 2024-12-12
Payer: COMMERCIAL

## 2024-12-12 ENCOUNTER — OFFICE VISIT (OUTPATIENT)
Dept: ONCOLOGY | Age: 39
End: 2024-12-12
Payer: COMMERCIAL

## 2024-12-12 VITALS
DIASTOLIC BLOOD PRESSURE: 88 MMHG | HEART RATE: 72 BPM | RESPIRATION RATE: 18 BRPM | SYSTOLIC BLOOD PRESSURE: 160 MMHG | TEMPERATURE: 97.9 F

## 2024-12-12 VITALS
DIASTOLIC BLOOD PRESSURE: 88 MMHG | OXYGEN SATURATION: 97 % | BODY MASS INDEX: 45.1 KG/M2 | TEMPERATURE: 97.9 F | HEART RATE: 72 BPM | SYSTOLIC BLOOD PRESSURE: 160 MMHG | WEIGHT: 315 LBS | RESPIRATION RATE: 18 BRPM | HEIGHT: 70 IN

## 2024-12-12 DIAGNOSIS — D69.6 THROMBOCYTOPENIA (HCC): ICD-10-CM

## 2024-12-12 DIAGNOSIS — D64.9 NORMOCYTIC ANEMIA: Primary | ICD-10-CM

## 2024-12-12 DIAGNOSIS — D64.9 NORMOCYTIC ANEMIA: ICD-10-CM

## 2024-12-12 LAB
ALBUMIN SERPL BCG-MCNC: 4.2 G/DL (ref 3.5–5.1)
ALP SERPL-CCNC: 110 U/L (ref 38–126)
ALT SERPL W/O P-5'-P-CCNC: 61 U/L (ref 11–66)
ANION GAP SERPL CALC-SCNC: 10 MEQ/L (ref 8–16)
APTT PPP: 35.2 SECONDS (ref 22–38)
AST SERPL-CCNC: 47 U/L (ref 5–40)
BASOPHILS ABSOLUTE: 0 THOU/MM3 (ref 0–0.1)
BASOPHILS NFR BLD AUTO: 0.8 %
BILIRUB SERPL-MCNC: 0.6 MG/DL (ref 0.3–1.2)
BUN SERPL-MCNC: 6 MG/DL (ref 7–22)
CALCIUM SERPL-MCNC: 9.3 MG/DL (ref 8.5–10.5)
CHLORIDE SERPL-SCNC: 100 MEQ/L (ref 98–111)
CLOSURE TME BLD-IMP: NORMAL SECONDS
CLOSURE TME COLL+EPINEP BLD: 117 SECONDS (ref 68–175)
CO2 SERPL-SCNC: 27 MEQ/L (ref 23–33)
CREAT SERPL-MCNC: 0.7 MG/DL (ref 0.4–1.2)
CRP SERPL-MCNC: 1.23 MG/DL (ref 0–1)
DEPRECATED RDW RBC AUTO: 45.3 FL (ref 35–45)
EOSINOPHIL NFR BLD AUTO: 2 %
EOSINOPHILS ABSOLUTE: 0.1 THOU/MM3 (ref 0–0.4)
ERYTHROCYTE [DISTWIDTH] IN BLOOD BY AUTOMATED COUNT: 14.1 % (ref 11.5–14.5)
ERYTHROCYTE [SEDIMENTATION RATE] IN BLOOD BY WESTERGREN METHOD: 4 MM/HR (ref 0–10)
FERRITIN SERPL IA-MCNC: 225 NG/ML (ref 22–322)
FOLATE SERPL-MCNC: 7.4 NG/ML (ref 4.8–24.2)
GFR SERPL CREATININE-BSD FRML MDRD: > 90 ML/MIN/1.73M2
GLUCOSE SERPL-MCNC: 130 MG/DL (ref 70–108)
HAV IGM SER QL: NEGATIVE
HBV CORE IGM SERPL QL IA: NEGATIVE
HBV SURFACE AG SERPL QL IA: NEGATIVE
HCT VFR BLD AUTO: 45 % (ref 42–52)
HCV IGG SERPL QL IA: NEGATIVE
HGB BLD-MCNC: 14.9 GM/DL (ref 14–18)
IMM GRANULOCYTES # BLD AUTO: 0.02 THOU/MM3 (ref 0–0.07)
IMM GRANULOCYTES NFR BLD AUTO: 0.4 %
INR PPP: 1.05 (ref 0.85–1.13)
IRON SATN MFR SERPL: 21 % (ref 20–50)
IRON SERPL-MCNC: 66 UG/DL (ref 65–195)
LYMPHOCYTES ABSOLUTE: 1.3 THOU/MM3 (ref 1–4.8)
LYMPHOCYTES NFR BLD AUTO: 25.5 %
MCH RBC QN AUTO: 29 PG (ref 26–33)
MCHC RBC AUTO-ENTMCNC: 33.1 GM/DL (ref 32.2–35.5)
MCV RBC AUTO: 87.5 FL (ref 80–94)
MONOCYTES ABSOLUTE: 0.3 THOU/MM3 (ref 0.4–1.3)
MONOCYTES NFR BLD AUTO: 5 %
NEUTROPHILS ABSOLUTE: 3.3 THOU/MM3 (ref 1.8–7.7)
NEUTROPHILS NFR BLD AUTO: 66.3 %
NRBC BLD AUTO-RTO: 0 /100 WBC
PLATELET # BLD AUTO: 108 THOU/MM3 (ref 130–400)
PMV BLD AUTO: 12.2 FL (ref 9.4–12.4)
POTASSIUM SERPL-SCNC: 4.1 MEQ/L (ref 3.5–5.2)
PROT SERPL-MCNC: 8 G/DL (ref 6.1–8)
RBC # BLD AUTO: 5.14 MILL/MM3 (ref 4.7–6.1)
RHEUMATOID FACT SERPL-ACNC: 12 IU/ML (ref 0–13)
SODIUM SERPL-SCNC: 137 MEQ/L (ref 135–145)
TIBC SERPL-MCNC: 308 UG/DL (ref 171–450)
VIT B12 SERPL-MCNC: 338 PG/ML (ref 211–911)
WBC # BLD AUTO: 5 THOU/MM3 (ref 4.8–10.8)

## 2024-12-12 PROCEDURE — 86140 C-REACTIVE PROTEIN: CPT

## 2024-12-12 PROCEDURE — 82607 VITAMIN B-12: CPT

## 2024-12-12 PROCEDURE — 86430 RHEUMATOID FACTOR TEST QUAL: CPT

## 2024-12-12 PROCEDURE — 86147 CARDIOLIPIN ANTIBODY EA IG: CPT

## 2024-12-12 PROCEDURE — 86665 EPSTEIN-BARR CAPSID VCA: CPT

## 2024-12-12 PROCEDURE — G8427 DOCREV CUR MEDS BY ELIG CLIN: HCPCS | Performed by: INTERNAL MEDICINE

## 2024-12-12 PROCEDURE — G8484 FLU IMMUNIZE NO ADMIN: HCPCS | Performed by: INTERNAL MEDICINE

## 2024-12-12 PROCEDURE — 1036F TOBACCO NON-USER: CPT | Performed by: INTERNAL MEDICINE

## 2024-12-12 PROCEDURE — 88184 FLOWCYTOMETRY/ TC 1 MARKER: CPT

## 2024-12-12 PROCEDURE — 36415 COLL VENOUS BLD VENIPUNCTURE: CPT

## 2024-12-12 PROCEDURE — 82728 ASSAY OF FERRITIN: CPT

## 2024-12-12 PROCEDURE — 85610 PROTHROMBIN TIME: CPT

## 2024-12-12 PROCEDURE — 88185 FLOWCYTOMETRY/TC ADD-ON: CPT

## 2024-12-12 PROCEDURE — 86038 ANTINUCLEAR ANTIBODIES: CPT

## 2024-12-12 PROCEDURE — 85025 COMPLETE CBC W/AUTO DIFF WBC: CPT

## 2024-12-12 PROCEDURE — 83550 IRON BINDING TEST: CPT

## 2024-12-12 PROCEDURE — 87389 HIV-1 AG W/HIV-1&-2 AB AG IA: CPT

## 2024-12-12 PROCEDURE — 80074 ACUTE HEPATITIS PANEL: CPT

## 2024-12-12 PROCEDURE — 82525 ASSAY OF COPPER: CPT

## 2024-12-12 PROCEDURE — G8417 CALC BMI ABV UP PARAM F/U: HCPCS | Performed by: INTERNAL MEDICINE

## 2024-12-12 PROCEDURE — 86146 BETA-2 GLYCOPROTEIN ANTIBODY: CPT

## 2024-12-12 PROCEDURE — 85576 BLOOD PLATELET AGGREGATION: CPT

## 2024-12-12 PROCEDURE — 85613 RUSSELL VIPER VENOM DILUTED: CPT

## 2024-12-12 PROCEDURE — 85730 THROMBOPLASTIN TIME PARTIAL: CPT

## 2024-12-12 PROCEDURE — 83540 ASSAY OF IRON: CPT

## 2024-12-12 PROCEDURE — 85651 RBC SED RATE NONAUTOMATED: CPT

## 2024-12-12 PROCEDURE — 82746 ASSAY OF FOLIC ACID SERUM: CPT

## 2024-12-12 PROCEDURE — 84630 ASSAY OF ZINC: CPT

## 2024-12-12 PROCEDURE — 99215 OFFICE O/P EST HI 40 MIN: CPT | Performed by: INTERNAL MEDICINE

## 2024-12-12 PROCEDURE — 86663 EPSTEIN-BARR ANTIBODY: CPT

## 2024-12-12 PROCEDURE — 80053 COMPREHEN METABOLIC PANEL: CPT

## 2024-12-12 PROCEDURE — 99211 OFF/OP EST MAY X REQ PHY/QHP: CPT

## 2024-12-12 PROCEDURE — 86664 EPSTEIN-BARR NUCLEAR ANTIGEN: CPT

## 2024-12-12 NOTE — PROGRESS NOTES
[facility] [place] [facility]  [facility]  [address]  [address]  [place] [id number]  Dept: [phone]  Loc: [phone]     Hematology / Oncology New Patient Office Visit      Rogelio Gonzalez  1985 12/12/2024     Referring Provider: Elkin Ramirez MD   PCP: Elkin Ramirez MD       HPI:   History Obtained From:  patient, spouse    The patient is a 39 y.o. male with significant multiple co-morbidities and of particular note with a longstanding history of thrombocytopenia that goes back at least 10 years.     He has a history of thrombocytopenia, first identified in 2012 during an episode of diverticulitis with colon rupture. Since then, platelet counts have consistently been low, typically ranging from 80 to 100, with the lowest recorded at 50-60. Recent lab work on October 17, 2024, showed a platelet count of 89. A bone marrow biopsy was performed in the past, but records are unavailable. He feels 'really run down, really weak' over the last six months, with episodes of a racing heart. No bleeding when brushing teeth, but heavy bleeding from minor scratches. No bleeding in stool or urine.    He experiences severe muscle spasms, describing them as 'really bad' and 'on the verge of a cramp nonstop.' He has not consulted a neurologist for these symptoms. His father has a history of lupus, muscular dystrophy, and a channelopathy, while his mother has hidradenitis suppurativa.    He is currently disabled due to back issues, having undergone three back surgeries with fusion from L3 down. He lives in a handicap-accessible apartment and reports difficulty walking due to muscle cramps. He smokes a pack a day and has a history of prediabetes, for which he was prescribed metformin. He experiences insomnia and renaldo, affecting his sleep patterns. He has attempted to quit drinking Mountain Dew, which led to constipation and hemorrhoids for some reason.

## 2024-12-12 NOTE — PATIENT INSTRUCTIONS
Labs today  Ov with labs in 3 months for follow up  Will bring bone marrow results from the past otherwise will order bone marrow next week.  Needs to see primary care for referral neurology, also blood pressure control and diabetes management.  Present to emergency room if any stroke symptoms appear or persist

## 2024-12-13 LAB — HIV 1+2 AB+HIV1 P24 AG SERPL QL IA: NORMAL

## 2024-12-15 LAB
B2 GLYCOPROT1 IGG SERPL IA-ACNC: < 10 SGU
B2 GLYCOPROT1 IGM SERPL IA-ACNC: < 10 SMU
CARDIOLIPIN IGG SER IA-ACNC: < 10 GPL
CARDIOLIPIN IGM SER IA-ACNC: < 10 MPL
COPPER SERPL-MCNC: 109 UG/DL (ref 70–140)
EPSTEIN-BARR VIRUS ANTIBODIES: NORMAL
LEUK/LYMPH PHENOTYPING WB: NORMAL
NUCLEAR IGG SER QL IA: NORMAL
ZINC SERPL-MCNC: 72.4 UG/DL (ref 60–120)

## 2024-12-16 ASSESSMENT — ENCOUNTER SYMPTOMS
CONSTIPATION: 0
SHORTNESS OF BREATH: 0
ABDOMINAL PAIN: 0
COLOR CHANGE: 0
PHOTOPHOBIA: 0
VOMITING: 0
BACK PAIN: 0
EYE REDNESS: 0
CHEST TIGHTNESS: 0
FACIAL SWELLING: 0
COUGH: 0
SORE THROAT: 0
NAUSEA: 0
DIARRHEA: 0

## 2024-12-17 LAB
CONFIRM DRVVT STA-STACLOT: NORMAL S
DRVVT SCREEN TO CONFIRM RATIO: NORMAL {RATIO}
DRVVT/DRVVT CFM P DOAC NEUT NORM PPP-RTO: NORMAL {RATIO}
HEPARIN NT PPP QL: NORMAL
LA 3 SCREEN W REFLEX-IMP: NORMAL
LMW HEPARIN IND PLT AB SER QL: NORMAL
MIXING DRVVT/NORMAL: NORMAL %
PROTHROMBIN TIME: 13.4 S (ref 12–15.5)
SCREEN APTT/NORMAL: 1.04
SCREEN APTT/NORMAL: NORMAL
SCREEN DRVVT/NORMAL: 0.93 %
THROMBIN TIME: NORMAL S

## 2024-12-23 ENCOUNTER — HOSPITAL ENCOUNTER (OUTPATIENT)
Dept: ULTRASOUND IMAGING | Age: 39
Discharge: HOME | End: 2024-12-23
Attending: INTERNAL MEDICINE
Payer: COMMERCIAL

## 2024-12-23 DIAGNOSIS — D64.9 NORMOCYTIC ANEMIA: ICD-10-CM

## 2024-12-23 DIAGNOSIS — D69.6 THROMBOCYTOPENIA (HCC): ICD-10-CM

## 2024-12-23 PROCEDURE — 76705 ECHO EXAM OF ABDOMEN: CPT

## 2025-01-06 DIAGNOSIS — F41.0 PANIC ATTACKS: ICD-10-CM

## 2025-01-06 DIAGNOSIS — L73.2 HIDRADENITIS SUPPURATIVA: ICD-10-CM

## 2025-01-06 DIAGNOSIS — G47.9 SLEEP DISTURBANCE: ICD-10-CM

## 2025-01-06 RX ORDER — ALPRAZOLAM 0.5 MG
0.5 TABLET ORAL 3 TIMES DAILY PRN
Qty: 75 TABLET | Refills: 0 | Status: SHIPPED | OUTPATIENT
Start: 2025-01-06 | End: 2025-02-05

## 2025-01-06 RX ORDER — HYDROCODONE BITARTRATE AND ACETAMINOPHEN 5; 325 MG/1; MG/1
1-2 TABLET ORAL EVERY 8 HOURS PRN
Qty: 150 TABLET | Refills: 0 | Status: SHIPPED | OUTPATIENT
Start: 2025-01-06 | End: 2025-02-05

## 2025-01-06 RX ORDER — ZOLPIDEM TARTRATE 10 MG/1
10 TABLET ORAL NIGHTLY PRN
Qty: 30 TABLET | Refills: 0 | Status: SHIPPED | OUTPATIENT
Start: 2025-01-06 | End: 2025-02-05

## 2025-01-06 NOTE — TELEPHONE ENCOUNTER
Rogelio Gonzalez called requesting a refill on the following medications:  Requested Prescriptions     Pending Prescriptions Disp Refills    ALPRAZolam (XANAX) 0.5 MG tablet 75 tablet 0     Sig: Take 1 tablet by mouth 3 times daily as needed for Anxiety for up to 30 days. Max Daily Amount: 1.5 mg    HYDROcodone-acetaminophen (NORCO) 5-325 MG per tablet 180 tablet 0     Sig: Take 2 tablets by mouth every 8 hours as needed for Pain for up to 30 days. Max Daily Amount: 6 tablets    zolpidem (AMBIEN) 10 MG tablet 30 tablet 0     Sig: Take 1 tablet by mouth nightly as needed for Sleep for up to 30 days. Max Daily Amount: 10 mg       Date of last visit: 12/10/2024  Date of next visit (if applicable):Visit date not found  Date of last refill: 12/6/24  [facility]

## 2025-01-06 NOTE — TELEPHONE ENCOUNTER
Ep'ed requested meds to pharm requested    Controlled Substance Monitoring:    Acute and Chronic Pain Monitoring:   RX Monitoring Periodic Controlled Substance Monitoring   12/10/2024   1:11 PM No signs of potential drug abuse or diversion identified.;Possible medication side effects, risk of tolerance/dependence & alternative treatments discussed.;Obtaining appropriate analgesic effect of treatment.

## 2025-01-07 ENCOUNTER — OFFICE VISIT (OUTPATIENT)
Dept: FAMILY MEDICINE CLINIC | Age: 40
End: 2025-01-07

## 2025-01-07 VITALS
WEIGHT: 315 LBS | SYSTOLIC BLOOD PRESSURE: 158 MMHG | BODY MASS INDEX: 47.32 KG/M2 | DIASTOLIC BLOOD PRESSURE: 96 MMHG | TEMPERATURE: 97.9 F | RESPIRATION RATE: 24 BRPM | HEART RATE: 78 BPM

## 2025-01-07 DIAGNOSIS — E11.9 TYPE 2 DIABETES MELLITUS WITHOUT COMPLICATION, WITHOUT LONG-TERM CURRENT USE OF INSULIN (HCC): ICD-10-CM

## 2025-01-07 DIAGNOSIS — J44.9 CHRONIC OBSTRUCTIVE PULMONARY DISEASE, UNSPECIFIED COPD TYPE (HCC): ICD-10-CM

## 2025-01-07 DIAGNOSIS — I75.023 BLUE TOE SYNDROME OF BOTH LOWER EXTREMITIES (HCC): ICD-10-CM

## 2025-01-07 DIAGNOSIS — I10 BENIGN ESSENTIAL HTN: Primary | ICD-10-CM

## 2025-01-07 RX ORDER — AMOXICILLIN 500 MG/1
500 CAPSULE ORAL 4 TIMES DAILY
COMMUNITY

## 2025-01-07 RX ORDER — VALSARTAN 80 MG/1
80 TABLET ORAL DAILY
Qty: 30 TABLET | Refills: 3 | Status: SHIPPED | OUTPATIENT
Start: 2025-01-07

## 2025-01-07 ASSESSMENT — ENCOUNTER SYMPTOMS
DIARRHEA: 0
SORE THROAT: 0
ABDOMINAL PAIN: 0
NAUSEA: 0
SHORTNESS OF BREATH: 0
RHINORRHEA: 0
COUGH: 0
VOMITING: 0
CONSTIPATION: 0
EYE PAIN: 0
BLOOD IN STOOL: 0
CHEST TIGHTNESS: 0
BACK PAIN: 0
WHEEZING: 0

## 2025-01-07 ASSESSMENT — PATIENT HEALTH QUESTIONNAIRE - PHQ9
9. THOUGHTS THAT YOU WOULD BE BETTER OFF DEAD, OR OF HURTING YOURSELF: NOT AT ALL
3. TROUBLE FALLING OR STAYING ASLEEP: MORE THAN HALF THE DAYS
7. TROUBLE CONCENTRATING ON THINGS, SUCH AS READING THE NEWSPAPER OR WATCHING TELEVISION: MORE THAN HALF THE DAYS
10. IF YOU CHECKED OFF ANY PROBLEMS, HOW DIFFICULT HAVE THESE PROBLEMS MADE IT FOR YOU TO DO YOUR WORK, TAKE CARE OF THINGS AT HOME, OR GET ALONG WITH OTHER PEOPLE: VERY DIFFICULT
2. FEELING DOWN, DEPRESSED OR HOPELESS: MORE THAN HALF THE DAYS
7. TROUBLE CONCENTRATING ON THINGS, SUCH AS READING THE NEWSPAPER OR WATCHING TELEVISION: MORE THAN HALF THE DAYS
SUM OF ALL RESPONSES TO PHQ9 QUESTIONS 1 & 2: 4
2. FEELING DOWN, DEPRESSED OR HOPELESS: MORE THAN HALF THE DAYS
6. FEELING BAD ABOUT YOURSELF - OR THAT YOU ARE A FAILURE OR HAVE LET YOURSELF OR YOUR FAMILY DOWN: MORE THAN HALF THE DAYS
SUM OF ALL RESPONSES TO PHQ9 QUESTIONS 1 & 2: 4
5. POOR APPETITE OR OVEREATING: MORE THAN HALF THE DAYS
3. TROUBLE FALLING OR STAYING ASLEEP: MORE THAN HALF THE DAYS
5. POOR APPETITE OR OVEREATING: MORE THAN HALF THE DAYS
1. LITTLE INTEREST OR PLEASURE IN DOING THINGS: MORE THAN HALF THE DAYS
SUM OF ALL RESPONSES TO PHQ QUESTIONS 1-9: 16
1. LITTLE INTEREST OR PLEASURE IN DOING THINGS: MORE THAN HALF THE DAYS
SUM OF ALL RESPONSES TO PHQ QUESTIONS 1-9: 16
4. FEELING TIRED OR HAVING LITTLE ENERGY: MORE THAN HALF THE DAYS
8. MOVING OR SPEAKING SO SLOWLY THAT OTHER PEOPLE COULD HAVE NOTICED. OR THE OPPOSITE - BEING SO FIDGETY OR RESTLESS THAT YOU HAVE BEEN MOVING AROUND A LOT MORE THAN USUAL: MORE THAN HALF THE DAYS
10. IF YOU CHECKED OFF ANY PROBLEMS, HOW DIFFICULT HAVE THESE PROBLEMS MADE IT FOR YOU TO DO YOUR WORK, TAKE CARE OF THINGS AT HOME, OR GET ALONG WITH OTHER PEOPLE: VERY DIFFICULT
6. FEELING BAD ABOUT YOURSELF - OR THAT YOU ARE A FAILURE OR HAVE LET YOURSELF OR YOUR FAMILY DOWN: MORE THAN HALF THE DAYS
SUM OF ALL RESPONSES TO PHQ QUESTIONS 1-9: 16
9. THOUGHTS THAT YOU WOULD BE BETTER OFF DEAD, OR OF HURTING YOURSELF: NOT AT ALL
SUM OF ALL RESPONSES TO PHQ QUESTIONS 1-9: 16
8. MOVING OR SPEAKING SO SLOWLY THAT OTHER PEOPLE COULD HAVE NOTICED. OR THE OPPOSITE, BEING SO FIGETY OR RESTLESS THAT YOU HAVE BEEN MOVING AROUND A LOT MORE THAN USUAL: MORE THAN HALF THE DAYS
4. FEELING TIRED OR HAVING LITTLE ENERGY: MORE THAN HALF THE DAYS
SUM OF ALL RESPONSES TO PHQ QUESTIONS 1-9: 16

## 2025-01-07 NOTE — PROGRESS NOTES
Rogelio Gonzalez (:  1985) is a 39 y.o. male,Established patient, here for evaluation of the following chief complaint(s):  Hypertension (At dentist 195/125, told to go to ER but came to PCP)         Assessment & Plan  Benign essential HTN   Chronic, worsening (exacerbation), add diovan 80 mg daily, BP log, report in 2 weeks, Dash diet    Orders:    valsartan (DIOVAN) 80 MG tablet; Take 1 tablet by mouth daily    Blue toe syndrome of both lower extremities (HCC)   Monitored by specialist- no acute findings meriting change in the plan         Type 2 diabetes mellitus without complication, without long-term current use of insulin (HCC)   Chronic, at goal (stable), continue current treatment plan, serial a1cs,   Hemoglobin A1C   Date Value Ref Range Status   12/10/2024 7.0 (H) 4.4 - 6.4 % Final              Chronic obstructive pulmonary disease, unspecified COPD type (HCC)   Chronic, at goal (stable), continue current treatment plan           No follow-ups on file.       Subjective   Hypertension  Pertinent negatives include no chest pain, headaches, neck pain, palpitations or shortness of breath.      Patient here today for a check up.  Reviewed BMI of 47 Encouraged diet, exercise and weight loss.  Elevated BPs, especially at the dentist.  No sxs.  Previous has had benicar and lisinopril.  Felt weak with these.  Single, current smoker, pmh reviewed.       Review of Systems   Constitutional:  Negative for chills, fatigue, fever and unexpected weight change.   HENT:  Negative for congestion, ear pain, rhinorrhea and sore throat.    Eyes:  Negative for pain and visual disturbance.   Respiratory:  Negative for cough, chest tightness, shortness of breath and wheezing.    Cardiovascular:  Negative for chest pain and palpitations.   Gastrointestinal:  Negative for abdominal pain, blood in stool, constipation, diarrhea, nausea and vomiting.   Genitourinary:  Negative for difficulty urinating, frequency, hematuria

## 2025-01-07 NOTE — ASSESSMENT & PLAN NOTE
Chronic, at goal (stable), continue current treatment plan, serial a1cs,   Hemoglobin A1C   Date Value Ref Range Status   12/10/2024 7.0 (H) 4.4 - 6.4 % Final

## 2025-02-04 DIAGNOSIS — F41.0 PANIC ATTACKS: ICD-10-CM

## 2025-02-04 DIAGNOSIS — L73.2 HIDRADENITIS SUPPURATIVA: ICD-10-CM

## 2025-02-04 DIAGNOSIS — G47.9 SLEEP DISTURBANCE: ICD-10-CM

## 2025-02-04 RX ORDER — ZOLPIDEM TARTRATE 10 MG/1
10 TABLET ORAL NIGHTLY PRN
Qty: 30 TABLET | Refills: 0 | Status: SHIPPED | OUTPATIENT
Start: 2025-02-04 | End: 2025-03-06

## 2025-02-04 RX ORDER — HYDROCODONE BITARTRATE AND ACETAMINOPHEN 5; 325 MG/1; MG/1
1-2 TABLET ORAL EVERY 8 HOURS PRN
Qty: 150 TABLET | Refills: 0 | Status: SHIPPED | OUTPATIENT
Start: 2025-02-04 | End: 2025-03-06

## 2025-02-04 RX ORDER — ALPRAZOLAM 0.5 MG
0.5 TABLET ORAL 3 TIMES DAILY PRN
Qty: 75 TABLET | Refills: 0 | Status: SHIPPED | OUTPATIENT
Start: 2025-02-04 | End: 2025-03-06

## 2025-02-04 NOTE — TELEPHONE ENCOUNTER
Ep'ed requested meds to pharm requested  Controlled Substance Monitoring:    Acute and Chronic Pain Monitoring:   RX Monitoring Periodic Controlled Substance Monitoring   2/4/2025   4:11 PM No signs of potential drug abuse or diversion identified.

## 2025-02-04 NOTE — TELEPHONE ENCOUNTER
Rogelio Gonzalez called requesting a refill on the following medications:  Requested Prescriptions     Pending Prescriptions Disp Refills    ALPRAZolam (XANAX) 0.5 MG tablet 75 tablet 0     Sig: Take 1 tablet by mouth 3 times daily as needed for Anxiety for up to 30 days. Max Daily Amount: 1.5 mg    HYDROcodone-acetaminophen (NORCO) 5-325 MG per tablet 150 tablet 0     Sig: Take 1-2 tablets by mouth every 8 hours as needed for Pain for up to 30 days. Max Daily Amount: 5 tablets    zolpidem (AMBIEN) 10 MG tablet 30 tablet 0     Sig: Take 1 tablet by mouth nightly as needed for Sleep for up to 30 days. Max Daily Amount: 10 mg       Date of last visit: 1/7/2025  Date of next visit (if applicable):Visit date not found  Date of last refill: 1/6/25  [facility]

## 2025-03-10 DIAGNOSIS — F41.0 PANIC ATTACKS: ICD-10-CM

## 2025-03-10 DIAGNOSIS — G47.9 SLEEP DISTURBANCE: ICD-10-CM

## 2025-03-10 DIAGNOSIS — L73.2 HIDRADENITIS SUPPURATIVA: ICD-10-CM

## 2025-03-10 RX ORDER — HYDROCODONE BITARTRATE AND ACETAMINOPHEN 5; 325 MG/1; MG/1
1-2 TABLET ORAL EVERY 8 HOURS PRN
Qty: 150 TABLET | Refills: 0 | Status: SHIPPED | OUTPATIENT
Start: 2025-03-10 | End: 2025-04-09

## 2025-03-10 RX ORDER — ZOLPIDEM TARTRATE 10 MG/1
10 TABLET ORAL NIGHTLY PRN
Qty: 30 TABLET | Refills: 0 | Status: SHIPPED | OUTPATIENT
Start: 2025-03-10 | End: 2025-04-09

## 2025-03-10 RX ORDER — ALPRAZOLAM 0.5 MG
0.5 TABLET ORAL 3 TIMES DAILY PRN
Qty: 75 TABLET | Refills: 0 | Status: SHIPPED | OUTPATIENT
Start: 2025-03-10 | End: 2025-04-09

## 2025-03-10 NOTE — TELEPHONE ENCOUNTER
Ep'ed requested meds to pharm requested    Controlled Substance Monitoring:    Acute and Chronic Pain Monitoring:   RX Monitoring Periodic Controlled Substance Monitoring   3/10/2025  12:15 PM No signs of potential drug abuse or diversion identified.

## 2025-03-10 NOTE — TELEPHONE ENCOUNTER
Rogelio Gonzalez called requesting a refill on the following medications:  Requested Prescriptions     Pending Prescriptions Disp Refills    ALPRAZolam (XANAX) 0.5 MG tablet 75 tablet 0     Sig: Take 1 tablet by mouth 3 times daily as needed for Anxiety for up to 30 days. Max Daily Amount: 1.5 mg    HYDROcodone-acetaminophen (NORCO) 5-325 MG per tablet 150 tablet 0     Sig: Take 1-2 tablets by mouth every 8 hours as needed for Pain for up to 30 days. Max Daily Amount: 5 tablets    zolpidem (AMBIEN) 10 MG tablet 30 tablet 0     Sig: Take 1 tablet by mouth nightly as needed for Sleep for up to 30 days. Max Daily Amount: 10 mg       Date of last visit: 1/7/2025  Date of next visit (if applicable):Visit date not found  Date of last refill: 2/4/25  [facility]

## 2025-03-17 ENCOUNTER — OFFICE VISIT (OUTPATIENT)
Dept: ONCOLOGY | Age: 40
End: 2025-03-17
Payer: COMMERCIAL

## 2025-03-17 ENCOUNTER — HOSPITAL ENCOUNTER (OUTPATIENT)
Dept: INFUSION THERAPY | Age: 40
Discharge: HOME | End: 2025-03-17
Payer: COMMERCIAL

## 2025-03-17 VITALS
RESPIRATION RATE: 16 BRPM | DIASTOLIC BLOOD PRESSURE: 80 MMHG | SYSTOLIC BLOOD PRESSURE: 130 MMHG | OXYGEN SATURATION: 97 % | HEART RATE: 67 BPM | TEMPERATURE: 98.2 F

## 2025-03-17 VITALS
TEMPERATURE: 98.2 F | DIASTOLIC BLOOD PRESSURE: 80 MMHG | RESPIRATION RATE: 16 BRPM | OXYGEN SATURATION: 97 % | SYSTOLIC BLOOD PRESSURE: 130 MMHG | HEART RATE: 67 BPM

## 2025-03-17 DIAGNOSIS — D64.9 NORMOCYTIC ANEMIA: ICD-10-CM

## 2025-03-17 DIAGNOSIS — D69.6 THROMBOCYTOPENIA: ICD-10-CM

## 2025-03-17 DIAGNOSIS — D64.9 ANEMIA, UNSPECIFIED TYPE: ICD-10-CM

## 2025-03-17 DIAGNOSIS — D64.9 NORMOCYTIC ANEMIA: Primary | ICD-10-CM

## 2025-03-17 LAB
BASOPHILS ABSOLUTE: 0 THOU/MM3 (ref 0–0.1)
BASOPHILS NFR BLD AUTO: 0 % (ref 0–3)
EOSINOPHIL NFR BLD AUTO: 4 % (ref 0–4)
EOSINOPHILS ABSOLUTE: 0.2 THOU/MM3 (ref 0–0.4)
ERYTHROCYTE [DISTWIDTH] IN BLOOD BY AUTOMATED COUNT: 13.7 % (ref 11.5–14.5)
HCT VFR BLD AUTO: 42.8 % (ref 42–52)
HGB BLD-MCNC: 14.1 GM/DL (ref 14–18)
IMMATURE GRANULOCYTES %: 0 %
IMMATURE GRANULOCYTES ABSOLUTE: 0.01 THOU/MM3 (ref 0–0.07)
LYMPHOCYTES ABSOLUTE: 1.2 THOU/MM3 (ref 1–4.8)
LYMPHOCYTES NFR BLD AUTO: 27 % (ref 15–47)
MCH RBC QN AUTO: 29.7 PG (ref 26–33)
MCHC RBC AUTO-ENTMCNC: 32.9 GM/DL (ref 32.2–35.5)
MCV RBC AUTO: 90 FL (ref 80–94)
MONOCYTES ABSOLUTE: 0.2 THOU/MM3 (ref 0.4–1.3)
MONOCYTES NFR BLD AUTO: 5 % (ref 0–12)
NEUTROPHILS ABSOLUTE: 2.9 THOU/MM3 (ref 1.8–7.7)
NEUTROPHILS NFR BLD AUTO: 64 % (ref 43–75)
PLATELET # BLD AUTO: 91 THOU/MM3 (ref 130–400)
PMV BLD AUTO: 10.7 FL (ref 9.4–12.4)
RBC # BLD AUTO: 4.75 MILL/MM3 (ref 4.7–6.1)
WBC # BLD AUTO: 4.5 THOU/MM3 (ref 4.8–10.8)

## 2025-03-17 PROCEDURE — 99213 OFFICE O/P EST LOW 20 MIN: CPT | Performed by: NURSE PRACTITIONER

## 2025-03-17 PROCEDURE — 99211 OFF/OP EST MAY X REQ PHY/QHP: CPT

## 2025-03-17 PROCEDURE — 36415 COLL VENOUS BLD VENIPUNCTURE: CPT

## 2025-03-17 PROCEDURE — 85025 COMPLETE CBC W/AUTO DIFF WBC: CPT

## 2025-03-17 RX ORDER — FERROUS SULFATE 325(65) MG
325 TABLET ORAL
Qty: 90 TABLET | Refills: 1 | Status: SHIPPED | OUTPATIENT
Start: 2025-03-17

## 2025-03-17 NOTE — PROGRESS NOTES
Differential; Future    Mild iron deficiency  Iron 66, iron sat 21, TIBC 225, ferritin 225  Will start daily oral iron with vitamin C      - ferrous sulfate (IRON 325) 325 (65 Fe) MG tablet; Take 1 tablet by mouth daily (with breakfast)  Dispense: 90 tablet; Refill: 1    Return in about 3 months (around 6/17/2025).       All patient questions answered. Pt voiced understanding. Patient agreed with treatment plan. Follow up as directed. Patient instructed to call for questions or concerns.            Electronically signed by   XOCHITL Drew - KIN

## 2025-03-17 NOTE — PATIENT INSTRUCTIONS
Take ferrous sulfate 325 mg daily with vitamin C (can be a fruit juice or a 250 mg vitamin C tablet.    We will check for bone marrow biopsy results, but if we don't find them, we will call you to schedule a bone marrow biopsy    If you have any bleeding or significant bruising, go to the emergency room.    Please call us with any questions or concerns.    Return in 3 months or after the bone marrow biopsy

## 2025-04-10 DIAGNOSIS — F41.0 PANIC ATTACKS: ICD-10-CM

## 2025-04-10 DIAGNOSIS — G47.9 SLEEP DISTURBANCE: ICD-10-CM

## 2025-04-10 DIAGNOSIS — L73.2 HIDRADENITIS SUPPURATIVA: ICD-10-CM

## 2025-04-10 RX ORDER — ALPRAZOLAM 0.5 MG
0.5 TABLET ORAL 3 TIMES DAILY PRN
Qty: 75 TABLET | Refills: 0 | Status: SHIPPED | OUTPATIENT
Start: 2025-04-10 | End: 2025-05-10

## 2025-04-10 RX ORDER — ZOLPIDEM TARTRATE 10 MG/1
10 TABLET ORAL NIGHTLY PRN
Qty: 30 TABLET | Refills: 0 | Status: SHIPPED | OUTPATIENT
Start: 2025-04-10 | End: 2025-05-10

## 2025-04-10 RX ORDER — HYDROCODONE BITARTRATE AND ACETAMINOPHEN 5; 325 MG/1; MG/1
1-2 TABLET ORAL EVERY 8 HOURS PRN
Qty: 150 TABLET | Refills: 0 | Status: SHIPPED | OUTPATIENT
Start: 2025-04-10 | End: 2025-05-10

## 2025-04-10 NOTE — TELEPHONE ENCOUNTER
Ep'ed requested meds to pharm requested    Controlled Substance Monitoring:    Acute and Chronic Pain Monitoring:   RX Monitoring Periodic Controlled Substance Monitoring   4/10/2025   1:41 PM No signs of potential drug abuse or diversion identified.

## 2025-04-10 NOTE — TELEPHONE ENCOUNTER
Rogelio Gonzalez called requesting a refill on the following medications:  Requested Prescriptions     Pending Prescriptions Disp Refills    ALPRAZolam (XANAX) 0.5 MG tablet 75 tablet 0     Sig: Take 1 tablet by mouth 3 times daily as needed for Anxiety for up to 30 days. Max Daily Amount: 1.5 mg    HYDROcodone-acetaminophen (NORCO) 5-325 MG per tablet 150 tablet 0     Sig: Take 1-2 tablets by mouth every 8 hours as needed for Pain for up to 30 days. Max Daily Amount: 5 tablets    zolpidem (AMBIEN) 10 MG tablet 30 tablet 0     Sig: Take 1 tablet by mouth nightly as needed for Sleep for up to 30 days. Max Daily Amount: 10 mg       Date of last visit: 1/7/2025  Date of next visit (if applicable):Visit date not found  Date of last refill: 3/10/25  [facility]

## 2025-04-17 ENCOUNTER — OFFICE VISIT (OUTPATIENT)
Dept: FAMILY MEDICINE CLINIC | Age: 40
End: 2025-04-17
Payer: COMMERCIAL

## 2025-04-17 VITALS
DIASTOLIC BLOOD PRESSURE: 76 MMHG | WEIGHT: 315 LBS | BODY MASS INDEX: 45.83 KG/M2 | HEART RATE: 60 BPM | RESPIRATION RATE: 22 BRPM | SYSTOLIC BLOOD PRESSURE: 124 MMHG | TEMPERATURE: 98.2 F

## 2025-04-17 DIAGNOSIS — I10 BENIGN ESSENTIAL HTN: ICD-10-CM

## 2025-04-17 DIAGNOSIS — E11.9 TYPE 2 DIABETES MELLITUS WITHOUT COMPLICATION, WITHOUT LONG-TERM CURRENT USE OF INSULIN: ICD-10-CM

## 2025-04-17 DIAGNOSIS — M79.672 LEFT FOOT PAIN: ICD-10-CM

## 2025-04-17 DIAGNOSIS — B96.89 ACUTE BACTERIAL SINUSITIS: ICD-10-CM

## 2025-04-17 DIAGNOSIS — L40.9 PSORIASIS: ICD-10-CM

## 2025-04-17 DIAGNOSIS — J01.90 ACUTE BACTERIAL SINUSITIS: ICD-10-CM

## 2025-04-17 DIAGNOSIS — L73.2 HIDRADENITIS SUPPURATIVA: Primary | ICD-10-CM

## 2025-04-17 PROCEDURE — G8417 CALC BMI ABV UP PARAM F/U: HCPCS | Performed by: FAMILY MEDICINE

## 2025-04-17 PROCEDURE — 3078F DIAST BP <80 MM HG: CPT | Performed by: FAMILY MEDICINE

## 2025-04-17 PROCEDURE — 2022F DILAT RTA XM EVC RTNOPTHY: CPT | Performed by: FAMILY MEDICINE

## 2025-04-17 PROCEDURE — G8427 DOCREV CUR MEDS BY ELIG CLIN: HCPCS | Performed by: FAMILY MEDICINE

## 2025-04-17 PROCEDURE — 99214 OFFICE O/P EST MOD 30 MIN: CPT | Performed by: FAMILY MEDICINE

## 2025-04-17 PROCEDURE — 3046F HEMOGLOBIN A1C LEVEL >9.0%: CPT | Performed by: FAMILY MEDICINE

## 2025-04-17 PROCEDURE — 4004F PT TOBACCO SCREEN RCVD TLK: CPT | Performed by: FAMILY MEDICINE

## 2025-04-17 PROCEDURE — 3074F SYST BP LT 130 MM HG: CPT | Performed by: FAMILY MEDICINE

## 2025-04-17 RX ORDER — PREDNISONE 20 MG/1
TABLET ORAL
Qty: 15 TABLET | Refills: 0 | Status: SHIPPED | OUTPATIENT
Start: 2025-04-17

## 2025-04-17 SDOH — ECONOMIC STABILITY: FOOD INSECURITY: WITHIN THE PAST 12 MONTHS, YOU WORRIED THAT YOUR FOOD WOULD RUN OUT BEFORE YOU GOT MONEY TO BUY MORE.: NEVER TRUE

## 2025-04-17 SDOH — ECONOMIC STABILITY: FOOD INSECURITY: WITHIN THE PAST 12 MONTHS, THE FOOD YOU BOUGHT JUST DIDN'T LAST AND YOU DIDN'T HAVE MONEY TO GET MORE.: NEVER TRUE

## 2025-04-17 ASSESSMENT — ENCOUNTER SYMPTOMS
WHEEZING: 0
COUGH: 0
VOMITING: 0
SHORTNESS OF BREATH: 0
BACK PAIN: 0
ABDOMINAL PAIN: 0
CONSTIPATION: 0
BLOOD IN STOOL: 0
EYE PAIN: 0
SORE THROAT: 0
NAUSEA: 0
DIARRHEA: 0
RHINORRHEA: 0
CHEST TIGHTNESS: 0

## 2025-04-17 NOTE — PROGRESS NOTES
Rogelio Gonzalez (:  1985) is a 40 y.o. male,Established patient, here for evaluation of the following chief complaint(s):  Foot Pain (Left foot pain, sores on face, by left eye, jaw, arm pits, )         Assessment & Plan  Hidradenitis suppurativa   Chronic, not at goal (unstable), add steroid, refer to dermatology for further evaluation and treatment    Orders:    predniSONE (DELTASONE) 20 MG tablet; Take 1 tab BID x 5 days, then 1 tab daily x 5 days.  Take with food.    Sathya Adam MD, Dermatology, [place]    Left foot pain   New, uncertain prognosis, get imaging to further evaluate    Orders:    XR FOOT LEFT (MIN 3 VIEWS); Future    Benign essential HTN   Chronic, at goal (stable), continue current treatment plan         Type 2 diabetes mellitus without complication, without long-term current use of insulin   Chronic, at goal (stable), continue current treatment plan         Psoriasis   Chronic, not at goal (unstable), add steroid, refer to dermatology for further evaluation and treatment    Orders:    predniSONE (DELTASONE) 20 MG tablet; Take 1 tab BID x 5 days, then 1 tab daily x 5 days.  Take with food.    Sathya Adam MD, Dermatology, [place]    Acute bacterial sinusitis   -acute condition, new, add abx, -monitor sxs, call if not improving      Orders:    amoxicillin-clavulanate (AUGMENTIN) 875-125 MG per tablet; Take 1 tablet by mouth 2 times daily for 10 days    predniSONE (DELTASONE) 20 MG tablet; Take 1 tab BID x 5 days, then 1 tab daily x 5 days.  Take with food.      No follow-ups on file.       Subjective   Foot Pain   Pertinent negatives include no fever.      Patient here today for a check up.  Reviewed BMI of 46.  Encouraged diet, exercise and weight loss.  Left foot pain, no injury.  Increased sores.  HS is flared.  Going through disability hearing.  Single, current smoker, pmh reviewed.       Review of Systems   Constitutional:  Negative for chills, fatigue, fever and

## 2025-04-18 ENCOUNTER — RESULTS FOLLOW-UP (OUTPATIENT)
Dept: FAMILY MEDICINE CLINIC | Age: 40
End: 2025-04-18

## 2025-04-18 DIAGNOSIS — M79.672 LEFT FOOT PAIN: ICD-10-CM

## 2025-04-19 ENCOUNTER — APPOINTMENT (OUTPATIENT)
Dept: CT IMAGING | Age: 40
End: 2025-04-19
Payer: COMMERCIAL

## 2025-04-19 ENCOUNTER — HOSPITAL ENCOUNTER (EMERGENCY)
Age: 40
Discharge: HOME | End: 2025-04-20
Attending: STUDENT IN AN ORGANIZED HEALTH CARE EDUCATION/TRAINING PROGRAM
Payer: COMMERCIAL

## 2025-04-19 DIAGNOSIS — R10.12 LEFT UPPER QUADRANT ABDOMINAL PAIN: Primary | ICD-10-CM

## 2025-04-19 DIAGNOSIS — G25.81 RLS (RESTLESS LEGS SYNDROME): ICD-10-CM

## 2025-04-19 PROCEDURE — 99285 EMERGENCY DEPT VISIT HI MDM: CPT

## 2025-04-19 PROCEDURE — 83735 ASSAY OF MAGNESIUM: CPT

## 2025-04-19 PROCEDURE — 6360000002 HC RX W HCPCS: Performed by: EMERGENCY MEDICINE

## 2025-04-19 PROCEDURE — 80053 COMPREHEN METABOLIC PANEL: CPT

## 2025-04-19 PROCEDURE — 93005 ELECTROCARDIOGRAM TRACING: CPT | Performed by: EMERGENCY MEDICINE

## 2025-04-19 PROCEDURE — 96374 THER/PROPH/DIAG INJ IV PUSH: CPT

## 2025-04-19 PROCEDURE — 85025 COMPLETE CBC W/AUTO DIFF WBC: CPT

## 2025-04-19 PROCEDURE — 83690 ASSAY OF LIPASE: CPT

## 2025-04-19 PROCEDURE — 84484 ASSAY OF TROPONIN QUANT: CPT

## 2025-04-19 PROCEDURE — 96375 TX/PRO/DX INJ NEW DRUG ADDON: CPT

## 2025-04-19 PROCEDURE — 36415 COLL VENOUS BLD VENIPUNCTURE: CPT

## 2025-04-19 RX ORDER — MORPHINE SULFATE 4 MG/ML
4 INJECTION, SOLUTION INTRAMUSCULAR; INTRAVENOUS ONCE
Status: COMPLETED | OUTPATIENT
Start: 2025-04-20 | End: 2025-04-19

## 2025-04-19 RX ORDER — ONDANSETRON 2 MG/ML
4 INJECTION INTRAMUSCULAR; INTRAVENOUS ONCE
Status: COMPLETED | OUTPATIENT
Start: 2025-04-20 | End: 2025-04-19

## 2025-04-19 RX ORDER — IOPAMIDOL 755 MG/ML
80 INJECTION, SOLUTION INTRAVASCULAR
Status: COMPLETED | OUTPATIENT
Start: 2025-04-19 | End: 2025-04-20

## 2025-04-19 RX ADMIN — MORPHINE SULFATE 4 MG: 4 INJECTION, SOLUTION INTRAMUSCULAR; INTRAVENOUS at 23:53

## 2025-04-19 RX ADMIN — ONDANSETRON 4 MG: 2 INJECTION, SOLUTION INTRAMUSCULAR; INTRAVENOUS at 23:53

## 2025-04-19 ASSESSMENT — PAIN DESCRIPTION - ORIENTATION
ORIENTATION: LEFT;UPPER
ORIENTATION: LEFT;UPPER

## 2025-04-19 ASSESSMENT — PAIN - FUNCTIONAL ASSESSMENT
PAIN_FUNCTIONAL_ASSESSMENT: 0-10
PAIN_FUNCTIONAL_ASSESSMENT: 0-10

## 2025-04-19 ASSESSMENT — PAIN DESCRIPTION - LOCATION
LOCATION: ABDOMEN
LOCATION: ABDOMEN

## 2025-04-19 ASSESSMENT — PAIN SCALES - GENERAL
PAINLEVEL_OUTOF10: 9
PAINLEVEL_OUTOF10: 9

## 2025-04-20 ENCOUNTER — APPOINTMENT (OUTPATIENT)
Dept: CT IMAGING | Age: 40
End: 2025-04-20
Payer: COMMERCIAL

## 2025-04-20 VITALS
HEIGHT: 70 IN | SYSTOLIC BLOOD PRESSURE: 129 MMHG | DIASTOLIC BLOOD PRESSURE: 75 MMHG | BODY MASS INDEX: 45.1 KG/M2 | TEMPERATURE: 98.6 F | HEART RATE: 65 BPM | RESPIRATION RATE: 18 BRPM | WEIGHT: 315 LBS | OXYGEN SATURATION: 98 %

## 2025-04-20 LAB
ALBUMIN SERPL BCG-MCNC: 3.9 G/DL (ref 3.4–4.9)
ALP SERPL-CCNC: 101 U/L (ref 40–129)
ALT SERPL W/O P-5'-P-CCNC: 56 U/L (ref 10–50)
ANION GAP SERPL CALC-SCNC: 11 MEQ/L (ref 8–16)
AST SERPL-CCNC: 49 U/L (ref 10–50)
BASOPHILS ABSOLUTE: 0 THOU/MM3 (ref 0–0.1)
BASOPHILS NFR BLD AUTO: 0.8 %
BILIRUB SERPL-MCNC: 0.4 MG/DL (ref 0.3–1.2)
BILIRUB UR QL STRIP.AUTO: NEGATIVE
BUN SERPL-MCNC: 7 MG/DL (ref 8–23)
CALCIUM SERPL-MCNC: 9.3 MG/DL (ref 8.6–10)
CHARACTER UR: CLEAR
CHLORIDE SERPL-SCNC: 102 MEQ/L (ref 98–111)
CO2 SERPL-SCNC: 26 MEQ/L (ref 22–29)
COLOR, UA: YELLOW
CREAT SERPL-MCNC: 0.9 MG/DL (ref 0.7–1.2)
DEPRECATED RDW RBC AUTO: 43.7 FL (ref 35–45)
EKG ATRIAL RATE: 64 BPM
EKG P AXIS: 48 DEGREES
EKG P-R INTERVAL: 184 MS
EKG Q-T INTERVAL: 388 MS
EKG QRS DURATION: 110 MS
EKG QTC CALCULATION (BAZETT): 400 MS
EKG R AXIS: -44 DEGREES
EKG T AXIS: 36 DEGREES
EKG VENTRICULAR RATE: 64 BPM
EOSINOPHIL NFR BLD AUTO: 3.9 %
EOSINOPHILS ABSOLUTE: 0.2 THOU/MM3 (ref 0–0.4)
ERYTHROCYTE [DISTWIDTH] IN BLOOD BY AUTOMATED COUNT: 13.6 % (ref 11.5–14.5)
GFR SERPL CREATININE-BSD FRML MDRD: > 90 ML/MIN/1.73M2
GLUCOSE SERPL-MCNC: 152 MG/DL (ref 74–109)
GLUCOSE UR QL STRIP.AUTO: NEGATIVE MG/DL
HCT VFR BLD AUTO: 41.5 % (ref 42–52)
HGB BLD-MCNC: 13.9 GM/DL (ref 14–18)
HGB RETIC QN AUTO: 34.5 PG (ref 28.2–35.7)
HGB UR QL STRIP.AUTO: NEGATIVE
IMM GRANULOCYTES # BLD AUTO: 0.02 THOU/MM3 (ref 0–0.07)
IMM GRANULOCYTES NFR BLD AUTO: 0.4 %
IMM RETICS NFR: 8.8 % (ref 2.3–13.4)
KETONES UR QL STRIP.AUTO: NEGATIVE
LIPASE SERPL-CCNC: 53 U/L (ref 13–60)
LYMPHOCYTES ABSOLUTE: 1.3 THOU/MM3 (ref 1–4.8)
LYMPHOCYTES NFR BLD AUTO: 28 %
MAGNESIUM SERPL-MCNC: 1.9 MG/DL (ref 1.6–2.6)
MCH RBC QN AUTO: 29.7 PG (ref 26–33)
MCHC RBC AUTO-ENTMCNC: 33.5 GM/DL (ref 32.2–35.5)
MCV RBC AUTO: 88.7 FL (ref 80–94)
MONOCYTES ABSOLUTE: 0.2 THOU/MM3 (ref 0.4–1.3)
MONOCYTES NFR BLD AUTO: 4.1 %
NEUTROPHILS ABSOLUTE: 3 THOU/MM3 (ref 1.8–7.7)
NEUTROPHILS NFR BLD AUTO: 62.8 %
NITRITE UR QL STRIP: NEGATIVE
NRBC BLD AUTO-RTO: 0 /100 WBC
OSMOLALITY SERPL CALC.SUM OF ELEC: 278.5 MOSMOL/KG (ref 275–300)
PH UR STRIP.AUTO: 7.5 [PH] (ref 5–9)
PLATELET # BLD AUTO: 106 THOU/MM3 (ref 130–400)
PMV BLD AUTO: 11.9 FL (ref 9.4–12.4)
POTASSIUM SERPL-SCNC: 3.8 MEQ/L (ref 3.5–5.2)
PROT SERPL-MCNC: 7.1 G/DL (ref 6.4–8.3)
PROT UR STRIP.AUTO-MCNC: NEGATIVE MG/DL
RBC # BLD AUTO: 4.68 MILL/MM3 (ref 4.7–6.1)
RETICS # AUTO: 85 THOU/MM3 (ref 20–115)
RETICS/RBC NFR AUTO: 1.8 % (ref 0.5–2)
SODIUM SERPL-SCNC: 139 MEQ/L (ref 135–145)
SP GR UR REFRACT.AUTO: 1.01 (ref 1–1.03)
TROPONIN, HIGH SENSITIVITY: 6 NG/L (ref 0–12)
UROBILINOGEN, URINE: 1 EU/DL (ref 0–1)
WBC # BLD AUTO: 4.8 THOU/MM3 (ref 4.8–10.8)
WBC #/AREA URNS HPF: NEGATIVE /[HPF]

## 2025-04-20 PROCEDURE — 81003 URINALYSIS AUTO W/O SCOPE: CPT

## 2025-04-20 PROCEDURE — 74177 CT ABD & PELVIS W/CONTRAST: CPT

## 2025-04-20 PROCEDURE — 6360000004 HC RX CONTRAST MEDICATION: Performed by: STUDENT IN AN ORGANIZED HEALTH CARE EDUCATION/TRAINING PROGRAM

## 2025-04-20 PROCEDURE — 6370000000 HC RX 637 (ALT 250 FOR IP): Performed by: EMERGENCY MEDICINE

## 2025-04-20 PROCEDURE — 96375 TX/PRO/DX INJ NEW DRUG ADDON: CPT

## 2025-04-20 PROCEDURE — 6360000002 HC RX W HCPCS: Performed by: EMERGENCY MEDICINE

## 2025-04-20 PROCEDURE — 85046 RETICYTE/HGB CONCENTRATE: CPT

## 2025-04-20 RX ORDER — HYDROCODONE BITARTRATE AND ACETAMINOPHEN 5; 325 MG/1; MG/1
1 TABLET ORAL ONCE
Status: COMPLETED | OUTPATIENT
Start: 2025-04-20 | End: 2025-04-20

## 2025-04-20 RX ORDER — KETOROLAC TROMETHAMINE 30 MG/ML
15 INJECTION, SOLUTION INTRAMUSCULAR; INTRAVENOUS ONCE
Status: COMPLETED | OUTPATIENT
Start: 2025-04-20 | End: 2025-04-20

## 2025-04-20 RX ADMIN — HYDROCODONE BITARTRATE AND ACETAMINOPHEN 1 TABLET: 5; 325 TABLET ORAL at 02:50

## 2025-04-20 RX ADMIN — KETOROLAC TROMETHAMINE 15 MG: 30 INJECTION, SOLUTION INTRAMUSCULAR at 01:59

## 2025-04-20 RX ADMIN — IOPAMIDOL 80 ML: 755 INJECTION, SOLUTION INTRAVENOUS at 00:45

## 2025-04-20 ASSESSMENT — PAIN - FUNCTIONAL ASSESSMENT: PAIN_FUNCTIONAL_ASSESSMENT: 0-10

## 2025-04-20 ASSESSMENT — PAIN SCALES - GENERAL: PAINLEVEL_OUTOF10: 9

## 2025-04-20 ASSESSMENT — PAIN DESCRIPTION - LOCATION: LOCATION: ABDOMEN

## 2025-04-20 ASSESSMENT — PAIN DESCRIPTION - ORIENTATION: ORIENTATION: LEFT;UPPER

## 2025-04-20 NOTE — ED NOTES
Pt medicated per MAR. Pt resting on cot with respirations even and unlabored. Pt reports 9/10 prior to medication. Call light within reach. Will monitor.

## 2025-04-20 NOTE — ED PROVIDER NOTES
[facility] EMERGENCY DEPARTMENT    EMERGENCY MEDICINE      Pt Name: Rogelio Gonzalez  MRN: [id number]  Birthdate 1985  Date of evaluation: 4/19/2025  Treating Physician: Dr. Zamora  Resident Physician: Susy Jain MD    CHIEF COMPLAINT       Chief Complaint   Patient presents with    Abdominal Pain     History obtained from chart review and the patient.    HISTORY OF PRESENT ILLNESS    HPI    Rogelio Gonzalez is a 40 y.o. male with PMH of anxiety, COPD, DM, diverticulitis, HTN, presents to the emergency department for evaluation of abdominal pain.  Patient complaining of left upper quadrant abdominal pain has been going on for the past 2 weeks but has worsened over the past couple of days.  Patient stated that he has hidradenitis suppurativa and is having a flare due to increased stress.  He also reports history of splenomegaly and thrombocytopenia and is concerned that he is having an issue with his spleen.  Patient reports associated nausea but denies any chest pain, shortness of breath, vomiting, hematochezia, melena, fever, dysuria.    The patient has no other acute complaints at this time.    PAST MEDICAL AND SURGICAL HISTORY     Past Medical History:   Diagnosis Date    Anxiety     Severe countine to struggle with this    Chronic back pain     COPD (chronic obstructive pulmonary disease) (HCC)     Depression     Diverticulitis     DM (diabetes mellitus), type 2 (HCC) 12/2024    a1c of 7.0    Headache     Pain mostly located in back of my head.    Hidradenitis suppurativa     Hypertension     Neuropathy     numbnes in feet leg and hands bilaterally    Obesity     Pancytopenia 07/2021    PONV (postoperative nausea and vomiting)     Restless legs syndrome     Sinusitis     Sleep apnea     has CPAP doesn't use it    Thrombocytopenia        Past Surgical History:   Procedure Laterality Date    ANKLE SURGERY Right 2001    BACK SURGERY      x3  2014-present    BONE MARROW BIOPSY  08/2021

## 2025-04-20 NOTE — DISCHARGE INSTRUCTIONS
If given narcotics (opiates) during this Emergency Department visit, please do not drink, drive or operate any machinery for at least 4 - 6 hours.    Avoid eating any spicy food, milk type products or drinks that have caffeine in it.  Take all medications as prescribed.  For pain use ibuprofen (Motrin) or acetaminophen (Tylenol), unless prescribed medications that have acetaminophen in it.  You can take over the counter acetaminophen tablets (1 - 2 tablets of the 500-mg strength every 6 hours) or ibuprofen tablets (2 tablets every 4 hours).    PLEASE RETURN TO THE EMERGENCY DEPARTMENT IMMEDIATELY for worsening symptoms, or if you develop any concerning symptoms such as: high fever not relieved by acetaminophen (Tylenol) and/or ibuprofen (Motrin), chills, shortness of breath, chest pain, persistent nausea and/or vomiting, numbness, weakness or tingling in the arms or legs or change in color of the extremities, changes in mental status, persistent headache, blurry vision.    Return within 8 - 12 hours if you have any of the following: worsening of pain in your abdomen, no food sounds good to you, you continue to vomit, pain goes to your back or testicles, have pain in the abdomen when going over a bump in the car or when you jump up and down, inability to urinate, unable to follow up with your physician, or other any other care or concern.

## 2025-04-20 NOTE — ED NOTES
Pt returning from CT at this time. Pt reporting worsening pain after CT due to movement of arms above heads. Pain 9/10. Respirations even and unlabored. Call light within reach. Will monitor.

## 2025-04-21 ENCOUNTER — TELEPHONE (OUTPATIENT)
Dept: FAMILY MEDICINE CLINIC | Age: 40
End: 2025-04-21

## 2025-04-21 DIAGNOSIS — G25.81 RLS (RESTLESS LEGS SYNDROME): Primary | ICD-10-CM

## 2025-04-21 RX ORDER — ROPINIROLE 1 MG/1
TABLET, FILM COATED ORAL
Qty: 180 TABLET | Refills: 2 | Status: SHIPPED | OUTPATIENT
Start: 2025-04-21

## 2025-04-21 RX ORDER — ROPINIROLE 0.5 MG/1
0.5 TABLET, FILM COATED ORAL 3 TIMES DAILY
Qty: 90 TABLET | OUTPATIENT
Start: 2025-04-21

## 2025-04-21 NOTE — TELEPHONE ENCOUNTER
Pt called and said he is having trouble getting rx for requip filled.  Spoke to pharmacist, insurance will not cover 4 tabs a day. Recommends sending rx for 1 mg tabs 1/2 in the am and afternoon and 1 tab at night.  [facility]

## 2025-04-21 NOTE — TELEPHONE ENCOUNTER
Rogelio Gonzalez called requesting a refill on the following medications:  Requested Prescriptions     Pending Prescriptions Disp Refills    rOPINIRole (REQUIP) 0.5 MG tablet [Pharmacy Med Name: ROPINIROLE HCL 0.5 MG TABLET] 90 tablet      Sig: TAKE 1 TABLET BY MOUTH THREE TIMES A DAY       Date of last visit: 4/17/2025  Date of next visit (if applicable):Visit date not found  Date of last refill: 12/10/2024 #360/3 refills  Pharmacy Name: [facility], [facility]

## 2025-05-08 ENCOUNTER — HOSPITAL ENCOUNTER (OUTPATIENT)
Dept: INFUSION THERAPY | Age: 40
Discharge: HOME | End: 2025-05-08
Payer: COMMERCIAL

## 2025-05-08 ENCOUNTER — OFFICE VISIT (OUTPATIENT)
Dept: ONCOLOGY | Age: 40
End: 2025-05-08
Payer: COMMERCIAL

## 2025-05-08 VITALS
HEART RATE: 77 BPM | HEIGHT: 70 IN | SYSTOLIC BLOOD PRESSURE: 157 MMHG | WEIGHT: 315 LBS | TEMPERATURE: 98.7 F | BODY MASS INDEX: 45.1 KG/M2 | DIASTOLIC BLOOD PRESSURE: 91 MMHG | OXYGEN SATURATION: 98 % | RESPIRATION RATE: 20 BRPM

## 2025-05-08 VITALS
HEIGHT: 70 IN | RESPIRATION RATE: 20 BRPM | WEIGHT: 315 LBS | SYSTOLIC BLOOD PRESSURE: 157 MMHG | HEART RATE: 77 BPM | DIASTOLIC BLOOD PRESSURE: 91 MMHG | BODY MASS INDEX: 45.1 KG/M2 | TEMPERATURE: 98.7 F | OXYGEN SATURATION: 98 %

## 2025-05-08 DIAGNOSIS — D69.6 THROMBOCYTOPENIA: Primary | ICD-10-CM

## 2025-05-08 DIAGNOSIS — D69.6 THROMBOCYTOPENIA: ICD-10-CM

## 2025-05-08 LAB
ALBUMIN SERPL BCG-MCNC: 4 G/DL (ref 3.4–4.9)
ALP SERPL-CCNC: 99 U/L (ref 40–129)
ALT SERPL W/O P-5'-P-CCNC: 68 U/L (ref 10–50)
AST SERPL-CCNC: 46 U/L (ref 10–50)
BASOPHILS ABSOLUTE: 0 THOU/MM3 (ref 0–0.1)
BASOPHILS NFR BLD AUTO: 1 % (ref 0–3)
BILIRUB CONJ SERPL-MCNC: < 0.1 MG/DL (ref 0–0.2)
BILIRUB SERPL-MCNC: 0.4 MG/DL (ref 0.3–1.2)
BUN BLDP-MCNC: 10 MG/DL (ref 8–26)
CHLORIDE BLD-SCNC: 104 MEQ/L (ref 98–109)
CREAT BLD-MCNC: 0.8 MG/DL (ref 0.5–1.2)
EOSINOPHIL NFR BLD AUTO: 4 % (ref 0–4)
EOSINOPHILS ABSOLUTE: 0.2 THOU/MM3 (ref 0–0.4)
ERYTHROCYTE [DISTWIDTH] IN BLOOD BY AUTOMATED COUNT: 13.3 % (ref 11.5–14.5)
GFR SERPL CREATININE-BSD FRML MDRD: > 90 ML/MIN/1.73M2
GLUCOSE BLD-MCNC: 136 MG/DL (ref 70–108)
HCT VFR BLD AUTO: 40.3 % (ref 42–52)
HGB BLD-MCNC: 13.8 GM/DL (ref 14–18)
IMMATURE GRANULOCYTES %: 0 %
IMMATURE GRANULOCYTES ABSOLUTE: 0.01 THOU/MM3 (ref 0–0.07)
IONIZED CALCIUM, WHOLE BLOOD: 1.21 MMOL/L (ref 1.12–1.32)
LYMPHOCYTES ABSOLUTE: 1.3 THOU/MM3 (ref 1–4.8)
LYMPHOCYTES NFR BLD AUTO: 25 % (ref 15–47)
MCH RBC QN AUTO: 30.6 PG (ref 26–33)
MCHC RBC AUTO-ENTMCNC: 34.2 GM/DL (ref 32.2–35.5)
MCV RBC AUTO: 89 FL (ref 80–94)
MONOCYTES ABSOLUTE: 0.3 THOU/MM3 (ref 0.4–1.3)
MONOCYTES NFR BLD AUTO: 5 % (ref 0–12)
NEUTROPHILS ABSOLUTE: 3.3 THOU/MM3 (ref 1.8–7.7)
NEUTROPHILS NFR BLD AUTO: 66 % (ref 43–75)
PLATELET # BLD AUTO: 96 THOU/MM3 (ref 130–400)
PMV BLD AUTO: 11.1 FL (ref 9.4–12.4)
POTASSIUM BLD-SCNC: 4.3 MEQ/L (ref 3.5–4.9)
PROT SERPL-MCNC: 7.1 G/DL (ref 6.4–8.3)
RBC # BLD AUTO: 4.51 MILL/MM3 (ref 4.7–6.1)
SODIUM BLD-SCNC: 143 MEQ/L (ref 138–146)
TOTAL CO2, WHOLE BLOOD: 27 MEQ/L (ref 23–33)
WBC # BLD AUTO: 5.1 THOU/MM3 (ref 4.8–10.8)

## 2025-05-08 PROCEDURE — 84155 ASSAY OF PROTEIN SERUM: CPT

## 2025-05-08 PROCEDURE — G8427 DOCREV CUR MEDS BY ELIG CLIN: HCPCS | Performed by: INTERNAL MEDICINE

## 2025-05-08 PROCEDURE — 99214 OFFICE O/P EST MOD 30 MIN: CPT | Performed by: INTERNAL MEDICINE

## 2025-05-08 PROCEDURE — 86334 IMMUNOFIX E-PHORESIS SERUM: CPT

## 2025-05-08 PROCEDURE — 80047 BASIC METABLC PNL IONIZED CA: CPT

## 2025-05-08 PROCEDURE — G8417 CALC BMI ABV UP PARAM F/U: HCPCS | Performed by: INTERNAL MEDICINE

## 2025-05-08 PROCEDURE — 84165 PROTEIN E-PHORESIS SERUM: CPT

## 2025-05-08 PROCEDURE — 82232 ASSAY OF BETA-2 PROTEIN: CPT

## 2025-05-08 PROCEDURE — 83883 ASSAY NEPHELOMETRY NOT SPEC: CPT

## 2025-05-08 PROCEDURE — 85025 COMPLETE CBC W/AUTO DIFF WBC: CPT

## 2025-05-08 PROCEDURE — 80076 HEPATIC FUNCTION PANEL: CPT

## 2025-05-08 PROCEDURE — 99211 OFF/OP EST MAY X REQ PHY/QHP: CPT

## 2025-05-08 PROCEDURE — 36415 COLL VENOUS BLD VENIPUNCTURE: CPT

## 2025-05-08 PROCEDURE — 4004F PT TOBACCO SCREEN RCVD TLK: CPT | Performed by: INTERNAL MEDICINE

## 2025-05-08 NOTE — PROGRESS NOTES
[facility] [id number], [address]  [facility] [place] [address]  Dept: [phone]  Dept Fax: [phone] Loc: [phone]      Visit Date:5/8/2025     Rogelio Gonzalez is a 40 y.o. male who presents today for:   Chief Complaint   Patient presents with    Follow-up     Normocytic anemia        HPI:   Rogelio Gonzalez is a 40 y.o. male  with significant multiple co-morbidities and of particular note with a longstanding history of thrombocytopenia that goes back at least 10 years.      He has a history of thrombocytopenia, first identified in 2012 during an episode of diverticulitis with colon rupture. Since then, platelet counts have consistently been low, typically ranging from 80 to 100, with the lowest recorded at 50-60. Recent lab work on October 17, 2024, showed a platelet count of 89. A bone marrow biopsy was performed in the past, but records are unavailable. He feels 'really run down, really weak' over the last six months, with episodes of a racing heart. No bleeding when brushing teeth, but heavy bleeding from minor scratches. No bleeding in stool or urine.     He experiences severe muscle spasms, describing them as 'really bad' and 'on the verge of a cramp nonstop.' He has not consulted a neurologist for these symptoms. His father has a history of lupus, muscular dystrophy, and a channelopathy, while his mother has hidradenitis suppurativa.     He is currently disabled due to back issues, having undergone three back surgeries with fusion from L3 down. He lives in a handicap-accessible apartment and reports difficulty walking due to muscle cramps. He smokes a pack a day and has a history of prediabetes, for which he was prescribed metformin. He experiences insomnia and renaldo, affecting his sleep patterns. He has attempted to quit drinking Mountain Dew, which led to constipation and hemorrhoids for some reason.  Overall, he is in very poor health for his age, including metabolic

## 2025-05-09 LAB
B2 MICROGLOB SERPL-MCNC: 2.5 MG/L (ref 0.8–2.4)
FREE KAPPA/LAMBDA RATIO: 4.66 (ref 0.22–1.74)
KAPPA LC FREE SER-MCNC: 70.3 MG/L
LAMBDA LC FREE SERPL-MCNC: 15.1 MG/L (ref 4.2–27.7)

## 2025-05-12 DIAGNOSIS — L73.2 HIDRADENITIS SUPPURATIVA: ICD-10-CM

## 2025-05-12 DIAGNOSIS — G47.9 SLEEP DISTURBANCE: ICD-10-CM

## 2025-05-12 DIAGNOSIS — F41.0 PANIC ATTACKS: ICD-10-CM

## 2025-05-12 LAB
INTERPRETATION SERPL IFE-IMP: NORMAL
PROTEIN ELECTROPHORESIS, SERUM: NORMAL

## 2025-05-12 RX ORDER — ALPRAZOLAM 0.5 MG
0.5 TABLET ORAL 3 TIMES DAILY PRN
Qty: 75 TABLET | Refills: 0 | Status: SHIPPED | OUTPATIENT
Start: 2025-05-12 | End: 2025-06-11

## 2025-05-12 RX ORDER — ZOLPIDEM TARTRATE 10 MG/1
10 TABLET ORAL NIGHTLY PRN
Qty: 30 TABLET | Refills: 0 | Status: SHIPPED | OUTPATIENT
Start: 2025-05-12 | End: 2025-06-11

## 2025-05-12 RX ORDER — HYDROCODONE BITARTRATE AND ACETAMINOPHEN 5; 325 MG/1; MG/1
1-2 TABLET ORAL EVERY 8 HOURS PRN
Qty: 150 TABLET | Refills: 0 | Status: SHIPPED | OUTPATIENT
Start: 2025-05-12 | End: 2025-06-11

## 2025-05-12 NOTE — TELEPHONE ENCOUNTER
Rogelio Gonzalez called requesting a refill on the following medications:  Requested Prescriptions     Pending Prescriptions Disp Refills    ALPRAZolam (XANAX) 0.5 MG tablet 75 tablet 0     Sig: Take 1 tablet by mouth 3 times daily as needed for Anxiety for up to 30 days. Max Daily Amount: 1.5 mg    HYDROcodone-acetaminophen (NORCO) 5-325 MG per tablet 150 tablet 0     Sig: Take 1-2 tablets by mouth every 8 hours as needed for Pain for up to 30 days. Max Daily Amount: 5 tablets    zolpidem (AMBIEN) 10 MG tablet 30 tablet 0     Sig: Take 1 tablet by mouth nightly as needed for Sleep for up to 30 days. Max Daily Amount: 10 mg       Date of last visit: 4/17/2025 hidradenitis suppurativa    Date of next visit: date not found    Date of last refill: 4/10/25    Pharmacy Name: [facility]    Pt has #2 Norco, took last Ambien this weekend, took last Xanax Friday.    Pls call pt at  only if probs.    Zip: [id number]    Doses confirmed by pt as stated above.    Thanks,  Josefina Flores

## 2025-05-12 NOTE — TELEPHONE ENCOUNTER
Ep'ed requested meds to pharm requested    Controlled Substance Monitoring:    Acute and Chronic Pain Monitoring:   RX Monitoring Periodic Controlled Substance Monitoring   5/12/2025  12:49 PM No signs of potential drug abuse or diversion identified.

## 2025-06-13 DIAGNOSIS — G47.9 SLEEP DISTURBANCE: ICD-10-CM

## 2025-06-13 DIAGNOSIS — L73.2 HIDRADENITIS SUPPURATIVA: ICD-10-CM

## 2025-06-13 DIAGNOSIS — F41.0 PANIC ATTACKS: ICD-10-CM

## 2025-06-13 RX ORDER — ALPRAZOLAM 0.5 MG
0.5 TABLET ORAL 3 TIMES DAILY PRN
Qty: 75 TABLET | Refills: 0 | Status: SHIPPED | OUTPATIENT
Start: 2025-06-13 | End: 2025-07-13

## 2025-06-13 RX ORDER — HYDROCODONE BITARTRATE AND ACETAMINOPHEN 5; 325 MG/1; MG/1
1-2 TABLET ORAL EVERY 8 HOURS PRN
Qty: 150 TABLET | Refills: 0 | Status: SHIPPED | OUTPATIENT
Start: 2025-06-13 | End: 2025-07-13

## 2025-06-13 RX ORDER — ZOLPIDEM TARTRATE 10 MG/1
10 TABLET ORAL NIGHTLY PRN
Qty: 30 TABLET | Refills: 0 | Status: SHIPPED | OUTPATIENT
Start: 2025-06-13 | End: 2025-07-13

## 2025-06-13 NOTE — TELEPHONE ENCOUNTER
Ep'ed requested meds to pharm requested      Controlled Substance Monitoring:    Acute and Chronic Pain Monitoring:   RX Monitoring Periodic Controlled Substance Monitoring   6/13/2025   9:21 AM No signs of potential drug abuse or diversion identified.

## 2025-06-13 NOTE — TELEPHONE ENCOUNTER
Rogelio Gonzalez called requesting a refill on the following medications:  Requested Prescriptions     Pending Prescriptions Disp Refills    ALPRAZolam (XANAX) 0.5 MG tablet 75 tablet 0     Sig: Take 1 tablet by mouth 3 times daily as needed for Anxiety for up to 30 days. Max Daily Amount: 1.5 mg    HYDROcodone-acetaminophen (NORCO) 5-325 MG per tablet 150 tablet 0     Sig: Take 1-2 tablets by mouth every 8 hours as needed for Pain for up to 30 days. Max Daily Amount: 5 tablets    zolpidem (AMBIEN) 10 MG tablet 30 tablet 0     Sig: Take 1 tablet by mouth nightly as needed for Sleep for up to 30 days. Max Daily Amount: 10 mg       Date of last visit: 4/17/2025  Date of next visit (if applicable):Visit date not found  Date of last refill: 5/12/25  Pharmacy Name: [facility]

## 2025-07-08 ENCOUNTER — TELEPHONE (OUTPATIENT)
Dept: FAMILY MEDICINE CLINIC | Age: 40
End: 2025-07-08

## 2025-07-08 NOTE — TELEPHONE ENCOUNTER
[facility] called and stated patient was not on a statin. With patient's diagnosis they were concerned that patient was not on a statin. Patient was seen for physical in December 2024. Lipid was checked and patient was not placed on statin. Patient prefers to do diet, exercise and weight loss.

## 2025-07-14 DIAGNOSIS — L73.2 HIDRADENITIS SUPPURATIVA: ICD-10-CM

## 2025-07-14 DIAGNOSIS — F41.0 PANIC ATTACKS: ICD-10-CM

## 2025-07-14 DIAGNOSIS — G47.9 SLEEP DISTURBANCE: ICD-10-CM

## 2025-07-14 RX ORDER — ALPRAZOLAM 0.5 MG
0.5 TABLET ORAL 3 TIMES DAILY PRN
Qty: 75 TABLET | Refills: 0 | Status: SHIPPED | OUTPATIENT
Start: 2025-07-14 | End: 2025-08-13

## 2025-07-14 RX ORDER — ZOLPIDEM TARTRATE 10 MG/1
10 TABLET ORAL NIGHTLY PRN
Qty: 30 TABLET | Refills: 0 | Status: SHIPPED | OUTPATIENT
Start: 2025-07-14 | End: 2025-08-13

## 2025-07-14 RX ORDER — HYDROCODONE BITARTRATE AND ACETAMINOPHEN 5; 325 MG/1; MG/1
1-2 TABLET ORAL EVERY 8 HOURS PRN
Qty: 150 TABLET | Refills: 0 | Status: SHIPPED | OUTPATIENT
Start: 2025-07-14 | End: 2025-08-13

## 2025-07-14 NOTE — TELEPHONE ENCOUNTER
Rogelio Gonzalez called requesting a refill on the following medications:  Requested Prescriptions     Pending Prescriptions Disp Refills    ALPRAZolam (XANAX) 0.5 MG tablet 75 tablet 0     Sig: Take 1 tablet by mouth 3 times daily as needed for Anxiety for up to 30 days. Max Daily Amount: 1.5 mg    HYDROcodone-acetaminophen (NORCO) 5-325 MG per tablet 150 tablet 0     Sig: Take 1-2 tablets by mouth every 8 hours as needed for Pain for up to 30 days. Max Daily Amount: 5 tablets    zolpidem (AMBIEN) 10 MG tablet 30 tablet 0     Sig: Take 1 tablet by mouth nightly as needed for Sleep for up to 30 days. Max Daily Amount: 10 mg       Date of last visit: 4/17/2025 hidradenitis    Date of next visit:Visit date not found    Date of last refill: 6/13/25    Pharmacy Name: [facility]    Pt took last Ambien last night; has #2 Xanax left, #3 Norco left.    Pls call pt at  only if probs.    Zip: [phone]    Doses confirmed by pt as stated above.    Thanks,  Josefina Flores

## 2025-07-14 NOTE — TELEPHONE ENCOUNTER
Ep'ed requested meds to pharm requested    Controlled Substance Monitoring:    Acute and Chronic Pain Monitoring:   RX Monitoring Periodic Controlled Substance Monitoring   7/14/2025  11:46 AM No signs of potential drug abuse or diversion identified.

## 2025-08-15 DIAGNOSIS — L73.2 HIDRADENITIS SUPPURATIVA: ICD-10-CM

## 2025-08-15 DIAGNOSIS — G47.9 SLEEP DISTURBANCE: ICD-10-CM

## 2025-08-15 DIAGNOSIS — F41.0 PANIC ATTACKS: ICD-10-CM

## 2025-08-15 RX ORDER — ZOLPIDEM TARTRATE 10 MG/1
10 TABLET ORAL NIGHTLY PRN
Qty: 30 TABLET | Refills: 0 | Status: SHIPPED | OUTPATIENT
Start: 2025-08-15 | End: 2025-09-14

## 2025-08-15 RX ORDER — ALPRAZOLAM 0.5 MG
0.5 TABLET ORAL 3 TIMES DAILY PRN
Qty: 75 TABLET | Refills: 0 | Status: SHIPPED | OUTPATIENT
Start: 2025-08-15 | End: 2025-09-14

## 2025-08-15 RX ORDER — HYDROCODONE BITARTRATE AND ACETAMINOPHEN 5; 325 MG/1; MG/1
1-2 TABLET ORAL EVERY 8 HOURS PRN
Qty: 150 TABLET | Refills: 0 | Status: SHIPPED | OUTPATIENT
Start: 2025-08-15 | End: 2025-09-14

## 2025-08-19 ENCOUNTER — OFFICE VISIT (OUTPATIENT)
Dept: FAMILY MEDICINE CLINIC | Age: 40
End: 2025-08-19
Payer: COMMERCIAL

## 2025-08-19 VITALS
RESPIRATION RATE: 24 BRPM | WEIGHT: 313.4 LBS | BODY MASS INDEX: 44.97 KG/M2 | OXYGEN SATURATION: 97 % | HEART RATE: 70 BPM | DIASTOLIC BLOOD PRESSURE: 76 MMHG | SYSTOLIC BLOOD PRESSURE: 130 MMHG | TEMPERATURE: 98 F

## 2025-08-19 DIAGNOSIS — L73.2 HIDRADENITIS SUPPURATIVA: ICD-10-CM

## 2025-08-19 DIAGNOSIS — F41.0 PANIC ATTACKS: ICD-10-CM

## 2025-08-19 DIAGNOSIS — H00.025 HORDEOLUM INTERNUM OF LEFT LOWER EYELID: Primary | ICD-10-CM

## 2025-08-19 DIAGNOSIS — M48.062 SPINAL STENOSIS OF LUMBAR REGION WITH NEUROGENIC CLAUDICATION: ICD-10-CM

## 2025-08-19 PROCEDURE — G8417 CALC BMI ABV UP PARAM F/U: HCPCS | Performed by: FAMILY MEDICINE

## 2025-08-19 PROCEDURE — 99214 OFFICE O/P EST MOD 30 MIN: CPT | Performed by: FAMILY MEDICINE

## 2025-08-19 PROCEDURE — G8427 DOCREV CUR MEDS BY ELIG CLIN: HCPCS | Performed by: FAMILY MEDICINE

## 2025-08-19 PROCEDURE — 4004F PT TOBACCO SCREEN RCVD TLK: CPT | Performed by: FAMILY MEDICINE

## 2025-08-19 RX ORDER — CIPROFLOXACIN HYDROCHLORIDE 3.5 MG/ML
1 SOLUTION/ DROPS TOPICAL
Qty: 5 ML | Refills: 0 | Status: SHIPPED | OUTPATIENT
Start: 2025-08-19 | End: 2025-08-26

## 2025-08-19 ASSESSMENT — ENCOUNTER SYMPTOMS
EYE REDNESS: 1
EYE PAIN: 1
SORE THROAT: 0
COUGH: 0
SHORTNESS OF BREATH: 0
EYE DISCHARGE: 1
CHEST TIGHTNESS: 0
RHINORRHEA: 0
BLOOD IN STOOL: 0
BACK PAIN: 0
CONSTIPATION: 0
WHEEZING: 0
VOMITING: 0
NAUSEA: 0
ABDOMINAL PAIN: 0
DIARRHEA: 0

## 2025-08-26 ENCOUNTER — HOSPITAL ENCOUNTER (OUTPATIENT)
Dept: INFUSION THERAPY | Age: 40
Discharge: HOME | End: 2025-08-26
Payer: COMMERCIAL

## 2025-08-26 ENCOUNTER — OFFICE VISIT (OUTPATIENT)
Dept: ONCOLOGY | Age: 40
End: 2025-08-26
Payer: COMMERCIAL

## 2025-08-26 VITALS
HEART RATE: 64 BPM | OXYGEN SATURATION: 98 % | RESPIRATION RATE: 20 BRPM | DIASTOLIC BLOOD PRESSURE: 85 MMHG | SYSTOLIC BLOOD PRESSURE: 158 MMHG | TEMPERATURE: 98.1 F

## 2025-08-26 VITALS
DIASTOLIC BLOOD PRESSURE: 85 MMHG | TEMPERATURE: 98.1 F | WEIGHT: 313.6 LBS | BODY MASS INDEX: 44.9 KG/M2 | OXYGEN SATURATION: 98 % | RESPIRATION RATE: 20 BRPM | SYSTOLIC BLOOD PRESSURE: 158 MMHG | HEIGHT: 70 IN | HEART RATE: 64 BPM

## 2025-08-26 DIAGNOSIS — D47.2 MGUS (MONOCLONAL GAMMOPATHY OF UNKNOWN SIGNIFICANCE): ICD-10-CM

## 2025-08-26 DIAGNOSIS — D47.2 MGUS (MONOCLONAL GAMMOPATHY OF UNKNOWN SIGNIFICANCE): Primary | ICD-10-CM

## 2025-08-26 DIAGNOSIS — D69.6 THROMBOCYTOPENIA: Primary | ICD-10-CM

## 2025-08-26 DIAGNOSIS — D69.6 THROMBOCYTOPENIA: ICD-10-CM

## 2025-08-26 DIAGNOSIS — D64.9 NORMOCYTIC ANEMIA: ICD-10-CM

## 2025-08-26 LAB
BASOPHILS ABSOLUTE: 0 THOU/MM3 (ref 0–0.1)
BASOPHILS NFR BLD AUTO: 1 % (ref 0–3)
BUN BLDP-MCNC: 7 MG/DL (ref 8–26)
CHLORIDE BLD-SCNC: 104 MEQ/L (ref 98–109)
CREAT BLD-MCNC: 1 MG/DL (ref 0.5–1.2)
EOSINOPHIL NFR BLD AUTO: 4 % (ref 0–4)
EOSINOPHILS ABSOLUTE: 0.2 THOU/MM3 (ref 0–0.4)
ERYTHROCYTE [DISTWIDTH] IN BLOOD BY AUTOMATED COUNT: 13.2 % (ref 11.5–14.5)
GFR SERPL CREATININE-BSD FRML MDRD: > 90 ML/MIN/1.73M2
GLUCOSE BLD-MCNC: 160 MG/DL (ref 70–108)
HCT VFR BLD AUTO: 40.4 % (ref 42–52)
HGB BLD-MCNC: 13.5 GM/DL (ref 14–18)
IGA SERPL-MCNC: 202 MG/DL (ref 70–400)
IGG SERPL-MCNC: 1608 MG/DL (ref 700–1600)
IGM SERPL-MCNC: 105 MG/DL (ref 40–230)
IMMATURE GRANULOCYTES %: 0 %
IMMATURE GRANULOCYTES ABSOLUTE: 0 THOU/MM3 (ref 0–0.07)
IONIZED CALCIUM, WHOLE BLOOD: 1.26 MMOL/L (ref 1.12–1.32)
LDH SERPL L TO P-CCNC: 193 U/L (ref 135–225)
LYMPHOCYTES ABSOLUTE: 1.2 THOU/MM3 (ref 1–4.8)
LYMPHOCYTES NFR BLD AUTO: 27 % (ref 15–47)
MCH RBC QN AUTO: 29.9 PG (ref 26–33)
MCHC RBC AUTO-ENTMCNC: 33.4 GM/DL (ref 32.2–35.5)
MCV RBC AUTO: 89 FL (ref 80–94)
MONOCYTES ABSOLUTE: 0.2 THOU/MM3 (ref 0.4–1.3)
MONOCYTES NFR BLD AUTO: 5 % (ref 0–12)
NEUTROPHILS ABSOLUTE: 2.7 THOU/MM3 (ref 1.8–7.7)
NEUTROPHILS NFR BLD AUTO: 64 % (ref 43–75)
PLATELET # BLD AUTO: 93 THOU/MM3 (ref 130–400)
PMV BLD AUTO: 10.4 FL (ref 9.4–12.4)
POTASSIUM BLD-SCNC: 4.4 MEQ/L (ref 3.5–4.9)
RBC # BLD AUTO: 4.52 MILL/MM3 (ref 4.7–6.1)
SODIUM BLD-SCNC: 143 MEQ/L (ref 138–146)
TOTAL CO2, WHOLE BLOOD: 31 MEQ/L (ref 23–33)
WBC # BLD AUTO: 4.3 THOU/MM3 (ref 4.8–10.8)

## 2025-08-26 PROCEDURE — 85025 COMPLETE CBC W/AUTO DIFF WBC: CPT

## 2025-08-26 PROCEDURE — 99214 OFFICE O/P EST MOD 30 MIN: CPT | Performed by: NURSE PRACTITIONER

## 2025-08-26 PROCEDURE — 99211 OFF/OP EST MAY X REQ PHY/QHP: CPT

## 2025-08-26 PROCEDURE — 82784 ASSAY IGA/IGD/IGG/IGM EACH: CPT

## 2025-08-26 PROCEDURE — 80047 BASIC METABLC PNL IONIZED CA: CPT

## 2025-08-26 PROCEDURE — 36415 COLL VENOUS BLD VENIPUNCTURE: CPT

## 2025-08-26 PROCEDURE — 83615 LACTATE (LD) (LDH) ENZYME: CPT

## 2025-09-02 DIAGNOSIS — I10 BENIGN ESSENTIAL HTN: Primary | ICD-10-CM

## 2025-09-03 RX ORDER — VALSARTAN 160 MG/1
160 TABLET ORAL DAILY
Qty: 90 TABLET | Refills: 0 | Status: SHIPPED | OUTPATIENT
Start: 2025-09-03

## (undated) DEVICE — ENT PACK: Brand: MEDLINE INDUSTRIES, INC.

## (undated) DEVICE — UNIVERSAL MONOPOLAR LAPAROSCOPIC CABLE 10FT, 4MM PIN CONNECTOR: Brand: CONMED

## (undated) DEVICE — SYRINGE IRRIG 60ML SFT PLIABLE BLB EZ TO GRP 1 HND USE W/

## (undated) DEVICE — TUBING, SUCTION, 1/4" X 20', STRAIGHT: Brand: MEDLINE INDUSTRIES, INC.

## (undated) DEVICE — GLOVE SURG SZ 6 THK91MIL LTX FREE SYN POLYISOPRENE ANTI

## (undated) DEVICE — BLADE LARYNSCP SZ 4 ENH DIR INTUB GLIDESCOPE MCGRATH MAC

## (undated) DEVICE — CATH URETH 10 FR RED RUBBER ALL PURPOSE

## (undated) DEVICE — 3M™ BAIR HUGGER® MULTI ACCESS BLANKET, PEDIATRIC, FULL BODY, 10 PER CASE 31000: Brand: BAIR HUGGER™

## (undated) DEVICE — GAUZE,SPONGE,8"X4",12PLY,XRAY,STRL,LF: Brand: MEDLINE

## (undated) DEVICE — TUBING 1895522 5PK STRAIGHTSHOT TO XPS: Brand: STRAIGHTSHOT®

## (undated) DEVICE — SUTURE PLN GUT SZ 4-0 L18IN ABSRB YELLOWISH TAN L13MM SC-1 1828H

## (undated) DEVICE — ANTI-FOG SOLUTION WITH FOAM PAD: Brand: DEVON

## (undated) DEVICE — INTENDED FOR TISSUE SEPARATION, AND OTHER PROCEDURES THAT REQUIRE A SHARP SURGICAL BLADE TO PUNCTURE OR CUT.: Brand: BARD-PARKER ® CARBON RIB-BACK BLADES

## (undated) DEVICE — ENT: Brand: MEDLINE INDUSTRIES, INC.

## (undated) DEVICE — AGENT HEMOSTATIC SURGIFLOW MATRIX KIT W/THROMBIN

## (undated) DEVICE — SOLUTION IV IRRIG POUR BRL 0.9% SODIUM CHL 2F7124

## (undated) DEVICE — COVER,MAYO STAND,STERILE: Brand: MEDLINE

## (undated) DEVICE — SUTURE MCRYL SZ 4 0 L18IN ABSRB UD P 3 3 8 CIR REV CUT NDL MCP494G

## (undated) DEVICE — SKIN AFFIX SURG ADHESIVE 72/CS 0.55ML: Brand: MEDLINE

## (undated) DEVICE — Y-TYPE TUR/BLADDER IRRIGATION SET, REGULATING CLAMP

## (undated) DEVICE — TURBINATOR WAND: Brand: COBLATION

## (undated) DEVICE — BLADE 1884006 RAD40 5PK SINUS 4MM: Brand: RAD®

## (undated) DEVICE — MICRO TIP WIPE: Brand: DEVON

## (undated) DEVICE — 3M™ TRANSPORE™ WHITE SURGICAL TAPE 1534-1, 1 INCH X 10 YARD (2,5CM X 9,1M), 12 ROLLS/CARTON 10 CARTONS/CASE: Brand: 3M™ TRANSPORE™

## (undated) DEVICE — DUP USE 304928 DRESSING GZ 12 PLY 4X4 STRL

## (undated) DEVICE — APPLICATOR MEDICATED 10.5 CC SOLUTION CLR STRL CHLORAPREP

## (undated) DEVICE — STANDARD HYPODERMIC NEEDLE,POLYPROPYLENE HUB: Brand: MONOJECT

## (undated) DEVICE — BRONCHOSCOPE GS BFLEX 2 SLIM 3.8 SU

## (undated) DEVICE — GLOVE SURG SZ 7.5 L11.73IN FNGR THK9.8MIL STRW LTX POLYMER

## (undated) DEVICE — DRAPE,EENT,SPLIT,STERILE: Brand: MEDLINE

## (undated) DEVICE — SILICONE DUAL LUMEN STOMACH TUBE,ANTI-REFLUX VALVE AND RADIOPAQUE LINE: Brand: SALEM SUMP

## (undated) DEVICE — 4-PORT MANIFOLD: Brand: NEPTUNE 2

## (undated) DEVICE — SUREFIT, DUAL DISPERSIVE ELECTRODE, CONTACT QUALITY MONITOR: Brand: SUREFIT

## (undated) DEVICE — PATIENT RETURN ELECTRODE, SINGLE-USE, CONTACT QUALITY MONITORING, ADULT, WITH 9FT CORD, FOR PATIENTS WEIGING OVER 33LBS. (15KG): Brand: MEGADYNE

## (undated) DEVICE — INTEGRA® KNIFE 1411050 10PK MYRINGOTOMY LANCE: Brand: INTEGRA®

## (undated) DEVICE — GLOVE SURG SZ 65 THK91MIL LTX FREE SYN POLYISOPRENE

## (undated) DEVICE — SUCTION COAGULATOR, FOOTSWITCHING, 10 FR, 6 INCH (15.24CM): Brand: MEGADYNE

## (undated) DEVICE — BLADE 1884080EM TRICUT 4MMX13CM M4 ROHS: Brand: FUSION®

## (undated) DEVICE — BLADE 1884002 5PK SERRATED 4MM

## (undated) DEVICE — Device

## (undated) DEVICE — CODMAN® SURGICAL PATTIES 1/2" X 3" (1.27CM X 7.62CM): Brand: CODMAN®

## (undated) DEVICE — PROCISE MAX COBLATION WAND: Brand: COBLATION

## (undated) DEVICE — PREP SOL PVP IODINE 4%  4 OZ/BTL

## (undated) DEVICE — POSITIONER HD W8XH4XL8.5IN RASPBERRY FOAM SLT

## (undated) DEVICE — Z INACTIVE USE 2735373 APPLICATOR FBR LAIN COT WOOD TIP ECONOMICAL

## (undated) DEVICE — BLADE ES ELASTOMERIC COAT INSUL DURABLE BEND UPTO 90DEG

## (undated) DEVICE — LINER SUCT CANSTR 1500CC SEMI RIG W/ POR HYDROPHOBIC SHUT

## (undated) DEVICE — SUTURE VCRL SZ 3-0 L27IN ABSRB UD L26MM SH 1/2 CIR J416H

## (undated) DEVICE — SPONGE,TONSIL,DBL STRNG,XRAY,SM,7/8",ST: Brand: MEDLINE INDUSTRIES, INC.

## (undated) DEVICE — SYRINGE MED 10ML TRNSLUC BRL PLUNG BLK MRK POLYPR CTRL

## (undated) DEVICE — PACK-MAJOR

## (undated) DEVICE — DISPOSABLE STANDARD BIPOLAR FORCEPS, NON-STICK,: Brand: SPETZLER-MALIS

## (undated) DEVICE — BLADE 1883517 RAD120 3PK MAX SINUS CVD: Brand: RAD

## (undated) DEVICE — ROYAL SILK SURGICAL GOWN, XXL: Brand: CONVERTORS

## (undated) DEVICE — PATIENT TRACKER 9734887XOM NON-INVASIVE

## (undated) DEVICE — GOWN,SIRUS,NON REINFRCD,LARGE,SET IN SL: Brand: MEDLINE

## (undated) DEVICE — PENCIL SMK EVAC ALL IN 1 DSGN ENH VISIBILITY IMPROVED AIR

## (undated) DEVICE — TELFA NON-ADHERENT ABSORBENT DRESSING: Brand: TELFA

## (undated) DEVICE — FLEXIBLE ADHESIVE BANDAGE: Brand: CURITY

## (undated) DEVICE — GLOVE ORANGE PI 7   MSG9070

## (undated) DEVICE — 1010 S-DRAPE TOWEL DRAPE 10/BX: Brand: STERI-DRAPE™

## (undated) DEVICE — LARYNGOSCOPY - BRONCHOSCOPY: Brand: MEDLINE INDUSTRIES, INC.

## (undated) DEVICE — ENTACT SEPTAL STAPLER 3 PACK: Brand: ENT SINUS

## (undated) DEVICE — SPLINT 1524050 5PK PAIR DOYLE II AIRWAY: Brand: DOYLE II ™

## (undated) DEVICE — COAGULATOR SUCT 8FR L6IN HNDL FOR ENT PROC

## (undated) DEVICE — MEDI-VAC NON-CONDUCTIVE SUCTION TUBING 6MM X 6.1M (20 FT.) L: Brand: CARDINAL HEALTH

## (undated) DEVICE — SUTURE MCRYL + SZ 3-0 L27IN ABSRB UD L26MM SH 1/2 CIR MCP416H

## (undated) DEVICE — STERILE COTTON BALLS LARGE 5/P: Brand: MEDLINE

## (undated) DEVICE — BLADE 1884006EM RAD40 4MM M4 ROTATE ROHS: Brand: FUSION®

## (undated) DEVICE — ELECTROSURGICAL SUCTION COAGULATOR 10FR

## (undated) DEVICE — GAUZE,SPONGE,4"X4",12PLY,STERILE,LF,2'S: Brand: MEDLINE

## (undated) DEVICE — SINU FOAM: Brand: SINU-FOAM

## (undated) DEVICE — SUTURE PROL SZ 2-0 L18IN NONABSORBABLE BLU FS L26MM 3/8 CIR 8685H

## (undated) DEVICE — SOLUTION IV 1000ML 0.9% SOD CHL PH 5 INJ USP VIAFLX PLAS

## (undated) DEVICE — PAD OP RM W20XL72XH2IN PRECIS CUT FLAT EC BIOCLINIC

## (undated) DEVICE — KIT,ANTI FOG,W/SPONGE & FLUID,SOFT PACK: Brand: MEDLINE

## (undated) DEVICE — DILATION SYRINGE: Brand: COOK

## (undated) DEVICE — YANKAUER,BULB TIP,W/O VENT,RIGID,STERILE: Brand: MEDLINE

## (undated) DEVICE — 2000CC GUARDIAN II: Brand: GUARDIAN